# Patient Record
Sex: MALE | Race: WHITE | NOT HISPANIC OR LATINO | Employment: OTHER | URBAN - METROPOLITAN AREA
[De-identification: names, ages, dates, MRNs, and addresses within clinical notes are randomized per-mention and may not be internally consistent; named-entity substitution may affect disease eponyms.]

---

## 2017-01-12 ENCOUNTER — COMMUNICATION - HEALTHEAST (OUTPATIENT)
Dept: NURSING | Facility: CLINIC | Age: 77
End: 2017-01-12

## 2017-01-12 ENCOUNTER — AMBULATORY - HEALTHEAST (OUTPATIENT)
Dept: INTERNAL MEDICINE | Facility: CLINIC | Age: 77
End: 2017-01-12

## 2017-01-12 DIAGNOSIS — I48.20 CHRONIC ATRIAL FIBRILLATION (H): ICD-10-CM

## 2017-01-12 DIAGNOSIS — Z79.01 LONG TERM (CURRENT) USE OF ANTICOAGULANTS: ICD-10-CM

## 2017-01-17 ENCOUNTER — OFFICE VISIT - HEALTHEAST (OUTPATIENT)
Dept: INTERNAL MEDICINE | Facility: CLINIC | Age: 77
End: 2017-01-17

## 2017-01-17 ENCOUNTER — RECORDS - HEALTHEAST (OUTPATIENT)
Dept: ADMINISTRATIVE | Facility: OTHER | Age: 77
End: 2017-01-17

## 2017-01-17 DIAGNOSIS — Z00.00 ROUTINE GENERAL MEDICAL EXAMINATION AT A HEALTH CARE FACILITY: ICD-10-CM

## 2017-01-17 LAB
CHOLEST SERPL-MCNC: 136 MG/DL
FASTING STATUS PATIENT QL REPORTED: YES
HDLC SERPL-MCNC: 46 MG/DL
LDLC SERPL CALC-MCNC: 63 MG/DL
PSA SERPL-MCNC: 0.9 NG/ML (ref 0–6.5)
TRIGL SERPL-MCNC: 135 MG/DL

## 2017-01-17 ASSESSMENT — MIFFLIN-ST. JEOR: SCORE: 1469.32

## 2017-01-18 ENCOUNTER — RECORDS - HEALTHEAST (OUTPATIENT)
Dept: ADMINISTRATIVE | Facility: OTHER | Age: 77
End: 2017-01-18

## 2017-01-19 ENCOUNTER — COMMUNICATION - HEALTHEAST (OUTPATIENT)
Dept: INTERNAL MEDICINE | Facility: CLINIC | Age: 77
End: 2017-01-19

## 2017-01-24 ENCOUNTER — RECORDS - HEALTHEAST (OUTPATIENT)
Dept: ADMINISTRATIVE | Facility: OTHER | Age: 77
End: 2017-01-24

## 2017-01-25 ENCOUNTER — HOSPITAL ENCOUNTER (OUTPATIENT)
Dept: ULTRASOUND IMAGING | Facility: CLINIC | Age: 77
Discharge: HOME | End: 2017-01-25
Attending: SURGERY

## 2017-01-25 DIAGNOSIS — R59.9 ENLARGED LYMPH NODE: ICD-10-CM

## 2017-01-26 ENCOUNTER — COMMUNICATION - HEALTHEAST (OUTPATIENT)
Dept: NURSING | Facility: CLINIC | Age: 77
End: 2017-01-26

## 2017-01-26 ENCOUNTER — AMBULATORY - HEALTHEAST (OUTPATIENT)
Dept: LAB | Facility: CLINIC | Age: 77
End: 2017-01-26

## 2017-01-26 DIAGNOSIS — Z79.01 LONG TERM (CURRENT) USE OF ANTICOAGULANTS: ICD-10-CM

## 2017-01-26 DIAGNOSIS — I48.91 ATRIAL FIBRILLATION (H): ICD-10-CM

## 2017-01-26 DIAGNOSIS — I48.20 CHRONIC ATRIAL FIBRILLATION (H): ICD-10-CM

## 2017-01-31 ENCOUNTER — RECORDS - HEALTHEAST (OUTPATIENT)
Dept: ADMINISTRATIVE | Facility: OTHER | Age: 77
End: 2017-01-31

## 2017-02-07 ENCOUNTER — COMMUNICATION - HEALTHEAST (OUTPATIENT)
Dept: INTERNAL MEDICINE | Facility: CLINIC | Age: 77
End: 2017-02-07

## 2017-02-08 ENCOUNTER — COMMUNICATION - HEALTHEAST (OUTPATIENT)
Dept: INTERNAL MEDICINE | Facility: CLINIC | Age: 77
End: 2017-02-08

## 2017-02-08 DIAGNOSIS — Z79.01 LONG TERM (CURRENT) USE OF ANTICOAGULANTS: ICD-10-CM

## 2017-02-08 DIAGNOSIS — I48.20 CHRONIC ATRIAL FIBRILLATION (H): ICD-10-CM

## 2017-02-13 ENCOUNTER — COMMUNICATION - HEALTHEAST (OUTPATIENT)
Dept: INTERNAL MEDICINE | Facility: CLINIC | Age: 77
End: 2017-02-13

## 2017-02-13 DIAGNOSIS — Z79.01 LONG TERM (CURRENT) USE OF ANTICOAGULANTS: ICD-10-CM

## 2017-02-13 DIAGNOSIS — I48.20 CHRONIC ATRIAL FIBRILLATION (H): ICD-10-CM

## 2017-02-20 ENCOUNTER — COMMUNICATION - HEALTHEAST (OUTPATIENT)
Dept: SCHEDULING | Facility: CLINIC | Age: 77
End: 2017-02-20

## 2017-02-21 ENCOUNTER — COMMUNICATION - HEALTHEAST (OUTPATIENT)
Dept: INTERNAL MEDICINE | Facility: CLINIC | Age: 77
End: 2017-02-21

## 2017-02-21 ENCOUNTER — RECORDS - HEALTHEAST (OUTPATIENT)
Dept: ADMINISTRATIVE | Facility: OTHER | Age: 77
End: 2017-02-21

## 2017-02-21 DIAGNOSIS — Z79.01 LONG TERM (CURRENT) USE OF ANTICOAGULANTS: ICD-10-CM

## 2017-02-21 DIAGNOSIS — I48.20 CHRONIC ATRIAL FIBRILLATION (H): ICD-10-CM

## 2017-02-23 ENCOUNTER — RECORDS - HEALTHEAST (OUTPATIENT)
Dept: ADMINISTRATIVE | Facility: OTHER | Age: 77
End: 2017-02-23

## 2017-02-24 ENCOUNTER — COMMUNICATION - HEALTHEAST (OUTPATIENT)
Dept: NURSING | Facility: CLINIC | Age: 77
End: 2017-02-24

## 2017-02-24 ENCOUNTER — AMBULATORY - HEALTHEAST (OUTPATIENT)
Dept: LAB | Facility: CLINIC | Age: 77
End: 2017-02-24

## 2017-02-24 DIAGNOSIS — Z79.01 LONG TERM (CURRENT) USE OF ANTICOAGULANTS: ICD-10-CM

## 2017-02-24 DIAGNOSIS — I48.91 ATRIAL FIBRILLATION (H): ICD-10-CM

## 2017-02-24 DIAGNOSIS — I48.20 CHRONIC ATRIAL FIBRILLATION (H): ICD-10-CM

## 2017-03-03 ENCOUNTER — COMMUNICATION - HEALTHEAST (OUTPATIENT)
Dept: NURSING | Facility: CLINIC | Age: 77
End: 2017-03-03

## 2017-03-03 DIAGNOSIS — Z79.01 LONG TERM (CURRENT) USE OF ANTICOAGULANTS: ICD-10-CM

## 2017-03-03 DIAGNOSIS — I48.20 CHRONIC ATRIAL FIBRILLATION (H): ICD-10-CM

## 2017-03-10 ENCOUNTER — COMMUNICATION - HEALTHEAST (OUTPATIENT)
Dept: NURSING | Facility: CLINIC | Age: 77
End: 2017-03-10

## 2017-03-10 DIAGNOSIS — Z79.01 LONG TERM (CURRENT) USE OF ANTICOAGULANTS: ICD-10-CM

## 2017-03-10 DIAGNOSIS — I48.20 CHRONIC ATRIAL FIBRILLATION (H): ICD-10-CM

## 2017-03-17 ENCOUNTER — COMMUNICATION - HEALTHEAST (OUTPATIENT)
Dept: NURSING | Facility: CLINIC | Age: 77
End: 2017-03-17

## 2017-03-17 DIAGNOSIS — I48.20 CHRONIC ATRIAL FIBRILLATION (H): ICD-10-CM

## 2017-03-22 ENCOUNTER — AMBULATORY - HEALTHEAST (OUTPATIENT)
Dept: HEALTH INFORMATION MANAGEMENT | Facility: CLINIC | Age: 77
End: 2017-03-22

## 2017-04-05 ENCOUNTER — COMMUNICATION - HEALTHEAST (OUTPATIENT)
Dept: NURSING | Facility: CLINIC | Age: 77
End: 2017-04-05

## 2017-04-05 DIAGNOSIS — Z79.01 LONG TERM (CURRENT) USE OF ANTICOAGULANTS: ICD-10-CM

## 2017-04-05 DIAGNOSIS — I48.20 CHRONIC ATRIAL FIBRILLATION (H): ICD-10-CM

## 2017-04-26 ENCOUNTER — COMMUNICATION - HEALTHEAST (OUTPATIENT)
Dept: NURSING | Facility: CLINIC | Age: 77
End: 2017-04-26

## 2017-04-26 DIAGNOSIS — Z79.01 LONG TERM (CURRENT) USE OF ANTICOAGULANTS: ICD-10-CM

## 2017-04-26 DIAGNOSIS — I48.20 CHRONIC ATRIAL FIBRILLATION (H): ICD-10-CM

## 2017-05-02 ENCOUNTER — AMBULATORY - HEALTHEAST (OUTPATIENT)
Dept: INTERNAL MEDICINE | Facility: CLINIC | Age: 77
End: 2017-05-02

## 2017-05-02 ENCOUNTER — COMMUNICATION - HEALTHEAST (OUTPATIENT)
Dept: INTERNAL MEDICINE | Facility: CLINIC | Age: 77
End: 2017-05-02

## 2017-05-02 DIAGNOSIS — R52 PAIN: ICD-10-CM

## 2017-05-11 ENCOUNTER — COMMUNICATION - HEALTHEAST (OUTPATIENT)
Dept: NURSING | Facility: CLINIC | Age: 77
End: 2017-05-11

## 2017-05-11 ENCOUNTER — AMBULATORY - HEALTHEAST (OUTPATIENT)
Dept: LAB | Facility: CLINIC | Age: 77
End: 2017-05-11

## 2017-05-11 DIAGNOSIS — I48.20 CHRONIC ATRIAL FIBRILLATION (H): ICD-10-CM

## 2017-05-11 DIAGNOSIS — Z79.01 LONG TERM (CURRENT) USE OF ANTICOAGULANTS: ICD-10-CM

## 2017-05-22 ENCOUNTER — COMMUNICATION - HEALTHEAST (OUTPATIENT)
Dept: NURSING | Facility: CLINIC | Age: 77
End: 2017-05-22

## 2017-05-22 ENCOUNTER — AMBULATORY - HEALTHEAST (OUTPATIENT)
Dept: LAB | Facility: CLINIC | Age: 77
End: 2017-05-22

## 2017-05-22 DIAGNOSIS — I48.20 CHRONIC ATRIAL FIBRILLATION (H): ICD-10-CM

## 2017-05-22 DIAGNOSIS — Z79.01 LONG TERM (CURRENT) USE OF ANTICOAGULANTS: ICD-10-CM

## 2017-05-26 ENCOUNTER — COMMUNICATION - HEALTHEAST (OUTPATIENT)
Dept: INTERNAL MEDICINE | Facility: CLINIC | Age: 77
End: 2017-05-26

## 2017-05-31 ENCOUNTER — RECORDS - HEALTHEAST (OUTPATIENT)
Dept: ADMINISTRATIVE | Facility: OTHER | Age: 77
End: 2017-05-31

## 2017-06-13 ENCOUNTER — RECORDS - HEALTHEAST (OUTPATIENT)
Dept: ADMINISTRATIVE | Facility: OTHER | Age: 77
End: 2017-06-13

## 2017-06-20 ENCOUNTER — COMMUNICATION - HEALTHEAST (OUTPATIENT)
Dept: INTERNAL MEDICINE | Facility: CLINIC | Age: 77
End: 2017-06-20

## 2017-06-20 DIAGNOSIS — Z79.01 LONG TERM (CURRENT) USE OF ANTICOAGULANTS: ICD-10-CM

## 2017-06-22 ENCOUNTER — COMMUNICATION - HEALTHEAST (OUTPATIENT)
Dept: INTERNAL MEDICINE | Facility: CLINIC | Age: 77
End: 2017-06-22

## 2017-06-23 ENCOUNTER — COMMUNICATION - HEALTHEAST (OUTPATIENT)
Dept: INTERNAL MEDICINE | Facility: CLINIC | Age: 77
End: 2017-06-23

## 2017-06-23 DIAGNOSIS — Z79.01 LONG TERM (CURRENT) USE OF ANTICOAGULANTS: ICD-10-CM

## 2017-06-23 DIAGNOSIS — I48.91 ATRIAL FIBRILLATION (H): ICD-10-CM

## 2017-06-27 ENCOUNTER — OFFICE VISIT - HEALTHEAST (OUTPATIENT)
Dept: INTERNAL MEDICINE | Facility: CLINIC | Age: 77
End: 2017-06-27

## 2017-06-27 ENCOUNTER — COMMUNICATION - HEALTHEAST (OUTPATIENT)
Dept: NURSING | Facility: CLINIC | Age: 77
End: 2017-06-27

## 2017-06-27 DIAGNOSIS — I48.91 ATRIAL FIBRILLATION (H): ICD-10-CM

## 2017-06-27 DIAGNOSIS — Z79.01 LONG TERM (CURRENT) USE OF ANTICOAGULANTS: ICD-10-CM

## 2017-06-27 DIAGNOSIS — I48.20 CHRONIC ATRIAL FIBRILLATION (H): ICD-10-CM

## 2017-06-27 DIAGNOSIS — Z01.818 PREOP EXAMINATION: ICD-10-CM

## 2017-06-27 LAB
ATRIAL RATE - MUSE: 75 BPM
DIASTOLIC BLOOD PRESSURE - MUSE: NORMAL MMHG
INTERPRETATION ECG - MUSE: NORMAL
P AXIS - MUSE: 79 DEGREES
PR INTERVAL - MUSE: 224 MS
QRS DURATION - MUSE: 74 MS
QT - MUSE: 410 MS
QTC - MUSE: 457 MS
R AXIS - MUSE: -23 DEGREES
SYSTOLIC BLOOD PRESSURE - MUSE: NORMAL MMHG
T AXIS - MUSE: 16 DEGREES
VENTRICULAR RATE- MUSE: 75 BPM

## 2017-06-27 ASSESSMENT — MIFFLIN-ST. JEOR: SCORE: 1469.89

## 2017-06-29 ENCOUNTER — COMMUNICATION - HEALTHEAST (OUTPATIENT)
Dept: INTERNAL MEDICINE | Facility: CLINIC | Age: 77
End: 2017-06-29

## 2017-06-29 ENCOUNTER — RECORDS - HEALTHEAST (OUTPATIENT)
Dept: ADMINISTRATIVE | Facility: OTHER | Age: 77
End: 2017-06-29

## 2017-07-06 ENCOUNTER — AMBULATORY - HEALTHEAST (OUTPATIENT)
Dept: LAB | Facility: CLINIC | Age: 77
End: 2017-07-06

## 2017-07-06 ENCOUNTER — COMMUNICATION - HEALTHEAST (OUTPATIENT)
Dept: NURSING | Facility: CLINIC | Age: 77
End: 2017-07-06

## 2017-07-06 DIAGNOSIS — Z79.01 LONG TERM (CURRENT) USE OF ANTICOAGULANTS: ICD-10-CM

## 2017-07-06 DIAGNOSIS — I48.20 CHRONIC ATRIAL FIBRILLATION (H): ICD-10-CM

## 2017-07-10 ENCOUNTER — RECORDS - HEALTHEAST (OUTPATIENT)
Dept: ADMINISTRATIVE | Facility: OTHER | Age: 77
End: 2017-07-10

## 2017-07-17 ENCOUNTER — AMBULATORY - HEALTHEAST (OUTPATIENT)
Dept: LAB | Facility: CLINIC | Age: 77
End: 2017-07-17

## 2017-07-17 ENCOUNTER — COMMUNICATION - HEALTHEAST (OUTPATIENT)
Dept: NURSING | Facility: CLINIC | Age: 77
End: 2017-07-17

## 2017-07-17 DIAGNOSIS — I48.91 ATRIAL FIBRILLATION (H): ICD-10-CM

## 2017-07-17 DIAGNOSIS — I48.20 CHRONIC ATRIAL FIBRILLATION (H): ICD-10-CM

## 2017-07-17 DIAGNOSIS — Z79.01 LONG TERM (CURRENT) USE OF ANTICOAGULANTS: ICD-10-CM

## 2017-07-31 ENCOUNTER — AMBULATORY - HEALTHEAST (OUTPATIENT)
Dept: LAB | Facility: CLINIC | Age: 77
End: 2017-07-31

## 2017-07-31 ENCOUNTER — COMMUNICATION - HEALTHEAST (OUTPATIENT)
Dept: NURSING | Facility: CLINIC | Age: 77
End: 2017-07-31

## 2017-07-31 DIAGNOSIS — I48.20 CHRONIC ATRIAL FIBRILLATION (H): ICD-10-CM

## 2017-07-31 DIAGNOSIS — Z79.01 LONG TERM (CURRENT) USE OF ANTICOAGULANTS: ICD-10-CM

## 2017-07-31 DIAGNOSIS — I48.91 ATRIAL FIBRILLATION (H): ICD-10-CM

## 2017-08-07 ENCOUNTER — RECORDS - HEALTHEAST (OUTPATIENT)
Dept: GENERAL RADIOLOGY | Facility: CLINIC | Age: 77
End: 2017-08-07

## 2017-08-07 ENCOUNTER — COMMUNICATION - HEALTHEAST (OUTPATIENT)
Dept: NURSING | Facility: CLINIC | Age: 77
End: 2017-08-07

## 2017-08-07 ENCOUNTER — OFFICE VISIT - HEALTHEAST (OUTPATIENT)
Dept: PODIATRY | Facility: CLINIC | Age: 77
End: 2017-08-07

## 2017-08-07 ENCOUNTER — AMBULATORY - HEALTHEAST (OUTPATIENT)
Dept: LAB | Facility: CLINIC | Age: 77
End: 2017-08-07

## 2017-08-07 DIAGNOSIS — I48.20 CHRONIC ATRIAL FIBRILLATION (H): ICD-10-CM

## 2017-08-07 DIAGNOSIS — Z79.01 LONG TERM (CURRENT) USE OF ANTICOAGULANTS: ICD-10-CM

## 2017-08-07 DIAGNOSIS — M72.2 PLANTAR FASCIAL FIBROMATOSIS: ICD-10-CM

## 2017-08-07 DIAGNOSIS — M72.2 PLANTAR FASCIITIS: ICD-10-CM

## 2017-08-07 DIAGNOSIS — I48.91 ATRIAL FIBRILLATION (H): ICD-10-CM

## 2017-08-07 ASSESSMENT — MIFFLIN-ST. JEOR: SCORE: 1465.35

## 2017-08-16 ENCOUNTER — COMMUNICATION - HEALTHEAST (OUTPATIENT)
Dept: ADMINISTRATIVE | Facility: CLINIC | Age: 77
End: 2017-08-16

## 2017-09-01 ENCOUNTER — COMMUNICATION - HEALTHEAST (OUTPATIENT)
Dept: NURSING | Facility: CLINIC | Age: 77
End: 2017-09-01

## 2017-09-01 ENCOUNTER — AMBULATORY - HEALTHEAST (OUTPATIENT)
Dept: LAB | Facility: CLINIC | Age: 77
End: 2017-09-01

## 2017-09-01 DIAGNOSIS — Z79.01 LONG TERM (CURRENT) USE OF ANTICOAGULANTS: ICD-10-CM

## 2017-09-01 DIAGNOSIS — I48.20 CHRONIC ATRIAL FIBRILLATION (H): ICD-10-CM

## 2017-09-21 ENCOUNTER — COMMUNICATION - HEALTHEAST (OUTPATIENT)
Dept: INTERNAL MEDICINE | Facility: CLINIC | Age: 77
End: 2017-09-21

## 2017-09-21 ENCOUNTER — COMMUNICATION - HEALTHEAST (OUTPATIENT)
Dept: ADMINISTRATIVE | Facility: CLINIC | Age: 77
End: 2017-09-21

## 2017-09-21 DIAGNOSIS — I48.91 ATRIAL FIBRILLATION (H): ICD-10-CM

## 2017-09-21 DIAGNOSIS — Z79.01 LONG TERM (CURRENT) USE OF ANTICOAGULANTS: ICD-10-CM

## 2017-09-27 ENCOUNTER — COMMUNICATION - HEALTHEAST (OUTPATIENT)
Dept: ADMINISTRATIVE | Facility: CLINIC | Age: 77
End: 2017-09-27

## 2017-09-27 ENCOUNTER — OFFICE VISIT - HEALTHEAST (OUTPATIENT)
Dept: INTERNAL MEDICINE | Facility: CLINIC | Age: 77
End: 2017-09-27

## 2017-09-27 DIAGNOSIS — C90.00 MULTIPLE MYELOMA, REMISSION STATUS UNSPECIFIED (H): ICD-10-CM

## 2017-09-27 LAB
CHOLEST SERPL-MCNC: 169 MG/DL
FASTING STATUS PATIENT QL REPORTED: YES
HDLC SERPL-MCNC: 52 MG/DL
LDLC SERPL CALC-MCNC: 81 MG/DL
TRIGL SERPL-MCNC: 180 MG/DL

## 2017-09-27 ASSESSMENT — MIFFLIN-ST. JEOR: SCORE: 1474.61

## 2017-09-28 ENCOUNTER — COMMUNICATION - HEALTHEAST (OUTPATIENT)
Dept: INTERNAL MEDICINE | Facility: CLINIC | Age: 77
End: 2017-09-28

## 2017-10-06 ENCOUNTER — COMMUNICATION - HEALTHEAST (OUTPATIENT)
Dept: NURSING | Facility: CLINIC | Age: 77
End: 2017-10-06

## 2017-10-06 ENCOUNTER — AMBULATORY - HEALTHEAST (OUTPATIENT)
Dept: LAB | Facility: CLINIC | Age: 77
End: 2017-10-06

## 2017-10-06 DIAGNOSIS — I48.20 CHRONIC ATRIAL FIBRILLATION (H): ICD-10-CM

## 2017-10-06 DIAGNOSIS — I48.91 ATRIAL FIBRILLATION (H): ICD-10-CM

## 2017-10-10 ENCOUNTER — RECORDS - HEALTHEAST (OUTPATIENT)
Dept: ADMINISTRATIVE | Facility: OTHER | Age: 77
End: 2017-10-10

## 2017-10-19 ENCOUNTER — COMMUNICATION - HEALTHEAST (OUTPATIENT)
Dept: INTERNAL MEDICINE | Facility: CLINIC | Age: 77
End: 2017-10-19

## 2017-10-19 DIAGNOSIS — I48.91 ATRIAL FIBRILLATION (H): ICD-10-CM

## 2017-10-19 DIAGNOSIS — Z79.01 LONG TERM CURRENT USE OF ANTICOAGULANT THERAPY: ICD-10-CM

## 2017-11-02 ENCOUNTER — COMMUNICATION - HEALTHEAST (OUTPATIENT)
Dept: NURSING | Facility: CLINIC | Age: 77
End: 2017-11-02

## 2017-11-02 DIAGNOSIS — I48.91 ATRIAL FIBRILLATION (H): ICD-10-CM

## 2017-12-08 ENCOUNTER — COMMUNICATION - HEALTHEAST (OUTPATIENT)
Dept: NURSING | Facility: CLINIC | Age: 77
End: 2017-12-08

## 2017-12-12 ENCOUNTER — COMMUNICATION - HEALTHEAST (OUTPATIENT)
Dept: INTERNAL MEDICINE | Facility: CLINIC | Age: 77
End: 2017-12-12

## 2017-12-12 ENCOUNTER — COMMUNICATION - HEALTHEAST (OUTPATIENT)
Dept: NURSING | Facility: CLINIC | Age: 77
End: 2017-12-12

## 2017-12-12 ENCOUNTER — AMBULATORY - HEALTHEAST (OUTPATIENT)
Dept: LAB | Facility: CLINIC | Age: 77
End: 2017-12-12

## 2017-12-12 DIAGNOSIS — I48.91 ATRIAL FIBRILLATION (H): ICD-10-CM

## 2017-12-12 DIAGNOSIS — I48.20 CHRONIC ATRIAL FIBRILLATION (H): ICD-10-CM

## 2017-12-14 ENCOUNTER — RECORDS - HEALTHEAST (OUTPATIENT)
Dept: ADMINISTRATIVE | Facility: OTHER | Age: 77
End: 2017-12-14

## 2018-01-15 ENCOUNTER — COMMUNICATION - HEALTHEAST (OUTPATIENT)
Dept: INTERNAL MEDICINE | Facility: CLINIC | Age: 78
End: 2018-01-15

## 2018-01-16 ENCOUNTER — AMBULATORY - HEALTHEAST (OUTPATIENT)
Dept: INTERNAL MEDICINE | Facility: CLINIC | Age: 78
End: 2018-01-16

## 2018-01-16 ENCOUNTER — COMMUNICATION - HEALTHEAST (OUTPATIENT)
Dept: NURSING | Facility: CLINIC | Age: 78
End: 2018-01-16

## 2018-01-16 ENCOUNTER — OFFICE VISIT - HEALTHEAST (OUTPATIENT)
Dept: INTERNAL MEDICINE | Facility: CLINIC | Age: 78
End: 2018-01-16

## 2018-01-16 ENCOUNTER — COMMUNICATION - HEALTHEAST (OUTPATIENT)
Dept: INTERNAL MEDICINE | Facility: CLINIC | Age: 78
End: 2018-01-16

## 2018-01-16 DIAGNOSIS — Z00.00 ROUTINE GENERAL MEDICAL EXAMINATION AT A HEALTH CARE FACILITY: ICD-10-CM

## 2018-01-16 DIAGNOSIS — I48.91 ATRIAL FIBRILLATION (H): ICD-10-CM

## 2018-01-16 DIAGNOSIS — I48.20 CHRONIC ATRIAL FIBRILLATION (H): ICD-10-CM

## 2018-01-16 LAB
ALBUMIN SERPL-MCNC: 3.5 G/DL (ref 3.5–5)
ALBUMIN UR-MCNC: ABNORMAL MG/DL
ALP SERPL-CCNC: 56 U/L (ref 45–120)
ALT SERPL W P-5'-P-CCNC: 15 U/L (ref 0–45)
ANION GAP SERPL CALCULATED.3IONS-SCNC: 12 MMOL/L (ref 5–18)
APPEARANCE UR: CLEAR
AST SERPL W P-5'-P-CCNC: 27 U/L (ref 0–40)
BACTERIA #/AREA URNS HPF: ABNORMAL HPF
BILIRUB SERPL-MCNC: 0.3 MG/DL (ref 0–1)
BILIRUB UR QL STRIP: NEGATIVE
BUN SERPL-MCNC: 32 MG/DL (ref 8–28)
CALCIUM SERPL-MCNC: 8.9 MG/DL (ref 8.5–10.5)
CHLORIDE BLD-SCNC: 107 MMOL/L (ref 98–107)
CHOLEST SERPL-MCNC: 161 MG/DL
CO2 SERPL-SCNC: 21 MMOL/L (ref 22–31)
COLOR UR AUTO: YELLOW
CREAT SERPL-MCNC: 1.64 MG/DL (ref 0.7–1.3)
ERYTHROCYTE [DISTWIDTH] IN BLOOD BY AUTOMATED COUNT: 12.6 % (ref 11–14.5)
FASTING STATUS PATIENT QL REPORTED: YES
GFR SERPL CREATININE-BSD FRML MDRD: 41 ML/MIN/1.73M2
GLUCOSE BLD-MCNC: 102 MG/DL (ref 70–125)
GLUCOSE UR STRIP-MCNC: NEGATIVE MG/DL
HCT VFR BLD AUTO: 38.7 % (ref 40–54)
HDLC SERPL-MCNC: 27 MG/DL
HGB BLD-MCNC: 12.9 G/DL (ref 14–18)
HGB UR QL STRIP: ABNORMAL
INR PPP: 2.5 (ref 0.9–1.1)
KETONES UR STRIP-MCNC: NEGATIVE MG/DL
LDLC SERPL CALC-MCNC: 47 MG/DL
LDLC SERPL CALC-MCNC: ABNORMAL MG/DL
LEUKOCYTE ESTERASE UR QL STRIP: NEGATIVE
MCH RBC QN AUTO: 32 PG (ref 27–34)
MCHC RBC AUTO-ENTMCNC: 33.2 G/DL (ref 32–36)
MCV RBC AUTO: 96 FL (ref 80–100)
NITRATE UR QL: NEGATIVE
PH UR STRIP: 5.5 [PH] (ref 5–8)
PLATELET # BLD AUTO: 142 THOU/UL (ref 140–440)
PMV BLD AUTO: 8.1 FL (ref 7–10)
POTASSIUM BLD-SCNC: 4.8 MMOL/L (ref 3.5–5)
PROT SERPL-MCNC: 7.6 G/DL (ref 6–8)
PSA SERPL-MCNC: 0.9 NG/ML (ref 0–6.5)
RBC # BLD AUTO: 4.02 MILL/UL (ref 4.4–6.2)
RBC #/AREA URNS AUTO: ABNORMAL HPF
SODIUM SERPL-SCNC: 140 MMOL/L (ref 136–145)
SP GR UR STRIP: 1.02 (ref 1–1.03)
SQUAMOUS #/AREA URNS AUTO: ABNORMAL LPF
TRIGL SERPL-MCNC: 628 MG/DL
TSH SERPL DL<=0.005 MIU/L-ACNC: 4 UIU/ML (ref 0.3–5)
UROBILINOGEN UR STRIP-ACNC: ABNORMAL
WBC #/AREA URNS AUTO: ABNORMAL HPF
WBC: 4.8 THOU/UL (ref 4–11)

## 2018-01-16 ASSESSMENT — MIFFLIN-ST. JEOR: SCORE: 1452.31

## 2018-01-18 ENCOUNTER — COMMUNICATION - HEALTHEAST (OUTPATIENT)
Dept: INTERNAL MEDICINE | Facility: CLINIC | Age: 78
End: 2018-01-18

## 2018-01-19 ENCOUNTER — COMMUNICATION - HEALTHEAST (OUTPATIENT)
Dept: NURSING | Facility: CLINIC | Age: 78
End: 2018-01-19

## 2018-01-19 ENCOUNTER — AMBULATORY - HEALTHEAST (OUTPATIENT)
Dept: LAB | Facility: CLINIC | Age: 78
End: 2018-01-19

## 2018-01-19 DIAGNOSIS — I48.20 CHRONIC ATRIAL FIBRILLATION (H): ICD-10-CM

## 2018-01-19 DIAGNOSIS — I48.91 ATRIAL FIBRILLATION (H): ICD-10-CM

## 2018-01-19 LAB — INR PPP: 3.2 (ref 0.9–1.1)

## 2018-01-30 ENCOUNTER — RECORDS - HEALTHEAST (OUTPATIENT)
Dept: ADMINISTRATIVE | Facility: OTHER | Age: 78
End: 2018-01-30

## 2018-02-01 ENCOUNTER — AMBULATORY - HEALTHEAST (OUTPATIENT)
Dept: LAB | Facility: CLINIC | Age: 78
End: 2018-02-01

## 2018-02-01 ENCOUNTER — COMMUNICATION - HEALTHEAST (OUTPATIENT)
Dept: NURSING | Facility: CLINIC | Age: 78
End: 2018-02-01

## 2018-02-01 DIAGNOSIS — I48.91 ATRIAL FIBRILLATION (H): ICD-10-CM

## 2018-02-01 DIAGNOSIS — I48.20 CHRONIC ATRIAL FIBRILLATION (H): ICD-10-CM

## 2018-02-01 DIAGNOSIS — I48.19 PERSISTENT ATRIAL FIBRILLATION (H): ICD-10-CM

## 2018-02-01 LAB — INR PPP: 2.6 (ref 0.9–1.1)

## 2018-02-08 ENCOUNTER — RECORDS - HEALTHEAST (OUTPATIENT)
Dept: ADMINISTRATIVE | Facility: OTHER | Age: 78
End: 2018-02-08

## 2018-02-12 ENCOUNTER — COMMUNICATION - HEALTHEAST (OUTPATIENT)
Dept: INTERNAL MEDICINE | Facility: CLINIC | Age: 78
End: 2018-02-12

## 2018-02-12 ENCOUNTER — RECORDS - HEALTHEAST (OUTPATIENT)
Dept: ADMINISTRATIVE | Facility: OTHER | Age: 78
End: 2018-02-12

## 2018-02-12 DIAGNOSIS — Z79.01 LONG TERM CURRENT USE OF ANTICOAGULANT THERAPY: ICD-10-CM

## 2018-02-12 DIAGNOSIS — I48.91 ATRIAL FIBRILLATION (H): ICD-10-CM

## 2018-02-14 ENCOUNTER — RECORDS - HEALTHEAST (OUTPATIENT)
Dept: ADMINISTRATIVE | Facility: OTHER | Age: 78
End: 2018-02-14

## 2018-02-21 ENCOUNTER — AMBULATORY - HEALTHEAST (OUTPATIENT)
Dept: LAB | Facility: CLINIC | Age: 78
End: 2018-02-21

## 2018-02-21 ENCOUNTER — COMMUNICATION - HEALTHEAST (OUTPATIENT)
Dept: NURSING | Facility: CLINIC | Age: 78
End: 2018-02-21

## 2018-02-21 DIAGNOSIS — I48.19 PERSISTENT ATRIAL FIBRILLATION (H): ICD-10-CM

## 2018-02-21 LAB — INR PPP: 2.2 (ref 0.9–1.1)

## 2018-02-22 ENCOUNTER — RECORDS - HEALTHEAST (OUTPATIENT)
Dept: ADMINISTRATIVE | Facility: OTHER | Age: 78
End: 2018-02-22

## 2018-03-21 ENCOUNTER — COMMUNICATION - HEALTHEAST (OUTPATIENT)
Dept: INTERNAL MEDICINE | Facility: CLINIC | Age: 78
End: 2018-03-21

## 2018-03-21 DIAGNOSIS — I48.19 PERSISTENT ATRIAL FIBRILLATION (H): ICD-10-CM

## 2018-03-21 LAB — INR PPP: 2 (ref 0.9–1.1)

## 2018-04-23 ENCOUNTER — COMMUNICATION - HEALTHEAST (OUTPATIENT)
Dept: INTERNAL MEDICINE | Facility: CLINIC | Age: 78
End: 2018-04-23

## 2018-04-23 DIAGNOSIS — I48.19 PERSISTENT ATRIAL FIBRILLATION (H): ICD-10-CM

## 2018-04-23 LAB — INR PPP: 2.6 (ref 0.9–1.1)

## 2018-04-25 LAB — INR PPP: 2.7 (ref 0.9–1.1)

## 2018-05-02 ENCOUNTER — RECORDS - HEALTHEAST (OUTPATIENT)
Dept: ADMINISTRATIVE | Facility: OTHER | Age: 78
End: 2018-05-02

## 2018-05-15 ENCOUNTER — AMBULATORY - HEALTHEAST (OUTPATIENT)
Dept: LAB | Facility: CLINIC | Age: 78
End: 2018-05-15

## 2018-05-15 ENCOUNTER — COMMUNICATION - HEALTHEAST (OUTPATIENT)
Dept: ANTICOAGULATION | Facility: CLINIC | Age: 78
End: 2018-05-15

## 2018-05-15 DIAGNOSIS — I48.19 PERSISTENT ATRIAL FIBRILLATION (H): ICD-10-CM

## 2018-05-15 LAB — INR PPP: 2.2 (ref 0.9–1.1)

## 2018-06-25 ENCOUNTER — AMBULATORY - HEALTHEAST (OUTPATIENT)
Dept: LAB | Facility: CLINIC | Age: 78
End: 2018-06-25

## 2018-06-25 ENCOUNTER — COMMUNICATION - HEALTHEAST (OUTPATIENT)
Dept: ANTICOAGULATION | Facility: CLINIC | Age: 78
End: 2018-06-25

## 2018-06-25 DIAGNOSIS — I48.19 PERSISTENT ATRIAL FIBRILLATION (H): ICD-10-CM

## 2018-06-25 LAB — INR PPP: 3.1 (ref 0.9–1.1)

## 2018-07-02 ENCOUNTER — RECORDS - HEALTHEAST (OUTPATIENT)
Dept: ADMINISTRATIVE | Facility: OTHER | Age: 78
End: 2018-07-02

## 2018-07-09 ENCOUNTER — AMBULATORY - HEALTHEAST (OUTPATIENT)
Dept: LAB | Facility: CLINIC | Age: 78
End: 2018-07-09

## 2018-07-09 ENCOUNTER — COMMUNICATION - HEALTHEAST (OUTPATIENT)
Dept: ANTICOAGULATION | Facility: CLINIC | Age: 78
End: 2018-07-09

## 2018-07-09 DIAGNOSIS — I48.19 PERSISTENT ATRIAL FIBRILLATION (H): ICD-10-CM

## 2018-07-09 LAB — INR PPP: 2.6 (ref 0.9–1.1)

## 2018-07-30 ENCOUNTER — COMMUNICATION - HEALTHEAST (OUTPATIENT)
Dept: ANTICOAGULATION | Facility: CLINIC | Age: 78
End: 2018-07-30

## 2018-07-30 ENCOUNTER — AMBULATORY - HEALTHEAST (OUTPATIENT)
Dept: LAB | Facility: CLINIC | Age: 78
End: 2018-07-30

## 2018-07-30 DIAGNOSIS — I48.19 PERSISTENT ATRIAL FIBRILLATION (H): ICD-10-CM

## 2018-07-30 LAB — INR PPP: 2 (ref 0.9–1.1)

## 2018-08-06 ENCOUNTER — RECORDS - HEALTHEAST (OUTPATIENT)
Dept: ADMINISTRATIVE | Facility: OTHER | Age: 78
End: 2018-08-06

## 2018-08-10 ENCOUNTER — RECORDS - HEALTHEAST (OUTPATIENT)
Dept: ADMINISTRATIVE | Facility: OTHER | Age: 78
End: 2018-08-10

## 2018-08-27 ENCOUNTER — AMBULATORY - HEALTHEAST (OUTPATIENT)
Dept: LAB | Facility: CLINIC | Age: 78
End: 2018-08-27

## 2018-08-27 ENCOUNTER — COMMUNICATION - HEALTHEAST (OUTPATIENT)
Dept: ANTICOAGULATION | Facility: CLINIC | Age: 78
End: 2018-08-27

## 2018-08-27 DIAGNOSIS — I48.19 PERSISTENT ATRIAL FIBRILLATION (H): ICD-10-CM

## 2018-08-27 LAB — INR PPP: 2 (ref 0.9–1.1)

## 2018-09-21 ENCOUNTER — AMBULATORY - HEALTHEAST (OUTPATIENT)
Dept: LAB | Facility: CLINIC | Age: 78
End: 2018-09-21

## 2018-09-21 ENCOUNTER — COMMUNICATION - HEALTHEAST (OUTPATIENT)
Dept: ANTICOAGULATION | Facility: CLINIC | Age: 78
End: 2018-09-21

## 2018-09-21 DIAGNOSIS — I48.19 PERSISTENT ATRIAL FIBRILLATION (H): ICD-10-CM

## 2018-09-21 LAB — INR PPP: 2.66 (ref 0.9–1.1)

## 2018-10-17 ENCOUNTER — RECORDS - HEALTHEAST (OUTPATIENT)
Dept: ADMINISTRATIVE | Facility: OTHER | Age: 78
End: 2018-10-17

## 2018-10-22 ENCOUNTER — COMMUNICATION - HEALTHEAST (OUTPATIENT)
Dept: LAB | Facility: CLINIC | Age: 78
End: 2018-10-22

## 2018-10-23 ENCOUNTER — AMBULATORY - HEALTHEAST (OUTPATIENT)
Dept: NURSING | Facility: CLINIC | Age: 78
End: 2018-10-23

## 2018-10-23 ENCOUNTER — COMMUNICATION - HEALTHEAST (OUTPATIENT)
Dept: ANTICOAGULATION | Facility: CLINIC | Age: 78
End: 2018-10-23

## 2018-10-23 ENCOUNTER — AMBULATORY - HEALTHEAST (OUTPATIENT)
Dept: LAB | Facility: CLINIC | Age: 78
End: 2018-10-23

## 2018-10-23 DIAGNOSIS — Z23 NEED FOR VACCINATION: ICD-10-CM

## 2018-10-23 DIAGNOSIS — Z79.01 LONG TERM (CURRENT) USE OF ANTICOAGULANTS: ICD-10-CM

## 2018-10-23 DIAGNOSIS — I48.19 PERSISTENT ATRIAL FIBRILLATION (H): ICD-10-CM

## 2018-10-23 LAB — INR PPP: 1.6 (ref 0.9–1.1)

## 2018-10-24 ENCOUNTER — COMMUNICATION - HEALTHEAST (OUTPATIENT)
Dept: ANTICOAGULATION | Facility: CLINIC | Age: 78
End: 2018-10-24

## 2018-10-24 DIAGNOSIS — Z79.01 LONG TERM (CURRENT) USE OF ANTICOAGULANTS: ICD-10-CM

## 2018-10-24 DIAGNOSIS — I48.19 PERSISTENT ATRIAL FIBRILLATION (H): ICD-10-CM

## 2018-10-31 ENCOUNTER — APPOINTMENT (RX ONLY)
Dept: URBAN - METROPOLITAN AREA CLINIC 116 | Facility: CLINIC | Age: 78
Setting detail: DERMATOLOGY
End: 2018-10-31

## 2018-10-31 DIAGNOSIS — Z01.818 ENCOUNTER FOR OTHER PREPROCEDURAL EXAMINATION: ICD-10-CM

## 2018-10-31 PROCEDURE — ? OTHER

## 2018-10-31 PROCEDURE — ? COUNSELING

## 2018-10-31 NOTE — PROCEDURE: OTHER
Note Text (......Xxx Chief Complaint.): This diagnosis correlates with the
Detail Level: Zone
Other (Free Text): Pink Low right antihelix\\nMohs 11/8/18 Dr. Ericka Mann

## 2018-11-06 ENCOUNTER — COMMUNICATION - HEALTHEAST (OUTPATIENT)
Dept: INTERNAL MEDICINE | Facility: CLINIC | Age: 78
End: 2018-11-06

## 2018-11-08 ENCOUNTER — APPOINTMENT (RX ONLY)
Dept: URBAN - METROPOLITAN AREA CLINIC 116 | Facility: CLINIC | Age: 78
Setting detail: DERMATOLOGY
End: 2018-11-08

## 2018-11-08 PROBLEM — Z85.828 PERSONAL HISTORY OF OTHER MALIGNANT NEOPLASM OF SKIN: Status: ACTIVE | Noted: 2018-11-08

## 2018-11-08 PROBLEM — L57.0 ACTINIC KERATOSIS: Status: ACTIVE | Noted: 2018-11-08

## 2018-11-08 PROBLEM — C44.222 SQUAMOUS CELL CARCINOMA OF SKIN OF RIGHT EAR AND EXTERNAL AURICULAR CANAL: Status: ACTIVE | Noted: 2018-11-08

## 2018-11-08 PROBLEM — I48.91 UNSPECIFIED ATRIAL FIBRILLATION: Status: ACTIVE | Noted: 2018-11-08

## 2018-11-08 PROBLEM — H91.90 UNSPECIFIED HEARING LOSS, UNSPECIFIED EAR: Status: ACTIVE | Noted: 2018-11-08

## 2018-11-08 PROCEDURE — 17311 MOHS 1 STAGE H/N/HF/G: CPT

## 2018-11-08 PROCEDURE — 17312 MOHS ADDL STAGE: CPT

## 2018-11-08 PROCEDURE — ? REFERRAL CORRESPONDENCE

## 2018-11-08 PROCEDURE — ? MOHS SURGERY

## 2018-11-08 NOTE — PROCEDURE: MOHS SURGERY
Stage 14: Number Of Blocks?: 0
Graft Cartilage Fenestration Text: The cartilage was fenestrated with a 2mm punch biopsy to help facilitate graft survival and healing.
Hatchet Flap Text: The defect edges were debeveled with a #15 scalpel blade. Given the location of the defect, shape of the defect and the proximity to free margins a hatchet flap was deemed most appropriate. Using a sterile surgical marker, an appropriate hatchet flap was drawn incorporating the defect and placing the expected incisions within the relaxed skin tension lines where possible. The area thus outlined was incised deep to adipose tissue with a #15 scalpel blade. The skin margins were undermined to an appropriate distance in all directions utilizing iris scissors.
Wound Care (No Sutures): Petrolatum
Full Thickness Lip Wedge Repair (Flap) Text: Given the location of the defect and the proximity to free margins a full thickness wedge repair was deemed most appropriate. Using a sterile surgical marker, the appropriate repair was drawn incorporating the defect and placing the expected incisions perpendicular to the vermillion border. The vermillion border was also meticulously outlined to ensure appropriate reapproximation during the repair. The area thus outlined was incised through and through with a #15 scalpel blade. The muscularis and dermis were reaproximated with deep sutures following hemostasis. Care was taken to realign the vermillion border before proceeding with the superficial closure. Once the vermillion was realigned the superfical and mucosal closure was finished.
Provider Procedure Text (B): After obtaining clear surgical margins the defect was repaired by another provider.
Mixed Nodular And Infiltrative Bcc Histology Text: There are narrow strands of spiky, irregular basal cell carcinoma cells infiltrating between collagen bundles with mucin-rich stroma
Complex Repair And Flap Additional Text (Will Appearing After The Standard Complex Repair Text): The complex repair was not sufficient to completely close the primary defect. The remaining additional defect was repaired with the flap mentioned below.
Stage 9: Additional Anesthesia Type: 1% lidocaine with epinephrine
Anesthesia Type: 1% lidocaine with epinephrine and a 1:10 solution of 8.4% sodium bicarbonate
Number Of Stages: 2
Tarsorrhaphy Performed?: No
Scc Ka Subtype Histology Text: There is well-differentiated squamous epithelium with pleomorphism and masses of keratin.
Epidermal Closure: none-secondary intention
Surgical Defect Width In Cm (Optional): 1.5
Show Home Suture Removal Variable: Yes
Melolabial Interpolation Flap Text: A decision was made to reconstruct the defect utilizing an interpolation axial flap and a staged reconstruction. A telfa template was made of the defect. This telfa template was then used to outline the melolabial interpolation flap. The donor area for the pedicle flap was then injected with anesthesia. The flap was excised through the skin and subcutaneous tissue down to the layer of the underlying musculature. The pedicle flap was carefully excised within this deep plane to maintain its blood supply. The edges of the donor site were undermined. The donor site was closed in a primary fashion. The pedicle was then rotated into position and sutured. Once the tube was sutured into place, adequate blood supply was confirmed with blanching and refill. The pedicle was then wrapped with xeroform gauze and dressed appropriately with a telfa and gauze bandage to ensure continued blood supply and protect the attached pedicle.
Unna Boot Text: An Unna boot was placed to help immobilize the limb and facilitate more rapid healing.
Complex Repair Preamble Text (Leave Blank If You Do Not Want): Extensive wide undermining was performed.
Bcc Micronodular Histology Text: Beneath an irregular epidermis, there are extremely small but infiltrative nodular masses of basaloid neoplastic cells with nuclear pleomorphism attached to the basal layer and surrounded by a loose fibrous stroma and mild inflammation in the dermis. The findings are typical for a basal cell carcinoma.
Asc Procedure Text (F): After obtaining clear surgical margins the patient was sent to an [facility] for surgical repair. The patient understands they will receive post-surgical care and follow-up from the [facility] physician.
Consent (Lip)/Introductory Paragraph: The rationale for Mohs was explained to the patient and consent was obtained. The risks, benefits and alternatives to therapy were discussed in detail. Specifically, the risks of lip deformity, changes in the oral aperture, infection, scarring, bleeding, prolonged wound healing, incomplete removal, allergy to anesthesia, nerve injury and recurrence were addressed. Prior to the procedure, the treatment site was clearly identified and confirmed by the patient. All components of Universal Protocol/PAUSE Rule completed.
Asc Procedure Text (C): After obtaining clear surgical margins the patient was sent to an [facility] for surgical repair.  The patient understands they will receive post-surgical care and follow-up from the ASC physician.
Consent 3/Introductory Paragraph: I gave the patient a chance to ask questions they had about the procedure. Following this I explained the Mohs procedure and consent was obtained. The risks, benefits and alternatives to therapy were discussed in detail. Specifically, the risks of infection, scarring, bleeding, prolonged wound healing, incomplete removal, allergy to anesthesia, nerve injury and recurrence were addressed. Prior to the procedure, the treatment site was clearly identified and confirmed by the patient. All components of Universal Protocol/PAUSE Rule completed.
Mohs Histo Method Verbiage: Each section was then chromacoded and processed in the Mohs lab using the Mohs protocol and submitted for frozen section.
Posterior Auricular Interpolation Flap Text: A decision was made to reconstruct the defect utilizing an interpolation axial flap and a staged reconstruction. A telfa template was made of the defect. This telfa template was then used to outline the posterior auricular interpolation flap. The donor area for the pedicle flap was then injected with anesthesia. The flap was excised through the skin and subcutaneous tissue down to the layer of the underlying musculature. The pedicle flap was carefully excised within this deep plane to maintain its blood supply. The edges of the donor site were undermined. The donor site was closed in a primary fashion. The pedicle was then rotated into position and sutured. Once the tube was sutured into place, adequate blood supply was confirmed with blanching and refill. The pedicle was then wrapped with xeroform gauze and dressed appropriately with a telfa and gauze bandage to ensure continued blood supply and protect the attached pedicle.
Body Location Override (Optional - Billing Will Still Be Based On Selected Body Map Location If Applicable): Right antihelix
Surgeon Performing Repair (Optional): Dr Yolanda Greenfield
W Plasty Text: The lesion was extirpated to the level of the fat with a #15 scalpel blade. Given the location of the defect, shape of the defect and the proximity to free margins a W-plasty was deemed most appropriate for repair. Using a sterile surgical marker, the appropriate transposition arms of the W-plasty were drawn incorporating the defect and placing the expected incisions within the relaxed skin tension lines where possible. The area thus outlined was incised deep to adipose tissue with a #15 scalpel blade. The skin margins were undermined to an appropriate distance in all directions utilizing iris scissors. The opposing transposition arms were then transposed into place in opposite direction and anchored with interrupted buried subcutaneous sutures.
Wound Care: Vaseline
O-Z Plasty Text: The defect edges were debeveled with a #15 scalpel blade. Given the location of the defect, shape of the defect and the proximity to free margins an O-Z plasty (double transposition flap) was deemed most appropriate. Using a sterile surgical marker, the appropriate transposition flaps were drawn incorporating the defect and placing the expected incisions within the relaxed skin tension lines where possible. The area thus outlined was incised deep to adipose tissue with a #15 scalpel blade. The skin margins were undermined to an appropriate distance in all directions utilizing iris scissors. Hemostasis was achieved with electrocautery. The flaps were then transposed into place, one clockwise and the other counterclockwise, and anchored with interrupted buried subcutaneous sutures.
Bilobed Transposition Flap Text: The defect edges were debeveled with a #15 scalpel blade. Given the location of the defect and the proximity to free margins a bilobed transposition flap was deemed most appropriate. Using a sterile surgical marker, an appropriate bilobe flap drawn around the defect. The area thus outlined was incised deep to adipose tissue with a #15 scalpel blade. The skin margins were undermined to an appropriate distance in all directions utilizing iris scissors.
Bcc Infiltrative Histology Text: There were numerous aggregates of basaloid cells demonstrating an infiltrative pattern.
Keystone Flap Text: The defect edges were debeveled with a #15 scalpel blade. Given the location of the defect, shape of the defect a keystone flap was deemed most appropriate. Using a sterile surgical marker, an appropriate keystone flap was drawn incorporating the defect, outlining the appropriate donor tissue and placing the expected incisions within the relaxed skin tension lines where possible. The area thus outlined was incised deep to adipose tissue with a #15 scalpel blade. The skin margins were undermined to an appropriate distance in all directions around the primary defect and laterally outward around the flap utilizing iris scissors.
O-T Advancement Flap Text: The defect edges were debeveled with a #15 scalpel blade. Given the location of the defect, shape of the defect and the proximity to free margins an O-T advancement flap was deemed most appropriate. Using a sterile surgical marker, an appropriate advancement flap was drawn incorporating the defect and placing the expected incisions within the relaxed skin tension lines where possible. The area thus outlined was incised deep to adipose tissue with a #15 scalpel blade. The skin margins were undermined to an appropriate distance in all directions utilizing iris scissors.
Oculoplastic Surgeon Procedure Text (F): After obtaining clear surgical margins the patient was sent to oculoplastics for surgical repair. The patient understands they will receive post-surgical care and follow-up from the referring physician's office.
Composite Graft Text: The defect edges were debeveled with a #15 scalpel blade. Given the location of the defect, shape of the defect, the proximity to free margins and the fact the defect was full thickness a composite graft was deemed most appropriate. The defect was outline and then transferred to the donor site. A full thickness graft was then excised from the donor site. The graft was then placed in the primary defect, oriented appropriately and then sutured into place. The secondary defect was then repaired using a primary closure.
Crescentic Intermediate Repair Preamble Text (Leave Blank If You Do Not Want): Undermining was performed with blunt dissection.
Afx Histology Text: Bizarre multinucleated tumor cells in hypercellular, spindly stroma with frequent mitotic figures. There are also smaller fibroblastic cells with pleomorphism and angulated nuclei confined to the dermis.
Surgeon: Rakesh Silva M.D.
Bcc Pigmented Histology Text: Functional melanocytes are scattered through the basal cell carcinoma tumor islands and there are numerous melanophages in the stroma
Ear Star Wedge Flap Text: The defect edges were debeveled with a #15 blade scalpel. Given the location of the defect and the proximity to free margins (helical rim) an ear star wedge flap was deemed most appropriate. Using a sterile surgical marker, the appropriate flap was drawn incorporating the defect and placing the expected incisions between the helical rim and antihelix where possible. The area thus outlined was incised through and through with a #15 scalpel blade.
Date Of Previous Biopsy (Optional): 10/17/18
Closure 4 Information: This tab is for additional flaps and grafts above and beyond our usual structured repairs. Please note if you enter information here it will not currently bill and you will need to add the billing information manually.
Z Plasty Text: The lesion was extirpated to the level of the fat with a #15 scalpel blade. Given the location of the defect, shape of the defect and the proximity to free margins a Z-plasty was deemed most appropriate for repair. Using a sterile surgical marker, the appropriate transposition arms of the Z-plasty were drawn incorporating the defect and placing the expected incisions within the relaxed skin tension lines where possible. The area thus outlined was incised deep to adipose tissue with a #15 scalpel blade. The skin margins were undermined to an appropriate distance in all directions utilizing iris scissors. The opposing transposition arms were then transposed into place in opposite direction and anchored with interrupted buried subcutaneous sutures.
Mid-Level Procedure Text (C): After obtaining clear surgical margins the patient was sent to a mid-level provider for surgical repair. The patient understands they will receive post-surgical care and follow-up from the mid-level provider.
No Residual Tumor Seen Histology Text: There were no malignant cells seen in the sections examined.
Transposition Flap Text: After a lengthy discussion with the patient regarding the wound treatment reconstruction options available including but not limited to simple repair, intermediate repair, complex repair, adjacent tissue transfer, tissue graft as well as allowing the lesion to repair by secondary intention. It was determined that due to the defect location, complexity, and given indications; an adjacent tissue transfer (transposition flap) would be the most appropriate means for repair of the surgical defect as the defect extended through the skin into the subcutaneous tissues, and required rearrangement or transfer of adjacent tissue to repair the surgical wound. \\n\\n\\n\\n\\nThe defect edges were debeveled with a #15 scalpel blade. Given the location of the defect and the proximity to free margins a transposition flap was deemed most appropriate. Using a sterile surgical marker, an appropriate transposition flap was drawn incorporating the defect. The area thus outlined was incised deep to adipose tissue with a #15 scalpel blade. The skin margins were undermined to an appropriate distance in all directions utilizing iris scissors.
Burow's Advancement Flap Text: The defect edges were debeveled with a #15 scalpel blade. Given the location of the defect and the proximity to free margins a Burow's advancement flap was deemed most appropriate. Using a sterile surgical marker, the appropriate advancement flap was drawn incorporating the defect and placing the expected incisions within the relaxed skin tension lines where possible. The area thus outlined was incised deep to adipose tissue with a #15 scalpel blade. The skin margins were undermined to an appropriate distance in all directions utilizing iris scissors.
Post-Care Instructions: WOUND CARE INSTRUCTIONS\\n\\nI reviewed the post-care instructions with the patient in detail. \\n\\nKeep our initial bandage on and dry for 48-72 hours as directed. After 48-72 hours,\\n\\n1. Cleanse the wound with peroxide gently. If there is a lot of crusting/scabbing, soak the site with peroxide to soften. \\n2. Apply a generous amount of Vaseline to the surgical site. DO NOT use Neosporin. \\n3. Cover the wound with a dressing. You can use a non-stick pad with paper tape or regular bandages. \\n4. Repeat twice daily, once in the morning, once in the evening. \\n\\n\\nPAIN [id number]\\T\\I9. It is common to experience swelling, bruising, and drainage after surgery. To decrease pain, use extra strength Tylenol as directed on the bottle. Please do not use ibuprofen, Aleve, Motrin, or Excedrin as they may worsen bleeding. If Tylenol does not help with your pain, please call our office. Certain pain medications may require you to come to the office to  an actual paper prescription. Other intermediate (non-narcotic) strength pain medications may be called in for you if necessary. \\n\\n2. To decrease pain, patients can also try using an ice pack, a bag of frozen green peas, or a bag of frozen marshmellows. Place the ice pack on top of the bandage for 20-30 minutes, then take a break for 20-30 minutes (place the ice pack back in the freezer to get it cold again). Repeat as many times as necessary. \\n\\n\\nBLEEDING CONTROL\\n\\nIf bleeding should occur, apply firm direct pressure to the site for 30 minutes without easing up. If bleeding continues after 30 minutes, please call the office at any time. In rare instances, it may be necessary to go to the nearest emergency room. \\n\\n\\nMISCELLANEOUS INFORMATION\\n\\nThings to avoid while healing:\\n - NO heavy lifting, exercise, or swimming for the next 14 days. \\n - NO exposure to tap water for the next 48-72 hours. \\n - NO exposure to hot tub, swimming pool, or ocean water for the next 14 days. \\n\\n\\nCONTACT INFORMATION\\n\\nShould you have any questions or concerns, please call:\\n\\n1. [address] [facility] Location = [phone]\\n\\n2.  [address] = [phone]
Dermal Autograft Text: The defect edges were debeveled with a #15 scalpel blade. Given the location of the defect, shape of the defect and the proximity to free margins a dermal autograft was deemed most appropriate. Using a sterile surgical marker, the primary defect shape was transferred to the donor site. The area thus outlined was incised deep to adipose tissue with a #15 scalpel blade. The harvested graft was then trimmed of adipose and epidermal tissue until only dermis was left. The skin graft was then placed in the primary defect and oriented appropriately.
Referred To Oculoplastics For Closure Text (Leave Blank If You Do Not Want): After obtaining clear surgical margins the patient was sent to oculoplastics for surgical repair.  The patient understands they will receive post-surgical care and follow-up from the referring physician's office.
Island Pedicle Flap With Canthal Suspension Text: The defect edges were debeveled with a #15 scalpel blade. Given the location of the defect, shape of the defect and the proximity to free margins an island pedicle advancement flap was deemed most appropriate. Using a sterile surgical marker, an appropriate advancement flap was drawn incorporating the defect, outlining the appropriate donor tissue and placing the expected incisions within the relaxed skin tension lines where possible. The area thus outlined was incised deep to adipose tissue with a #15 scalpel blade. The skin margins were undermined to an appropriate distance in all directions around the primary defect and laterally outward around the island pedicle utilizing iris scissors. There was minimal undermining beneath the pedicle flap. A suspension suture was placed in the canthal tendon to prevent tension and prevent ectropion.
Otolaryngologist Procedure Text (C): After obtaining clear surgical margins the patient was sent to otolaryngology for surgical repair. The patient understands they will receive post-surgical care and follow-up from the referring physician's office.
Plastic Surgeon Procedure Text (A): After obtaining clear surgical margins the patient was sent to plastics for surgical repair. The patient understands they will receive post-surgical care and follow-up from the referring physician's office.
Purse String (Intermediate) Text: Given the location of the defect and the characteristics of the surrounding skin a pursestring intermediate closure was deemed most appropriate. Undermining was performed circumfirentially around the surgical defect. A purstring suture was then placed and tightened.
Bcc Infundibulocystic Histology Text: Beneath an irregular epidermis, there are small nodular masses  of basaloid neoplastic cells aggregating in a cystic formation with nuclear pleomorphism attached to the basal layer and surrounded by a loose fibrous stroma and mild inflammation in the dermis. The findings are typical for a basal cell carcinoma.
Information: Selecting Yes will display possible errors in your note based on the variables you have selected. This validation is only offered as a suggestion for you. PLEASE NOTE THAT THE VALIDATION TEXT WILL BE REMOVED WHEN YOU FINALIZE YOUR NOTE. IF YOU WANT TO FAX A PRELIMINARY NOTE YOU WILL NEED TO TOGGLE THIS TO 'NO' IF YOU DO NOT WANT IT IN YOUR FAXED NOTE.
Mauc Instructions: By selecting yes to the question below the [facility] number will be added into the note. This will be calculated automatically based on the diagnosis chosen, the size entered, the body zone selected (H,M,L) and the specific indications you chose. You will also have the option to override the Mohs AUC if you disagree with the automatically calculated number and this option is found in the Case Summary tab.
Xenograft Text: The defect edges were debeveled with a #15 scalpel blade. Given the location of the defect, shape of the defect and the proximity to free margins a xenograft was deemed most appropriate. The graft was then trimmed to fit the size of the defect. The graft was then placed in the primary defect and oriented appropriately.
Scc Moderately Differentiated Histology Text: There are nests of squamous epithelial cells arising from the epidermis and extending into the dermis. The malignant cells are large with abundant eosinophilic cytoplasm and a large nucleus. Keratin pearls are also present.
S Plasty Text: Given the location and shape of the defect, and the orientation of relaxed skin tension lines, an S-plasty was deemed most appropriate for repair. Using a sterile surgical marker, the appropriate outline of the S-plasty was drawn, incorporating the defect and placing the expected incisions within the relaxed skin tension lines where possible. The area thus outlined was incised deep to adipose tissue with a #15 scalpel blade. The skin margins were undermined to an appropriate distance in all directions utilizing iris scissors. The skin flaps were advanced over the defect. The opposing margins were then approximated with interrupted buried subcutaneous sutures.
Consent Type: Consent 1 (Standard)
Partial Purse String (Simple) Text: Given the location of the defect and the characteristics of the surrounding skin a simple purse string closure was deemed most appropriate. Undermining was performed circumfirentially around the surgical defect. A purse string suture was then placed and tightened. Wound tension only allowed a partial closure of the circular defect.
Surgical Defect Length In Cm (Optional): 1.1
Ftsg Text: The defect edges were debeveled with a #15 scalpel blade. Given the location of the defect, shape of the defect and the proximity to free margins a full thickness skin graft was deemed most appropriate. Using a sterile surgical marker, the primary defect shape was transferred to the donor site. The area thus outlined was incised deep to adipose tissue with a #15 scalpel blade. The harvested graft was then trimmed of adipose tissue until only dermis and epidermis was left. The skin margins of the secondary defect were undermined to an appropriate distance in all directions utilizing iris scissors. The secondary defect was closed with interrupted buried subcutaneous sutures. The skin edges were then re-apposed with running  sutures. The skin graft was then placed in the primary defect and oriented appropriately.
Cheiloplasty (Complex) Text: A decision was made to reconstruct the defect with a  cheiloplasty. The defect was undermined extensively. Additional obicularis oris muscle was excised with a 15 blade scalpel. The defect was converted into a full thickness wedge to facilite a better cosmetic result. Small vessels were then tied off with 5-0 monocyrl. The obicularis oris, superficial fascia, adipose and dermis were then reapproximated. After the deeper layers were approximated the epidermis was reapproximated with particular care given to realign the vermillion border.
Advancement-Rotation Flap Text: The defect edges were debeveled with a #15 scalpel blade. Given the location of the defect, shape of the defect and the proximity to free margins an advancement-rotation flap was deemed most appropriate. Using a sterile surgical marker, an appropriate flap was drawn incorporating the defect and placing the expected incisions within the relaxed skin tension lines where possible. The area thus outlined was incised deep to adipose tissue with a #15 scalpel blade. The skin margins were undermined to an appropriate distance in all directions utilizing iris scissors.
Consent 2/Introductory Paragraph: Mohs surgery was explained to the patient and consent was obtained. The risks, benefits and alternatives to therapy were discussed in detail. Specifically, the risks of infection, scarring, bleeding, prolonged wound healing, incomplete removal, allergy to anesthesia, nerve injury and recurrence were addressed. Prior to the procedure, the treatment site was clearly identified and confirmed by the patient. All components of Universal Protocol/PAUSE Rule completed.
A-T Advancement Flap Text: The defect edges were debeveled with a #15 scalpel blade. Given the location of the defect, shape of the defect and the proximity to free margins an A-T advancement flap was deemed most appropriate. Using a sterile surgical marker, an appropriate advancement flap was drawn incorporating the defect and placing the expected incisions within the relaxed skin tension lines where possible. The area thus outlined was incised deep to adipose tissue with a #15 scalpel blade. The skin margins were undermined to an appropriate distance in all directions utilizing iris scissors.
Rhombic Flap Text: The defect edges were debeveled with a #15 scalpel blade. Given the location of the defect and the proximity to free margins a rhombic flap was deemed most appropriate. Using a sterile surgical marker, an appropriate rhombic flap was drawn incorporating the defect. The area thus outlined was incised deep to adipose tissue with a #15 scalpel blade. The skin margins were undermined to an appropriate distance in all directions utilizing iris scissors.
Estimated Blood Loss (Cc): minimal
Rotation Flap Text: After a lengthy discussion with the patient regarding the wound treatment reconstruction options available including but not limited to simple repair, intermediate repair, complex repair, adjacent tissue transfer, tissue graft as well as allowing the lesion to repair by secondary intention. It was determined that due to the defect location, complexity, and given indications; an adjacent tissue transfer (rotation flap) would be the most appropriate means for repair of the surgical defect as the defect extended through the skin into the subcutaneous tissues, and required rearrangement or transfer of adjacent tissue to repair the surgical wound. \\n\\n\\n\\n\\nThe defect edges were debeveled with a #15 scalpel blade. Given the location of the defect, shape of the defect and the proximity to free margins a rotation flap was deemed most appropriate. Using a sterile surgical marker, an appropriate rotation flap was drawn incorporating the defect and placing the expected incisions within the relaxed skin tension lines where possible. The area thus outlined was incised deep to adipose tissue with a #15 scalpel blade. The skin margins were undermined to an appropriate distance in all directions utilizing iris scissors.
Stage 2: Number Of Blocks?: 1
Tarsorrhaphy Text: A tarsorrhaphy was performed using Frost sutures.
Plastic Surgeon Procedure Text (E): After obtaining clear surgical margins the patient was sent to plastics for surgical repair.  The patient understands they will receive post-surgical care and follow-up from the referring physician's office.
Mohs Rapid Report Verbiage: The area of clinically evident tumor was marked with skin marking ink and appropriately hatched. The initial incision was made following the Mohs approach through the skin. The specimen was taken to the lab, divided into the necessary number of pieces, chromacoded and processed according to the Mohs protocol. This was repeated in successive stages until a tumor free defect was achieved.
Consent (Near Eyelid Margin)/Introductory Paragraph: The rationale for Mohs was explained to the patient and consent was obtained. The risks, benefits and alternatives to therapy were discussed in detail. Specifically, the risks of ectropion or eyelid deformity, infection, scarring, bleeding, prolonged wound healing, incomplete removal, allergy to anesthesia, nerve injury and recurrence were addressed. Prior to the procedure, the treatment site was clearly identified and confirmed by the patient. All components of Universal Protocol/PAUSE Rule completed.
Bilobed Flap Text: The defect edges were debeveled with a #15 scalpel blade. Given the location of the defect and the proximity to free margins a bilobe flap was deemed most appropriate. Using a sterile surgical marker, an appropriate bilobe flap drawn around the defect. The area thus outlined was incised deep to adipose tissue with a #15 scalpel blade. The skin margins were undermined to an appropriate distance in all directions utilizing iris scissors.
Graft Donor Site Epidermal Sutures (Optional): 6-0 Prolene
Consent (Scalp)/Introductory Paragraph: The rationale for Mohs was explained to the patient and consent was obtained. The risks, benefits and alternatives to therapy were discussed in detail. Specifically, the risks of changes in hair growth pattern secondary to repair, infection, scarring, bleeding, prolonged wound healing, incomplete removal, allergy to anesthesia, nerve injury and recurrence were addressed. Prior to the procedure, the treatment site was clearly identified and confirmed by the patient. All components of Universal Protocol/PAUSE Rule completed.
Interpolation Flap Text: A decision was made to reconstruct the defect utilizing an interpolation axial flap and a staged reconstruction. A telfa template was made of the defect. This telfa template was then used to outline the interpolation flap. The donor area for the pedicle flap was then injected with anesthesia. The flap was excised through the skin and subcutaneous tissue down to the layer of the underlying musculature. The interpolation flap was carefully excised within this deep plane to maintain its blood supply. The edges of the donor site were undermined. The donor site was closed in a primary fashion. The pedicle was then rotated into position and sutured. Once the tube was sutured into place, adequate blood supply was confirmed with blanching and refill. The pedicle was then wrapped with xeroform gauze and dressed appropriately with a telfa and gauze bandage to ensure continued blood supply and protect the attached pedicle.
Crescentic Advancement Flap Text: The defect edges were debeveled with a #15 scalpel blade. Given the location of the defect and the proximity to free margins a crescentic advancement flap was deemed most appropriate. Using a sterile surgical marker, the appropriate advancement flap was drawn incorporating the defect and placing the expected incisions within the relaxed skin tension lines where possible. The area thus outlined was incised deep to adipose tissue with a #15 scalpel blade. The skin margins were undermined to an appropriate distance in all directions utilizing iris scissors.
Bcc  Nodulocystic Histology Text: Beneath an irregular epidermis, there are small nodular masses of basaloid neoplastic cells aggregating in a cystic formation with nuclear pleomorphism attached to the basal layer and surrounded by a loose fibrous stroma and mild inflammation in the dermis. The findings are typical for a basal cell carcinoma.
Referring Physician (Optional): Dr. Casa Vora
Epidermal Autograft Text: The defect edges were debeveled with a #15 scalpel blade. Given the location of the defect, shape of the defect and the proximity to free margins an epidermal autograft was deemed most appropriate. Using a sterile surgical marker, the primary defect shape was transferred to the donor site. The epidermal graft was then harvested. The skin graft was then placed in the primary defect and oriented appropriately.
Presence Of Scar Tissue (For Histology): absent
Anesthesia Volume In Cc: 3
Star Wedge Flap Text: The defect edges were debeveled with a #15 scalpel blade. Given the location of the defect, shape of the defect and the proximity to free margins a star wedge flap was deemed most appropriate. Using a sterile surgical marker, an appropriate rotation flap was drawn incorporating the defect and placing the expected incisions within the relaxed skin tension lines where possible. The area thus outlined was incised deep to adipose tissue with a #15 scalpel blade. The skin margins were undermined to an appropriate distance in all directions utilizing iris scissors.
No Repair - Repaired With Adjacent Surgical Defect Text (Leave Blank If You Do Not Want): After obtaining clear surgical margins the defect was repaired concurrently with another surgical defect which was in close approximation.
Mid-Level Procedure Text (B): After obtaining clear surgical margins the patient was sent to a mid-level provider for surgical repair.  The patient understands they will receive post-surgical care and follow-up from the mid-level provider.
Consent (Spinal Accessory)/Introductory Paragraph: The rationale for Mohs was explained to the patient and consent was obtained. The risks, benefits and alternatives to therapy were discussed in detail. Specifically, the risks of damage to the spinal accessory nerve, infection, scarring, bleeding, prolonged wound healing, incomplete removal, allergy to anesthesia, and recurrence were addressed. Prior to the procedure, the treatment site was clearly identified and confirmed by the patient. All components of Universal Protocol/PAUSE Rule completed.
Alar Island Pedicle Flap Text: The defect edges were debeveled with a #15 scalpel blade. Given the location of the defect, shape of the defect and the proximity to the alar rim an island pedicle advancement flap was deemed most appropriate. Using a sterile surgical marker, an appropriate advancement flap was drawn incorporating the defect, outlining the appropriate donor tissue and placing the expected incisions within the nasal ala running parallel to the alar rim. The area thus outlined was incised with a #15 scalpel blade. The skin margins were undermined minimally to an appropriate distance in all directions around the primary defect and laterally outward around the island pedicle utilizing iris scissors. There was minimal undermining beneath the pedicle flap.
Non-Graft Cartilage Fenestration Text: The cartilage was fenestrated with a 2mm punch biopsy to help facilitate healing.
Otolaryngologist Procedure Text (D): After obtaining clear surgical margins the patient was sent to otolaryngology for surgical repair.  The patient understands they will receive post-surgical care and follow-up from the referring physician's office.
Scc In Situ Histology Text: Atypia of the keratinocytes across the full thickness of the epidermis with loss of the granular layer and overlying zones of parakeratosis
Bcc Superficial Histology Text: On the basal layer of an irregular epidermis, there are small nodular masses of basaloid neoplastic cells with nuclear pleomorphism attached to the basal layer and surrounded by a loose fibrous stroma and mild inflammation in the dermis. The findings are typical for a basal cell carcinoma.
Scc Well Differentiated Histology Text: There are nests of squamous epithelial cells arising from the epidermis and extending into the dermis. The malignant cells are large with abundant eosinophilic cytoplasm and a large vesicular nucleus.
Mercedes Flap Text: The defect edges were debeveled with a #15 scalpel blade. Given the location of the defect, shape of the defect and the proximity to free margins a Mercedes flap was deemed most appropriate. Using a sterile surgical marker, an appropriate advancement flap was drawn incorporating the defect and placing the expected incisions within the relaxed skin tension lines where possible. The area thus outlined was incised deep to adipose tissue with a #15 scalpel blade. The skin margins were undermined to an appropriate distance in all directions utilizing iris scissors.
Depth Of Tumor Invasion (For Histology): dermis
Bi-Rhombic Flap Text: The defect edges were debeveled with a #15 scalpel blade. Given the location of the defect and the proximity to free margins a bi-rhombic flap was deemed most appropriate. Using a sterile surgical marker, an appropriate rhombic flap was drawn incorporating the defect. The area thus outlined was incised deep to adipose tissue with a #15 scalpel blade. The skin margins were undermined to an appropriate distance in all directions utilizing iris scissors.
Mohs Case Number: [id number]
Banner Transposition Flap Text: The defect edges were debeveled with a #15 scalpel blade. Given the location of the defect and the proximity to free margins a Banner transposition flap was deemed most appropriate. Using a sterile surgical marker, an appropriate flap drawn around the defect. The area thus outlined was incised deep to adipose tissue with a #15 scalpel blade. The skin margins were undermined to an appropriate distance in all directions utilizing iris scissors.
Island Pedicle Flap Text: The defect edges were debeveled with a #15 scalpel blade. Given the location of the defect, shape of the defect and the proximity to free margins an island pedicle advancement flap was deemed most appropriate. Using a sterile surgical marker, an appropriate advancement flap was drawn incorporating the defect, outlining the appropriate donor tissue and placing the expected incisions within the relaxed skin tension lines where possible. The area thus outlined was incised deep to adipose tissue with a #15 scalpel blade. The skin margins were undermined to an appropriate distance in all directions around the primary defect and laterally outward around the island pedicle utilizing iris scissors. There was minimal undermining beneath the pedicle flap.
Mohs Method Verbiage: An incision at a 45 degree angle following the standard Mohs approach was done and the specimen was harvested as a microscopic controlled layer.
V-Y Plasty Text: The defect edges were debeveled with a #15 scalpel blade. Given the location of the defect, shape of the defect and the proximity to free margins an V-Y advancement flap was deemed most appropriate. Using a sterile surgical marker, an appropriate advancement flap was drawn incorporating the defect and placing the expected incisions within the relaxed skin tension lines where possible. The area thus outlined was incised deep to adipose tissue with a #15 scalpel blade. The skin margins were undermined to an appropriate distance in all directions utilizing iris scissors.
Consent 1/Introductory Paragraph: Various treatment options for skin cancer treatment; including but not limited to no treatment, cryosurgery or cryotherapy, excision, radiation therapy, electrodessication and curettage, topical therapeutic agents and light therapy were discussed in depth with the patient. The rationale for Mohs was explained to the patient and consent was obtained. The risks, benefits and alternatives to therapy were discussed in detail. Specifically, the risks of infection, scarring, bleeding, prolonged wound healing, incomplete removal, allergy to anesthesia, nerve injury and recurrence were addressed. Prior to the procedure, the treatment site was clearly identified and confirmed by the patient and the patient agreed that Mohs surgery is the best treatment option for their lesion. No guarantees were made or implied; close follow-up was stressed out to the patient. All components of Universal Protocol/PAUSE Rule completed.
Melolabial Transposition Flap Text: The defect edges were debeveled with a #15 scalpel blade. Given the location of the defect and the proximity to free margins a melolabial flap was deemed most appropriate. Using a sterile surgical marker, an appropriate melolabial transposition flap was drawn incorporating the defect. The area thus outlined was incised deep to adipose tissue with a #15 scalpel blade. The skin margins were undermined to an appropriate distance in all directions utilizing iris scissors.
Same Histology In Subsequent Stages Text: The pattern and morphology of the tumor is as described in the first stage.
Postop Diagnosis: same
Graft Donor Site Dermal Sutures (Optional): 5-0 Monocryl
Area L Indication Text: Tumors in this location are included in Area L (trunk and extremities). Mohs surgery is indicated for larger tumors, or tumors with aggressive histologic features, in these anatomic locations.
Detail Level: Detailed
Dressing (No Sutures): dry sterile dressing
Tissue Cultured Epidermal Autograft Text: The defect edges were debeveled with a #15 scalpel blade. Given the location of the defect, shape of the defect and the proximity to free margins a tissue cultured epidermal autograft was deemed most appropriate. The graft was then trimmed to fit the size of the defect. The graft was then placed in the primary defect and oriented appropriately.
Consent (Ear)/Introductory Paragraph: The rationale for Mohs was explained to the patient and consent was obtained. The risks, benefits and alternatives to therapy were discussed in detail. Specifically, the risks of ear deformity, infection, scarring, bleeding, prolonged wound healing, incomplete removal, allergy to anesthesia, nerve injury and recurrence were addressed. Prior to the procedure, the treatment site was clearly identified and confirmed by the patient. All components of Universal Protocol/PAUSE Rule completed.
Advancement Flap (Single) Text: After a lengthy discussion with the patient regarding the wound treatment reconstruction options available including but not limited to simple repair, intermediate repair, complex repair, adjacent tissue transfer, tissue graft as well as allowing the lesion to repair by secondary intention. It was determined that due to the defect location, complexity, and given indications; an adjacent tissue transfer (advancement flap) would be the most appropriate means for repair of the surgical defect as the defect extended through the skin into the subcutaneous tissues, and required rearrangement or transfer of adjacent tissue to repair the surgical wound. \\n\\n\\n\\n\\n\\nThe defect edges were debeveled with a #15 scalpel blade. Given the location of the defect and the proximity to free margins a single advancement flap was deemed most appropriate. Using a sterile surgical marker, an appropriate advancement flap was drawn incorporating the defect and placing the expected incisions within the relaxed skin tension lines where possible. The area thus outlined was incised deep to adipose tissue with a #15 scalpel blade. The skin margins were undermined to an appropriate distance in all directions utilizing iris scissors.
Cheiloplasty (Less Than 50%) Text: A decision was made to reconstruct the defect with a  cheiloplasty. The defect was undermined extensively. Additional obicularis oris muscle was excised with a 15 blade scalpel. The defect was converted into a full thickness wedge, of less than 50% of the vertical height of the lip, to facilite a better cosmetic result. Small vessels were then tied off with 5-0 monocyrl. The obicularis oris, superficial fascia, adipose and dermis were then reapproximated. After the deeper layers were approximated the epidermis was reapproximated with particular care given to realign the vermillion border.
Scc Acantholytic Histology Text: There are nests of squamous epithelial cells arising from the epidermis and extending into the dermis.  The malignant cells are demonstratic acantholysis
V-Y Flap Text: The defect edges were debeveled with a #15 scalpel blade. Given the location of the defect, shape of the defect and the proximity to free margins a V-Y flap was deemed most appropriate. Using a sterile surgical marker, an appropriate advancement flap was drawn incorporating the defect and placing the expected incisions within the relaxed skin tension lines where possible. The area thus outlined was incised deep to adipose tissue with a #15 scalpel blade. The skin margins were undermined to an appropriate distance in all directions utilizing iris scissors.
Cheek-To-Nose Interpolation Flap Text: A decision was made to reconstruct the defect utilizing an interpolation axial flap and a staged reconstruction. A telfa template was made of the defect. This telfa template was then used to outline the Cheek-To-Nose Interpolation flap. The donor area for the pedicle flap was then injected with anesthesia. The flap was excised through the skin and subcutaneous tissue down to the layer of the underlying musculature. The interpolation flap was carefully excised within this deep plane to maintain its blood supply. The edges of the donor site were undermined. The donor site was closed in a primary fashion. The pedicle was then rotated into position and sutured. Once the tube was sutured into place, adequate blood supply was confirmed with blanching and refill. The pedicle was then wrapped with xeroform gauze and dressed appropriately with a telfa and gauze bandage to ensure continued blood supply and protect the attached pedicle.
Scc Poorly Differentiated Histology Text: There are nests of squamous epithelial cells arising from the epidermis and extending into the dermis. The malignant cells are poorly differentiated.
Tumor Debulked?: scalpel
Helical Rim Advancement Flap Text: The defect edges were debeveled with a #15 blade scalpel. Given the location of the defect and the proximity to free margins (helical rim) a double helical rim advancement flap was deemed most appropriate. Using a sterile surgical marker, the appropriate advancement flaps were drawn incorporating the defect and placing the expected incisions between the helical rim and antihelix where possible. The area thus outlined was incised through and through with a #15 scalpel blade. With a skin hook and iris scissors, the flaps were gently and sharply undermined and freed up.
Spiral Flap Text: The defect edges were debeveled with a #15 scalpel blade. Given the location of the defect, shape of the defect and the proximity to free margins a spiral flap was deemed most appropriate. Using a sterile surgical marker, an appropriate rotation flap was drawn incorporating the defect and placing the expected incisions within the relaxed skin tension lines where possible. The area thus outlined was incised deep to adipose tissue with a #15 scalpel blade. The skin margins were undermined to an appropriate distance in all directions utilizing iris scissors.
Modified Advancement Flap Text: The defect edges were debeveled with a #15 scalpel blade. Given the location of the defect, shape of the defect and the proximity to free margins a modified advancement flap was deemed most appropriate. Using a sterile surgical marker, an appropriate advancement flap was drawn incorporating the defect and placing the expected incisions within the relaxed skin tension lines where possible. The area thus outlined was incised deep to adipose tissue with a #15 scalpel blade. The skin margins were undermined to an appropriate distance in all directions utilizing iris scissors.
Mixed Nodular And Micronodular Bcc Histology Text: Beneath an irregular epidermis, there are varying sizes of nodular masses of basaloid neoplastic cells with nuclear pleomorphism attached to the basal layer and surrounded by a loose fibrous stroma and mild inflammation in the dermis. The findings are typical for a basal cell carcinoma.
Dorsal Nasal Flap Text: The defect edges were debeveled with a #15 scalpel blade. Given the location of the defect and the proximity to free margins a dorsal nasal flap was deemed most appropriate. Using a sterile surgical marker, an appropriate dorsal nasal flap was drawn around the defect. The area thus outlined was incised deep to adipose tissue with a #15 scalpel blade. The skin margins were undermined to an appropriate distance in all directions utilizing iris scissors.
Area M Indication Text: Tumors in this location are included in Area M (cheek, forehead, scalp, neck, jawline and pretibial skin). Mohs surgery is indicated for tumors in these anatomic locations.
Alternatives Discussed Intro (Do Not Add Period): I discussed alternative treatments to Mohs surgery and specifically discussed the risks and benefits of
Hidradenocarcinoma Histology Text: On histologic exam, there are nodular, epithelioid, basaloid tumor cells with irregular infiltration of the surrounding dermis and foci of duct formation.
Double Island Pedicle Flap Text: The defect edges were debeveled with a #15 scalpel blade. Given the location of the defect, shape of the defect and the proximity to free margins a double island pedicle advancement flap was deemed most appropriate. Using a sterile surgical marker, an appropriate advancement flap was drawn incorporating the defect, outlining the appropriate donor tissue and placing the expected incisions within the relaxed skin tension lines where possible. The area thus outlined was incised deep to adipose tissue with a #15 scalpel blade. The skin margins were undermined to an appropriate distance in all directions around the primary defect and laterally outward around the island pedicle utilizing iris scissors. There was minimal undermining beneath the pedicle flap.
Manual Repair Warning Statement: We plan on removing the manually selected variable below in favor of our much easier automatic structured text blocks found in the previous tab. We decided to do this to help make the flow better and give you the full power of structured data. Manual selection is never going to be ideal in our platform and I would encourage you to avoid using manual selection from this point on, especially since I will be sunsetting this feature. It is important that you do one of two things with the customized text below. First, you can save all of the text in a word file so you can have it for future reference. Second, transfer the text to the appropriate area in the Library tab. Lastly, if there is a flap or graft type which we do not have you need to let us know right away so I can add it in before the variable is hidden. No need to panic, we plan to give you roughly 6 months to make the change.
Dressing: pressure dressing with telfa
Melanoma In Situ Histology Text: Histologically, the lesion is asymmetrical, poorly circumscribed with architectural disturbance and marked cytological atypia
Consent (Marginal Mandibular)/Introductory Paragraph: The rationale for Mohs was explained to the patient and consent was obtained. The risks, benefits and alternatives to therapy were discussed in detail. Specifically, the risks of damage to the marginal mandibular branch of the facial nerve, infection, scarring, bleeding, prolonged wound healing, incomplete removal, allergy to anesthesia, and recurrence were addressed. Prior to the procedure, the treatment site was clearly identified and confirmed by the patient. All components of Universal Protocol/PAUSE Rule completed.
Split-Thickness Skin Graft Text: The defect edges were debeveled with a #15 scalpel blade. Given the location of the defect, shape of the defect and the proximity to free margins a split thickness skin graft was deemed most appropriate. Using a sterile surgical marker, the primary defect shape was transferred to the donor site. The split thickness graft was then harvested. The skin graft was then placed in the primary defect and oriented appropriately.
Partial Purse String (Intermediate) Text: Given the location of the defect and the characteristics of the surrounding skin an intermediate purse string closure was deemed most appropriate. Undermining was performed circumfirentially around the surgical defect. A purse string suture was then placed and tightened. Wound tension only allowed a partial closure of the circular defect.
Bcc  Nodular Histology Text: Nodular aggregates of basaloid cells with large, hyperchromatic, oval nuclei and little cytoplasm aligning densely in a palisade pattern at the periphery of the nest
Eye Protection Verbiage: Before proceeding with the stage, a plastic scleral shield was inserted. The globe was anesthetized with a few drops of 1% lidocaine with 1:100,000 epinephrine. Then, an appropriate sized scleral shield was chosen and coated with lacrilube ointment. The shield was gently inserted and left in place for the duration of each stage. After the stage was completed, the shield was gently removed.
Skin Substitute Text: The defect edges were debeveled with a #15 scalpel blade. Given the location of the defect, shape of the defect and the proximity to free margins a skin substitute graft was deemed most appropriate. The graft material was trimmed to fit the size of the defect. The graft was then placed in the primary defect and oriented appropriately.
Closure 3 Information: This tab is for additional flaps and grafts above and beyond our usual structured repairs.  Please note if you enter information here it will not currently bill and you will need to add the billing information manually.
Bcc  Morpheaform/Sclerosing Histology Text: Beneath an irregular epidermis, there are bizarrely shaped masses of basaloid neoplastic cells with nuclear pleomorphism attached to the basal layer and surrounded by a rapidly forming scar and mild inflammation in the dermis. The findings are typical for a basal cell carcinoma.
Island Pedicle Flap-Requiring Vessel Identification Text: The defect edges were debeveled with a #15 scalpel blade. Given the location of the defect, shape of the defect and the proximity to free margins an island pedicle advancement flap was deemed most appropriate. Using a sterile surgical marker, an appropriate advancement flap was drawn, based on the axial vessel mentioned above, incorporating the defect, outlining the appropriate donor tissue and placing the expected incisions within the relaxed skin tension lines where possible. The area thus outlined was incised deep to adipose tissue with a #15 scalpel blade. The skin margins were undermined to an appropriate distance in all directions around the primary defect and laterally outward around the island pedicle utilizing iris scissors. There was minimal undermining beneath the pedicle flap.
Graft Basting Suture (Optional): 5-0 Fast Absorbing Gut
Ear Wedge Repair Text: A wedge excision was completed by carrying down an excision through the full thickness of the ear and cartilage with an inward facing Burow's triangle. The wound was then closed in a layered fashion.
Muscle Hinge Flap Text: The defect edges were debeveled with a #15 scalpel blade. Given the size, depth and location of the defect and the proximity to free margins a muscle hinge flap was deemed most appropriate. Using a sterile surgical marker, an appropriate hinge flap was drawn incorporating the defect. The area thus outlined was incised with a #15 scalpel blade. The skin margins were undermined to an appropriate distance in all directions utilizing iris scissors.
Epidermal Closure Graft Donor Site (Optional): running
Previous Accession (Optional): [id number]
H Plasty Text: Given the location of the defect, shape of the defect and the proximity to free margins a H-plasty was deemed most appropriate for repair. Using a sterile surgical marker, the appropriate advancement arms of the H-plasty were drawn incorporating the defect and placing the expected incisions within the relaxed skin tension lines where possible. The area thus outlined was incised deep to adipose tissue with a #15 scalpel blade. The skin margins were undermined to an appropriate distance in all directions utilizing iris scissors. The opposing advancement arms were then advanced into place in opposite direction and anchored with interrupted buried subcutaneous sutures.
Hemostasis: Electrocautery
Bcc Histology Text: Beneath an irregular epidermis, there are small nodular masses of basaloid neoplastic cells with nuclear pleomorphism attached to the basal layer and surrounded by a loose fibrous stroma and mild inflammation in the dermis. The findings are typical for a basal cell carcinoma.
Complex Repair And Graft Additional Text (Will Appearing After The Standard Complex Repair Text): The complex repair was not sufficient to completely close the primary defect. The remaining additional defect was repaired with the graft mentioned below.
O-T Plasty Text: The defect edges were debeveled with a #15 scalpel blade. Given the location of the defect, shape of the defect and the proximity to free margins an O-T plasty was deemed most appropriate. Using a sterile surgical marker, an appropriate O-T plasty was drawn incorporating the defect and placing the expected incisions within the relaxed skin tension lines where possible. The area thus outlined was incised deep to adipose tissue with a #15 scalpel blade. The skin margins were undermined to an appropriate distance in all directions utilizing iris scissors.
Trilobed Flap Text: The defect edges were debeveled with a #15 scalpel blade. Given the location of the defect and the proximity to free margins a trilobed flap was deemed most appropriate. Using a sterile surgical marker, an appropriate trilobed flap drawn around the defect. The area thus outlined was incised deep to adipose tissue with a #15 scalpel blade. The skin margins were undermined to an appropriate distance in all directions utilizing iris scissors.
Paramedian Forehead Flap Text: A decision was made to reconstruct the defect utilizing an interpolation axial flap and a staged reconstruction. A telfa template was made of the defect. This telfa template was then used to outline the paramedian forehead pedicle flap. The donor area for the pedicle flap was then injected with anesthesia. The flap was excised through the skin and subcutaneous tissue down to the layer of the underlying musculature. The pedicle flap was carefully excised within this deep plane to maintain its blood supply. The edges of the donor site were undermined. The donor site was closed in a primary fashion. The pedicle was then rotated into position and sutured. Once the tube was sutured into place, adequate blood supply was confirmed with blanching and refill. The pedicle was then wrapped with xeroform gauze and dressed appropriately with a telfa and gauze bandage to ensure continued blood supply and protect the attached pedicle.
Mixed Superficial And Nodular Bcc Histology Text: On the basal layer of and beneath an irregular epidermis, there are small nodular masses of basaloid neoplastic cells with nuclear pleomorphism attached to the basal layer and surrounded by a loose fibrous stroma and mild inflammation in the dermis. The findings are typical for a basal cell carcinoma.
Mastoid Interpolation Flap Text: A decision was made to reconstruct the defect utilizing an interpolation axial flap and a staged reconstruction. A telfa template was made of the defect. This telfa template was then used to outline the mastoid interpolation flap. The donor area for the pedicle flap was then injected with anesthesia. The flap was excised through the skin and subcutaneous tissue down to the layer of the underlying musculature. The pedicle flap was carefully excised within this deep plane to maintain its blood supply. The edges of the donor site were undermined. The donor site was closed in a primary fashion. The pedicle was then rotated into position and sutured. Once the tube was sutured into place, adequate blood supply was confirmed with blanching and refill. The pedicle was then wrapped with xeroform gauze and dressed appropriately with a telfa and gauze bandage to ensure continued blood supply and protect the attached pedicle.
Consent (Temporal Branch)/Introductory Paragraph: The rationale for Mohs was explained to the patient and consent was obtained. The risks, benefits and alternatives to therapy were discussed in detail. Specifically, the risks of damage to the temporal branch of the facial nerve, infection, scarring, bleeding, prolonged wound healing, incomplete removal, allergy to anesthesia, and recurrence were addressed. Prior to the procedure, the treatment site was clearly identified and confirmed by the patient. All components of Universal Protocol/PAUSE Rule completed.
Medical Necessity Statement: Based on my medical judgement; after reviewing the pertinent pathology report, physical exam findings and the various treatment options for skin cancer treatment; standard excision and/or destruction technique methods are not clinically sufficient treatments options. To prevent the risk of compromising surgical cure and reconstruction; prompt microscopic examination of the surgical margins is necessary. Based on the given indication(s), maximum conservation of healthy tissue, and high cure rate, Mohs surgery is the most appropriate treatment for this cancer.
Advancement Flap (Double) Text: The defect edges were debeveled with a #15 scalpel blade. Given the location of the defect and the proximity to free margins a double advancement flap was deemed most appropriate. Using a sterile surgical marker, the appropriate advancement flaps were drawn incorporating the defect and placing the expected incisions within the relaxed skin tension lines where possible. The area thus outlined was incised deep to adipose tissue with a #15 scalpel blade. The skin margins were undermined to an appropriate distance in all directions utilizing iris scissors.
Cheek Interpolation Flap Text: A decision was made to reconstruct the defect utilizing an interpolation axial flap and a staged reconstruction. A telfa template was made of the defect. This telfa template was then used to outline the Cheek Interpolation flap. The donor area for the pedicle flap was then injected with anesthesia. The flap was excised through the skin and subcutaneous tissue down to the layer of the underlying musculature. The interpolation flap was carefully excised within this deep plane to maintain its blood supply. The edges of the donor site were undermined. The donor site was closed in a primary fashion. The pedicle was then rotated into position and sutured. Once the tube was sutured into place, adequate blood supply was confirmed with blanching and refill. The pedicle was then wrapped with xeroform gauze and dressed appropriately with a telfa and gauze bandage to ensure continued blood supply and protect the attached pedicle.
Repair Anesthesia Method: local infiltration
Cartilage Graft Text: The defect edges were debeveled with a #15 scalpel blade. Given the location of the defect, shape of the defect, the fact the defect involved a full thickness cartilage defect a cartilage graft was deemed most appropriate. An appropriate donor site was identified, cleansed, and anesthetized. The cartilage graft was then harvested and transferred to the recipient site, oriented appropriately and then sutured into place. The secondary defect was then repaired using a primary closure.
Purse String (Simple) Text: Given the location of the defect and the characteristics of the surrounding skin a pursestring closure was deemed most appropriate. Undermining was performed circumfirentially around the surgical defect. A purstring suture was then placed and tightened.
Localized Dermabrasion Text: The patient was draped in routine manner. Localized dermabrasion using 3 x 17 mm wire brush was performed in routine manner to papillary dermis. This spot dermabrasion is being performed to complete skin cancer reconstruction. It also will eliminate the other sun damaged precancerous cells that are known to be part of the regional effect of a lifetime's worth of sun exposure. This localized dermabrasion is therapeutic and should not be considered cosmetic in any regard.
O-L Flap Text: The defect edges were debeveled with a #15 scalpel blade. Given the location of the defect, shape of the defect and the proximity to free margins an O-L flap was deemed most appropriate. Using a sterile surgical marker, an appropriate advancement flap was drawn incorporating the defect and placing the expected incisions within the relaxed skin tension lines where possible. The area thus outlined was incised deep to adipose tissue with a #15 scalpel blade. The skin margins were undermined to an appropriate distance in all directions utilizing iris scissors.
Bilateral Helical Rim Advancement Flap Text: The defect edges were debeveled with a #15 blade scalpel. Given the location of the defect and the proximity to free margins (helical rim) a bilateral helical rim advancement flap was deemed most appropriate. Using a sterile surgical marker, the appropriate advancement flaps were drawn incorporating the defect and placing the expected incisions between the helical rim and antihelix where possible. The area thus outlined was incised through and through with a #15 scalpel blade. With a skin hook and iris scissors, the flaps were gently and sharply undermined and freed up.
Repair Hemostasis (Optional): Electrodesiccation
Surgeon/Pathologist Verbiage (Will Incorporate Name Of Surgeon From Intro If Not Blank): operated in two distinct and integrated capacities as the surgeon and pathologist. [address] was completed at this time and should be considered part of the medical record. Refer to attached Mohs Map for additional details including but not limited to: address where microscope slide was read, patient name, patient medical record number, date of service, patient insurance, referring provider, biopsy date, tumor type, tumor site, initial pre-operative size, Mohs accession #, Mohs indications, details of Mohs procedure including stages, debulk status, tumor type / pattern / morphology, depth of invasion, inflammation, perineural invasion, scar tissue, margins status, tissue blocks, and final measurement size.
Area H Indication Text: Tumors in this location are included in Area H (eyelids, eyebrows, nose, lips, chin, ear, pre-auricular, post-auricular, temple, genitalia, hands, feet, ankles and areola). Tissue conservation is critical in these anatomic locations.
Inflammation Suggestive Of Cancer Camouflage Histology Text: There was a dense lymphocytic infiltrate which prevented adequate histologic evaluation of adjacent structures.
Mucosal Advancement Flap Text: Given the location of the defect, shape of the defect and the proximity to free margins a mucosal advancement flap was deemed most appropriate. Incisions were made with a 15 blade scalpel in the appropriate fashion along the cutaneous vermillion border and the mucosal lip. The remaining actinically damaged mucosal tissue was excised. The mucosal advancement flap was then elevated to the gingival sulcus with care taken to preserve the neurovascular structures and advanced into the primary defect. Care was taken to ensure that precise realignment of the vermillion border was achieved.
Secondary Intention Text (Leave Blank If You Do Not Want): The defect will heal with secondary intention.
Subsequent Stages Histo Method Verbiage: Using a similar technique to that described above, a thin layer of tissue was removed from all areas where tumor was visible on the previous stage. The tissue was again oriented, mapped, dyed, and processed as above.
Repair Type: No repair - secondary intention
Consent (Nose)/Introductory Paragraph: The rationale for Mohs was explained to the patient and consent was obtained. The risks, benefits and alternatives to therapy were discussed in detail. Specifically, the risks of nasal deformity, changes in the flow of air through the nose, infection, scarring, bleeding, prolonged wound healing, incomplete removal, allergy to anesthesia, nerve injury and recurrence were addressed. Prior to the procedure, the treatment site was clearly identified and confirmed by the patient. All components of Universal Protocol/PAUSE Rule completed.
Closure 2 Information: This tab is for additional flaps and grafts, including complex repair and grafts and complex repair and flaps. You can also specify a different location for the additional defect, if the location is the same you do not need to select a new one. We will insert the automated text for the repair you select below just as we do for solitary flaps and grafts. Please note that at this time if you select a location with a different insurance zone you will need to override the ICD10 and CPT if appropriate.
Location Indication Override (Is Already Calculated Based On Selected Body Location): Area H
Donor Site Anesthesia Type: same as repair anesthesia
Home Suture Removal Text: Patient was provided instructions on removing sutures and will remove their sutures at home. If they have any questions or difficulties they will call the office.

## 2018-11-08 NOTE — HPI: PROCEDURE (MOHS)
Has The Growth Been Previously Biopsied?: has been previously biopsied
Body Location Override (Optional): Right antihelix

## 2018-11-13 ENCOUNTER — RECORDS - HEALTHEAST (OUTPATIENT)
Dept: ADMINISTRATIVE | Facility: OTHER | Age: 78
End: 2018-11-13

## 2018-11-14 ENCOUNTER — COMMUNICATION - HEALTHEAST (OUTPATIENT)
Dept: INTERNAL MEDICINE | Facility: CLINIC | Age: 78
End: 2018-11-14

## 2018-11-14 DIAGNOSIS — Z79.01 LONG TERM (CURRENT) USE OF ANTICOAGULANTS: ICD-10-CM

## 2018-11-14 DIAGNOSIS — I48.19 PERSISTENT ATRIAL FIBRILLATION (H): ICD-10-CM

## 2018-11-14 LAB — INR PPP: 2.9 (ref 0.9–1.1)

## 2018-11-27 ENCOUNTER — RECORDS - HEALTHEAST (OUTPATIENT)
Dept: ADMINISTRATIVE | Facility: OTHER | Age: 78
End: 2018-11-27

## 2018-11-28 ENCOUNTER — RECORDS - HEALTHEAST (OUTPATIENT)
Dept: ADMINISTRATIVE | Facility: OTHER | Age: 78
End: 2018-11-28

## 2018-11-29 ENCOUNTER — COMMUNICATION - HEALTHEAST (OUTPATIENT)
Dept: INTERNAL MEDICINE | Facility: CLINIC | Age: 78
End: 2018-11-29

## 2018-11-29 DIAGNOSIS — I48.19 PERSISTENT ATRIAL FIBRILLATION (H): ICD-10-CM

## 2018-11-29 DIAGNOSIS — Z79.01 LONG TERM (CURRENT) USE OF ANTICOAGULANTS: ICD-10-CM

## 2018-11-29 LAB — INR PPP: 3.1 (ref 0.9–1.1)

## 2018-12-11 ENCOUNTER — COMMUNICATION - HEALTHEAST (OUTPATIENT)
Dept: INTERNAL MEDICINE | Facility: CLINIC | Age: 78
End: 2018-12-11

## 2018-12-11 DIAGNOSIS — Z79.01 LONG TERM CURRENT USE OF ANTICOAGULANT THERAPY: ICD-10-CM

## 2018-12-11 DIAGNOSIS — I48.91 ATRIAL FIBRILLATION (H): ICD-10-CM

## 2018-12-13 ENCOUNTER — AMBULATORY - HEALTHEAST (OUTPATIENT)
Dept: LAB | Facility: CLINIC | Age: 78
End: 2018-12-13

## 2018-12-13 ENCOUNTER — COMMUNICATION - HEALTHEAST (OUTPATIENT)
Dept: ANTICOAGULATION | Facility: CLINIC | Age: 78
End: 2018-12-13

## 2018-12-13 DIAGNOSIS — I48.19 PERSISTENT ATRIAL FIBRILLATION (H): ICD-10-CM

## 2018-12-13 DIAGNOSIS — Z79.01 LONG TERM (CURRENT) USE OF ANTICOAGULANTS: ICD-10-CM

## 2018-12-13 LAB — INR PPP: 1.8 (ref 0.9–1.1)

## 2018-12-14 ENCOUNTER — COMMUNICATION - HEALTHEAST (OUTPATIENT)
Dept: INTERNAL MEDICINE | Facility: CLINIC | Age: 78
End: 2018-12-14

## 2018-12-18 ENCOUNTER — OFFICE VISIT - HEALTHEAST (OUTPATIENT)
Dept: INTERNAL MEDICINE | Facility: CLINIC | Age: 78
End: 2018-12-18

## 2018-12-18 DIAGNOSIS — C90.00 MULTIPLE MYELOMA, REMISSION STATUS UNSPECIFIED (H): ICD-10-CM

## 2018-12-18 ASSESSMENT — MIFFLIN-ST. JEOR: SCORE: 1465.92

## 2018-12-24 ENCOUNTER — AMBULATORY - HEALTHEAST (OUTPATIENT)
Dept: LAB | Facility: CLINIC | Age: 78
End: 2018-12-24

## 2018-12-24 ENCOUNTER — COMMUNICATION - HEALTHEAST (OUTPATIENT)
Dept: INTERNAL MEDICINE | Facility: CLINIC | Age: 78
End: 2018-12-24

## 2018-12-24 DIAGNOSIS — Z79.01 LONG TERM (CURRENT) USE OF ANTICOAGULANTS: ICD-10-CM

## 2018-12-24 DIAGNOSIS — I48.19 PERSISTENT ATRIAL FIBRILLATION (H): ICD-10-CM

## 2018-12-24 LAB — INR PPP: 3 (ref 0.9–1.1)

## 2019-01-09 ENCOUNTER — COMMUNICATION - HEALTHEAST (OUTPATIENT)
Dept: INTERNAL MEDICINE | Facility: CLINIC | Age: 79
End: 2019-01-09

## 2019-01-09 DIAGNOSIS — I48.19 PERSISTENT ATRIAL FIBRILLATION (H): ICD-10-CM

## 2019-01-09 DIAGNOSIS — Z79.01 LONG TERM (CURRENT) USE OF ANTICOAGULANTS: ICD-10-CM

## 2019-01-10 LAB — INR PPP: 2.9 (ref 0.9–1.1)

## 2019-01-15 ENCOUNTER — COMMUNICATION - HEALTHEAST (OUTPATIENT)
Dept: ANTICOAGULATION | Facility: CLINIC | Age: 79
End: 2019-01-15

## 2019-01-15 DIAGNOSIS — I48.19 PERSISTENT ATRIAL FIBRILLATION (H): ICD-10-CM

## 2019-01-18 ENCOUNTER — RECORDS - HEALTHEAST (OUTPATIENT)
Dept: ADMINISTRATIVE | Facility: OTHER | Age: 79
End: 2019-01-18

## 2019-01-23 ENCOUNTER — OFFICE VISIT - HEALTHEAST (OUTPATIENT)
Dept: INTERNAL MEDICINE | Facility: CLINIC | Age: 79
End: 2019-01-23

## 2019-01-23 DIAGNOSIS — Z00.00 ROUTINE GENERAL MEDICAL EXAMINATION AT A HEALTH CARE FACILITY: ICD-10-CM

## 2019-01-23 DIAGNOSIS — Z12.5 SCREENING FOR PROSTATE CANCER: ICD-10-CM

## 2019-01-23 LAB
ALBUMIN SERPL-MCNC: 3.9 G/DL (ref 3.5–5)
ALBUMIN UR-MCNC: NEGATIVE MG/DL
ALP SERPL-CCNC: 67 U/L (ref 45–120)
ALT SERPL W P-5'-P-CCNC: 16 U/L (ref 0–45)
ANION GAP SERPL CALCULATED.3IONS-SCNC: 12 MMOL/L (ref 5–18)
APPEARANCE UR: CLEAR
AST SERPL W P-5'-P-CCNC: 21 U/L (ref 0–40)
BILIRUB SERPL-MCNC: 0.8 MG/DL (ref 0–1)
BILIRUB UR QL STRIP: NEGATIVE
BUN SERPL-MCNC: 29 MG/DL (ref 8–28)
CALCIUM SERPL-MCNC: 9.6 MG/DL (ref 8.5–10.5)
CHLORIDE BLD-SCNC: 106 MMOL/L (ref 98–107)
CHOLEST SERPL-MCNC: 159 MG/DL
CO2 SERPL-SCNC: 21 MMOL/L (ref 22–31)
COLOR UR AUTO: YELLOW
CREAT SERPL-MCNC: 1.71 MG/DL (ref 0.7–1.3)
ERYTHROCYTE [DISTWIDTH] IN BLOOD BY AUTOMATED COUNT: 12.5 % (ref 11–14.5)
FASTING STATUS PATIENT QL REPORTED: YES
GFR SERPL CREATININE-BSD FRML MDRD: 39 ML/MIN/1.73M2
GLUCOSE BLD-MCNC: 97 MG/DL (ref 70–125)
GLUCOSE UR STRIP-MCNC: NEGATIVE MG/DL
HCT VFR BLD AUTO: 37.5 % (ref 40–54)
HDLC SERPL-MCNC: 51 MG/DL
HGB BLD-MCNC: 12.8 G/DL (ref 14–18)
HGB UR QL STRIP: NEGATIVE
KETONES UR STRIP-MCNC: NEGATIVE MG/DL
LDLC SERPL CALC-MCNC: 78 MG/DL
LEUKOCYTE ESTERASE UR QL STRIP: NEGATIVE
MCH RBC QN AUTO: 32.8 PG (ref 27–34)
MCHC RBC AUTO-ENTMCNC: 34.2 G/DL (ref 32–36)
MCV RBC AUTO: 96 FL (ref 80–100)
NITRATE UR QL: NEGATIVE
PH UR STRIP: 5.5 [PH] (ref 5–8)
PLATELET # BLD AUTO: 162 THOU/UL (ref 140–440)
PMV BLD AUTO: 8.6 FL (ref 7–10)
POTASSIUM BLD-SCNC: 4.3 MMOL/L (ref 3.5–5)
PROT SERPL-MCNC: 7.6 G/DL (ref 6–8)
PSA SERPL-MCNC: 1 NG/ML (ref 0–6.5)
RBC # BLD AUTO: 3.91 MILL/UL (ref 4.4–6.2)
SODIUM SERPL-SCNC: 139 MMOL/L (ref 136–145)
SP GR UR STRIP: 1.02 (ref 1–1.03)
TRIGL SERPL-MCNC: 150 MG/DL
TSH SERPL DL<=0.005 MIU/L-ACNC: 3.19 UIU/ML (ref 0.3–5)
UROBILINOGEN UR STRIP-ACNC: NORMAL
WBC: 6.4 THOU/UL (ref 4–11)

## 2019-01-23 ASSESSMENT — MIFFLIN-ST. JEOR: SCORE: 1479.53

## 2019-01-24 ENCOUNTER — COMMUNICATION - HEALTHEAST (OUTPATIENT)
Dept: INTERNAL MEDICINE | Facility: CLINIC | Age: 79
End: 2019-01-24

## 2019-01-28 LAB
ALBUMIN PERCENT: 61.6 % (ref 51–67)
ALBUMIN SERPL ELPH-MCNC: 4.6 G/DL (ref 3.2–4.7)
ALPHA 1 PERCENT: 2.4 % (ref 2–4)
ALPHA 2 PERCENT: 10.5 % (ref 5–13)
ALPHA1 GLOB SERPL ELPH-MCNC: 0.2 G/DL (ref 0.1–0.3)
ALPHA2 GLOB SERPL ELPH-MCNC: 0.8 G/DL (ref 0.4–0.9)
B-GLOBULIN SERPL ELPH-MCNC: 1.2 G/DL (ref 0.7–1.2)
BETA PERCENT: 16.8 % (ref 10–17)
GAMMA GLOB SERPL ELPH-MCNC: 0.6 G/DL (ref 0.6–1.4)
GAMMA GLOBULIN PERCENT: 8.7 % (ref 9–20)
M PROTEIN SERPL ELPH-MCNC: 0.8 G/DL
PATH ICD:: ABNORMAL
PROT PATTERN SERPL ELPH-IMP: ABNORMAL
PROT SERPL-MCNC: 7.4 G/DL (ref 6–8)
REVIEWING PATHOLOGIST: ABNORMAL

## 2019-01-29 ENCOUNTER — AMBULATORY - HEALTHEAST (OUTPATIENT)
Dept: LAB | Facility: CLINIC | Age: 79
End: 2019-01-29

## 2019-01-29 ENCOUNTER — COMMUNICATION - HEALTHEAST (OUTPATIENT)
Dept: ANTICOAGULATION | Facility: CLINIC | Age: 79
End: 2019-01-29

## 2019-01-29 DIAGNOSIS — I48.19 PERSISTENT ATRIAL FIBRILLATION (H): ICD-10-CM

## 2019-01-29 DIAGNOSIS — Z79.01 LONG TERM (CURRENT) USE OF ANTICOAGULANTS: ICD-10-CM

## 2019-01-29 LAB — INR PPP: 2.1 (ref 0.9–1.1)

## 2019-01-30 LAB
PATH ICD:: NORMAL
PROT PATTERN SERPL IFE-IMP: NORMAL
REVIEWING PATHOLOGIST: NORMAL

## 2019-01-31 ENCOUNTER — COMMUNICATION - HEALTHEAST (OUTPATIENT)
Dept: INTERNAL MEDICINE | Facility: CLINIC | Age: 79
End: 2019-01-31

## 2019-02-08 ENCOUNTER — RECORDS - HEALTHEAST (OUTPATIENT)
Dept: ADMINISTRATIVE | Facility: OTHER | Age: 79
End: 2019-02-08

## 2019-02-14 ENCOUNTER — RECORDS - HEALTHEAST (OUTPATIENT)
Dept: ADMINISTRATIVE | Facility: OTHER | Age: 79
End: 2019-02-14

## 2019-02-25 ENCOUNTER — COMMUNICATION - HEALTHEAST (OUTPATIENT)
Dept: ANTICOAGULATION | Facility: CLINIC | Age: 79
End: 2019-02-25

## 2019-02-25 ENCOUNTER — AMBULATORY - HEALTHEAST (OUTPATIENT)
Dept: LAB | Facility: CLINIC | Age: 79
End: 2019-02-25

## 2019-02-25 DIAGNOSIS — Z79.01 LONG TERM (CURRENT) USE OF ANTICOAGULANTS: ICD-10-CM

## 2019-02-25 DIAGNOSIS — I48.19 PERSISTENT ATRIAL FIBRILLATION (H): ICD-10-CM

## 2019-02-25 LAB — INR PPP: 2.3 (ref 0.9–1.1)

## 2019-03-27 ENCOUNTER — COMMUNICATION - HEALTHEAST (OUTPATIENT)
Dept: INTERNAL MEDICINE | Facility: CLINIC | Age: 79
End: 2019-03-27

## 2019-03-27 DIAGNOSIS — I48.19 PERSISTENT ATRIAL FIBRILLATION (H): ICD-10-CM

## 2019-03-27 DIAGNOSIS — Z79.01 LONG TERM (CURRENT) USE OF ANTICOAGULANTS: ICD-10-CM

## 2019-03-27 LAB — INR PPP: 2.2 (ref 0.9–1.1)

## 2019-04-26 ENCOUNTER — COMMUNICATION - HEALTHEAST (OUTPATIENT)
Dept: ANTICOAGULATION | Facility: CLINIC | Age: 79
End: 2019-04-26

## 2019-04-26 DIAGNOSIS — I48.19 PERSISTENT ATRIAL FIBRILLATION (H): ICD-10-CM

## 2019-04-26 DIAGNOSIS — Z79.01 LONG TERM (CURRENT) USE OF ANTICOAGULANTS: ICD-10-CM

## 2019-04-26 LAB — INR PPP: 2 (ref 0.9–1.1)

## 2019-05-10 ENCOUNTER — COMMUNICATION - HEALTHEAST (OUTPATIENT)
Dept: ANTICOAGULATION | Facility: CLINIC | Age: 79
End: 2019-05-10

## 2019-05-10 ENCOUNTER — AMBULATORY - HEALTHEAST (OUTPATIENT)
Dept: LAB | Facility: CLINIC | Age: 79
End: 2019-05-10

## 2019-05-10 DIAGNOSIS — Z79.01 LONG TERM (CURRENT) USE OF ANTICOAGULANTS: ICD-10-CM

## 2019-05-10 DIAGNOSIS — I48.19 PERSISTENT ATRIAL FIBRILLATION (H): ICD-10-CM

## 2019-05-10 LAB — INR PPP: 2.8 (ref 0.9–1.1)

## 2019-06-11 ENCOUNTER — COMMUNICATION - HEALTHEAST (OUTPATIENT)
Dept: INTERNAL MEDICINE | Facility: CLINIC | Age: 79
End: 2019-06-11

## 2019-06-19 ENCOUNTER — COMMUNICATION - HEALTHEAST (OUTPATIENT)
Dept: INTERNAL MEDICINE | Facility: CLINIC | Age: 79
End: 2019-06-19

## 2019-06-19 DIAGNOSIS — Z79.01 LONG TERM CURRENT USE OF ANTICOAGULANT THERAPY: ICD-10-CM

## 2019-06-19 DIAGNOSIS — I48.91 ATRIAL FIBRILLATION (H): ICD-10-CM

## 2019-06-20 ENCOUNTER — COMMUNICATION - HEALTHEAST (OUTPATIENT)
Dept: ANTICOAGULATION | Facility: CLINIC | Age: 79
End: 2019-06-20

## 2019-06-20 ENCOUNTER — OFFICE VISIT - HEALTHEAST (OUTPATIENT)
Dept: INTERNAL MEDICINE | Facility: CLINIC | Age: 79
End: 2019-06-20

## 2019-06-20 DIAGNOSIS — I48.19 PERSISTENT ATRIAL FIBRILLATION (H): ICD-10-CM

## 2019-06-20 DIAGNOSIS — R06.00 DYSPNEA, UNSPECIFIED TYPE: ICD-10-CM

## 2019-06-20 DIAGNOSIS — C90.00 MULTIPLE MYELOMA, REMISSION STATUS UNSPECIFIED (H): ICD-10-CM

## 2019-06-20 DIAGNOSIS — Z79.01 LONG TERM (CURRENT) USE OF ANTICOAGULANTS: ICD-10-CM

## 2019-06-20 LAB
ALBUMIN SERPL-MCNC: 3.7 G/DL (ref 3.5–5)
ALP SERPL-CCNC: 58 U/L (ref 45–120)
ALT SERPL W P-5'-P-CCNC: 9 U/L (ref 0–45)
ANION GAP SERPL CALCULATED.3IONS-SCNC: 9 MMOL/L (ref 5–18)
AST SERPL W P-5'-P-CCNC: 18 U/L (ref 0–40)
BILIRUB SERPL-MCNC: 0.7 MG/DL (ref 0–1)
BNP SERPL-MCNC: 196 PG/ML (ref 0–84)
BUN SERPL-MCNC: 26 MG/DL (ref 8–28)
CALCIUM SERPL-MCNC: 9.1 MG/DL (ref 8.5–10.5)
CHLORIDE BLD-SCNC: 106 MMOL/L (ref 98–107)
CO2 SERPL-SCNC: 24 MMOL/L (ref 22–31)
CREAT SERPL-MCNC: 1.8 MG/DL (ref 0.7–1.3)
ERYTHROCYTE [DISTWIDTH] IN BLOOD BY AUTOMATED COUNT: 12.2 % (ref 11–14.5)
GFR SERPL CREATININE-BSD FRML MDRD: 37 ML/MIN/1.73M2
GLUCOSE BLD-MCNC: 92 MG/DL (ref 70–125)
HCT VFR BLD AUTO: 37 % (ref 40–54)
HGB BLD-MCNC: 12.5 G/DL (ref 14–18)
INR PPP: 2.6 (ref 0.9–1.1)
MCH RBC QN AUTO: 32.8 PG (ref 27–34)
MCHC RBC AUTO-ENTMCNC: 33.7 G/DL (ref 32–36)
MCV RBC AUTO: 97 FL (ref 80–100)
PLATELET # BLD AUTO: 165 THOU/UL (ref 140–440)
PMV BLD AUTO: 8.2 FL (ref 7–10)
POTASSIUM BLD-SCNC: 4.2 MMOL/L (ref 3.5–5)
PROT SERPL-MCNC: 7.3 G/DL (ref 6–8)
RBC # BLD AUTO: 3.8 MILL/UL (ref 4.4–6.2)
SODIUM SERPL-SCNC: 139 MMOL/L (ref 136–145)
WBC: 5.6 THOU/UL (ref 4–11)

## 2019-06-20 RX ORDER — TERAZOSIN 5 MG/1
5 CAPSULE ORAL
Status: SHIPPED | COMMUNITY
Start: 2015-09-22 | End: 2022-02-09

## 2019-06-20 ASSESSMENT — MIFFLIN-ST. JEOR: SCORE: 1461.38

## 2019-06-21 ENCOUNTER — COMMUNICATION - HEALTHEAST (OUTPATIENT)
Dept: INTERNAL MEDICINE | Facility: CLINIC | Age: 79
End: 2019-06-21

## 2019-07-23 ENCOUNTER — RECORDS - HEALTHEAST (OUTPATIENT)
Dept: ADMINISTRATIVE | Facility: OTHER | Age: 79
End: 2019-07-23

## 2019-08-06 ENCOUNTER — RECORDS - HEALTHEAST (OUTPATIENT)
Dept: ADMINISTRATIVE | Facility: OTHER | Age: 79
End: 2019-08-06

## 2019-08-13 ENCOUNTER — COMMUNICATION - HEALTHEAST (OUTPATIENT)
Dept: ANTICOAGULATION | Facility: CLINIC | Age: 79
End: 2019-08-13

## 2019-08-13 ENCOUNTER — AMBULATORY - HEALTHEAST (OUTPATIENT)
Dept: LAB | Facility: CLINIC | Age: 79
End: 2019-08-13

## 2019-08-13 DIAGNOSIS — Z79.01 LONG TERM (CURRENT) USE OF ANTICOAGULANTS: ICD-10-CM

## 2019-08-13 DIAGNOSIS — I48.19 PERSISTENT ATRIAL FIBRILLATION (H): ICD-10-CM

## 2019-08-13 LAB — INR PPP: 3.1 (ref 0.9–1.1)

## 2019-08-16 ENCOUNTER — RECORDS - HEALTHEAST (OUTPATIENT)
Dept: ADMINISTRATIVE | Facility: OTHER | Age: 79
End: 2019-08-16

## 2019-08-27 ENCOUNTER — COMMUNICATION - HEALTHEAST (OUTPATIENT)
Dept: ANTICOAGULATION | Facility: CLINIC | Age: 79
End: 2019-08-27

## 2019-08-27 ENCOUNTER — AMBULATORY - HEALTHEAST (OUTPATIENT)
Dept: LAB | Facility: CLINIC | Age: 79
End: 2019-08-27

## 2019-08-27 DIAGNOSIS — I48.19 PERSISTENT ATRIAL FIBRILLATION (H): ICD-10-CM

## 2019-08-27 DIAGNOSIS — Z79.01 LONG TERM (CURRENT) USE OF ANTICOAGULANTS: ICD-10-CM

## 2019-08-27 LAB — INR PPP: 2 (ref 0.9–1.1)

## 2019-09-17 ENCOUNTER — COMMUNICATION - HEALTHEAST (OUTPATIENT)
Dept: INTERNAL MEDICINE | Facility: CLINIC | Age: 79
End: 2019-09-17

## 2019-09-17 ENCOUNTER — OFFICE VISIT - HEALTHEAST (OUTPATIENT)
Dept: INTERNAL MEDICINE | Facility: CLINIC | Age: 79
End: 2019-09-17

## 2019-09-17 ENCOUNTER — COMMUNICATION - HEALTHEAST (OUTPATIENT)
Dept: ANTICOAGULATION | Facility: CLINIC | Age: 79
End: 2019-09-17

## 2019-09-17 DIAGNOSIS — C90.00 MULTIPLE MYELOMA, REMISSION STATUS UNSPECIFIED (H): ICD-10-CM

## 2019-09-17 DIAGNOSIS — I48.19 PERSISTENT ATRIAL FIBRILLATION (H): ICD-10-CM

## 2019-09-17 DIAGNOSIS — Z79.01 LONG TERM (CURRENT) USE OF ANTICOAGULANTS: ICD-10-CM

## 2019-09-17 LAB
ALBUMIN SERPL-MCNC: 3.5 G/DL (ref 3.5–5)
ALP SERPL-CCNC: 390 U/L (ref 45–120)
ALT SERPL W P-5'-P-CCNC: 244 U/L (ref 0–45)
ANION GAP SERPL CALCULATED.3IONS-SCNC: 10 MMOL/L (ref 5–18)
AST SERPL W P-5'-P-CCNC: 138 U/L (ref 0–40)
BILIRUB SERPL-MCNC: 0.5 MG/DL (ref 0–1)
BUN SERPL-MCNC: 27 MG/DL (ref 8–28)
CALCIUM SERPL-MCNC: 9.6 MG/DL (ref 8.5–10.5)
CHLORIDE BLD-SCNC: 105 MMOL/L (ref 98–107)
CO2 SERPL-SCNC: 24 MMOL/L (ref 22–31)
CREAT SERPL-MCNC: 1.77 MG/DL (ref 0.7–1.3)
ERYTHROCYTE [DISTWIDTH] IN BLOOD BY AUTOMATED COUNT: 11.3 % (ref 11–14.5)
GFR SERPL CREATININE-BSD FRML MDRD: 37 ML/MIN/1.73M2
GLUCOSE BLD-MCNC: 122 MG/DL (ref 70–125)
HCT VFR BLD AUTO: 37.3 % (ref 40–54)
HGB BLD-MCNC: 12.3 G/DL (ref 14–18)
INR PPP: 2.4 (ref 0.9–1.1)
MCH RBC QN AUTO: 33 PG (ref 27–34)
MCHC RBC AUTO-ENTMCNC: 33.1 G/DL (ref 32–36)
MCV RBC AUTO: 100 FL (ref 80–100)
PLATELET # BLD AUTO: 260 THOU/UL (ref 140–440)
PMV BLD AUTO: 8.2 FL (ref 7–10)
POTASSIUM BLD-SCNC: 4.5 MMOL/L (ref 3.5–5)
PROT SERPL-MCNC: 7.3 G/DL (ref 6–8)
RBC # BLD AUTO: 3.74 MILL/UL (ref 4.4–6.2)
SODIUM SERPL-SCNC: 139 MMOL/L (ref 136–145)
TSH SERPL DL<=0.005 MIU/L-ACNC: 3.1 UIU/ML (ref 0.3–5)
WBC: 6.8 THOU/UL (ref 4–11)

## 2019-09-17 ASSESSMENT — MIFFLIN-ST. JEOR: SCORE: 1470.46

## 2019-09-18 ENCOUNTER — AMBULATORY - HEALTHEAST (OUTPATIENT)
Dept: INTERNAL MEDICINE | Facility: CLINIC | Age: 79
End: 2019-09-18

## 2019-09-18 ENCOUNTER — COMMUNICATION - HEALTHEAST (OUTPATIENT)
Dept: INTERNAL MEDICINE | Facility: CLINIC | Age: 79
End: 2019-09-18

## 2019-09-18 DIAGNOSIS — R79.89 ABNORMAL LFTS: ICD-10-CM

## 2019-10-09 ENCOUNTER — COMMUNICATION - HEALTHEAST (OUTPATIENT)
Dept: INTERNAL MEDICINE | Facility: CLINIC | Age: 79
End: 2019-10-09

## 2019-10-09 DIAGNOSIS — I48.91 ATRIAL FIBRILLATION (H): ICD-10-CM

## 2019-10-09 DIAGNOSIS — Z79.01 LONG TERM CURRENT USE OF ANTICOAGULANT THERAPY: ICD-10-CM

## 2019-10-10 ENCOUNTER — RECORDS - HEALTHEAST (OUTPATIENT)
Dept: ADMINISTRATIVE | Facility: OTHER | Age: 79
End: 2019-10-10

## 2019-10-11 ENCOUNTER — HOSPITAL ENCOUNTER (OUTPATIENT)
Dept: ULTRASOUND IMAGING | Facility: CLINIC | Age: 79
Discharge: HOME | End: 2019-10-11
Attending: PHYSICIAN ASSISTANT

## 2019-10-11 DIAGNOSIS — R79.89 ELEVATED LFTS: ICD-10-CM

## 2019-10-11 DIAGNOSIS — R94.5 ABNORMAL RESULTS OF LIVER FUNCTION STUDIES: ICD-10-CM

## 2019-10-15 ENCOUNTER — AMBULATORY - HEALTHEAST (OUTPATIENT)
Dept: LAB | Facility: CLINIC | Age: 79
End: 2019-10-15

## 2019-10-15 ENCOUNTER — COMMUNICATION - HEALTHEAST (OUTPATIENT)
Dept: ANTICOAGULATION | Facility: CLINIC | Age: 79
End: 2019-10-15

## 2019-10-15 DIAGNOSIS — I48.91 ATRIAL FIBRILLATION (H): ICD-10-CM

## 2019-10-15 DIAGNOSIS — Z79.01 LONG TERM (CURRENT) USE OF ANTICOAGULANTS: ICD-10-CM

## 2019-10-15 DIAGNOSIS — I48.19 PERSISTENT ATRIAL FIBRILLATION (H): ICD-10-CM

## 2019-10-15 LAB — INR PPP: 1.7 (ref 0.9–1.1)

## 2019-10-18 ENCOUNTER — COMMUNICATION - HEALTHEAST (OUTPATIENT)
Dept: ANTICOAGULATION | Facility: CLINIC | Age: 79
End: 2019-10-18

## 2019-10-18 DIAGNOSIS — I48.91 ATRIAL FIBRILLATION (H): ICD-10-CM

## 2019-10-18 DIAGNOSIS — Z79.01 LONG TERM (CURRENT) USE OF ANTICOAGULANTS: ICD-10-CM

## 2019-10-21 ENCOUNTER — RECORDS - HEALTHEAST (OUTPATIENT)
Dept: ADMINISTRATIVE | Facility: OTHER | Age: 79
End: 2019-10-21

## 2019-10-22 ENCOUNTER — AMBULATORY - HEALTHEAST (OUTPATIENT)
Dept: CARDIOLOGY | Facility: CLINIC | Age: 79
End: 2019-10-22

## 2019-10-22 ENCOUNTER — COMMUNICATION - HEALTHEAST (OUTPATIENT)
Dept: INTERNAL MEDICINE | Facility: CLINIC | Age: 79
End: 2019-10-22

## 2019-10-22 DIAGNOSIS — I49.5 SICK SINUS SYNDROME (H): ICD-10-CM

## 2019-10-22 DIAGNOSIS — Z00.00 ROUTINE GENERAL MEDICAL EXAMINATION AT A HEALTH CARE FACILITY: ICD-10-CM

## 2019-10-29 ENCOUNTER — COMMUNICATION - HEALTHEAST (OUTPATIENT)
Dept: INTERNAL MEDICINE | Facility: CLINIC | Age: 79
End: 2019-10-29

## 2019-10-29 DIAGNOSIS — Z79.01 LONG TERM (CURRENT) USE OF ANTICOAGULANTS: ICD-10-CM

## 2019-10-29 DIAGNOSIS — I48.91 ATRIAL FIBRILLATION (H): ICD-10-CM

## 2019-10-30 LAB — INR PPP: 1.9 (ref 0.9–1.1)

## 2019-11-11 ENCOUNTER — COMMUNICATION - HEALTHEAST (OUTPATIENT)
Dept: ANTICOAGULATION | Facility: CLINIC | Age: 79
End: 2019-11-11

## 2019-11-11 DIAGNOSIS — Z79.01 LONG TERM (CURRENT) USE OF ANTICOAGULANTS: ICD-10-CM

## 2019-11-11 DIAGNOSIS — I48.91 ATRIAL FIBRILLATION (H): ICD-10-CM

## 2019-11-11 LAB — INR PPP: 2.1 (ref 0.9–1.1)

## 2019-12-04 ENCOUNTER — COMMUNICATION - HEALTHEAST (OUTPATIENT)
Dept: ANTICOAGULATION | Facility: CLINIC | Age: 79
End: 2019-12-04

## 2019-12-04 DIAGNOSIS — Z79.01 LONG TERM (CURRENT) USE OF ANTICOAGULANTS: ICD-10-CM

## 2019-12-04 DIAGNOSIS — I48.91 ATRIAL FIBRILLATION (H): ICD-10-CM

## 2019-12-04 LAB — INR PPP: 1.8 (ref 0.9–1.1)

## 2019-12-10 ENCOUNTER — HOSPITAL ENCOUNTER (OUTPATIENT)
Dept: MRI IMAGING | Facility: CLINIC | Age: 79
Discharge: HOME | End: 2019-12-10

## 2019-12-10 ENCOUNTER — COMMUNICATION - HEALTHEAST (OUTPATIENT)
Dept: INTERNAL MEDICINE | Facility: CLINIC | Age: 79
End: 2019-12-10

## 2019-12-10 ENCOUNTER — HOSPITAL ENCOUNTER (OUTPATIENT)
Dept: RADIOLOGY | Facility: CLINIC | Age: 79
Discharge: HOME | End: 2019-12-10

## 2019-12-10 DIAGNOSIS — K83.8 DILATED CBD, ACQUIRED: ICD-10-CM

## 2019-12-10 DIAGNOSIS — Z95.0 PACEMAKER: ICD-10-CM

## 2019-12-10 DIAGNOSIS — R21 RASH: ICD-10-CM

## 2019-12-10 LAB
CREAT BLD-MCNC: 1.7 MG/DL (ref 0.7–1.3)
GFR SERPL CREATININE-BSD FRML MDRD: 39 ML/MIN/1.73M2

## 2019-12-13 ENCOUNTER — COMMUNICATION - HEALTHEAST (OUTPATIENT)
Dept: ANTICOAGULATION | Facility: CLINIC | Age: 79
End: 2019-12-13

## 2019-12-13 DIAGNOSIS — I48.91 ATRIAL FIBRILLATION (H): ICD-10-CM

## 2019-12-13 DIAGNOSIS — Z79.01 LONG TERM (CURRENT) USE OF ANTICOAGULANTS: ICD-10-CM

## 2019-12-16 ENCOUNTER — COMMUNICATION - HEALTHEAST (OUTPATIENT)
Dept: ANTICOAGULATION | Facility: CLINIC | Age: 79
End: 2019-12-16

## 2019-12-16 ENCOUNTER — AMBULATORY - HEALTHEAST (OUTPATIENT)
Dept: LAB | Facility: CLINIC | Age: 79
End: 2019-12-16

## 2019-12-16 DIAGNOSIS — Z79.01 LONG TERM (CURRENT) USE OF ANTICOAGULANTS: ICD-10-CM

## 2019-12-16 DIAGNOSIS — I48.91 ATRIAL FIBRILLATION (H): ICD-10-CM

## 2019-12-16 DIAGNOSIS — I48.19 PERSISTENT ATRIAL FIBRILLATION (H): ICD-10-CM

## 2019-12-16 LAB
INR PPP: 1.6 (ref 0.9–1.1)
INR PPP: 2 (ref 0.9–1.1)

## 2019-12-19 ENCOUNTER — RECORDS - HEALTHEAST (OUTPATIENT)
Dept: ADMINISTRATIVE | Facility: OTHER | Age: 79
End: 2019-12-19

## 2019-12-20 ENCOUNTER — COMMUNICATION - HEALTHEAST (OUTPATIENT)
Dept: INTERNAL MEDICINE | Facility: CLINIC | Age: 79
End: 2019-12-20

## 2019-12-23 ENCOUNTER — COMMUNICATION - HEALTHEAST (OUTPATIENT)
Dept: INTERNAL MEDICINE | Facility: CLINIC | Age: 79
End: 2019-12-23

## 2019-12-23 ENCOUNTER — COMMUNICATION - HEALTHEAST (OUTPATIENT)
Dept: ANTICOAGULATION | Facility: CLINIC | Age: 79
End: 2019-12-23

## 2019-12-23 ENCOUNTER — OFFICE VISIT - HEALTHEAST (OUTPATIENT)
Dept: INTERNAL MEDICINE | Facility: CLINIC | Age: 79
End: 2019-12-23

## 2019-12-23 DIAGNOSIS — I48.19 PERSISTENT ATRIAL FIBRILLATION (H): ICD-10-CM

## 2019-12-23 DIAGNOSIS — I48.91 ATRIAL FIBRILLATION, UNSPECIFIED TYPE (H): ICD-10-CM

## 2019-12-23 DIAGNOSIS — R21 RASH: ICD-10-CM

## 2019-12-23 DIAGNOSIS — I48.91 ATRIAL FIBRILLATION (H): ICD-10-CM

## 2019-12-23 DIAGNOSIS — Z79.01 LONG TERM CURRENT USE OF ANTICOAGULANT THERAPY: ICD-10-CM

## 2019-12-23 DIAGNOSIS — Z79.01 LONG TERM (CURRENT) USE OF ANTICOAGULANTS: ICD-10-CM

## 2019-12-23 LAB — INR PPP: 1.9 (ref 0.9–1.1)

## 2019-12-26 ENCOUNTER — COMMUNICATION - HEALTHEAST (OUTPATIENT)
Dept: INTERNAL MEDICINE | Facility: CLINIC | Age: 79
End: 2019-12-26

## 2019-12-26 DIAGNOSIS — Z79.01 LONG TERM CURRENT USE OF ANTICOAGULANT THERAPY: ICD-10-CM

## 2019-12-26 DIAGNOSIS — I48.91 ATRIAL FIBRILLATION (H): ICD-10-CM

## 2019-12-27 RX ORDER — WARFARIN SODIUM 2.5 MG/1
TABLET ORAL
Qty: 115 TABLET | Refills: 1 | Status: SHIPPED | OUTPATIENT
Start: 2019-12-27 | End: 2021-07-22

## 2019-12-31 ENCOUNTER — COMMUNICATION - HEALTHEAST (OUTPATIENT)
Dept: INTERNAL MEDICINE | Facility: CLINIC | Age: 79
End: 2019-12-31

## 2019-12-31 DIAGNOSIS — R21 RASH: ICD-10-CM

## 2020-01-02 ENCOUNTER — COMMUNICATION - HEALTHEAST (OUTPATIENT)
Dept: ANTICOAGULATION | Facility: CLINIC | Age: 80
End: 2020-01-02

## 2020-01-02 DIAGNOSIS — Z79.01 LONG TERM (CURRENT) USE OF ANTICOAGULANTS: ICD-10-CM

## 2020-01-02 DIAGNOSIS — I48.91 ATRIAL FIBRILLATION, UNSPECIFIED TYPE (H): ICD-10-CM

## 2020-01-02 LAB — INR PPP: 1.2 (ref 0.9–1.1)

## 2020-01-13 ENCOUNTER — COMMUNICATION - HEALTHEAST (OUTPATIENT)
Dept: ANTICOAGULATION | Facility: CLINIC | Age: 80
End: 2020-01-13

## 2020-01-13 ENCOUNTER — RECORDS - HEALTHEAST (OUTPATIENT)
Dept: ANTICOAGULATION | Facility: CLINIC | Age: 80
End: 2020-01-13

## 2020-01-13 DIAGNOSIS — I48.19 PERSISTENT ATRIAL FIBRILLATION (H): ICD-10-CM

## 2020-01-13 DIAGNOSIS — I48.91 ATRIAL FIBRILLATION, UNSPECIFIED TYPE (H): ICD-10-CM

## 2020-01-13 DIAGNOSIS — Z79.01 LONG TERM (CURRENT) USE OF ANTICOAGULANTS: ICD-10-CM

## 2020-01-13 LAB — INR PPP: 3 (ref 0.9–1.1)

## 2020-01-22 ENCOUNTER — RECORDS - HEALTHEAST (OUTPATIENT)
Dept: ADMINISTRATIVE | Facility: OTHER | Age: 80
End: 2020-01-22

## 2020-01-23 ENCOUNTER — RECORDS - HEALTHEAST (OUTPATIENT)
Dept: ADMINISTRATIVE | Facility: OTHER | Age: 80
End: 2020-01-23

## 2020-01-24 ENCOUNTER — COMMUNICATION - HEALTHEAST (OUTPATIENT)
Dept: INTERNAL MEDICINE | Facility: CLINIC | Age: 80
End: 2020-01-24

## 2020-01-28 ENCOUNTER — COMMUNICATION - HEALTHEAST (OUTPATIENT)
Dept: ANTICOAGULATION | Facility: CLINIC | Age: 80
End: 2020-01-28

## 2020-01-28 ENCOUNTER — OFFICE VISIT - HEALTHEAST (OUTPATIENT)
Dept: INTERNAL MEDICINE | Facility: CLINIC | Age: 80
End: 2020-01-28

## 2020-01-28 DIAGNOSIS — Z79.01 LONG TERM (CURRENT) USE OF ANTICOAGULANTS: ICD-10-CM

## 2020-01-28 DIAGNOSIS — Z00.00 ROUTINE GENERAL MEDICAL EXAMINATION AT A HEALTH CARE FACILITY: ICD-10-CM

## 2020-01-28 DIAGNOSIS — I48.19 PERSISTENT ATRIAL FIBRILLATION (H): ICD-10-CM

## 2020-01-28 DIAGNOSIS — I48.91 ATRIAL FIBRILLATION, UNSPECIFIED TYPE (H): ICD-10-CM

## 2020-01-28 LAB
ALBUMIN SERPL-MCNC: 2.8 G/DL (ref 3.5–5)
ALBUMIN UR-MCNC: NEGATIVE MG/DL
ALP SERPL-CCNC: 577 U/L (ref 45–120)
ALT SERPL W P-5'-P-CCNC: 109 U/L (ref 0–45)
ANION GAP SERPL CALCULATED.3IONS-SCNC: 12 MMOL/L (ref 5–18)
APPEARANCE UR: CLEAR
AST SERPL W P-5'-P-CCNC: 120 U/L (ref 0–40)
BILIRUB SERPL-MCNC: 2.9 MG/DL (ref 0–1)
BILIRUB UR QL STRIP: ABNORMAL
BUN SERPL-MCNC: 22 MG/DL (ref 8–28)
CALCIUM SERPL-MCNC: 8.9 MG/DL (ref 8.5–10.5)
CHLORIDE BLD-SCNC: 105 MMOL/L (ref 98–107)
CHOLEST SERPL-MCNC: 187 MG/DL
CO2 SERPL-SCNC: 23 MMOL/L (ref 22–31)
COLOR UR AUTO: YELLOW
CREAT SERPL-MCNC: 1.52 MG/DL (ref 0.7–1.3)
ERYTHROCYTE [DISTWIDTH] IN BLOOD BY AUTOMATED COUNT: 12.5 % (ref 11–14.5)
FASTING STATUS PATIENT QL REPORTED: YES
GFR SERPL CREATININE-BSD FRML MDRD: 44 ML/MIN/1.73M2
GLUCOSE BLD-MCNC: 126 MG/DL (ref 70–125)
GLUCOSE UR STRIP-MCNC: NEGATIVE MG/DL
HCT VFR BLD AUTO: 33.9 % (ref 40–54)
HDLC SERPL-MCNC: 61 MG/DL
HGB BLD-MCNC: 11.4 G/DL (ref 14–18)
HGB UR QL STRIP: NEGATIVE
INR PPP: 2.6 (ref 0.9–1.1)
KETONES UR STRIP-MCNC: NEGATIVE MG/DL
LDLC SERPL CALC-MCNC: 96 MG/DL
LEUKOCYTE ESTERASE UR QL STRIP: NEGATIVE
MCH RBC QN AUTO: 34.3 PG (ref 27–34)
MCHC RBC AUTO-ENTMCNC: 33.5 G/DL (ref 32–36)
MCV RBC AUTO: 102 FL (ref 80–100)
NITRATE UR QL: NEGATIVE
PH UR STRIP: 5.5 [PH] (ref 5–8)
PLATELET # BLD AUTO: 178 THOU/UL (ref 140–440)
PMV BLD AUTO: 8.3 FL (ref 7–10)
POTASSIUM BLD-SCNC: 4.2 MMOL/L (ref 3.5–5)
PROT SERPL-MCNC: 7.2 G/DL (ref 6–8)
RBC # BLD AUTO: 3.31 MILL/UL (ref 4.4–6.2)
SODIUM SERPL-SCNC: 140 MMOL/L (ref 136–145)
SP GR UR STRIP: 1.02 (ref 1–1.03)
TRIGL SERPL-MCNC: 149 MG/DL
TSH SERPL DL<=0.005 MIU/L-ACNC: 3.2 UIU/ML (ref 0.3–5)
UROBILINOGEN UR STRIP-ACNC: ABNORMAL
WBC: 6.7 THOU/UL (ref 4–11)

## 2020-01-28 ASSESSMENT — MIFFLIN-ST. JEOR: SCORE: 1433.6

## 2020-01-28 ASSESSMENT — ANXIETY QUESTIONNAIRES
2. NOT BEING ABLE TO STOP OR CONTROL WORRYING: NOT AT ALL
1. FEELING NERVOUS, ANXIOUS, OR ON EDGE: NOT AT ALL

## 2020-01-29 ENCOUNTER — COMMUNICATION - HEALTHEAST (OUTPATIENT)
Dept: INTERNAL MEDICINE | Facility: CLINIC | Age: 80
End: 2020-01-29

## 2020-01-30 ENCOUNTER — COMMUNICATION - HEALTHEAST (OUTPATIENT)
Dept: INTERNAL MEDICINE | Facility: CLINIC | Age: 80
End: 2020-01-30

## 2020-02-04 ENCOUNTER — RECORDS - HEALTHEAST (OUTPATIENT)
Dept: ADMINISTRATIVE | Facility: OTHER | Age: 80
End: 2020-02-04

## 2020-02-05 ENCOUNTER — COMMUNICATION - HEALTHEAST (OUTPATIENT)
Dept: INTERNAL MEDICINE | Facility: CLINIC | Age: 80
End: 2020-02-05

## 2020-02-05 DIAGNOSIS — I48.91 ATRIAL FIBRILLATION, UNSPECIFIED TYPE (H): ICD-10-CM

## 2020-02-05 DIAGNOSIS — Z79.01 LONG TERM (CURRENT) USE OF ANTICOAGULANTS: ICD-10-CM

## 2020-02-06 ENCOUNTER — RECORDS - HEALTHEAST (OUTPATIENT)
Dept: ADMINISTRATIVE | Facility: OTHER | Age: 80
End: 2020-02-06

## 2020-02-07 ENCOUNTER — COMMUNICATION - HEALTHEAST (OUTPATIENT)
Dept: INTERNAL MEDICINE | Facility: CLINIC | Age: 80
End: 2020-02-07

## 2020-02-07 DIAGNOSIS — I48.91 ATRIAL FIBRILLATION, UNSPECIFIED TYPE (H): ICD-10-CM

## 2020-02-07 DIAGNOSIS — Z79.01 LONG TERM (CURRENT) USE OF ANTICOAGULANTS: ICD-10-CM

## 2020-02-17 ENCOUNTER — COMMUNICATION - HEALTHEAST (OUTPATIENT)
Dept: INTERNAL MEDICINE | Facility: CLINIC | Age: 80
End: 2020-02-17

## 2020-02-17 ENCOUNTER — COMMUNICATION - HEALTHEAST (OUTPATIENT)
Dept: SCHEDULING | Facility: CLINIC | Age: 80
End: 2020-02-17

## 2020-02-17 DIAGNOSIS — Z79.01 LONG TERM (CURRENT) USE OF ANTICOAGULANTS: ICD-10-CM

## 2020-02-17 DIAGNOSIS — I48.91 ATRIAL FIBRILLATION, UNSPECIFIED TYPE (H): ICD-10-CM

## 2020-02-18 ENCOUNTER — COMMUNICATION - HEALTHEAST (OUTPATIENT)
Dept: INTERNAL MEDICINE | Facility: CLINIC | Age: 80
End: 2020-02-18

## 2020-02-21 ENCOUNTER — COMMUNICATION - HEALTHEAST (OUTPATIENT)
Dept: INTERNAL MEDICINE | Facility: CLINIC | Age: 80
End: 2020-02-21

## 2020-02-21 DIAGNOSIS — I48.91 ATRIAL FIBRILLATION, UNSPECIFIED TYPE (H): ICD-10-CM

## 2020-02-21 DIAGNOSIS — Z79.01 LONG TERM (CURRENT) USE OF ANTICOAGULANTS: ICD-10-CM

## 2020-02-26 ENCOUNTER — RECORDS - HEALTHEAST (OUTPATIENT)
Dept: ADMINISTRATIVE | Facility: OTHER | Age: 80
End: 2020-02-26

## 2020-02-27 ENCOUNTER — COMMUNICATION - HEALTHEAST (OUTPATIENT)
Dept: INTERNAL MEDICINE | Facility: CLINIC | Age: 80
End: 2020-02-27

## 2020-02-27 DIAGNOSIS — Z79.01 LONG TERM (CURRENT) USE OF ANTICOAGULANTS: ICD-10-CM

## 2020-02-27 DIAGNOSIS — I48.91 ATRIAL FIBRILLATION, UNSPECIFIED TYPE (H): ICD-10-CM

## 2020-02-28 ENCOUNTER — COMMUNICATION - HEALTHEAST (OUTPATIENT)
Dept: ANTICOAGULATION | Facility: CLINIC | Age: 80
End: 2020-02-28

## 2020-02-28 ENCOUNTER — AMBULATORY - HEALTHEAST (OUTPATIENT)
Dept: LAB | Facility: CLINIC | Age: 80
End: 2020-02-28

## 2020-02-28 DIAGNOSIS — I48.91 ATRIAL FIBRILLATION, UNSPECIFIED TYPE (H): ICD-10-CM

## 2020-02-28 DIAGNOSIS — Z79.01 LONG TERM (CURRENT) USE OF ANTICOAGULANTS: ICD-10-CM

## 2020-02-28 DIAGNOSIS — I48.19 PERSISTENT ATRIAL FIBRILLATION (H): ICD-10-CM

## 2020-02-28 LAB — INR PPP: 1.8 (ref 0.9–1.1)

## 2020-03-13 ENCOUNTER — COMMUNICATION - HEALTHEAST (OUTPATIENT)
Dept: INTERNAL MEDICINE | Facility: CLINIC | Age: 80
End: 2020-03-13

## 2020-03-13 DIAGNOSIS — I48.91 ATRIAL FIBRILLATION, UNSPECIFIED TYPE (H): ICD-10-CM

## 2020-03-13 DIAGNOSIS — Z79.01 LONG TERM (CURRENT) USE OF ANTICOAGULANTS: ICD-10-CM

## 2020-03-19 LAB — INR PPP: 1.4 (ref 0.9–1.1)

## 2020-03-20 LAB — INR PPP: 1.5 (ref 0.9–1.1)

## 2020-03-24 ENCOUNTER — COMMUNICATION - HEALTHEAST (OUTPATIENT)
Dept: INTERNAL MEDICINE | Facility: CLINIC | Age: 80
End: 2020-03-24

## 2020-03-24 DIAGNOSIS — Z79.01 LONG TERM (CURRENT) USE OF ANTICOAGULANTS: ICD-10-CM

## 2020-03-24 DIAGNOSIS — I48.91 ATRIAL FIBRILLATION, UNSPECIFIED TYPE (H): ICD-10-CM

## 2020-03-27 ENCOUNTER — HOSPITAL ENCOUNTER (OUTPATIENT)
Dept: CT IMAGING | Facility: CLINIC | Age: 80
Discharge: HOME | End: 2020-03-27
Attending: INTERNAL MEDICINE

## 2020-03-27 ENCOUNTER — AMBULATORY - HEALTHEAST (OUTPATIENT)
Dept: LAB | Facility: CLINIC | Age: 80
End: 2020-03-27

## 2020-03-27 ENCOUNTER — AMBULATORY - HEALTHEAST (OUTPATIENT)
Dept: INTERNAL MEDICINE | Facility: CLINIC | Age: 80
End: 2020-03-27

## 2020-03-27 ENCOUNTER — COMMUNICATION - HEALTHEAST (OUTPATIENT)
Dept: ANTICOAGULATION | Facility: CLINIC | Age: 80
End: 2020-03-27

## 2020-03-27 DIAGNOSIS — C25.1 MALIGNANT NEOPLASM OF BODY OF PANCREAS (H): ICD-10-CM

## 2020-03-27 DIAGNOSIS — I48.91 ATRIAL FIBRILLATION, UNSPECIFIED TYPE (H): ICD-10-CM

## 2020-03-27 DIAGNOSIS — I48.19 PERSISTENT ATRIAL FIBRILLATION (H): ICD-10-CM

## 2020-03-27 DIAGNOSIS — Z79.01 LONG TERM (CURRENT) USE OF ANTICOAGULANTS: ICD-10-CM

## 2020-03-27 LAB
ANION GAP SERPL CALCULATED.3IONS-SCNC: 9 MMOL/L (ref 5–18)
BUN SERPL-MCNC: 14 MG/DL (ref 8–28)
CALCIUM SERPL-MCNC: 7.9 MG/DL (ref 8.5–10.5)
CHLORIDE BLD-SCNC: 103 MMOL/L (ref 98–107)
CO2 SERPL-SCNC: 24 MMOL/L (ref 22–31)
CREAT SERPL-MCNC: 1.39 MG/DL (ref 0.7–1.3)
ERYTHROCYTE [DISTWIDTH] IN BLOOD BY AUTOMATED COUNT: 12.9 % (ref 11–14.5)
GFR SERPL CREATININE-BSD FRML MDRD: 49 ML/MIN/1.73M2
GLUCOSE BLD-MCNC: 101 MG/DL (ref 70–125)
HCT VFR BLD AUTO: 32.6 % (ref 40–54)
HGB BLD-MCNC: 10.8 G/DL (ref 14–18)
INR PPP: 2 (ref 0.9–1.1)
MCH RBC QN AUTO: 31.8 PG (ref 27–34)
MCHC RBC AUTO-ENTMCNC: 33.1 G/DL (ref 32–36)
MCV RBC AUTO: 96 FL (ref 80–100)
PLATELET # BLD AUTO: 297 THOU/UL (ref 140–440)
PMV BLD AUTO: 7.9 FL (ref 7–10)
POTASSIUM BLD-SCNC: 3.7 MMOL/L (ref 3.5–5)
RBC # BLD AUTO: 3.39 MILL/UL (ref 4.4–6.2)
SODIUM SERPL-SCNC: 136 MMOL/L (ref 136–145)
WBC: 10.2 THOU/UL (ref 4–11)

## 2020-03-29 LAB — CANCER AG19-9 SERPL IA-ACNC: 25 U/ML (ref 0–37)

## 2020-03-30 ENCOUNTER — COMMUNICATION - HEALTHEAST (OUTPATIENT)
Dept: INTERNAL MEDICINE | Facility: CLINIC | Age: 80
End: 2020-03-30

## 2020-04-02 ENCOUNTER — COMMUNICATION - HEALTHEAST (OUTPATIENT)
Dept: INTERNAL MEDICINE | Facility: CLINIC | Age: 80
End: 2020-04-02

## 2020-04-03 ENCOUNTER — COMMUNICATION - HEALTHEAST (OUTPATIENT)
Dept: LAB | Facility: CLINIC | Age: 80
End: 2020-04-03

## 2020-04-03 ENCOUNTER — AMBULATORY - HEALTHEAST (OUTPATIENT)
Dept: LAB | Facility: CLINIC | Age: 80
End: 2020-04-03

## 2020-04-03 DIAGNOSIS — I48.19 PERSISTENT ATRIAL FIBRILLATION (H): ICD-10-CM

## 2020-04-03 DIAGNOSIS — Z79.01 LONG TERM (CURRENT) USE OF ANTICOAGULANTS: ICD-10-CM

## 2020-04-03 DIAGNOSIS — I48.91 ATRIAL FIBRILLATION, UNSPECIFIED TYPE (H): ICD-10-CM

## 2020-04-03 LAB — INR PPP: 5.19 (ref 0.9–1.1)

## 2020-04-07 ENCOUNTER — COMMUNICATION - HEALTHEAST (OUTPATIENT)
Dept: INTERNAL MEDICINE | Facility: CLINIC | Age: 80
End: 2020-04-07

## 2020-04-09 ENCOUNTER — COMMUNICATION - HEALTHEAST (OUTPATIENT)
Dept: ANTICOAGULATION | Facility: CLINIC | Age: 80
End: 2020-04-09

## 2020-04-09 ENCOUNTER — AMBULATORY - HEALTHEAST (OUTPATIENT)
Dept: LAB | Facility: CLINIC | Age: 80
End: 2020-04-09

## 2020-04-09 DIAGNOSIS — Z79.01 LONG TERM (CURRENT) USE OF ANTICOAGULANTS: ICD-10-CM

## 2020-04-09 DIAGNOSIS — I48.19 PERSISTENT ATRIAL FIBRILLATION (H): ICD-10-CM

## 2020-04-09 DIAGNOSIS — I48.91 ATRIAL FIBRILLATION, UNSPECIFIED TYPE (H): ICD-10-CM

## 2020-04-09 LAB — INR PPP: 3.8 (ref 0.9–1.1)

## 2020-04-10 ENCOUNTER — COMMUNICATION - HEALTHEAST (OUTPATIENT)
Dept: INTERNAL MEDICINE | Facility: CLINIC | Age: 80
End: 2020-04-10

## 2020-04-10 DIAGNOSIS — I48.91 ATRIAL FIBRILLATION (H): ICD-10-CM

## 2020-04-10 DIAGNOSIS — Z79.01 LONG TERM (CURRENT) USE OF ANTICOAGULANTS: ICD-10-CM

## 2020-04-14 ENCOUNTER — RECORDS - HEALTHEAST (OUTPATIENT)
Dept: ADMINISTRATIVE | Facility: OTHER | Age: 80
End: 2020-04-14

## 2020-04-14 ENCOUNTER — COMMUNICATION - HEALTHEAST (OUTPATIENT)
Dept: INTERNAL MEDICINE | Facility: CLINIC | Age: 80
End: 2020-04-14

## 2020-04-14 DIAGNOSIS — Z79.01 LONG TERM (CURRENT) USE OF ANTICOAGULANTS: ICD-10-CM

## 2020-04-14 DIAGNOSIS — I48.91 ATRIAL FIBRILLATION (H): ICD-10-CM

## 2020-04-14 LAB — INR PPP: 3.1 (ref 0.9–1.1)

## 2020-04-15 ENCOUNTER — COMMUNICATION - HEALTHEAST (OUTPATIENT)
Dept: INTERNAL MEDICINE | Facility: CLINIC | Age: 80
End: 2020-04-15

## 2020-04-16 ENCOUNTER — COMMUNICATION - HEALTHEAST (OUTPATIENT)
Dept: INTERNAL MEDICINE | Facility: CLINIC | Age: 80
End: 2020-04-16

## 2020-04-16 ENCOUNTER — AMBULATORY - HEALTHEAST (OUTPATIENT)
Dept: INTERNAL MEDICINE | Facility: CLINIC | Age: 80
End: 2020-04-16

## 2020-04-16 DIAGNOSIS — I48.91 ATRIAL FIBRILLATION (H): ICD-10-CM

## 2020-04-17 ENCOUNTER — AMBULATORY - HEALTHEAST (OUTPATIENT)
Dept: NURSING | Facility: CLINIC | Age: 80
End: 2020-04-17

## 2020-04-17 ENCOUNTER — OFFICE VISIT - HEALTHEAST (OUTPATIENT)
Dept: INTERNAL MEDICINE | Facility: CLINIC | Age: 80
End: 2020-04-17

## 2020-04-17 DIAGNOSIS — I48.91 ATRIAL FIBRILLATION (H): ICD-10-CM

## 2020-04-17 DIAGNOSIS — C24.0 BILE DUCT CANCER (H): ICD-10-CM

## 2020-04-17 LAB
ATRIAL RATE - MUSE: 82 BPM
DIASTOLIC BLOOD PRESSURE - MUSE: NORMAL
INTERPRETATION ECG - MUSE: NORMAL
P AXIS - MUSE: 95 DEGREES
PR INTERVAL - MUSE: 200 MS
QRS DURATION - MUSE: 74 MS
QT - MUSE: 376 MS
QTC - MUSE: 439 MS
R AXIS - MUSE: -22 DEGREES
SYSTOLIC BLOOD PRESSURE - MUSE: NORMAL
T AXIS - MUSE: 35 DEGREES
VENTRICULAR RATE- MUSE: 82 BPM

## 2020-04-17 RX ORDER — ROSUVASTATIN CALCIUM 10 MG/1
TABLET, COATED ORAL
Status: SHIPPED | COMMUNITY
Start: 2020-03-11 | End: 2023-02-10

## 2020-04-21 ENCOUNTER — COMMUNICATION - HEALTHEAST (OUTPATIENT)
Dept: LAB | Facility: CLINIC | Age: 80
End: 2020-04-21

## 2020-04-21 ENCOUNTER — COMMUNICATION - HEALTHEAST (OUTPATIENT)
Dept: INTERNAL MEDICINE | Facility: CLINIC | Age: 80
End: 2020-04-21

## 2020-04-21 ENCOUNTER — AMBULATORY - HEALTHEAST (OUTPATIENT)
Dept: LAB | Facility: CLINIC | Age: 80
End: 2020-04-21

## 2020-04-21 DIAGNOSIS — I48.19 PERSISTENT ATRIAL FIBRILLATION (H): ICD-10-CM

## 2020-04-21 DIAGNOSIS — Z79.01 LONG TERM (CURRENT) USE OF ANTICOAGULANTS: ICD-10-CM

## 2020-04-21 DIAGNOSIS — I48.91 ATRIAL FIBRILLATION (H): ICD-10-CM

## 2020-04-21 LAB — INR PPP: 5.53 (ref 0.9–1.1)

## 2020-04-22 ENCOUNTER — OFFICE VISIT - HEALTHEAST (OUTPATIENT)
Dept: INTERNAL MEDICINE | Facility: CLINIC | Age: 80
End: 2020-04-22

## 2020-04-22 DIAGNOSIS — G47.00 INSOMNIA, UNSPECIFIED TYPE: ICD-10-CM

## 2020-04-22 DIAGNOSIS — C24.0 BILE DUCT CANCER (H): ICD-10-CM

## 2020-04-27 ENCOUNTER — AMBULATORY - HEALTHEAST (OUTPATIENT)
Dept: LAB | Facility: CLINIC | Age: 80
End: 2020-04-27

## 2020-04-27 ENCOUNTER — COMMUNICATION - HEALTHEAST (OUTPATIENT)
Dept: ANTICOAGULATION | Facility: CLINIC | Age: 80
End: 2020-04-27

## 2020-04-27 DIAGNOSIS — I48.19 PERSISTENT ATRIAL FIBRILLATION (H): ICD-10-CM

## 2020-04-27 DIAGNOSIS — I48.91 ATRIAL FIBRILLATION (H): ICD-10-CM

## 2020-04-27 DIAGNOSIS — Z79.01 LONG TERM (CURRENT) USE OF ANTICOAGULANTS: ICD-10-CM

## 2020-04-27 LAB — INR PPP: 3.3 (ref 0.9–1.1)

## 2020-05-04 ENCOUNTER — AMBULATORY - HEALTHEAST (OUTPATIENT)
Dept: LAB | Facility: CLINIC | Age: 80
End: 2020-05-04

## 2020-05-04 ENCOUNTER — COMMUNICATION - HEALTHEAST (OUTPATIENT)
Dept: ANTICOAGULATION | Facility: CLINIC | Age: 80
End: 2020-05-04

## 2020-05-04 DIAGNOSIS — Z79.01 LONG TERM (CURRENT) USE OF ANTICOAGULANTS: ICD-10-CM

## 2020-05-04 DIAGNOSIS — I48.19 PERSISTENT ATRIAL FIBRILLATION (H): ICD-10-CM

## 2020-05-04 DIAGNOSIS — I48.91 ATRIAL FIBRILLATION (H): ICD-10-CM

## 2020-05-04 LAB — INR PPP: 4.1 (ref 0.9–1.1)

## 2020-05-06 ENCOUNTER — COMMUNICATION - HEALTHEAST (OUTPATIENT)
Dept: INTERNAL MEDICINE | Facility: CLINIC | Age: 80
End: 2020-05-06

## 2020-05-06 DIAGNOSIS — I48.91 ATRIAL FIBRILLATION (H): ICD-10-CM

## 2020-05-06 DIAGNOSIS — Z79.01 LONG TERM (CURRENT) USE OF ANTICOAGULANTS: ICD-10-CM

## 2020-05-08 ENCOUNTER — RECORDS - HEALTHEAST (OUTPATIENT)
Dept: ADMINISTRATIVE | Facility: OTHER | Age: 80
End: 2020-05-08

## 2020-05-11 ENCOUNTER — COMMUNICATION - HEALTHEAST (OUTPATIENT)
Dept: ANTICOAGULATION | Facility: CLINIC | Age: 80
End: 2020-05-11

## 2020-05-11 ENCOUNTER — AMBULATORY - HEALTHEAST (OUTPATIENT)
Dept: LAB | Facility: CLINIC | Age: 80
End: 2020-05-11

## 2020-05-11 ENCOUNTER — COMMUNICATION - HEALTHEAST (OUTPATIENT)
Dept: INTERNAL MEDICINE | Facility: CLINIC | Age: 80
End: 2020-05-11

## 2020-05-11 DIAGNOSIS — G47.00 INSOMNIA, UNSPECIFIED TYPE: ICD-10-CM

## 2020-05-11 DIAGNOSIS — Z79.01 LONG TERM (CURRENT) USE OF ANTICOAGULANTS: ICD-10-CM

## 2020-05-11 DIAGNOSIS — I48.19 PERSISTENT ATRIAL FIBRILLATION (H): ICD-10-CM

## 2020-05-11 DIAGNOSIS — I48.91 ATRIAL FIBRILLATION (H): ICD-10-CM

## 2020-05-11 LAB — INR PPP: 4.5 (ref 0.9–1.1)

## 2020-05-14 ENCOUNTER — COMMUNICATION - HEALTHEAST (OUTPATIENT)
Dept: INTERNAL MEDICINE | Facility: CLINIC | Age: 80
End: 2020-05-14

## 2020-05-14 ENCOUNTER — OFFICE VISIT - HEALTHEAST (OUTPATIENT)
Dept: INTERNAL MEDICINE | Facility: CLINIC | Age: 80
End: 2020-05-14

## 2020-05-14 DIAGNOSIS — C24.0 BILE DUCT CANCER (H): ICD-10-CM

## 2020-05-14 DIAGNOSIS — Z20.822 SUSPECTED 2019 NOVEL CORONAVIRUS INFECTION: ICD-10-CM

## 2020-05-15 ENCOUNTER — AMBULATORY - HEALTHEAST (OUTPATIENT)
Dept: LAB | Facility: CLINIC | Age: 80
End: 2020-05-15

## 2020-05-15 ENCOUNTER — COMMUNICATION - HEALTHEAST (OUTPATIENT)
Dept: ANTICOAGULATION | Facility: CLINIC | Age: 80
End: 2020-05-15

## 2020-05-15 DIAGNOSIS — I48.91 ATRIAL FIBRILLATION (H): ICD-10-CM

## 2020-05-15 DIAGNOSIS — Z79.01 LONG TERM (CURRENT) USE OF ANTICOAGULANTS: ICD-10-CM

## 2020-05-15 DIAGNOSIS — I48.19 PERSISTENT ATRIAL FIBRILLATION (H): ICD-10-CM

## 2020-05-15 LAB — INR PPP: 3.1 (ref 0.9–1.1)

## 2020-05-21 ENCOUNTER — AMBULATORY - HEALTHEAST (OUTPATIENT)
Dept: LAB | Facility: CLINIC | Age: 80
End: 2020-05-21

## 2020-05-21 ENCOUNTER — COMMUNICATION - HEALTHEAST (OUTPATIENT)
Dept: ANTICOAGULATION | Facility: CLINIC | Age: 80
End: 2020-05-21

## 2020-05-21 ENCOUNTER — COMMUNICATION - HEALTHEAST (OUTPATIENT)
Dept: INTERNAL MEDICINE | Facility: CLINIC | Age: 80
End: 2020-05-21

## 2020-05-21 ENCOUNTER — OFFICE VISIT - HEALTHEAST (OUTPATIENT)
Dept: INTERNAL MEDICINE | Facility: CLINIC | Age: 80
End: 2020-05-21

## 2020-05-21 DIAGNOSIS — Z79.01 LONG TERM (CURRENT) USE OF ANTICOAGULANTS: ICD-10-CM

## 2020-05-21 DIAGNOSIS — I48.91 ATRIAL FIBRILLATION (H): ICD-10-CM

## 2020-05-21 DIAGNOSIS — I48.19 PERSISTENT ATRIAL FIBRILLATION (H): ICD-10-CM

## 2020-05-21 DIAGNOSIS — C24.0 BILE DUCT CANCER (H): ICD-10-CM

## 2020-05-21 DIAGNOSIS — R53.82 CHRONIC FATIGUE: ICD-10-CM

## 2020-05-21 LAB — INR PPP: 3.5 (ref 0.9–1.1)

## 2020-05-26 ENCOUNTER — COMMUNICATION - HEALTHEAST (OUTPATIENT)
Dept: ANTICOAGULATION | Facility: CLINIC | Age: 80
End: 2020-05-26

## 2020-05-26 ENCOUNTER — AMBULATORY - HEALTHEAST (OUTPATIENT)
Dept: LAB | Facility: CLINIC | Age: 80
End: 2020-05-26

## 2020-05-26 DIAGNOSIS — C24.0 BILE DUCT CANCER (H): ICD-10-CM

## 2020-05-26 DIAGNOSIS — R53.82 CHRONIC FATIGUE: ICD-10-CM

## 2020-05-26 DIAGNOSIS — I48.19 PERSISTENT ATRIAL FIBRILLATION (H): ICD-10-CM

## 2020-05-26 DIAGNOSIS — I48.91 ATRIAL FIBRILLATION (H): ICD-10-CM

## 2020-05-26 DIAGNOSIS — Z79.01 LONG TERM (CURRENT) USE OF ANTICOAGULANTS: ICD-10-CM

## 2020-05-26 LAB
ALBUMIN SERPL-MCNC: 3.5 G/DL (ref 3.5–5)
ALBUMIN UR-MCNC: NEGATIVE MG/DL
ALP SERPL-CCNC: 113 U/L (ref 45–120)
ALT SERPL W P-5'-P-CCNC: 97 U/L (ref 0–45)
ANION GAP SERPL CALCULATED.3IONS-SCNC: 9 MMOL/L (ref 5–18)
APPEARANCE UR: CLEAR
AST SERPL W P-5'-P-CCNC: 99 U/L (ref 0–40)
BILIRUB SERPL-MCNC: 0.4 MG/DL (ref 0–1)
BILIRUB UR QL STRIP: NEGATIVE
BUN SERPL-MCNC: 24 MG/DL (ref 8–28)
CALCIUM SERPL-MCNC: 8.7 MG/DL (ref 8.5–10.5)
CHLORIDE BLD-SCNC: 107 MMOL/L (ref 98–107)
CO2 SERPL-SCNC: 24 MMOL/L (ref 22–31)
COLOR UR AUTO: YELLOW
CREAT SERPL-MCNC: 1.61 MG/DL (ref 0.7–1.3)
ERYTHROCYTE [DISTWIDTH] IN BLOOD BY AUTOMATED COUNT: 14.8 % (ref 11–14.5)
GFR SERPL CREATININE-BSD FRML MDRD: 41 ML/MIN/1.73M2
GLUCOSE BLD-MCNC: 112 MG/DL (ref 70–125)
GLUCOSE UR STRIP-MCNC: NEGATIVE MG/DL
HCT VFR BLD AUTO: 33.8 % (ref 40–54)
HGB BLD-MCNC: 11 G/DL (ref 14–18)
HGB UR QL STRIP: NEGATIVE
INR PPP: 2.2 (ref 0.9–1.1)
KETONES UR STRIP-MCNC: NEGATIVE MG/DL
LEUKOCYTE ESTERASE UR QL STRIP: NEGATIVE
MCH RBC QN AUTO: 29.5 PG (ref 27–34)
MCHC RBC AUTO-ENTMCNC: 32.4 G/DL (ref 32–36)
MCV RBC AUTO: 91 FL (ref 80–100)
NITRATE UR QL: NEGATIVE
PH UR STRIP: 5.5 [PH] (ref 5–8)
PLATELET # BLD AUTO: 160 THOU/UL (ref 140–440)
PMV BLD AUTO: 8.3 FL (ref 7–10)
POTASSIUM BLD-SCNC: 4 MMOL/L (ref 3.5–5)
PROT SERPL-MCNC: 6.8 G/DL (ref 6–8)
RBC # BLD AUTO: 3.72 MILL/UL (ref 4.4–6.2)
SODIUM SERPL-SCNC: 140 MMOL/L (ref 136–145)
SP GR UR STRIP: 1.02 (ref 1–1.03)
TSH SERPL DL<=0.005 MIU/L-ACNC: 4 UIU/ML (ref 0.3–5)
UROBILINOGEN UR STRIP-ACNC: NORMAL
WBC: 6.1 THOU/UL (ref 4–11)

## 2020-05-28 LAB — CANCER AG19-9 SERPL IA-ACNC: 62 U/ML (ref 0–37)

## 2020-05-29 ENCOUNTER — COMMUNICATION - HEALTHEAST (OUTPATIENT)
Dept: INTERNAL MEDICINE | Facility: CLINIC | Age: 80
End: 2020-05-29

## 2020-05-29 DIAGNOSIS — E88.810 METABOLIC SYNDROME: ICD-10-CM

## 2020-06-02 ENCOUNTER — COMMUNICATION - HEALTHEAST (OUTPATIENT)
Dept: ANTICOAGULATION | Facility: CLINIC | Age: 80
End: 2020-06-02

## 2020-06-02 ENCOUNTER — AMBULATORY - HEALTHEAST (OUTPATIENT)
Dept: LAB | Facility: CLINIC | Age: 80
End: 2020-06-02

## 2020-06-02 DIAGNOSIS — I48.19 PERSISTENT ATRIAL FIBRILLATION (H): ICD-10-CM

## 2020-06-02 DIAGNOSIS — Z79.01 LONG TERM (CURRENT) USE OF ANTICOAGULANTS: ICD-10-CM

## 2020-06-02 DIAGNOSIS — I48.91 ATRIAL FIBRILLATION (H): ICD-10-CM

## 2020-06-02 LAB — INR PPP: 2.1 (ref 0.9–1.1)

## 2020-06-09 ENCOUNTER — AMBULATORY - HEALTHEAST (OUTPATIENT)
Dept: LAB | Facility: CLINIC | Age: 80
End: 2020-06-09

## 2020-06-09 ENCOUNTER — COMMUNICATION - HEALTHEAST (OUTPATIENT)
Dept: ANTICOAGULATION | Facility: CLINIC | Age: 80
End: 2020-06-09

## 2020-06-09 DIAGNOSIS — I48.19 PERSISTENT ATRIAL FIBRILLATION (H): ICD-10-CM

## 2020-06-09 DIAGNOSIS — I48.91 ATRIAL FIBRILLATION (H): ICD-10-CM

## 2020-06-09 DIAGNOSIS — Z79.01 LONG TERM (CURRENT) USE OF ANTICOAGULANTS: ICD-10-CM

## 2020-06-09 LAB — INR PPP: 2.3 (ref 0.9–1.1)

## 2020-06-11 ENCOUNTER — COMMUNICATION - HEALTHEAST (OUTPATIENT)
Dept: INTERNAL MEDICINE | Facility: CLINIC | Age: 80
End: 2020-06-11

## 2020-06-11 DIAGNOSIS — Z79.01 LONG TERM (CURRENT) USE OF ANTICOAGULANTS: ICD-10-CM

## 2020-06-11 DIAGNOSIS — I48.91 ATRIAL FIBRILLATION (H): ICD-10-CM

## 2020-06-15 ENCOUNTER — RECORDS - HEALTHEAST (OUTPATIENT)
Dept: ADMINISTRATIVE | Facility: OTHER | Age: 80
End: 2020-06-15

## 2020-06-16 ENCOUNTER — AMBULATORY - HEALTHEAST (OUTPATIENT)
Dept: LAB | Facility: CLINIC | Age: 80
End: 2020-06-16

## 2020-06-16 ENCOUNTER — COMMUNICATION - HEALTHEAST (OUTPATIENT)
Dept: ANTICOAGULATION | Facility: CLINIC | Age: 80
End: 2020-06-16

## 2020-06-16 DIAGNOSIS — I48.91 ATRIAL FIBRILLATION (H): ICD-10-CM

## 2020-06-16 DIAGNOSIS — Z79.01 LONG TERM (CURRENT) USE OF ANTICOAGULANTS: ICD-10-CM

## 2020-06-16 DIAGNOSIS — I48.19 PERSISTENT ATRIAL FIBRILLATION (H): ICD-10-CM

## 2020-06-16 LAB — INR PPP: 2.1 (ref 0.9–1.1)

## 2020-06-19 ENCOUNTER — RECORDS - HEALTHEAST (OUTPATIENT)
Dept: ADMINISTRATIVE | Facility: OTHER | Age: 80
End: 2020-06-19

## 2020-06-24 ENCOUNTER — COMMUNICATION - HEALTHEAST (OUTPATIENT)
Dept: ANTICOAGULATION | Facility: CLINIC | Age: 80
End: 2020-06-24

## 2020-06-24 ENCOUNTER — AMBULATORY - HEALTHEAST (OUTPATIENT)
Dept: LAB | Facility: CLINIC | Age: 80
End: 2020-06-24

## 2020-06-24 DIAGNOSIS — Z79.01 LONG TERM (CURRENT) USE OF ANTICOAGULANTS: ICD-10-CM

## 2020-06-24 DIAGNOSIS — I48.91 ATRIAL FIBRILLATION (H): ICD-10-CM

## 2020-06-24 DIAGNOSIS — I48.19 PERSISTENT ATRIAL FIBRILLATION (H): ICD-10-CM

## 2020-06-24 LAB — INR PPP: 2.1 (ref 0.9–1.1)

## 2020-07-02 ENCOUNTER — COMMUNICATION - HEALTHEAST (OUTPATIENT)
Dept: ANTICOAGULATION | Facility: CLINIC | Age: 80
End: 2020-07-02

## 2020-07-02 ENCOUNTER — AMBULATORY - HEALTHEAST (OUTPATIENT)
Dept: LAB | Facility: CLINIC | Age: 80
End: 2020-07-02

## 2020-07-02 DIAGNOSIS — Z79.01 LONG TERM (CURRENT) USE OF ANTICOAGULANTS: ICD-10-CM

## 2020-07-02 DIAGNOSIS — I48.19 PERSISTENT ATRIAL FIBRILLATION (H): ICD-10-CM

## 2020-07-02 DIAGNOSIS — I48.91 ATRIAL FIBRILLATION (H): ICD-10-CM

## 2020-07-02 LAB — INR PPP: 2.6 (ref 0.9–1.1)

## 2020-07-08 ENCOUNTER — RECORDS - HEALTHEAST (OUTPATIENT)
Dept: ADMINISTRATIVE | Facility: OTHER | Age: 80
End: 2020-07-08

## 2020-07-09 ENCOUNTER — RECORDS - HEALTHEAST (OUTPATIENT)
Dept: ADMINISTRATIVE | Facility: OTHER | Age: 80
End: 2020-07-09

## 2020-07-10 ENCOUNTER — COMMUNICATION - HEALTHEAST (OUTPATIENT)
Dept: INTERNAL MEDICINE | Facility: CLINIC | Age: 80
End: 2020-07-10

## 2020-07-10 DIAGNOSIS — I48.91 ATRIAL FIBRILLATION (H): ICD-10-CM

## 2020-07-10 DIAGNOSIS — Z79.01 LONG TERM (CURRENT) USE OF ANTICOAGULANTS: ICD-10-CM

## 2020-07-12 ENCOUNTER — COMMUNICATION - HEALTHEAST (OUTPATIENT)
Dept: SCHEDULING | Facility: CLINIC | Age: 80
End: 2020-07-12

## 2020-07-13 ENCOUNTER — COMMUNICATION - HEALTHEAST (OUTPATIENT)
Dept: INTERNAL MEDICINE | Facility: CLINIC | Age: 80
End: 2020-07-13

## 2020-07-13 DIAGNOSIS — E88.810 METABOLIC SYNDROME: ICD-10-CM

## 2020-07-13 RX ORDER — FUROSEMIDE 40 MG
40 TABLET ORAL DAILY
Qty: 30 TABLET | Refills: 3 | Status: SHIPPED | OUTPATIENT
Start: 2020-07-13 | End: 2022-12-26

## 2020-07-16 ENCOUNTER — COMMUNICATION - HEALTHEAST (OUTPATIENT)
Dept: ANTICOAGULATION | Facility: CLINIC | Age: 80
End: 2020-07-16

## 2020-07-16 ENCOUNTER — OFFICE VISIT - HEALTHEAST (OUTPATIENT)
Dept: INTERNAL MEDICINE | Facility: CLINIC | Age: 80
End: 2020-07-16

## 2020-07-16 ENCOUNTER — AMBULATORY - HEALTHEAST (OUTPATIENT)
Dept: LAB | Facility: CLINIC | Age: 80
End: 2020-07-16

## 2020-07-16 DIAGNOSIS — I48.19 PERSISTENT ATRIAL FIBRILLATION (H): ICD-10-CM

## 2020-07-16 DIAGNOSIS — I48.91 ATRIAL FIBRILLATION (H): ICD-10-CM

## 2020-07-16 DIAGNOSIS — Z79.01 LONG TERM (CURRENT) USE OF ANTICOAGULANTS: ICD-10-CM

## 2020-07-16 DIAGNOSIS — I10 ESSENTIAL HYPERTENSION: ICD-10-CM

## 2020-07-16 LAB — INR PPP: 2.1 (ref 0.9–1.1)

## 2020-07-16 ASSESSMENT — MIFFLIN-ST. JEOR: SCORE: 1361.03

## 2020-07-17 ENCOUNTER — COMMUNICATION - HEALTHEAST (OUTPATIENT)
Dept: INTERNAL MEDICINE | Facility: CLINIC | Age: 80
End: 2020-07-17

## 2020-07-17 DIAGNOSIS — Z79.01 LONG TERM (CURRENT) USE OF ANTICOAGULANTS: ICD-10-CM

## 2020-07-17 DIAGNOSIS — I48.91 ATRIAL FIBRILLATION (H): ICD-10-CM

## 2020-07-23 ENCOUNTER — AMBULATORY - HEALTHEAST (OUTPATIENT)
Dept: LAB | Facility: CLINIC | Age: 80
End: 2020-07-23

## 2020-07-23 ENCOUNTER — COMMUNICATION - HEALTHEAST (OUTPATIENT)
Dept: ANTICOAGULATION | Facility: CLINIC | Age: 80
End: 2020-07-23

## 2020-07-23 DIAGNOSIS — Z79.01 LONG TERM (CURRENT) USE OF ANTICOAGULANTS: ICD-10-CM

## 2020-07-23 DIAGNOSIS — I48.19 PERSISTENT ATRIAL FIBRILLATION (H): ICD-10-CM

## 2020-07-23 LAB — INR PPP: 1.9 (ref 0.9–1.1)

## 2020-07-24 ENCOUNTER — RECORDS - HEALTHEAST (OUTPATIENT)
Dept: ADMINISTRATIVE | Facility: OTHER | Age: 80
End: 2020-07-24

## 2020-07-25 ENCOUNTER — RECORDS - HEALTHEAST (OUTPATIENT)
Dept: ADMINISTRATIVE | Facility: OTHER | Age: 80
End: 2020-07-25

## 2020-07-28 ENCOUNTER — OFFICE VISIT - HEALTHEAST (OUTPATIENT)
Dept: INTERNAL MEDICINE | Facility: CLINIC | Age: 80
End: 2020-07-28

## 2020-07-28 ENCOUNTER — COMMUNICATION - HEALTHEAST (OUTPATIENT)
Dept: ANTICOAGULATION | Facility: CLINIC | Age: 80
End: 2020-07-28

## 2020-07-28 DIAGNOSIS — Z79.01 LONG TERM (CURRENT) USE OF ANTICOAGULANTS: ICD-10-CM

## 2020-07-28 DIAGNOSIS — I48.19 PERSISTENT ATRIAL FIBRILLATION (H): ICD-10-CM

## 2020-07-28 DIAGNOSIS — I48.19 OTHER PERSISTENT ATRIAL FIBRILLATION (H): ICD-10-CM

## 2020-07-28 DIAGNOSIS — I10 ESSENTIAL HYPERTENSION: ICD-10-CM

## 2020-07-28 LAB — INR PPP: 2.1 (ref 0.9–1.1)

## 2020-07-28 RX ORDER — BACILLUS COAGULANS 1B CELL
CAPSULE ORAL
Status: SHIPPED | COMMUNITY
Start: 2020-07-21

## 2020-07-28 RX ORDER — TAMSULOSIN HYDROCHLORIDE 0.4 MG/1
0.4 CAPSULE ORAL
Status: SHIPPED | COMMUNITY
Start: 2020-07-28 | End: 2022-05-17

## 2020-07-28 ASSESSMENT — MIFFLIN-ST. JEOR: SCORE: 1379.17

## 2020-07-30 ENCOUNTER — RECORDS - HEALTHEAST (OUTPATIENT)
Dept: ADMINISTRATIVE | Facility: OTHER | Age: 80
End: 2020-07-30

## 2020-08-04 ENCOUNTER — COMMUNICATION - HEALTHEAST (OUTPATIENT)
Dept: ANTICOAGULATION | Facility: CLINIC | Age: 80
End: 2020-08-04

## 2020-08-04 ENCOUNTER — AMBULATORY - HEALTHEAST (OUTPATIENT)
Dept: LAB | Facility: CLINIC | Age: 80
End: 2020-08-04

## 2020-08-04 DIAGNOSIS — Z79.01 LONG TERM (CURRENT) USE OF ANTICOAGULANTS: ICD-10-CM

## 2020-08-04 DIAGNOSIS — I48.19 PERSISTENT ATRIAL FIBRILLATION (H): ICD-10-CM

## 2020-08-04 DIAGNOSIS — I48.19 OTHER PERSISTENT ATRIAL FIBRILLATION (H): ICD-10-CM

## 2020-08-04 LAB — INR PPP: 2.4 (ref 0.9–1.1)

## 2020-08-06 ENCOUNTER — COMMUNICATION - HEALTHEAST (OUTPATIENT)
Dept: INTERNAL MEDICINE | Facility: CLINIC | Age: 80
End: 2020-08-06

## 2020-08-06 ENCOUNTER — RECORDS - HEALTHEAST (OUTPATIENT)
Dept: ADMINISTRATIVE | Facility: OTHER | Age: 80
End: 2020-08-06

## 2020-08-06 DIAGNOSIS — Z79.01 LONG TERM (CURRENT) USE OF ANTICOAGULANTS: ICD-10-CM

## 2020-08-06 DIAGNOSIS — I48.19 OTHER PERSISTENT ATRIAL FIBRILLATION (H): ICD-10-CM

## 2020-08-11 ENCOUNTER — OFFICE VISIT - HEALTHEAST (OUTPATIENT)
Dept: INTERNAL MEDICINE | Facility: CLINIC | Age: 80
End: 2020-08-11

## 2020-08-11 ENCOUNTER — COMMUNICATION - HEALTHEAST (OUTPATIENT)
Dept: ANTICOAGULATION | Facility: CLINIC | Age: 80
End: 2020-08-11

## 2020-08-11 DIAGNOSIS — I48.19 PERSISTENT ATRIAL FIBRILLATION (H): ICD-10-CM

## 2020-08-11 DIAGNOSIS — C25.9 MALIGNANT NEOPLASM OF PANCREAS, UNSPECIFIED LOCATION OF MALIGNANCY (H): ICD-10-CM

## 2020-08-11 LAB
ALBUMIN SERPL-MCNC: 3.5 G/DL (ref 3.5–5)
ALP SERPL-CCNC: 157 U/L (ref 45–120)
ALT SERPL W P-5'-P-CCNC: 52 U/L (ref 0–45)
ANION GAP SERPL CALCULATED.3IONS-SCNC: 12 MMOL/L (ref 5–18)
AST SERPL W P-5'-P-CCNC: 44 U/L (ref 0–40)
BILIRUB SERPL-MCNC: 0.5 MG/DL (ref 0–1)
BUN SERPL-MCNC: 24 MG/DL (ref 8–28)
CALCIUM SERPL-MCNC: 8.5 MG/DL (ref 8.5–10.5)
CHLORIDE BLD-SCNC: 103 MMOL/L (ref 98–107)
CO2 SERPL-SCNC: 23 MMOL/L (ref 22–31)
CREAT SERPL-MCNC: 1.6 MG/DL (ref 0.7–1.3)
ERYTHROCYTE [DISTWIDTH] IN BLOOD BY AUTOMATED COUNT: 14 % (ref 11–14.5)
GFR SERPL CREATININE-BSD FRML MDRD: 42 ML/MIN/1.73M2
GLUCOSE BLD-MCNC: 89 MG/DL (ref 70–125)
HCT VFR BLD AUTO: 35.3 % (ref 40–54)
HGB BLD-MCNC: 11.4 G/DL (ref 14–18)
INR PPP: 2.4 (ref 0.9–1.1)
MCH RBC QN AUTO: 31 PG (ref 27–34)
MCHC RBC AUTO-ENTMCNC: 32.4 G/DL (ref 32–36)
MCV RBC AUTO: 96 FL (ref 80–100)
PLATELET # BLD AUTO: 151 THOU/UL (ref 140–440)
PMV BLD AUTO: 8.3 FL (ref 7–10)
POTASSIUM BLD-SCNC: 4.7 MMOL/L (ref 3.5–5)
PROT SERPL-MCNC: 6.9 G/DL (ref 6–8)
RBC # BLD AUTO: 3.69 MILL/UL (ref 4.4–6.2)
SODIUM SERPL-SCNC: 138 MMOL/L (ref 136–145)
WBC: 5.6 THOU/UL (ref 4–11)

## 2020-08-11 ASSESSMENT — MIFFLIN-ST. JEOR: SCORE: 1374.64

## 2020-08-13 ENCOUNTER — COMMUNICATION - HEALTHEAST (OUTPATIENT)
Dept: INTERNAL MEDICINE | Facility: CLINIC | Age: 80
End: 2020-08-13

## 2020-08-13 ENCOUNTER — RECORDS - HEALTHEAST (OUTPATIENT)
Dept: ADMINISTRATIVE | Facility: OTHER | Age: 80
End: 2020-08-13

## 2020-08-13 LAB — CANCER AG19-9 SERPL IA-ACNC: 106 U/ML (ref 0–37)

## 2020-08-14 ENCOUNTER — COMMUNICATION - HEALTHEAST (OUTPATIENT)
Dept: INTERNAL MEDICINE | Facility: CLINIC | Age: 80
End: 2020-08-14

## 2020-08-17 ENCOUNTER — RECORDS - HEALTHEAST (OUTPATIENT)
Dept: ADMINISTRATIVE | Facility: OTHER | Age: 80
End: 2020-08-17

## 2020-08-18 ENCOUNTER — COMMUNICATION - HEALTHEAST (OUTPATIENT)
Dept: ANTICOAGULATION | Facility: CLINIC | Age: 80
End: 2020-08-18

## 2020-08-18 ENCOUNTER — AMBULATORY - HEALTHEAST (OUTPATIENT)
Dept: LAB | Facility: CLINIC | Age: 80
End: 2020-08-18

## 2020-08-18 DIAGNOSIS — I48.19 PERSISTENT ATRIAL FIBRILLATION (H): ICD-10-CM

## 2020-08-18 DIAGNOSIS — I48.19 OTHER PERSISTENT ATRIAL FIBRILLATION (H): ICD-10-CM

## 2020-08-18 DIAGNOSIS — Z79.01 LONG TERM (CURRENT) USE OF ANTICOAGULANTS: ICD-10-CM

## 2020-08-18 LAB — INR PPP: 4.3 (ref 0.9–1.1)

## 2020-08-20 ENCOUNTER — AMBULATORY - HEALTHEAST (OUTPATIENT)
Dept: LAB | Facility: CLINIC | Age: 80
End: 2020-08-20

## 2020-08-20 ENCOUNTER — RECORDS - HEALTHEAST (OUTPATIENT)
Dept: ADMINISTRATIVE | Facility: OTHER | Age: 80
End: 2020-08-20

## 2020-08-20 ENCOUNTER — COMMUNICATION - HEALTHEAST (OUTPATIENT)
Dept: ANTICOAGULATION | Facility: CLINIC | Age: 80
End: 2020-08-20

## 2020-08-20 DIAGNOSIS — Z79.01 LONG TERM (CURRENT) USE OF ANTICOAGULANTS: ICD-10-CM

## 2020-08-20 DIAGNOSIS — I48.19 PERSISTENT ATRIAL FIBRILLATION (H): ICD-10-CM

## 2020-08-20 DIAGNOSIS — I48.19 OTHER PERSISTENT ATRIAL FIBRILLATION (H): ICD-10-CM

## 2020-08-20 LAB — INR PPP: 2.6 (ref 0.9–1.1)

## 2020-08-25 ENCOUNTER — RECORDS - HEALTHEAST (OUTPATIENT)
Dept: ADMINISTRATIVE | Facility: OTHER | Age: 80
End: 2020-08-25
Payer: MEDICARE

## 2020-08-25 LAB — INR PPP: 2.5 (ref 0.9–1.1)

## 2020-08-26 ENCOUNTER — COMMUNICATION - HEALTHEAST (OUTPATIENT)
Dept: INTERNAL MEDICINE | Facility: CLINIC | Age: 80
End: 2020-08-26

## 2020-08-26 ENCOUNTER — RECORDS - HEALTHEAST (OUTPATIENT)
Dept: ADMINISTRATIVE | Facility: OTHER | Age: 80
End: 2020-08-26

## 2020-08-26 DIAGNOSIS — Z79.01 LONG TERM (CURRENT) USE OF ANTICOAGULANTS: ICD-10-CM

## 2020-08-26 DIAGNOSIS — I48.19 OTHER PERSISTENT ATRIAL FIBRILLATION (H): ICD-10-CM

## 2020-08-26 LAB — INR PPP: 3 (ref 0.9–1.1)

## 2020-08-28 ENCOUNTER — COMMUNICATION - HEALTHEAST (OUTPATIENT)
Dept: ANTICOAGULATION | Facility: CLINIC | Age: 80
End: 2020-08-28

## 2020-08-28 ENCOUNTER — AMBULATORY - HEALTHEAST (OUTPATIENT)
Dept: LAB | Facility: CLINIC | Age: 80
End: 2020-08-28

## 2020-08-28 DIAGNOSIS — I48.19 PERSISTENT ATRIAL FIBRILLATION (H): ICD-10-CM

## 2020-08-28 DIAGNOSIS — I48.19 OTHER PERSISTENT ATRIAL FIBRILLATION (H): ICD-10-CM

## 2020-08-28 DIAGNOSIS — Z79.01 LONG TERM (CURRENT) USE OF ANTICOAGULANTS: ICD-10-CM

## 2020-08-28 LAB — INR PPP: 2.7 (ref 0.9–1.1)

## 2020-09-03 ENCOUNTER — COMMUNICATION - HEALTHEAST (OUTPATIENT)
Dept: ANTICOAGULATION | Facility: CLINIC | Age: 80
End: 2020-09-03

## 2020-09-03 ENCOUNTER — AMBULATORY - HEALTHEAST (OUTPATIENT)
Dept: LAB | Facility: CLINIC | Age: 80
End: 2020-09-03

## 2020-09-03 DIAGNOSIS — I48.19 PERSISTENT ATRIAL FIBRILLATION (H): ICD-10-CM

## 2020-09-03 DIAGNOSIS — Z79.01 LONG TERM (CURRENT) USE OF ANTICOAGULANTS: ICD-10-CM

## 2020-09-03 DIAGNOSIS — I48.19 OTHER PERSISTENT ATRIAL FIBRILLATION (H): ICD-10-CM

## 2020-09-03 LAB — INR PPP: 1.9 (ref 0.9–1.1)

## 2020-09-09 ENCOUNTER — COMMUNICATION - HEALTHEAST (OUTPATIENT)
Dept: INTERNAL MEDICINE | Facility: CLINIC | Age: 80
End: 2020-09-09

## 2020-09-15 ENCOUNTER — COMMUNICATION - HEALTHEAST (OUTPATIENT)
Dept: ANTICOAGULATION | Facility: CLINIC | Age: 80
End: 2020-09-15

## 2020-09-15 ENCOUNTER — OFFICE VISIT - HEALTHEAST (OUTPATIENT)
Dept: INTERNAL MEDICINE | Facility: CLINIC | Age: 80
End: 2020-09-15

## 2020-09-15 DIAGNOSIS — I10 ESSENTIAL HYPERTENSION: ICD-10-CM

## 2020-09-15 DIAGNOSIS — I48.19 PERSISTENT ATRIAL FIBRILLATION (H): ICD-10-CM

## 2020-09-15 DIAGNOSIS — Z79.01 LONG TERM (CURRENT) USE OF ANTICOAGULANTS: ICD-10-CM

## 2020-09-15 DIAGNOSIS — I48.19 OTHER PERSISTENT ATRIAL FIBRILLATION (H): ICD-10-CM

## 2020-09-15 DIAGNOSIS — C24.0 BILE DUCT CANCER (H): ICD-10-CM

## 2020-09-15 LAB
CHOLEST SERPL-MCNC: 92 MG/DL
FASTING STATUS PATIENT QL REPORTED: NO
HDLC SERPL-MCNC: 52 MG/DL
HGB BLD-MCNC: 11.7 G/DL (ref 14–18)
INR PPP: 3.7 (ref 0.9–1.1)
LDLC SERPL CALC-MCNC: 25 MG/DL
TRIGL SERPL-MCNC: 75 MG/DL

## 2020-09-15 ASSESSMENT — MIFFLIN-ST. JEOR: SCORE: 1356.49

## 2020-09-17 ENCOUNTER — COMMUNICATION - HEALTHEAST (OUTPATIENT)
Dept: INTERNAL MEDICINE | Facility: CLINIC | Age: 80
End: 2020-09-17

## 2020-09-17 LAB — CANCER AG19-9 SERPL IA-ACNC: 39 U/ML (ref 0–37)

## 2020-09-22 ENCOUNTER — AMBULATORY - HEALTHEAST (OUTPATIENT)
Dept: LAB | Facility: CLINIC | Age: 80
End: 2020-09-22

## 2020-09-22 ENCOUNTER — COMMUNICATION - HEALTHEAST (OUTPATIENT)
Dept: ANTICOAGULATION | Facility: CLINIC | Age: 80
End: 2020-09-22

## 2020-09-22 DIAGNOSIS — I48.19 PERSISTENT ATRIAL FIBRILLATION (H): ICD-10-CM

## 2020-09-22 DIAGNOSIS — Z79.01 LONG TERM (CURRENT) USE OF ANTICOAGULANTS: ICD-10-CM

## 2020-09-22 DIAGNOSIS — I48.19 OTHER PERSISTENT ATRIAL FIBRILLATION (H): ICD-10-CM

## 2020-09-29 ENCOUNTER — COMMUNICATION - HEALTHEAST (OUTPATIENT)
Dept: ANTICOAGULATION | Facility: CLINIC | Age: 80
End: 2020-09-29

## 2020-09-29 ENCOUNTER — AMBULATORY - HEALTHEAST (OUTPATIENT)
Dept: LAB | Facility: CLINIC | Age: 80
End: 2020-09-29

## 2020-09-29 DIAGNOSIS — Z79.01 LONG TERM (CURRENT) USE OF ANTICOAGULANTS: ICD-10-CM

## 2020-09-29 DIAGNOSIS — I48.19 PERSISTENT ATRIAL FIBRILLATION (H): ICD-10-CM

## 2020-09-29 DIAGNOSIS — I48.19 OTHER PERSISTENT ATRIAL FIBRILLATION (H): ICD-10-CM

## 2020-09-29 LAB — INR PPP: 2.2 (ref 0.9–1.1)

## 2020-10-01 ENCOUNTER — OFFICE VISIT - HEALTHEAST (OUTPATIENT)
Dept: INTERNAL MEDICINE | Facility: CLINIC | Age: 80
End: 2020-10-01

## 2020-10-01 ENCOUNTER — COMMUNICATION - HEALTHEAST (OUTPATIENT)
Dept: SCHEDULING | Facility: CLINIC | Age: 80
End: 2020-10-01

## 2020-10-01 DIAGNOSIS — R05.9 COUGH: ICD-10-CM

## 2020-10-05 ENCOUNTER — RECORDS - HEALTHEAST (OUTPATIENT)
Dept: ADMINISTRATIVE | Facility: OTHER | Age: 80
End: 2020-10-05

## 2020-10-05 LAB — INR PPP: 1.9 (ref 0.9–1.1)

## 2020-10-06 ENCOUNTER — COMMUNICATION - HEALTHEAST (OUTPATIENT)
Dept: ANTICOAGULATION | Facility: CLINIC | Age: 80
End: 2020-10-06

## 2020-10-06 ENCOUNTER — AMBULATORY - HEALTHEAST (OUTPATIENT)
Dept: LAB | Facility: CLINIC | Age: 80
End: 2020-10-06

## 2020-10-06 ENCOUNTER — OFFICE VISIT - HEALTHEAST (OUTPATIENT)
Dept: INTERNAL MEDICINE | Facility: CLINIC | Age: 80
End: 2020-10-06

## 2020-10-06 ENCOUNTER — COMMUNICATION - HEALTHEAST (OUTPATIENT)
Dept: SCHEDULING | Facility: CLINIC | Age: 80
End: 2020-10-06

## 2020-10-06 DIAGNOSIS — C24.1 PRIMARY ADENOCARCINOMA OF AMPULLA OF VATER (H): ICD-10-CM

## 2020-10-06 DIAGNOSIS — R07.9 ACUTE CHEST PAIN: ICD-10-CM

## 2020-10-06 DIAGNOSIS — K21.9 GASTROESOPHAGEAL REFLUX DISEASE, UNSPECIFIED WHETHER ESOPHAGITIS PRESENT: ICD-10-CM

## 2020-10-06 DIAGNOSIS — I48.91 ATRIAL FIBRILLATION (H): ICD-10-CM

## 2020-10-06 DIAGNOSIS — I48.19 PERSISTENT ATRIAL FIBRILLATION (H): ICD-10-CM

## 2020-10-06 DIAGNOSIS — Z79.01 LONG TERM (CURRENT) USE OF ANTICOAGULANTS: ICD-10-CM

## 2020-10-06 LAB — INR PPP: 2.7 (ref 0.9–1.1)

## 2020-10-06 RX ORDER — FAMOTIDINE 40 MG/1
40 TABLET, FILM COATED ORAL EVERY EVENING
Qty: 30 TABLET | Refills: 11 | Status: SHIPPED | OUTPATIENT
Start: 2020-10-06 | End: 2022-12-26

## 2020-10-07 ENCOUNTER — COMMUNICATION - HEALTHEAST (OUTPATIENT)
Dept: SCHEDULING | Facility: CLINIC | Age: 80
End: 2020-10-07

## 2020-10-12 ENCOUNTER — COMMUNICATION - HEALTHEAST (OUTPATIENT)
Dept: ANTICOAGULATION | Facility: CLINIC | Age: 80
End: 2020-10-12

## 2020-10-12 ENCOUNTER — COMMUNICATION - HEALTHEAST (OUTPATIENT)
Dept: SCHEDULING | Facility: CLINIC | Age: 80
End: 2020-10-12

## 2020-10-12 ENCOUNTER — AMBULATORY - HEALTHEAST (OUTPATIENT)
Dept: LAB | Facility: CLINIC | Age: 80
End: 2020-10-12

## 2020-10-12 ENCOUNTER — OFFICE VISIT - HEALTHEAST (OUTPATIENT)
Dept: INTERNAL MEDICINE | Facility: CLINIC | Age: 80
End: 2020-10-12

## 2020-10-12 DIAGNOSIS — R05.9 COUGH: ICD-10-CM

## 2020-10-12 DIAGNOSIS — I48.19 PERSISTENT ATRIAL FIBRILLATION (H): ICD-10-CM

## 2020-10-12 DIAGNOSIS — Z79.01 LONG TERM (CURRENT) USE OF ANTICOAGULANTS: ICD-10-CM

## 2020-10-12 DIAGNOSIS — I48.91 ATRIAL FIBRILLATION (H): ICD-10-CM

## 2020-10-12 LAB — INR PPP: 1.8 (ref 0.9–1.1)

## 2020-10-13 ENCOUNTER — COMMUNICATION - HEALTHEAST (OUTPATIENT)
Dept: INTERNAL MEDICINE | Facility: CLINIC | Age: 80
End: 2020-10-13

## 2020-10-19 ENCOUNTER — OFFICE VISIT - HEALTHEAST (OUTPATIENT)
Dept: INTERNAL MEDICINE | Facility: CLINIC | Age: 80
End: 2020-10-19

## 2020-10-19 ENCOUNTER — RECORDS - HEALTHEAST (OUTPATIENT)
Dept: ADMINISTRATIVE | Facility: OTHER | Age: 80
End: 2020-10-19

## 2020-10-19 ENCOUNTER — COMMUNICATION - HEALTHEAST (OUTPATIENT)
Dept: ANTICOAGULATION | Facility: CLINIC | Age: 80
End: 2020-10-19

## 2020-10-19 DIAGNOSIS — R05.9 COUGH: ICD-10-CM

## 2020-10-19 DIAGNOSIS — E03.9 HYPOTHYROIDISM, UNSPECIFIED TYPE: ICD-10-CM

## 2020-10-19 DIAGNOSIS — Z79.01 LONG TERM (CURRENT) USE OF ANTICOAGULANTS: ICD-10-CM

## 2020-10-19 DIAGNOSIS — I48.91 ATRIAL FIBRILLATION (H): ICD-10-CM

## 2020-10-19 DIAGNOSIS — I48.19 PERSISTENT ATRIAL FIBRILLATION (H): ICD-10-CM

## 2020-10-19 LAB
INR PPP: 2.3 (ref 0.9–1.1)
TSH SERPL DL<=0.005 MIU/L-ACNC: 13.04 UIU/ML (ref 0.3–5)

## 2020-10-19 ASSESSMENT — MIFFLIN-ST. JEOR: SCORE: 1370.1

## 2020-10-20 ENCOUNTER — AMBULATORY - HEALTHEAST (OUTPATIENT)
Dept: INTERNAL MEDICINE | Facility: CLINIC | Age: 80
End: 2020-10-20

## 2020-10-20 ENCOUNTER — COMMUNICATION - HEALTHEAST (OUTPATIENT)
Dept: INTERNAL MEDICINE | Facility: CLINIC | Age: 80
End: 2020-10-20

## 2020-10-20 DIAGNOSIS — E88.810 METABOLIC SYNDROME: ICD-10-CM

## 2020-10-20 RX ORDER — LEVOTHYROXINE SODIUM 100 UG/1
100 TABLET ORAL DAILY
Qty: 90 TABLET | Refills: 3 | Status: SHIPPED | OUTPATIENT
Start: 2020-10-20 | End: 2021-07-15

## 2020-10-21 ENCOUNTER — RECORDS - HEALTHEAST (OUTPATIENT)
Dept: ADMINISTRATIVE | Facility: OTHER | Age: 80
End: 2020-10-21

## 2020-11-10 ENCOUNTER — COMMUNICATION - HEALTHEAST (OUTPATIENT)
Dept: ANTICOAGULATION | Facility: CLINIC | Age: 80
End: 2020-11-10

## 2020-11-10 DIAGNOSIS — I48.91 ATRIAL FIBRILLATION (H): ICD-10-CM

## 2020-11-10 DIAGNOSIS — Z79.01 LONG TERM (CURRENT) USE OF ANTICOAGULANTS: ICD-10-CM

## 2020-11-10 LAB — INR PPP: 3.2 (ref 0.9–1.1)

## 2020-11-27 ENCOUNTER — COMMUNICATION - HEALTHEAST (OUTPATIENT)
Dept: INTERNAL MEDICINE | Facility: CLINIC | Age: 80
End: 2020-11-27

## 2020-11-27 DIAGNOSIS — I48.91 ATRIAL FIBRILLATION (H): ICD-10-CM

## 2020-11-27 DIAGNOSIS — Z79.01 LONG TERM (CURRENT) USE OF ANTICOAGULANTS: ICD-10-CM

## 2020-11-27 LAB — INR PPP: 1.9 (ref 0.9–1.1)

## 2020-12-08 ENCOUNTER — COMMUNICATION - HEALTHEAST (OUTPATIENT)
Dept: ANTICOAGULATION | Facility: CLINIC | Age: 80
End: 2020-12-08

## 2020-12-08 ENCOUNTER — RECORDS - HEALTHEAST (OUTPATIENT)
Dept: ADMINISTRATIVE | Facility: OTHER | Age: 80
End: 2020-12-08

## 2020-12-08 DIAGNOSIS — Z79.01 LONG TERM (CURRENT) USE OF ANTICOAGULANTS: ICD-10-CM

## 2020-12-08 DIAGNOSIS — I48.91 ATRIAL FIBRILLATION (H): ICD-10-CM

## 2020-12-08 LAB — INR PPP: 1.9 (ref 0.9–1.1)

## 2020-12-10 ENCOUNTER — OFFICE VISIT - HEALTHEAST (OUTPATIENT)
Dept: INTERNAL MEDICINE | Facility: CLINIC | Age: 80
End: 2020-12-10

## 2020-12-10 DIAGNOSIS — C24.1 PRIMARY ADENOCARCINOMA OF AMPULLA OF VATER (H): ICD-10-CM

## 2020-12-10 DIAGNOSIS — I10 ESSENTIAL HYPERTENSION: ICD-10-CM

## 2020-12-10 ASSESSMENT — PATIENT HEALTH QUESTIONNAIRE - PHQ9: SUM OF ALL RESPONSES TO PHQ QUESTIONS 1-9: 0

## 2020-12-17 ENCOUNTER — OFFICE VISIT - HEALTHEAST (OUTPATIENT)
Dept: INTERNAL MEDICINE | Facility: CLINIC | Age: 80
End: 2020-12-17

## 2020-12-17 ENCOUNTER — COMMUNICATION - HEALTHEAST (OUTPATIENT)
Dept: ANTICOAGULATION | Facility: CLINIC | Age: 80
End: 2020-12-17

## 2020-12-17 DIAGNOSIS — I48.19 PERSISTENT ATRIAL FIBRILLATION (H): ICD-10-CM

## 2020-12-17 DIAGNOSIS — C24.0 BILE DUCT CANCER (H): ICD-10-CM

## 2020-12-17 DIAGNOSIS — Z79.01 LONG TERM (CURRENT) USE OF ANTICOAGULANTS: ICD-10-CM

## 2020-12-17 LAB
ALBUMIN SERPL-MCNC: 3.7 G/DL (ref 3.5–5)
ALP SERPL-CCNC: 180 U/L (ref 45–120)
ALT SERPL W P-5'-P-CCNC: 33 U/L (ref 0–45)
ANION GAP SERPL CALCULATED.3IONS-SCNC: 10 MMOL/L (ref 5–18)
AST SERPL W P-5'-P-CCNC: 39 U/L (ref 0–40)
BILIRUB SERPL-MCNC: 0.5 MG/DL (ref 0–1)
BUN SERPL-MCNC: 23 MG/DL (ref 8–28)
CALCIUM SERPL-MCNC: 8.6 MG/DL (ref 8.5–10.5)
CHLORIDE BLD-SCNC: 106 MMOL/L (ref 98–107)
CO2 SERPL-SCNC: 24 MMOL/L (ref 22–31)
CREAT SERPL-MCNC: 1.43 MG/DL (ref 0.7–1.3)
ERYTHROCYTE [DISTWIDTH] IN BLOOD BY AUTOMATED COUNT: 12.4 % (ref 11–14.5)
GFR SERPL CREATININE-BSD FRML MDRD: 48 ML/MIN/1.73M2
GLUCOSE BLD-MCNC: 85 MG/DL (ref 70–125)
HCT VFR BLD AUTO: 36 % (ref 40–54)
HGB BLD-MCNC: 12.1 G/DL (ref 14–18)
MCH RBC QN AUTO: 33.3 PG (ref 27–34)
MCHC RBC AUTO-ENTMCNC: 33.7 G/DL (ref 32–36)
MCV RBC AUTO: 99 FL (ref 80–100)
PLATELET # BLD AUTO: 149 THOU/UL (ref 140–440)
PMV BLD AUTO: 8.2 FL (ref 7–10)
POTASSIUM BLD-SCNC: 4.8 MMOL/L (ref 3.5–5)
PROT SERPL-MCNC: 7.1 G/DL (ref 6–8)
RBC # BLD AUTO: 3.64 MILL/UL (ref 4.4–6.2)
SODIUM SERPL-SCNC: 140 MMOL/L (ref 136–145)
WBC: 5.4 THOU/UL (ref 4–11)

## 2020-12-17 RX ORDER — METOPROLOL SUCCINATE 25 MG/1
25 TABLET, EXTENDED RELEASE ORAL DAILY
Status: SHIPPED | COMMUNITY
Start: 2020-12-10

## 2020-12-17 ASSESSMENT — MIFFLIN-ST. JEOR: SCORE: 1356.49

## 2020-12-19 LAB — CANCER AG19-9 SERPL IA-ACNC: 26 U/ML (ref 0–37)

## 2020-12-21 ENCOUNTER — COMMUNICATION - HEALTHEAST (OUTPATIENT)
Dept: ANTICOAGULATION | Facility: CLINIC | Age: 80
End: 2020-12-21

## 2020-12-21 ENCOUNTER — COMMUNICATION - HEALTHEAST (OUTPATIENT)
Dept: INTERNAL MEDICINE | Facility: CLINIC | Age: 80
End: 2020-12-21

## 2020-12-21 ENCOUNTER — AMBULATORY - HEALTHEAST (OUTPATIENT)
Dept: LAB | Facility: CLINIC | Age: 80
End: 2020-12-21

## 2020-12-21 DIAGNOSIS — I48.91 ATRIAL FIBRILLATION (H): ICD-10-CM

## 2020-12-21 DIAGNOSIS — I48.19 PERSISTENT ATRIAL FIBRILLATION (H): ICD-10-CM

## 2020-12-21 DIAGNOSIS — Z79.01 LONG TERM (CURRENT) USE OF ANTICOAGULANTS: ICD-10-CM

## 2020-12-21 LAB — INR PPP: 3.4 (ref 0.9–1.1)

## 2021-01-04 ENCOUNTER — COMMUNICATION - HEALTHEAST (OUTPATIENT)
Dept: ANTICOAGULATION | Facility: CLINIC | Age: 81
End: 2021-01-04

## 2021-01-04 DIAGNOSIS — I48.91 ATRIAL FIBRILLATION (H): ICD-10-CM

## 2021-01-04 DIAGNOSIS — Z79.01 LONG TERM (CURRENT) USE OF ANTICOAGULANTS: ICD-10-CM

## 2021-01-04 LAB — INR PPP: 2.1 (ref 0.9–1.1)

## 2021-01-15 ENCOUNTER — RECORDS - HEALTHEAST (OUTPATIENT)
Dept: ADMINISTRATIVE | Facility: OTHER | Age: 81
End: 2021-01-15

## 2021-01-17 ENCOUNTER — RECORDS - HEALTHEAST (OUTPATIENT)
Dept: ADMINISTRATIVE | Facility: OTHER | Age: 81
End: 2021-01-17

## 2021-01-22 ENCOUNTER — COMMUNICATION - HEALTHEAST (OUTPATIENT)
Dept: ANTICOAGULATION | Facility: CLINIC | Age: 81
End: 2021-01-22

## 2021-01-22 ENCOUNTER — OFFICE VISIT - HEALTHEAST (OUTPATIENT)
Dept: INTERNAL MEDICINE | Facility: CLINIC | Age: 81
End: 2021-01-22

## 2021-01-22 ENCOUNTER — AMBULATORY - HEALTHEAST (OUTPATIENT)
Dept: LAB | Facility: CLINIC | Age: 81
End: 2021-01-22

## 2021-01-22 DIAGNOSIS — Z79.01 LONG TERM (CURRENT) USE OF ANTICOAGULANTS: ICD-10-CM

## 2021-01-22 DIAGNOSIS — J18.9 PNEUMONIA DUE TO INFECTIOUS ORGANISM, UNSPECIFIED LATERALITY, UNSPECIFIED PART OF LUNG: ICD-10-CM

## 2021-01-22 DIAGNOSIS — I48.91 ATRIAL FIBRILLATION (H): ICD-10-CM

## 2021-01-22 DIAGNOSIS — I48.19 PERSISTENT ATRIAL FIBRILLATION (H): ICD-10-CM

## 2021-01-22 LAB
ALBUMIN SERPL-MCNC: 2.8 G/DL (ref 3.5–5)
ALP SERPL-CCNC: 326 U/L (ref 45–120)
ALT SERPL W P-5'-P-CCNC: 118 U/L (ref 0–45)
ANION GAP SERPL CALCULATED.3IONS-SCNC: 11 MMOL/L (ref 5–18)
AST SERPL W P-5'-P-CCNC: 88 U/L (ref 0–40)
BILIRUB SERPL-MCNC: 0.6 MG/DL (ref 0–1)
BUN SERPL-MCNC: 27 MG/DL (ref 8–28)
CALCIUM SERPL-MCNC: 8.4 MG/DL (ref 8.5–10.5)
CHLORIDE BLD-SCNC: 99 MMOL/L (ref 98–107)
CO2 SERPL-SCNC: 25 MMOL/L (ref 22–31)
CREAT SERPL-MCNC: 1.35 MG/DL (ref 0.7–1.3)
ERYTHROCYTE [DISTWIDTH] IN BLOOD BY AUTOMATED COUNT: 11.1 % (ref 11–14.5)
GFR SERPL CREATININE-BSD FRML MDRD: 51 ML/MIN/1.73M2
GLUCOSE BLD-MCNC: 96 MG/DL (ref 70–125)
HCT VFR BLD AUTO: 38.4 % (ref 40–54)
HGB BLD-MCNC: 12.8 G/DL (ref 14–18)
INR PPP: 3.3 (ref 0.9–1.1)
INR PPP: 3.6 (ref 0.9–1.1)
INR PPP: 3.9 (ref 0.9–1.1)
INR PPP: 4 (ref 0.9–1.1)
MCH RBC QN AUTO: 33.1 PG (ref 27–34)
MCHC RBC AUTO-ENTMCNC: 33.3 G/DL (ref 32–36)
MCV RBC AUTO: 99 FL (ref 80–100)
PLATELET # BLD AUTO: 466 THOU/UL (ref 140–440)
PMV BLD AUTO: 7.2 FL (ref 7–10)
POTASSIUM BLD-SCNC: 5.3 MMOL/L (ref 3.5–5)
PROT SERPL-MCNC: 6.7 G/DL (ref 6–8)
RBC # BLD AUTO: 3.86 MILL/UL (ref 4.4–6.2)
SODIUM SERPL-SCNC: 135 MMOL/L (ref 136–145)
WBC: 9.3 THOU/UL (ref 4–11)

## 2021-01-22 ASSESSMENT — MIFFLIN-ST. JEOR: SCORE: 1342.88

## 2021-01-25 ENCOUNTER — COMMUNICATION - HEALTHEAST (OUTPATIENT)
Dept: ADMINISTRATIVE | Facility: CLINIC | Age: 81
End: 2021-01-25

## 2021-01-25 ENCOUNTER — COMMUNICATION - HEALTHEAST (OUTPATIENT)
Dept: INTERNAL MEDICINE | Facility: CLINIC | Age: 81
End: 2021-01-25

## 2021-01-27 ENCOUNTER — COMMUNICATION - HEALTHEAST (OUTPATIENT)
Dept: ANTICOAGULATION | Facility: CLINIC | Age: 81
End: 2021-01-27

## 2021-01-27 ENCOUNTER — AMBULATORY - HEALTHEAST (OUTPATIENT)
Dept: LAB | Facility: CLINIC | Age: 81
End: 2021-01-27

## 2021-01-27 DIAGNOSIS — Z79.01 LONG TERM (CURRENT) USE OF ANTICOAGULANTS: ICD-10-CM

## 2021-01-27 DIAGNOSIS — I48.91 ATRIAL FIBRILLATION (H): ICD-10-CM

## 2021-01-27 DIAGNOSIS — I48.19 PERSISTENT ATRIAL FIBRILLATION (H): ICD-10-CM

## 2021-01-27 LAB — INR PPP: 1.8 (ref 0.9–1.1)

## 2021-01-28 ENCOUNTER — COMMUNICATION - HEALTHEAST (OUTPATIENT)
Dept: INTERNAL MEDICINE | Facility: CLINIC | Age: 81
End: 2021-01-28

## 2021-01-28 ENCOUNTER — COMMUNICATION - HEALTHEAST (OUTPATIENT)
Dept: SCHEDULING | Facility: CLINIC | Age: 81
End: 2021-01-28

## 2021-01-29 ENCOUNTER — COMMUNICATION - HEALTHEAST (OUTPATIENT)
Dept: ADMINISTRATIVE | Facility: CLINIC | Age: 81
End: 2021-01-29

## 2021-01-29 ENCOUNTER — RECORDS - HEALTHEAST (OUTPATIENT)
Dept: ADMINISTRATIVE | Facility: OTHER | Age: 81
End: 2021-01-29

## 2021-02-03 ENCOUNTER — AMBULATORY - HEALTHEAST (OUTPATIENT)
Dept: LAB | Facility: CLINIC | Age: 81
End: 2021-02-03

## 2021-02-03 ENCOUNTER — COMMUNICATION - HEALTHEAST (OUTPATIENT)
Dept: ANTICOAGULATION | Facility: CLINIC | Age: 81
End: 2021-02-03

## 2021-02-03 DIAGNOSIS — Z79.01 LONG TERM (CURRENT) USE OF ANTICOAGULANTS: ICD-10-CM

## 2021-02-03 DIAGNOSIS — I48.19 PERSISTENT ATRIAL FIBRILLATION (H): ICD-10-CM

## 2021-02-03 DIAGNOSIS — I48.91 ATRIAL FIBRILLATION (H): ICD-10-CM

## 2021-02-03 LAB — INR PPP: 5.1 (ref 0.9–1.1)

## 2021-02-08 ENCOUNTER — AMBULATORY - HEALTHEAST (OUTPATIENT)
Dept: LAB | Facility: CLINIC | Age: 81
End: 2021-02-08

## 2021-02-08 ENCOUNTER — RECORDS - HEALTHEAST (OUTPATIENT)
Dept: ANTICOAGULATION | Facility: CLINIC | Age: 81
End: 2021-02-08

## 2021-02-08 ENCOUNTER — COMMUNICATION - HEALTHEAST (OUTPATIENT)
Dept: INTERNAL MEDICINE | Facility: CLINIC | Age: 81
End: 2021-02-08

## 2021-02-08 ENCOUNTER — COMMUNICATION - HEALTHEAST (OUTPATIENT)
Dept: LAB | Facility: CLINIC | Age: 81
End: 2021-02-08

## 2021-02-08 ENCOUNTER — OFFICE VISIT - HEALTHEAST (OUTPATIENT)
Dept: INTERNAL MEDICINE | Facility: CLINIC | Age: 81
End: 2021-02-08

## 2021-02-08 DIAGNOSIS — I48.19 PERSISTENT ATRIAL FIBRILLATION (H): ICD-10-CM

## 2021-02-08 DIAGNOSIS — Z00.00 ROUTINE GENERAL MEDICAL EXAMINATION AT A HEALTH CARE FACILITY: ICD-10-CM

## 2021-02-08 DIAGNOSIS — Z79.01 LONG TERM (CURRENT) USE OF ANTICOAGULANTS: ICD-10-CM

## 2021-02-08 DIAGNOSIS — I48.91 ATRIAL FIBRILLATION (H): ICD-10-CM

## 2021-02-08 LAB
ALBUMIN SERPL-MCNC: 2.9 G/DL (ref 3.5–5)
ALBUMIN UR-MCNC: ABNORMAL MG/DL
ALP SERPL-CCNC: 168 U/L (ref 45–120)
ALT SERPL W P-5'-P-CCNC: 39 U/L (ref 0–45)
ANION GAP SERPL CALCULATED.3IONS-SCNC: 10 MMOL/L (ref 5–18)
APPEARANCE UR: CLEAR
AST SERPL W P-5'-P-CCNC: 37 U/L (ref 0–40)
BACTERIA #/AREA URNS HPF: ABNORMAL HPF
BILIRUB SERPL-MCNC: 0.4 MG/DL (ref 0–1)
BILIRUB UR QL STRIP: NEGATIVE
BUN SERPL-MCNC: 19 MG/DL (ref 8–28)
CALCIUM SERPL-MCNC: 8.5 MG/DL (ref 8.5–10.5)
CHLORIDE BLD-SCNC: 103 MMOL/L (ref 98–107)
CHOLEST SERPL-MCNC: 87 MG/DL
CO2 SERPL-SCNC: 24 MMOL/L (ref 22–31)
COLOR UR AUTO: YELLOW
CREAT SERPL-MCNC: 1.39 MG/DL (ref 0.7–1.3)
ERYTHROCYTE [DISTWIDTH] IN BLOOD BY AUTOMATED COUNT: 13.9 % (ref 11–14.5)
FASTING STATUS PATIENT QL REPORTED: YES
GFR SERPL CREATININE-BSD FRML MDRD: 49 ML/MIN/1.73M2
GLUCOSE BLD-MCNC: 107 MG/DL (ref 70–125)
GLUCOSE UR STRIP-MCNC: NEGATIVE MG/DL
HCT VFR BLD AUTO: 39.4 % (ref 40–54)
HDLC SERPL-MCNC: 40 MG/DL
HGB BLD-MCNC: 12.6 G/DL (ref 14–18)
HGB UR QL STRIP: NEGATIVE
INR PPP: 4.35 (ref 0.9–1.1)
KETONES UR STRIP-MCNC: NEGATIVE MG/DL
LDLC SERPL CALC-MCNC: 23 MG/DL
LEUKOCYTE ESTERASE UR QL STRIP: NEGATIVE
MCH RBC QN AUTO: 32.4 PG (ref 27–34)
MCHC RBC AUTO-ENTMCNC: 32 G/DL (ref 32–36)
MCV RBC AUTO: 101 FL (ref 80–100)
NITRATE UR QL: NEGATIVE
PH UR STRIP: 5 [PH] (ref 5–8)
PLATELET # BLD AUTO: 322 THOU/UL (ref 140–440)
PMV BLD AUTO: 9 FL (ref 7–10)
POTASSIUM BLD-SCNC: 4.8 MMOL/L (ref 3.5–5)
PROT SERPL-MCNC: 6.9 G/DL (ref 6–8)
RBC # BLD AUTO: 3.89 MILL/UL (ref 4.4–6.2)
RBC #/AREA URNS AUTO: ABNORMAL HPF
SODIUM SERPL-SCNC: 137 MMOL/L (ref 136–145)
SP GR UR STRIP: 1.02 (ref 1–1.03)
SQUAMOUS #/AREA URNS AUTO: ABNORMAL LPF
TRIGL SERPL-MCNC: 122 MG/DL
TSH SERPL DL<=0.005 MIU/L-ACNC: 4.46 UIU/ML (ref 0.3–5)
UROBILINOGEN UR STRIP-ACNC: ABNORMAL
WBC #/AREA URNS AUTO: ABNORMAL HPF
WBC: 7.4 THOU/UL (ref 4–11)

## 2021-02-08 RX ORDER — BRIMONIDINE TARTRATE, TIMOLOL MALEATE 2; 5 MG/ML; MG/ML
1 SOLUTION/ DROPS OPHTHALMIC
Status: SHIPPED | COMMUNITY
Start: 2020-02-17

## 2021-02-08 ASSESSMENT — PATIENT HEALTH QUESTIONNAIRE - PHQ9: SUM OF ALL RESPONSES TO PHQ QUESTIONS 1-9: 7

## 2021-02-08 ASSESSMENT — MIFFLIN-ST. JEOR: SCORE: 1290.16

## 2021-02-08 ASSESSMENT — ANXIETY QUESTIONNAIRES
1. FEELING NERVOUS, ANXIOUS, OR ON EDGE: NOT AT ALL
2. NOT BEING ABLE TO STOP OR CONTROL WORRYING: NOT AT ALL

## 2021-02-09 ENCOUNTER — COMMUNICATION - HEALTHEAST (OUTPATIENT)
Dept: INTERNAL MEDICINE | Facility: CLINIC | Age: 81
End: 2021-02-09

## 2021-02-10 LAB — CANCER AG19-9 SERPL IA-ACNC: 19 U/ML (ref 0–37)

## 2021-02-12 ENCOUNTER — AMBULATORY - HEALTHEAST (OUTPATIENT)
Dept: LAB | Facility: CLINIC | Age: 81
End: 2021-02-12

## 2021-02-12 ENCOUNTER — COMMUNICATION - HEALTHEAST (OUTPATIENT)
Dept: ANTICOAGULATION | Facility: CLINIC | Age: 81
End: 2021-02-12

## 2021-02-12 DIAGNOSIS — Z79.01 LONG TERM (CURRENT) USE OF ANTICOAGULANTS: ICD-10-CM

## 2021-02-12 DIAGNOSIS — I48.91 ATRIAL FIBRILLATION (H): ICD-10-CM

## 2021-02-12 DIAGNOSIS — I48.19 PERSISTENT ATRIAL FIBRILLATION (H): ICD-10-CM

## 2021-02-12 LAB — INR PPP: 3.6 (ref 0.9–1.1)

## 2021-02-15 ENCOUNTER — RECORDS - HEALTHEAST (OUTPATIENT)
Dept: ADMINISTRATIVE | Facility: OTHER | Age: 81
End: 2021-02-15

## 2021-02-16 ENCOUNTER — COMMUNICATION - HEALTHEAST (OUTPATIENT)
Dept: INTERNAL MEDICINE | Facility: CLINIC | Age: 81
End: 2021-02-16

## 2021-02-16 DIAGNOSIS — I48.91 ATRIAL FIBRILLATION (H): ICD-10-CM

## 2021-02-16 DIAGNOSIS — Z79.01 LONG TERM (CURRENT) USE OF ANTICOAGULANTS: ICD-10-CM

## 2021-02-16 LAB — INR PPP: 2.1 (ref 0.9–1.1)

## 2021-02-19 ENCOUNTER — COMMUNICATION - HEALTHEAST (OUTPATIENT)
Dept: ANTICOAGULATION | Facility: CLINIC | Age: 81
End: 2021-02-19

## 2021-02-19 ENCOUNTER — AMBULATORY - HEALTHEAST (OUTPATIENT)
Dept: ANTICOAGULATION | Facility: CLINIC | Age: 81
End: 2021-02-19

## 2021-02-19 ENCOUNTER — AMBULATORY - HEALTHEAST (OUTPATIENT)
Dept: LAB | Facility: CLINIC | Age: 81
End: 2021-02-19

## 2021-02-19 DIAGNOSIS — I48.91 ATRIAL FIBRILLATION (H): ICD-10-CM

## 2021-02-19 DIAGNOSIS — I48.19 PERSISTENT ATRIAL FIBRILLATION (H): ICD-10-CM

## 2021-02-19 DIAGNOSIS — Z79.01 LONG TERM (CURRENT) USE OF ANTICOAGULANTS: ICD-10-CM

## 2021-02-19 LAB — INR PPP: 1.4 (ref 0.9–1.1)

## 2021-02-22 ENCOUNTER — OFFICE VISIT - HEALTHEAST (OUTPATIENT)
Dept: INTERNAL MEDICINE | Facility: CLINIC | Age: 81
End: 2021-02-22

## 2021-02-22 DIAGNOSIS — C24.0 BILE DUCT CANCER (H): ICD-10-CM

## 2021-02-22 ASSESSMENT — MIFFLIN-ST. JEOR: SCORE: 1308.3

## 2021-02-23 ENCOUNTER — COMMUNICATION - HEALTHEAST (OUTPATIENT)
Dept: ANTICOAGULATION | Facility: CLINIC | Age: 81
End: 2021-02-23

## 2021-02-23 ENCOUNTER — AMBULATORY - HEALTHEAST (OUTPATIENT)
Dept: INTERNAL MEDICINE | Facility: CLINIC | Age: 81
End: 2021-02-23

## 2021-02-23 ENCOUNTER — AMBULATORY - HEALTHEAST (OUTPATIENT)
Dept: FAMILY MEDICINE | Facility: CLINIC | Age: 81
End: 2021-02-23

## 2021-02-23 ENCOUNTER — AMBULATORY - HEALTHEAST (OUTPATIENT)
Dept: LAB | Facility: CLINIC | Age: 81
End: 2021-02-23

## 2021-02-23 DIAGNOSIS — J18.9 PNEUMONIA OF BOTH LUNGS DUE TO INFECTIOUS ORGANISM, UNSPECIFIED PART OF LUNG: ICD-10-CM

## 2021-02-23 DIAGNOSIS — Z79.01 LONG TERM (CURRENT) USE OF ANTICOAGULANTS: ICD-10-CM

## 2021-02-23 DIAGNOSIS — I48.91 ATRIAL FIBRILLATION (H): ICD-10-CM

## 2021-02-23 DIAGNOSIS — I48.19 PERSISTENT ATRIAL FIBRILLATION (H): ICD-10-CM

## 2021-02-23 DIAGNOSIS — C24.0 BILE DUCT CANCER (H): ICD-10-CM

## 2021-02-23 LAB — INR PPP: 1.7 (ref 0.9–1.1)

## 2021-02-24 ENCOUNTER — AMBULATORY - HEALTHEAST (OUTPATIENT)
Dept: ANTICOAGULATION | Facility: CLINIC | Age: 81
End: 2021-02-24

## 2021-02-24 DIAGNOSIS — Z79.01 LONG TERM (CURRENT) USE OF ANTICOAGULANTS: ICD-10-CM

## 2021-02-24 LAB
SARS-COV-2 PCR COMMENT: NORMAL
SARS-COV-2 RNA SPEC QL NAA+PROBE: NEGATIVE
SARS-COV-2 VIRUS SPECIMEN SOURCE: NORMAL

## 2021-02-25 ENCOUNTER — COMMUNICATION - HEALTHEAST (OUTPATIENT)
Dept: SCHEDULING | Facility: CLINIC | Age: 81
End: 2021-02-25

## 2021-02-25 ENCOUNTER — COMMUNICATION - HEALTHEAST (OUTPATIENT)
Dept: INTERNAL MEDICINE | Facility: CLINIC | Age: 81
End: 2021-02-25

## 2021-02-26 ENCOUNTER — AMBULATORY - HEALTHEAST (OUTPATIENT)
Dept: LAB | Facility: CLINIC | Age: 81
End: 2021-02-26

## 2021-02-26 ENCOUNTER — COMMUNICATION - HEALTHEAST (OUTPATIENT)
Dept: ANTICOAGULATION | Facility: CLINIC | Age: 81
End: 2021-02-26

## 2021-02-26 DIAGNOSIS — Z79.01 LONG TERM (CURRENT) USE OF ANTICOAGULANTS: ICD-10-CM

## 2021-02-26 DIAGNOSIS — I48.91 ATRIAL FIBRILLATION (H): ICD-10-CM

## 2021-02-26 DIAGNOSIS — I48.19 PERSISTENT ATRIAL FIBRILLATION (H): ICD-10-CM

## 2021-02-26 LAB — INR PPP: 2.2 (ref 0.9–1.1)

## 2021-03-02 ENCOUNTER — AMBULATORY - HEALTHEAST (OUTPATIENT)
Dept: LAB | Facility: CLINIC | Age: 81
End: 2021-03-02

## 2021-03-02 ENCOUNTER — COMMUNICATION - HEALTHEAST (OUTPATIENT)
Dept: ANTICOAGULATION | Facility: CLINIC | Age: 81
End: 2021-03-02

## 2021-03-02 DIAGNOSIS — Z79.01 LONG TERM (CURRENT) USE OF ANTICOAGULANTS: ICD-10-CM

## 2021-03-02 DIAGNOSIS — I48.91 ATRIAL FIBRILLATION (H): ICD-10-CM

## 2021-03-02 DIAGNOSIS — I48.19 PERSISTENT ATRIAL FIBRILLATION (H): ICD-10-CM

## 2021-03-02 LAB — INR PPP: 2.4 (ref 0.9–1.1)

## 2021-03-03 ENCOUNTER — COMMUNICATION - HEALTHEAST (OUTPATIENT)
Dept: INTERNAL MEDICINE | Facility: CLINIC | Age: 81
End: 2021-03-03

## 2021-03-03 DIAGNOSIS — Z79.01 LONG TERM (CURRENT) USE OF ANTICOAGULANTS: ICD-10-CM

## 2021-03-03 DIAGNOSIS — I48.91 ATRIAL FIBRILLATION (H): ICD-10-CM

## 2021-03-16 ENCOUNTER — RECORDS - HEALTHEAST (OUTPATIENT)
Dept: ADMINISTRATIVE | Facility: OTHER | Age: 81
End: 2021-03-16

## 2021-03-16 LAB — INR PPP: 2.2 (ref 0.9–1.1)

## 2021-03-17 ENCOUNTER — COMMUNICATION - HEALTHEAST (OUTPATIENT)
Dept: INTERNAL MEDICINE | Facility: CLINIC | Age: 81
End: 2021-03-17

## 2021-03-17 DIAGNOSIS — Z79.01 LONG TERM (CURRENT) USE OF ANTICOAGULANTS: ICD-10-CM

## 2021-03-17 DIAGNOSIS — I48.91 ATRIAL FIBRILLATION (H): ICD-10-CM

## 2021-03-19 ENCOUNTER — COMMUNICATION - HEALTHEAST (OUTPATIENT)
Dept: INTERNAL MEDICINE | Facility: CLINIC | Age: 81
End: 2021-03-19

## 2021-03-22 ENCOUNTER — COMMUNICATION - HEALTHEAST (OUTPATIENT)
Dept: ADMINISTRATIVE | Facility: CLINIC | Age: 81
End: 2021-03-22

## 2021-03-23 ENCOUNTER — OFFICE VISIT - HEALTHEAST (OUTPATIENT)
Dept: INTERNAL MEDICINE | Facility: CLINIC | Age: 81
End: 2021-03-23

## 2021-03-23 DIAGNOSIS — R39.14 BENIGN PROSTATIC HYPERPLASIA WITH INCOMPLETE BLADDER EMPTYING: ICD-10-CM

## 2021-03-23 DIAGNOSIS — I48.19 PERSISTENT ATRIAL FIBRILLATION (H): ICD-10-CM

## 2021-03-23 DIAGNOSIS — I50.22 CHRONIC SYSTOLIC HEART FAILURE (H): ICD-10-CM

## 2021-03-23 DIAGNOSIS — N40.1 BENIGN PROSTATIC HYPERPLASIA WITH INCOMPLETE BLADDER EMPTYING: ICD-10-CM

## 2021-03-23 DIAGNOSIS — Z01.818 PREOPERATIVE EXAMINATION: ICD-10-CM

## 2021-03-23 DIAGNOSIS — E03.9 HYPOTHYROIDISM, UNSPECIFIED TYPE: ICD-10-CM

## 2021-03-23 DIAGNOSIS — N18.31 STAGE 3A CHRONIC KIDNEY DISEASE (H): ICD-10-CM

## 2021-03-23 LAB — INR PPP: 2.9 (ref 0.9–1.1)

## 2021-03-23 ASSESSMENT — MIFFLIN-ST. JEOR: SCORE: 1330.08

## 2021-03-26 ENCOUNTER — COMMUNICATION - HEALTHEAST (OUTPATIENT)
Dept: ANTICOAGULATION | Facility: CLINIC | Age: 81
End: 2021-03-26

## 2021-03-26 ENCOUNTER — RECORDS - HEALTHEAST (OUTPATIENT)
Dept: ADMINISTRATIVE | Facility: OTHER | Age: 81
End: 2021-03-26

## 2021-03-26 DIAGNOSIS — Z79.01 LONG TERM (CURRENT) USE OF ANTICOAGULANTS: ICD-10-CM

## 2021-03-26 DIAGNOSIS — I48.91 ATRIAL FIBRILLATION (H): ICD-10-CM

## 2021-03-26 LAB — INR PPP: 2.9 (ref 0.9–1.1)

## 2021-03-31 ENCOUNTER — RECORDS - HEALTHEAST (OUTPATIENT)
Dept: ADMINISTRATIVE | Facility: OTHER | Age: 81
End: 2021-03-31

## 2021-03-31 ENCOUNTER — COMMUNICATION - HEALTHEAST (OUTPATIENT)
Dept: ANTICOAGULATION | Facility: CLINIC | Age: 81
End: 2021-03-31

## 2021-03-31 DIAGNOSIS — I48.91 ATRIAL FIBRILLATION (H): ICD-10-CM

## 2021-03-31 DIAGNOSIS — Z79.01 LONG TERM (CURRENT) USE OF ANTICOAGULANTS: ICD-10-CM

## 2021-03-31 LAB — INR PPP: 2.6 (ref 0.9–1.1)

## 2021-04-08 ENCOUNTER — RECORDS - HEALTHEAST (OUTPATIENT)
Dept: ADMINISTRATIVE | Facility: OTHER | Age: 81
End: 2021-04-08

## 2021-04-08 ENCOUNTER — COMMUNICATION - HEALTHEAST (OUTPATIENT)
Dept: INTERNAL MEDICINE | Facility: CLINIC | Age: 81
End: 2021-04-08

## 2021-04-08 DIAGNOSIS — Z79.01 LONG TERM (CURRENT) USE OF ANTICOAGULANTS: ICD-10-CM

## 2021-04-08 DIAGNOSIS — I48.91 ATRIAL FIBRILLATION (H): ICD-10-CM

## 2021-04-08 LAB — INR PPP: 5 (ref 0.9–1.1)

## 2021-04-09 ENCOUNTER — COMMUNICATION - HEALTHEAST (OUTPATIENT)
Dept: ANTICOAGULATION | Facility: CLINIC | Age: 81
End: 2021-04-09

## 2021-04-09 ENCOUNTER — AMBULATORY - HEALTHEAST (OUTPATIENT)
Dept: LAB | Facility: CLINIC | Age: 81
End: 2021-04-09

## 2021-04-09 DIAGNOSIS — I48.91 ATRIAL FIBRILLATION (H): ICD-10-CM

## 2021-04-09 DIAGNOSIS — Z79.01 LONG TERM (CURRENT) USE OF ANTICOAGULANTS: ICD-10-CM

## 2021-04-09 DIAGNOSIS — I48.19 PERSISTENT ATRIAL FIBRILLATION (H): ICD-10-CM

## 2021-04-09 LAB — INR PPP: 5 (ref 0.9–1.1)

## 2021-04-12 ENCOUNTER — COMMUNICATION - HEALTHEAST (OUTPATIENT)
Dept: ANTICOAGULATION | Facility: CLINIC | Age: 81
End: 2021-04-12

## 2021-04-12 ENCOUNTER — RECORDS - HEALTHEAST (OUTPATIENT)
Dept: ADMINISTRATIVE | Facility: OTHER | Age: 81
End: 2021-04-12

## 2021-04-12 DIAGNOSIS — Z79.01 LONG TERM (CURRENT) USE OF ANTICOAGULANTS: ICD-10-CM

## 2021-04-12 DIAGNOSIS — I48.91 ATRIAL FIBRILLATION (H): ICD-10-CM

## 2021-04-12 LAB — INR PPP: 3 (ref 0.9–1.1)

## 2021-04-15 ENCOUNTER — COMMUNICATION - HEALTHEAST (OUTPATIENT)
Dept: ANTICOAGULATION | Facility: CLINIC | Age: 81
End: 2021-04-15

## 2021-04-15 DIAGNOSIS — I48.91 ATRIAL FIBRILLATION (H): ICD-10-CM

## 2021-04-15 DIAGNOSIS — Z79.01 LONG TERM (CURRENT) USE OF ANTICOAGULANTS: ICD-10-CM

## 2021-04-15 DIAGNOSIS — I48.19 PERSISTENT ATRIAL FIBRILLATION (H): ICD-10-CM

## 2021-04-15 DIAGNOSIS — I48.91 ATRIAL FIBRILLATION WITH RVR (H): ICD-10-CM

## 2021-04-15 LAB — INR PPP: 3.4 (ref 0.9–1.1)

## 2021-04-15 RX ORDER — WARFARIN SODIUM 1 MG/1
TABLET ORAL
Qty: 90 TABLET | Refills: 1 | Status: SHIPPED | OUTPATIENT
Start: 2021-04-15 | End: 2021-12-13

## 2021-04-19 ENCOUNTER — COMMUNICATION - HEALTHEAST (OUTPATIENT)
Dept: ANTICOAGULATION | Facility: CLINIC | Age: 81
End: 2021-04-19

## 2021-04-19 DIAGNOSIS — I48.19 PERSISTENT ATRIAL FIBRILLATION (H): ICD-10-CM

## 2021-04-19 DIAGNOSIS — Z79.01 LONG TERM (CURRENT) USE OF ANTICOAGULANTS: ICD-10-CM

## 2021-04-19 LAB — INR PPP: 3.6 (ref 0.9–1.1)

## 2021-04-23 ENCOUNTER — COMMUNICATION - HEALTHEAST (OUTPATIENT)
Dept: ANTICOAGULATION | Facility: CLINIC | Age: 81
End: 2021-04-23

## 2021-04-23 ENCOUNTER — RECORDS - HEALTHEAST (OUTPATIENT)
Dept: INTERNAL MEDICINE | Facility: CLINIC | Age: 81
End: 2021-04-23

## 2021-04-23 DIAGNOSIS — Z79.01 LONG TERM (CURRENT) USE OF ANTICOAGULANTS: ICD-10-CM

## 2021-04-23 DIAGNOSIS — I48.19 PERSISTENT ATRIAL FIBRILLATION (H): ICD-10-CM

## 2021-04-23 LAB — INR PPP: 2.1 (ref 0.9–1.1)

## 2021-04-30 ENCOUNTER — OFFICE VISIT - HEALTHEAST (OUTPATIENT)
Dept: INTERNAL MEDICINE | Facility: CLINIC | Age: 81
End: 2021-04-30

## 2021-04-30 DIAGNOSIS — C25.1 MALIGNANT NEOPLASM OF BODY OF PANCREAS (H): ICD-10-CM

## 2021-04-30 LAB
ALBUMIN SERPL-MCNC: 3.7 G/DL (ref 3.5–5)
ALP SERPL-CCNC: 277 U/L (ref 45–120)
ALT SERPL W P-5'-P-CCNC: 45 U/L (ref 0–45)
ANION GAP SERPL CALCULATED.3IONS-SCNC: 12 MMOL/L (ref 5–18)
AST SERPL W P-5'-P-CCNC: 44 U/L (ref 0–40)
BILIRUB SERPL-MCNC: 0.6 MG/DL (ref 0–1)
BUN SERPL-MCNC: 37 MG/DL (ref 8–28)
CALCIUM SERPL-MCNC: 8.8 MG/DL (ref 8.5–10.5)
CHLORIDE BLD-SCNC: 100 MMOL/L (ref 98–107)
CO2 SERPL-SCNC: 23 MMOL/L (ref 22–31)
CREAT SERPL-MCNC: 2.21 MG/DL (ref 0.7–1.3)
ERYTHROCYTE [DISTWIDTH] IN BLOOD BY AUTOMATED COUNT: 14.1 % (ref 11–14.5)
GFR SERPL CREATININE-BSD FRML MDRD: 29 ML/MIN/1.73M2
GLUCOSE BLD-MCNC: 87 MG/DL (ref 70–125)
HCT VFR BLD AUTO: 37.8 % (ref 40–54)
HGB BLD-MCNC: 12.1 G/DL (ref 14–18)
MCH RBC QN AUTO: 32.3 PG (ref 27–34)
MCHC RBC AUTO-ENTMCNC: 32 G/DL (ref 32–36)
MCV RBC AUTO: 101 FL (ref 80–100)
PLATELET # BLD AUTO: 207 THOU/UL (ref 140–440)
PMV BLD AUTO: 10.3 FL (ref 7–10)
POTASSIUM BLD-SCNC: 5.1 MMOL/L (ref 3.5–5)
PROT SERPL-MCNC: 7.7 G/DL (ref 6–8)
RBC # BLD AUTO: 3.75 MILL/UL (ref 4.4–6.2)
SODIUM SERPL-SCNC: 135 MMOL/L (ref 136–145)
WBC: 8.4 THOU/UL (ref 4–11)

## 2021-04-30 RX ORDER — AMIODARONE HYDROCHLORIDE 200 MG/1
200 TABLET ORAL DAILY
Status: SHIPPED | COMMUNITY
Start: 2021-04-23 | End: 2022-05-17

## 2021-04-30 ASSESSMENT — MIFFLIN-ST. JEOR: SCORE: 1326.45

## 2021-05-01 ENCOUNTER — AMBULATORY - HEALTHEAST (OUTPATIENT)
Dept: INTERNAL MEDICINE | Facility: CLINIC | Age: 81
End: 2021-05-01

## 2021-05-01 DIAGNOSIS — N18.30 STAGE 3 CHRONIC KIDNEY DISEASE, UNSPECIFIED WHETHER STAGE 3A OR 3B CKD (H): ICD-10-CM

## 2021-05-02 LAB — CANCER AG19-9 SERPL IA-ACNC: 33 U/ML (ref 0–37)

## 2021-05-03 ENCOUNTER — COMMUNICATION - HEALTHEAST (OUTPATIENT)
Dept: INTERNAL MEDICINE | Facility: CLINIC | Age: 81
End: 2021-05-03

## 2021-05-03 ENCOUNTER — COMMUNICATION - HEALTHEAST (OUTPATIENT)
Dept: ANTICOAGULATION | Facility: CLINIC | Age: 81
End: 2021-05-03

## 2021-05-03 ENCOUNTER — AMBULATORY - HEALTHEAST (OUTPATIENT)
Dept: LAB | Facility: CLINIC | Age: 81
End: 2021-05-03

## 2021-05-03 DIAGNOSIS — I48.19 PERSISTENT ATRIAL FIBRILLATION (H): ICD-10-CM

## 2021-05-03 DIAGNOSIS — Z79.01 LONG TERM (CURRENT) USE OF ANTICOAGULANTS: ICD-10-CM

## 2021-05-03 LAB — INR PPP: 2.6 (ref 0.9–1.1)

## 2021-05-10 ENCOUNTER — AMBULATORY - HEALTHEAST (OUTPATIENT)
Dept: LAB | Facility: CLINIC | Age: 81
End: 2021-05-10

## 2021-05-10 ENCOUNTER — COMMUNICATION - HEALTHEAST (OUTPATIENT)
Dept: ANTICOAGULATION | Facility: CLINIC | Age: 81
End: 2021-05-10

## 2021-05-10 DIAGNOSIS — I48.19 PERSISTENT ATRIAL FIBRILLATION (H): ICD-10-CM

## 2021-05-10 DIAGNOSIS — Z79.01 LONG TERM (CURRENT) USE OF ANTICOAGULANTS: ICD-10-CM

## 2021-05-10 LAB — INR PPP: 2.9 (ref 0.9–1.1)

## 2021-05-11 ENCOUNTER — TRANSCRIBE ORDERS (OUTPATIENT)
Dept: OTHER | Age: 81
End: 2021-05-11

## 2021-05-11 DIAGNOSIS — N18.30 CKD (CHRONIC KIDNEY DISEASE) STAGE 3, GFR 30-59 ML/MIN (H): Primary | ICD-10-CM

## 2021-05-13 ENCOUNTER — RECORDS - HEALTHEAST (OUTPATIENT)
Dept: ADMINISTRATIVE | Facility: OTHER | Age: 81
End: 2021-05-13

## 2021-05-17 ENCOUNTER — COMMUNICATION - HEALTHEAST (OUTPATIENT)
Dept: ANTICOAGULATION | Facility: CLINIC | Age: 81
End: 2021-05-17

## 2021-05-17 ENCOUNTER — AMBULATORY - HEALTHEAST (OUTPATIENT)
Dept: LAB | Facility: CLINIC | Age: 81
End: 2021-05-17

## 2021-05-17 DIAGNOSIS — I48.19 PERSISTENT ATRIAL FIBRILLATION (H): ICD-10-CM

## 2021-05-17 DIAGNOSIS — Z79.01 LONG TERM (CURRENT) USE OF ANTICOAGULANTS: ICD-10-CM

## 2021-05-17 LAB — INR PPP: 3.4 (ref 0.9–1.1)

## 2021-05-21 ENCOUNTER — OFFICE VISIT - HEALTHEAST (OUTPATIENT)
Dept: INTERNAL MEDICINE | Facility: CLINIC | Age: 81
End: 2021-05-21

## 2021-05-21 ENCOUNTER — COMMUNICATION - HEALTHEAST (OUTPATIENT)
Dept: ANTICOAGULATION | Facility: CLINIC | Age: 81
End: 2021-05-21

## 2021-05-21 DIAGNOSIS — I48.91 ATRIAL FIBRILLATION, UNSPECIFIED TYPE (H): ICD-10-CM

## 2021-05-21 DIAGNOSIS — I48.19 PERSISTENT ATRIAL FIBRILLATION (H): ICD-10-CM

## 2021-05-21 DIAGNOSIS — Z79.01 LONG TERM (CURRENT) USE OF ANTICOAGULANTS: ICD-10-CM

## 2021-05-21 DIAGNOSIS — R21 RASH: ICD-10-CM

## 2021-05-21 DIAGNOSIS — I10 ESSENTIAL HYPERTENSION: ICD-10-CM

## 2021-05-21 LAB
CHOLEST SERPL-MCNC: 96 MG/DL
FASTING STATUS PATIENT QL REPORTED: YES
HDLC SERPL-MCNC: 55 MG/DL
INR PPP: 2.6 (ref 0.9–1.1)
LDLC SERPL CALC-MCNC: 28 MG/DL
TRIGL SERPL-MCNC: 67 MG/DL

## 2021-05-21 RX ORDER — TRIAMCINOLONE ACETONIDE 1 MG/G
CREAM TOPICAL
Qty: 60 G | Refills: 3 | Status: SHIPPED | OUTPATIENT
Start: 2021-05-21

## 2021-05-21 ASSESSMENT — MIFFLIN-ST. JEOR: SCORE: 1335.52

## 2021-05-25 ENCOUNTER — COMMUNICATION - HEALTHEAST (OUTPATIENT)
Dept: INTERNAL MEDICINE | Facility: CLINIC | Age: 81
End: 2021-05-25

## 2021-05-26 ENCOUNTER — RECORDS - HEALTHEAST (OUTPATIENT)
Dept: ADMINISTRATIVE | Facility: CLINIC | Age: 81
End: 2021-05-26

## 2021-05-26 ASSESSMENT — PATIENT HEALTH QUESTIONNAIRE - PHQ9: SUM OF ALL RESPONSES TO PHQ QUESTIONS 1-9: 0

## 2021-05-27 ASSESSMENT — PATIENT HEALTH QUESTIONNAIRE - PHQ9: SUM OF ALL RESPONSES TO PHQ QUESTIONS 1-9: 7

## 2021-05-27 NOTE — TELEPHONE ENCOUNTER
Who is calling:  Patient  Reason for Call:  Following up on INR that was drawn 03/22/19 at out of state lab in [place]. Patient states his INR was 2.2 and is looking for dosing and follow up instructions. Thank you!  Date of last appointment with primary care: none  Okay to leave a detailed message: Yes

## 2021-05-27 NOTE — TELEPHONE ENCOUNTER
ANTICOAGULATION  MANAGEMENT    Assessment     Today's INR result of 2.20 is Therapeutic (goal INR of 2.0-3.0)        Warfarin taken as previously instructed    No new diet changes affecting INR    No new medication/supplements affecting INR    Continues to tolerate warfarin with no reported s/s of bleeding or thromboembolism     Previous INR was Therapeutic at 2.30 on 2/25/19.    Plan:     Spoke with Trenton regarding INR result and instructed:     Warfarin Dosing Instructions:    - Continue current warfarin dose 1.25 mg daily on Mon/Thurs; and 2.5 mg daily rest of week.    Instructed patient to follow up no later than:  4-6 wks.    Education provided: importance of consistent vitamin K intake, target INR goal and significance of current INR result and importance of notifying clinic for changes in medications    Trenton verbalizes understanding and agrees to warfarin dosing plan.    Instructed to call the [facility] for any changes, questions or concerns. (#[phone])   ?   Yamileth Flores RN    Subjective/Objective:      Trenton Nova, a 79 y.o. male is on warfarin.     Trenton reports:     Home warfarin dose: verbally confirmed home dose with Trenton and updated on anticoagulation calendar     Missed doses: No     Medication changes:  No     S/S of bleeding or thromboembolism:  No     New Injury or illness:  No     Changes in diet or alcohol consumption:  No     Upcoming surgery, procedure or cardioversion:  No    Anticoagulation Episode Summary     Current INR goal:   2.0-3.0   TTR:   81.1 % (4.1 y)   Next INR check:   5/8/2019   INR from last check:   2.20 (3/22/2019)   Weekly max warfarin dose:      Target end date:      INR check location:      Preferred lab:      Send INR reminders to:   ANTICOAGULATION POOL C (DTN,VAD,CGR,GAV)    Indications    Atrial Fibrillation [I48.91]  Long term (current) use of anticoagulants [Z79.01]           Comments:            Anticoagulation Care Providers     Provider Role  Specialty Phone number    Sarwat Mayorga MD Referring Internal Medicine [phone]

## 2021-05-27 NOTE — TELEPHONE ENCOUNTER
Called [facility] -  Tel#[phone]     - INR from 3/22/19 has not been received.  Advised to FAX INR results for patients medical records.     - WAITING FOR INR RESULTS.

## 2021-05-28 ENCOUNTER — AMBULATORY - HEALTHEAST (OUTPATIENT)
Dept: LAB | Facility: CLINIC | Age: 81
End: 2021-05-28

## 2021-05-28 ENCOUNTER — COMMUNICATION - HEALTHEAST (OUTPATIENT)
Dept: ANTICOAGULATION | Facility: CLINIC | Age: 81
End: 2021-05-28

## 2021-05-28 DIAGNOSIS — I48.19 PERSISTENT ATRIAL FIBRILLATION (H): ICD-10-CM

## 2021-05-28 DIAGNOSIS — Z79.01 LONG TERM (CURRENT) USE OF ANTICOAGULANTS: ICD-10-CM

## 2021-05-28 LAB — INR PPP: 3.2 (ref 0.9–1.1)

## 2021-05-28 NOTE — TELEPHONE ENCOUNTER
ANTICOAGULATION  MANAGEMENT    Assessment     Today's INR result of 2.00 is Therapeutic (goal INR of 2.0-3.0)        Warfarin taken as previously instructed    No new diet changes affecting INR    No new medication/supplements affecting INR    Continues to tolerate warfarin with no reported s/s of bleeding or thromboembolism     Previous INR was Therapeutic at 2.20 on 3/27/19.  (last 6 consecutive INR's therapeutic.)    Flying home to [place] on 4/30/19.    Plan:     Spoke with Mr. Nova regarding INR result and instructed:     Warfarin Dosing Instructions:  (has 2.5mg tabs)   - Continue current warfarin dose 1.25 mg daily on Mon/Thurs; and 2.5 mg daily rest of week.    Instructed patient to follow up no later than:  6-8 wks.     - However, would advise to recheck INR after 1-2 wks being in [place].    Education provided: importance of consistent vitamin K intake and target INR goal and significance of current INR result    Mr. Nova verbalizes understanding and agrees to warfarin dosing plan.    Instructed to call the [facility] for any changes, questions or concerns. (#[phone])   ?   Yamileth Flores RN    Subjective/Objective:      Trenton Nova, a 79 y.o. male is on warfarin.     Trenton reports:     Home warfarin dose: verbally confirmed home dose with Mr. Nova and updated on anticoagulation calendar     Missed doses: No     Medication changes:  No     S/S of bleeding or thromboembolism:  No     New Injury or illness:  No     Changes in diet or alcohol consumption:  No     Upcoming surgery, procedure or cardioversion:  No    Anticoagulation Episode Summary     Current INR goal:   2.0-3.0   TTR:   81.5 % (4.2 y)   Next INR check:   6/7/2019   INR from last check:   2.00 (4/26/2019)   Weekly max warfarin dose:      Target end date:      INR check location:      Preferred lab:      Send INR reminders to:   ANTICOAGULATION POOL C (DTN,VAD,CGR,GAV)    Indications    Atrial Fibrillation [I48.91]  Long term (current)  use of anticoagulants [Z79.01]           Comments:            Anticoagulation Care Providers     Provider Role Specialty Phone number    Sarwat Mayorga MD Referring Internal Medicine [phone]

## 2021-05-28 NOTE — TELEPHONE ENCOUNTER
Received a faxed INR result for Trenton Nova     From [facility], [facility], [place], [place]    INR result dated 4/26/2019 is 2.00

## 2021-05-28 NOTE — TELEPHONE ENCOUNTER
ANTICOAGULATION  MANAGEMENT    Assessment     Today's INR result of 2.80 is Therapeutic (goal INR of 2.0-3.0)        Warfarin taken as previously instructed    No new diet changes affecting INR    No new medication/supplements affecting INR    Continues to tolerate warfarin with no reported s/s of bleeding or thromboembolism     Previous INR was Therapeutic at 2.00 on 4/26/19.    Plan:     Spoke with Trenton regarding INR result and instructed:     Warfarin Dosing Instructions:   (has 2.5mg tabs)   - Continue current warfarin dose 1.25 mg daily on Mon/Thurs; and 2.5 mg daily rest of week.    Instructed patient to follow up no later than:  6 wks.   - scheduled on 6/20/19 during f/u visit with Dr. Mayorga.    Education provided: importance of consistent vitamin K intake, target INR goal and significance of current INR result and importance of notifying clinic for changes in medications    Trenton verbalizes understanding and agrees to warfarin dosing plan.    Instructed to call the [facility] for any changes, questions or concerns. (#[phone])   ?   Yamileth Flores RN    Subjective/Objective:      Trenton Nova, a 79 y.o. male is on warfarin.     Trenton reports:     Home warfarin dose: as updated on anticoagulation calendar per template     Missed doses: No     Medication changes:  No     S/S of bleeding or thromboembolism:  No     New Injury or illness:  No     Changes in diet or alcohol consumption:  No     Upcoming surgery, procedure or cardioversion:  No    Anticoagulation Episode Summary     Current INR goal:   2.0-3.0   TTR:   81.7 % (4.2 y)   Next INR check:   6/21/2019   INR from last check:   2.80 (5/10/2019)   Weekly max warfarin dose:      Target end date:      INR check location:      Preferred lab:      Send INR reminders to:   ANTICOAGULATION POOL C (DTN,VAD,CGR,GAV)    Indications    Atrial Fibrillation [I48.91]  Long term (current) use of anticoagulants [Z79.01]           Comments:             Anticoagulation Care Providers     Provider Role Specialty Phone number    Sarwat Mayorga MD Referring Internal Medicine [phone]

## 2021-05-29 NOTE — TELEPHONE ENCOUNTER
ANTICOAGULATION  MANAGEMENT    Assessment     Today's INR result of 2.60 is Therapeutic (goal INR of 2.0-3.0)        Warfarin taken as previously instructed    No new diet changes affecting INR    No new medication/supplements affecting INR    Continues to tolerate warfarin with no reported s/s of bleeding or thromboembolism     Previous INR was Therapeutic at 2.80 on 5/10/19.  (last 8 consecutive INR's therapeutic.)    Plan:     Spoke with Trenton regarding INR result and instructed:     Warfarin Dosing Instructions:  (has 2.5mg tabs)   - Continue current warfarin dose 1.25 mg daily on Mon/Thurs; and 2.5 mg daily rest of week.    Instructed patient to follow up no later than:  6-8 wks.   - scheduled on 8/13/19 @ [facility].    Education provided: importance of consistent vitamin K intake, target INR goal and significance of current INR result and importance of notifying clinic for changes in medications    Trenton verbalizes understanding and agrees to warfarin dosing plan.    Instructed to call the [facility] for any changes, questions or concerns. (#[phone])   ?   Yamileth Flores RN    Subjective/Objective:      Trenton Noav, a 79 y.o. male is on warfarin.     Trenton reports:     Home warfarin dose: as updated on anticoagulation calendar per template     Missed doses: No     Medication changes:  No     S/S of bleeding or thromboembolism:  No     New Injury or illness:  Yes:  F/u today with Dr. Mayorga - complained of shortness of breath with exertion (golfing and going up stairs or incline). - referred to f/u with cardiologist.     Changes in diet or alcohol consumption:  No     Upcoming surgery, procedure or cardioversion:  No    Anticoagulation Episode Summary     Current INR goal:   2.0-3.0   TTR:   82.1 % (4.4 y)   Next INR check:   8/15/2019   INR from last check:   2.60 (6/20/2019)   Weekly max warfarin dose:      Target end date:      INR check location:      Preferred lab:      Send INR reminders to:    [facility]    Indications    Atrial Fibrillation [I48.91]  Long term (current) use of anticoagulants [Z79.01]           Comments:            Anticoagulation Care Providers     Provider Role Specialty Phone number    Sarwat Mayorga MD Referring Internal Medicine [phone]

## 2021-05-29 NOTE — TELEPHONE ENCOUNTER
Upcoming Appointment Question  When is the appointment: 6/20/19  What is your appointment for?: Follow up and referral for CT  Who is your appointment scheduled with?: PCP only  What is your question/concern?: Patient is calling to find out what he needs the CT scan for and to find out what he is following up on. Please advise.  Okay to leave a detailed message?: Yes

## 2021-05-29 NOTE — TELEPHONE ENCOUNTER
Not certain I know reason for CT scan and maybe pt should make an office visit appt. W/ me to discuss and thanks

## 2021-05-29 NOTE — PROGRESS NOTES
Office Visit - Follow up    Trenton Nova   79 y.o. male    Date of Visit: 6/20/2019    Chief Complaint   Patient presents with     Shortness of Breath     notices derick. when doing stairs       Subjective: Multiple myeloma smoldering.  Followed by Dr. LAYTON Johnston arrived as well from [facility] Department of cardiac electrophysiology.  The patient is short of breath with climbing stairs in his home he has 32 stairs to climb he can climb 1 flight and another flight if he takes this time there is no pain he does have left-sided abdominal pain or had it feels better it bothered him in March but better now.    No blood in stool or urine no chest pain no syncopal spells medication list reviewed well-tolerated normal effects.    Last colonoscopy dated February 21, 2017 showed 2 tubular adenomatous colon polyps but no sign of cancer.    Medication list reviewed reconciled well-tolerated.    Sulfa allergy.    ROS: A comprehensive review of systems was performed and was otherwise negative    Medications:  Prior to Admission medications    Medication Sig Start Date End Date Taking? Authorizing Provider   calcium carbonate-vitamin D2 500 mg(1,250mg) -200 unit tablet Take 1 tablet by mouth 2 (two) times a day.   Yes PROVIDER, HISTORICAL   cyanocobalamin (VITAMIN B-12) 500 MCG tablet Take 500 mcg by mouth daily.   Yes PROVIDER, HISTORICAL   levothyroxine 50 mcg cap Take 1 tablet by mouth Daily at 6:00 am.   Yes Sarwat Mayorga MD   multivitamin therapeutic (THERAGRAN) tablet Take 1 tablet by mouth daily.   Yes PROVIDER, HISTORICAL   omeprazole (PRILOSEC) 20 MG capsule Take 20 mg by mouth daily.   Yes Sarwat Mayorga MD   simvastatin (ZOCOR) 20 MG tablet Take 20 mg by mouth at bedtime 5 mg every other day.         Yes Sarwat Mayorga MD   sotalol (BETAPACE) 80 MG tablet 40 mg daily .       6/25/15  Yes PROVIDER, HISTORICAL   terazosin (HYTRIN) 5 MG capsule Take 5 mg by mouth. 9/22/15  Yes PROVIDER, HISTORICAL    vit C/E/Zn/coppr/lutein/zeaxan (PRESERVISION AREDS-2 ORAL) Take by mouth 2 (two) times a day.   Yes PROVIDER, HISTORICAL   warfarin (COUMADIN/JANTOVEN) 2.5 MG tablet Take 1/2 tab (1.25mg) to 1 tab (2.5mg) by mouth daily, as directed.  Adjust dose based on INR results.. 12/12/18  Yes Sarwat Mayorga MD   tadalafil (CIALIS) 5 MG tablet Take 5 mg by mouth daily as needed for erectile dysfunction.    PROVIDER, HISTORICAL   terazosin (HYTRIN) 1 MG capsule Take 1 mg by mouth bedtime.  6/20/19  PROVIDER, HISTORICAL       Allergies:   Allergies   Allergen Reactions     Sulfa (Sulfonamide Antibiotics)        Immunizations:   Immunization History   Administered Date(s) Administered     DT (pediatric) 11/15/2004     IG-IM 04/18/2000     Influenza [id number], 02/09/2010     Influenza high dose, seasonal 10/21/2015, 09/14/2016, 11/02/2017, 10/23/2018     Influenza, inj, historic,unspecified 09/30/2009, 10/01/2014     Influenza, seasonal,quad inj 6-35 mos 10/04/2013, 10/16/2014     Pneumo Conj 13-V (2010&after) 12/20/2016     Pneumo Polysac 23-V 11/08/2005     Td,adult,historic,unspecified 11/15/2004     Tdap 02/18/2014       Exam Chest clear to auscultation and percussion.  Heart tones regular rhythm without murmur rub or gallop.  Abdomen soft nontender no organomegaly.  No peritoneal signs.  Extremities free of edema cyanosis or clubbing.  Neck veins nondistended no thyromegaly or scleral icterus noted, carotids full.  Skin warm and dry easily conversant good spirited.  Normal intelligence.  Neurologically intact no gross localizing findings.    108/60 pulse 67 respirations 18 O2 sats 95% on room air.  Weight down 4 pounds slightly pale not in acute distress no neck vein distention no leg edema.  No carotid bruits no thyromegaly.    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    Today check chest x-ray plus comprehensive metabolic  profile hemogram brain natruretic peptide level.  I did advise the patient to reconsult Dr. LAYTON Johnston arrived at [facility] Department of cardiac electrophysiology regarding dyspnea on exertion.    Time: total time spent with the patient was 25 minutes of which >50% was spent in counseling and coordination of care    The following high BMI interventions were performed this visit: encouragement to exercise    Sarwat Mayorga MD    Patient Active Problem List   Diagnosis     TMJ Pain     Neck Pain     Hyperlipidemia     Benign Monoclonal Hypergammaglobulinemia     Atrial Fibrillation     Benign Prostatic Hypertrophy     Primary Osteoarthritis Of The Cervical Vertebrae     Reactions To Sulfa Drugs     Removal of staples     Multiple myeloma (H)     Colon polyp     Long term (current) use of anticoagulants     Shoulder pain

## 2021-05-30 VITALS — HEIGHT: 70 IN | BODY MASS INDEX: 24.05 KG/M2 | WEIGHT: 168 LBS

## 2021-05-31 VITALS — WEIGHT: 173 LBS | BODY MASS INDEX: 27.15 KG/M2 | HEIGHT: 67 IN

## 2021-05-31 VITALS — WEIGHT: 176 LBS | HEIGHT: 68 IN | BODY MASS INDEX: 26.67 KG/M2

## 2021-05-31 VITALS — WEIGHT: 175 LBS | BODY MASS INDEX: 26.52 KG/M2 | HEIGHT: 68 IN

## 2021-05-31 VITALS — WEIGHT: 177.04 LBS | BODY MASS INDEX: 26.83 KG/M2 | HEIGHT: 68 IN

## 2021-05-31 NOTE — TELEPHONE ENCOUNTER
ANTICOAGULATION  MANAGEMENT    Assessment     Today's INR result of 2.00 is Therapeutic (goal INR of 2.0-3.0)        Warfarin taken as previously instructed    No new diet changes affecting INR    No new medication/supplements affecting INR    Continues to tolerate warfarin with no reported s/s of bleeding or thromboembolism     Previous INR was Supratherapeutic at 3.10 on 8/13/19.    Plan:     Spoke with Jonatan regarding INR result and instructed:     Warfarin Dosing Instructions:   (has 2.5mg tabs)   - Continue current warfarin dose 1.25 mg daily on Monday/Thursday; and 2.5 mg daily rest of week.    Instructed patient to follow up no later than:  2 wks.   - scheduled on 9/17/19 during f/u visit with Dr. Mayorga.    Education provided: importance of consistent vitamin K intake and target INR goal and significance of current INR result    Jonatan verbalizes understanding and agrees to warfarin dosing plan.    Instructed to call the [facility] for any changes, questions or concerns. (#[phone])   ?   Yamileth Flores RN    Subjective/Objective:      Trenton LINDA Meraradha, a 79 y.o. male is on warfarin.     Trenton reports:     Home warfarin dose: as updated on anticoagulation calendar per template     Missed doses: No     Medication changes:  No     S/S of bleeding or thromboembolism:  No     New Injury or illness:  No     Changes in diet or alcohol consumption:  No.  Reported no new changes with regiments.     Upcoming surgery, procedure or cardioversion:  No    Anticoagulation Episode Summary     Current INR goal:   2.0-3.0   TTR:   82.1 % (4.5 y)   Next INR check:   9/10/2019   INR from last check:   2.00 (8/27/2019)   Weekly max warfarin dose:      Target end date:      INR check location:      Preferred lab:      Send INR reminders to:   [facility]    Indications    Atrial Fibrillation [I48.91]  Long term (current) use of anticoagulants [Z79.01]           Comments:            Anticoagulation Care  Providers     Provider Role Specialty Phone number    Sarwat Mayorga MD Referring Internal Medicine [phone]

## 2021-05-31 NOTE — TELEPHONE ENCOUNTER
ANTICOAGULATION  MANAGEMENT    Assessment     Today's INR result of 3.10 is Supratherapeutic (goal INR of 2.0-3.0)        Warfarin taken as previously instructed    No new diet changes affecting INR    No interaction expected between discontinued Sotalol and warfarin    Continues to tolerate warfarin with no reported s/s of bleeding or thromboembolism     Previous INR was Therapeutic at 2.60 on 6/20/19.    Plan:     Spoke with Mr. Nova regarding INR result and instructed:     Warfarin Dosing Instructions:  (has 2.5mg tabs)   - Continue current warfarin dose 1.25 mg daily on Monday / Thursday; and 2.5 mg daily rest of week.    Instructed patient to follow up no later than:  2 wks.   - scheduled on 8/27/19 @ [facility].    Education provided: importance of consistent vitamin K intake, target INR goal and significance of current INR result, no interaction anticipated between warfarin and Sotalol discontinued and importance of notifying clinic for changes in medications    Mr. Nova verbalizes understanding and agrees to warfarin dosing plan.    Instructed to call the [facility] for any changes, questions or concerns. (#[phone])   ?   Yamileth Flores RN    Subjective/Objective:      Trenton Nova, a 79 y.o. male is on warfarin.     Trenton reports:     Home warfarin dose: as updated on anticoagulation calendar per template     Missed doses: No     Medication changes:  Yes:  On 8/7/19 Sotalol discontinued for a while.     S/S of bleeding or thromboembolism:  No     New Injury or illness:  No     Changes in diet or alcohol consumption:  No     Upcoming surgery, procedure or cardioversion:  No    Anticoagulation Episode Summary     Current INR goal:   2.0-3.0   TTR:   82.1 % (4.5 y)   Next INR check:   8/27/2019   INR from last check:   3.10! (8/13/2019)   Weekly max warfarin dose:      Target end date:      INR check location:      Preferred lab:      Send INR reminders to:   [facility]     Atrial Fibrillation [I48.91]  Long term (current) use of anticoagulants [Z79.01]           Comments:            Anticoagulation Care Providers     Provider Role Specialty Phone number    Sarwat Mayorga MD Referring Internal Medicine [phone]

## 2021-06-01 NOTE — PROGRESS NOTES
Office Visit - Follow up    Trenton Nova   79 y.o. male    Date of Visit: 9/17/2019    Chief Complaint   Patient presents with     Follow-up     3 month follow up       Subjective: Atrial fibrillation with multiple myeloma.    Persistent fatigue especially with climbing a slight incline.  Seen by hematologist at [place] [facility] Dr. Johan Pepper recently 6 weeks ago Tejinder.  He has no chest pain leg cramps.    No blood in stool or urine medication list reviewed well-tolerated reconciled no ill effects.    Colonoscopy showed 2 tubular adenomatous colon polyps date of colonoscopy February 21, 2017 [facility] presiding.    Trouble with his wife she is suspicious that he is had an affair.    ROS: A comprehensive review of systems was performed and was otherwise negative    Medications:  Prior to Admission medications    Medication Sig Start Date End Date Taking? Authorizing Provider   calcium carbonate-vitamin D2 500 mg(1,250mg) -200 unit tablet Take 1 tablet by mouth 2 (two) times a day.   Yes PROVIDER, HISTORICAL   cyanocobalamin (VITAMIN B-12) 500 MCG tablet Take 500 mcg by mouth daily.   Yes PROVIDER, HISTORICAL   levothyroxine 50 mcg cap Take 1 tablet by mouth Daily at 6:00 am.   Yes Sarwat Mayorga MD   multivitamin therapeutic (THERAGRAN) tablet Take 1 tablet by mouth daily.   Yes PROVIDER, HISTORICAL   omeprazole (PRILOSEC) 20 MG capsule Take 20 mg by mouth daily.   Yes Sarwat Mayorga MD   simvastatin (ZOCOR) 20 MG tablet Take 10 mg by mouth at bedtime. 5 mg every other day         Yes Sarwat Mayorga MD   sotalol (BETAPACE) 80 MG tablet 40 mg daily .       6/25/15  Yes PROVIDER, HISTORICAL   terazosin (HYTRIN) 5 MG capsule Take 5 mg by mouth. 9/22/15  Yes PROVIDER, HISTORICAL   vit C/E/Zn/coppr/lutein/zeaxan (PRESERVISION AREDS-2 ORAL) Take by mouth 2 (two) times a day.   Yes PROVIDER, HISTORICAL   warfarin (COUMADIN/JANTOVEN) 2.5 MG tablet TAKE 1/2 TABLET( 1.25MG) TO 1 TABLET( 2.5 MG)  BY MOUTH ONCE DAILY AS DIRECTED, ADJUST BASED ON INR 6/20/19  Yes Sarwat Mayorga MD   tadalafil (CIALIS) 5 MG tablet Take 5 mg by mouth daily as needed for erectile dysfunction.  9/17/19 Yes PROVIDER, HISTORICAL       Allergies:   Allergies   Allergen Reactions     Sulfa (Sulfonamide Antibiotics)        Immunizations:   Immunization History   Administered Date(s) Administered     DT (pediatric) 11/15/2004     IG-IM 04/18/2000     Influenza L0h3-60, 02/09/2010     Influenza high dose,seasonal,PF, 65+ yrs 10/21/2015, 09/14/2016, 11/02/2017, 10/23/2018     Influenza, inj, historic,unspecified 09/30/2009, 10/01/2014     Influenza, seasonal,quad inj 6-35 mos 10/04/2013, 10/16/2014     Pneumo Conj 13-V (2010&after) 12/20/2016     Pneumo Polysac 23-V 11/08/2005     Td,adult,historic,unspecified 11/15/2004     Tdap 02/18/2014       Exam Chest clear to auscultation and percussion.  Heart tones regular rhythm without murmur rub or gallop.  Abdomen soft nontender no organomegaly.  No peritoneal signs.  Extremities free of edema cyanosis or clubbing.  Neck veins nondistended no thyromegaly or scleral icterus noted, carotids full.  Skin warm and dry easily conversant good spirited.  Normal intelligence.  Neurologically intact no gross localizing findings.  Pale and sallow in complexion.  102/56 pulse 82 respirations 18 O2 sats 97%.  Weight stable 177 pounds he says.    Assessment and Plan  Persistent atrial fibrillation status post permanent pacemaker multiple myeloma check hemogram plus comprehensive metabolic profile and TSH.    Sulfa allergy.    Tubular adenomatous colon polyps seen on colonoscopy February 21, 2017.    Non-smoker no excess alcohol.  History of myeloma kidney.  Return to clinic 3 months time consider hematology nephrology reconsultation.  Pending results of today's labs.    Time: total time spent with the patient was 25 minutes of which >50% was spent in counseling and coordination of care    The  following high BMI interventions were performed this visit: encouragement to exercise    Sarwat Mayorga MD    Patient Active Problem List   Diagnosis     TMJ Pain     Neck Pain     Hyperlipidemia     Benign Monoclonal Hypergammaglobulinemia     Atrial Fibrillation     Benign Prostatic Hypertrophy     Primary Osteoarthritis Of The Cervical Vertebrae     Reactions To Sulfa Drugs     Removal of staples     Multiple myeloma (H)     Colon polyp     Long term (current) use of anticoagulants     Shoulder pain

## 2021-06-01 NOTE — TELEPHONE ENCOUNTER
Name of form/paperwork: Other:  Disability form  Date form received by clinic:  Today  When is the form needed by? Non-urgent  Patient Notified form requests are processed in 3-5 business days: No  (If patient needs for sooner, please note that in this message)  Okay to leave a detailed message: Yes   [phone]    Patient stated there are some missing parts from his form that Sarwat Mayorga MD filled out today. Patient stated he's going to drop it off to the clinic today and he would like to pick it back up.

## 2021-06-01 NOTE — TELEPHONE ENCOUNTER
ANTICOAGULATION  MANAGEMENT    Assessment     Today's INR result of 2.40 is Therapeutic (goal INR of 2.0-3.0)        Warfarin taken as previously instructed    No new diet changes affecting INR    No new medication/supplements affecting INR    Continues to tolerate warfarin with no reported s/s of bleeding or thromboembolism     Previous INR was Therapeutic at 2.00 on 8/27/19.    Already leaving for [place] for the winter on 10/24/19.    Plan:     Spoke with Mr. Nova regarding INR result and instructed:     Warfarin Dosing Instructions:   (has 2.5mg tabs)   - Continue current warfarin dose 1.25 mg daily on Mon/Thurs; and 2.5 mg daily rest of week.    Instructed patient to follow up no later than:  4 wks.   - Scheduled on 10/15/19 @ [facility]    Education provided: target INR goal and significance of current INR result    Mr. Nova verbalizes understanding and agrees to warfarin dosing plan.    Instructed to call the [facility] for any changes, questions or concerns. (#[phone])   ?   Yamileth Flores RN    Subjective/Objective:      Trenton Nova, a 79 y.o. male is on warfarin.     Trenton reports:     Home warfarin dose: as updated on anticoagulation calendar per template     Missed doses: No     Medication changes:  No     S/S of bleeding or thromboembolism:  No     New Injury or illness:  No     Changes in diet or alcohol consumption:  No     Upcoming surgery, procedure or cardioversion:  No    Anticoagulation Episode Summary     Current INR goal:   2.0-3.0   TTR:   88.1 %   Next INR check:   10/15/2019   INR from last check:   2.40 (9/17/2019)   Weekly max warfarin dose:      Target end date:      INR check location:      Preferred lab:      Send INR reminders to:   [facility]    Indications    Atrial Fibrillation [I48.91]  Long term (current) use of anticoagulants [Z79.01]           Comments:            Anticoagulation Care Providers     Provider Role Specialty Phone number    Tierra  Sarwat MCKEON MD [facility] Internal Medicine [phone]

## 2021-06-02 ENCOUNTER — RECORDS - HEALTHEAST (OUTPATIENT)
Dept: ADMINISTRATIVE | Facility: CLINIC | Age: 81
End: 2021-06-02

## 2021-06-02 VITALS — HEIGHT: 67 IN | BODY MASS INDEX: 27.62 KG/M2 | WEIGHT: 176 LBS

## 2021-06-02 VITALS — BODY MASS INDEX: 28.09 KG/M2 | HEIGHT: 67 IN | WEIGHT: 179 LBS

## 2021-06-02 NOTE — TELEPHONE ENCOUNTER
Medication Request  Medication name: Sedation medication for MRI  Pharmacy Name and Location:  [facility] #[id number] - [facility] [address] AT [facility] [address] [facility] [address]  Reason for request: needed for MRI as Patient stated I am claustrophobic.   When did you use medication last?:  N/a not tried prior    Patient offered appointment:  patient declined  Okay to leave a detailed message: yes

## 2021-06-02 NOTE — TELEPHONE ENCOUNTER
Talked to Trenton who will check with his lab as we did not receive the fax yet for an inr done this am

## 2021-06-02 NOTE — TELEPHONE ENCOUNTER
ANTICOAGULATION  MANAGEMENT    Assessment     Today's INR result of 1.90 is Subtherapeutic (goal INR of 2.0-3.0)        Warfarin recently held as instructed which may be affecting INR    No new diet changes affecting INR    No new medication/supplements affecting INR    Continues to tolerate warfarin with no reported s/s of bleeding or thromboembolism     Previous INR was Subtherapeutic at 1.70 on 10/15/19.    Already in [place] for the winter since 10/24/19.    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    Plan:     Spoke with Mr. Nova regarding INR result and instructed:     Warfarin Dosing Instructions:   (evenings. has 2.5mg tabs)   - Change warfarin dose to 1.25 mg daily on Mondays; and 2.5 mg daily rest of week.   - (8.3 % change)    Instructed patient to follow up no later than:  2 wks.    Education provided: importance of consistent vitamin K intake and target INR goal and significance of current INR result    Mr. Noav verbalizes understanding and agrees to warfarin dosing plan.    Instructed to call the [facility] for any changes, questions or concerns. (#[phone])   ?   Yamileth Flores RN    Subjective/Objective:      Trenton Nova, a 79 y.o. male is on warfarin.     Trenton reports:     Home warfarin dose: as updated on anticoagulation calendar per template     Missed doses: No     Medication changes:  No     S/S of bleeding or thromboembolism:  No     New Injury or illness:  Yes.  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     Changes in diet or alcohol consumption:  No     Upcoming surgery, procedure or cardioversion:  No    Anticoagulation Episode Summary     Current INR goal:   2.0-3.0   TTR:   86.0 %   Next INR check:   11/19/2019   INR from last check:   1.90! (10/29/2019)   Weekly max warfarin dose:      Target end date:      INR check location:      Preferred lab:      Send INR reminders to:    [facility]    Indications    Atrial Fibrillation [I48.91]  Long term (current) use of anticoagulants [Z79.01]           Comments:            Anticoagulation Care Providers     Provider Role Specialty Phone number    Sarwat Mayorga MD Referring Internal Medicine [phone]

## 2021-06-02 NOTE — TELEPHONE ENCOUNTER
Okgiovanni for lorazepam 0.5 mg tablets number 3 tablets to be dispensed   : Take 1 or 2 tablets prior to MRI scan.  No refills.  Please call patient and his pharmacy.

## 2021-06-02 NOTE — TELEPHONE ENCOUNTER
Spoke with the patient and relayed message below from Dr. Mayorga.  He verbalized understanding and had no further questions at this time.    Prescription has been set up for Dr. Mayorga to review.    Eileen LONG, CLAUDIA/CMT....................10:34 AM

## 2021-06-02 NOTE — TELEPHONE ENCOUNTER
Who is calling:  patient  Reason for Call:  Patient called requesting to speak with Faviola regarding his INR result of 1.9 today. He is in [place] & wanted to make sure that his INR was received  Date of last appointment with primary care: 9/17/19  Okay to leave a detailed message: Yes

## 2021-06-02 NOTE — TELEPHONE ENCOUNTER
Received a faxed INR result for Trenton Nova  From [facility]  PHYSICIAN  [facility]  INR result dated 10/29/2019 is 1.90

## 2021-06-02 NOTE — TELEPHONE ENCOUNTER
Called [facility] ([phone])     - advised to FAX INR results tested on 10/29/19.

## 2021-06-02 NOTE — TELEPHONE ENCOUNTER
INR STANDING ORDER - [facility], [address], [place] [address]  and Fax# [phone]

## 2021-06-02 NOTE — TELEPHONE ENCOUNTER
ANTICOAGULATION  MANAGEMENT    Assessment     Today's INR result of 1.7 is Subtherapeutic (goal INR of 2.0-3.0)        Warfarin taken as previously instructed    No new diet changes affecting INR    No new medication/supplements affecting INR    Continues to tolerate warfarin with no reported s/s of bleeding or thromboembolism     Previous INR was Therapeutic x 2    Plan:     Spoke with Trenton regarding INR result and instructed:     Warfarin Dosing Instructions:  one time booster of 5mg then continue current warfarin dose 1.25 mg daily on Mon/Thurs; and 5 mg daily rest of week  (0 % change)    Instructed patient to follow up no later than: two weeks    Education provided: importance of therapeutic range and target INR goal and significance of current INR result  See note below regarding travel plans.  Trenton verbalizes understanding and agrees to warfarin dosing plan.    Instructed to call the [facility] for any changes, questions or concerns. (#[phone])   ?   Nieves Danielle RN    Subjective/Objective:      Trenton Nova, a 79 y.o. male is on warfarin.     Trenton reports:     Home warfarin dose: as updated on anticoagulation calendar per template     Missed doses: No     Medication changes:  No     S/S of bleeding or thromboembolism:  No     New Injury or illness:  No     Changes in diet or alcohol consumption:  No     Upcoming surgery, procedure or cardioversion:  No    Anticoagulation Episode Summary     Current INR goal:   2.0-3.0   TTR:   85.8 %   Next INR check:   10/29/2019   INR from last check:   1.70! (10/15/2019)   Weekly max warfarin dose:      Target end date:      INR check location:      Preferred lab:      Send INR reminders to:   [facility]    Indications    Atrial Fibrillation [I48.91]  Long term (current) use of anticoagulants [Z79.01]           Comments:            Anticoagulation Care Providers     Provider Role Specialty Phone number    Sarwat Mayorga MD  [facility] Internal Medicine [phone]

## 2021-06-03 VITALS — BODY MASS INDEX: 27.47 KG/M2 | WEIGHT: 175 LBS | HEIGHT: 67 IN

## 2021-06-03 VITALS
OXYGEN SATURATION: 97 % | DIASTOLIC BLOOD PRESSURE: 56 MMHG | HEART RATE: 82 BPM | HEIGHT: 67 IN | SYSTOLIC BLOOD PRESSURE: 102 MMHG | WEIGHT: 177 LBS | BODY MASS INDEX: 27.78 KG/M2

## 2021-06-03 NOTE — TELEPHONE ENCOUNTER
Received a faxed INR result for Trenton Nova  From [facility]  INR result dated 11/11/2019 is 2.10

## 2021-06-03 NOTE — TELEPHONE ENCOUNTER
ANTICOAGULATION  MANAGEMENT    Assessment     Today's INR result of 2.10 is Therapeutic (goal INR of 2.0-3.0)        Warfarin taken as previously instructed    No new diet changes affecting INR    No new medication/supplements affecting INR    Continues to tolerate warfarin with no reported s/s of bleeding or thromboembolism     Previous INR was Subtherapeutic at 1.90 on 11/11/19.    Will establishe now with a primary physician in [place].  They will be managing his warfarin.  (he is planning to move to [place] and spend summers in [place]).    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      Plan:     Spoke with Trenton regarding INR result and instructed:     Warfarin Dosing Instructions:   (evenings. has 2.5mg tabs)   - Continue current warfarin dose 1.25 mg daily on Mondays; and 2.5 mg daily rest of week.    Instructed patient to follow up no later than:  2 wks.    Education provided: importance of consistent vitamin K intake, target INR goal and significance of current INR result and importance of notifying clinic for changes in medications    Trenton verbalizes understanding and agrees to warfarin dosing plan.    Instructed to call the [facility] for any changes, questions or concerns. (#[phone])   ?   Yamileth Flores RN    Subjective/Objective:      Trenton Nova, a 79 y.o. male is on warfarin.     Trenton reports:     Home warfarin dose: verbally confirmed home dose with Trenton and updated on anticoagulation calendar     Missed doses: No     Medication changes:  No     S/S of bleeding or thromboembolism:  No     New Injury or illness:  No     Changes in diet or alcohol consumption:  No     Upcoming surgery, procedure or cardioversion:  No    Anticoagulation Episode Summary     Current INR goal:   2.0-3.0   TTR:   84.3 %   Next INR check:   4/30/2020   INR from last check:   2.10 (11/11/2019)   Weekly max warfarin dose:      Target end date:      INR check  location:      Preferred lab:      Send INR reminders to:   [facility]    Indications    Atrial Fibrillation [I48.91]  Long term (current) use of anticoagulants [Z79.01]           Comments:            Anticoagulation Care Providers     Provider Role Specialty Phone number    Sarwat Mayorga MD Referring Internal Medicine [phone]

## 2021-06-04 ENCOUNTER — COMMUNICATION - HEALTHEAST (OUTPATIENT)
Dept: ANTICOAGULATION | Facility: CLINIC | Age: 81
End: 2021-06-04

## 2021-06-04 ENCOUNTER — AMBULATORY - HEALTHEAST (OUTPATIENT)
Dept: LAB | Facility: CLINIC | Age: 81
End: 2021-06-04

## 2021-06-04 VITALS
WEIGHT: 168 LBS | HEIGHT: 68 IN | BODY MASS INDEX: 25.46 KG/M2 | DIASTOLIC BLOOD PRESSURE: 60 MMHG | OXYGEN SATURATION: 98 % | HEART RATE: 78 BPM | SYSTOLIC BLOOD PRESSURE: 118 MMHG

## 2021-06-04 VITALS
WEIGHT: 152 LBS | SYSTOLIC BLOOD PRESSURE: 116 MMHG | OXYGEN SATURATION: 98 % | DIASTOLIC BLOOD PRESSURE: 56 MMHG | HEART RATE: 70 BPM | BODY MASS INDEX: 23.04 KG/M2 | HEIGHT: 68 IN

## 2021-06-04 VITALS
OXYGEN SATURATION: 98 % | WEIGHT: 173 LBS | SYSTOLIC BLOOD PRESSURE: 132 MMHG | DIASTOLIC BLOOD PRESSURE: 70 MMHG | HEART RATE: 70 BPM | BODY MASS INDEX: 26.89 KG/M2

## 2021-06-04 VITALS
TEMPERATURE: 97.6 F | WEIGHT: 156 LBS | HEIGHT: 68 IN | DIASTOLIC BLOOD PRESSURE: 60 MMHG | OXYGEN SATURATION: 99 % | SYSTOLIC BLOOD PRESSURE: 110 MMHG | HEART RATE: 64 BPM | BODY MASS INDEX: 23.64 KG/M2

## 2021-06-04 VITALS
HEIGHT: 68 IN | OXYGEN SATURATION: 97 % | DIASTOLIC BLOOD PRESSURE: 60 MMHG | SYSTOLIC BLOOD PRESSURE: 110 MMHG | BODY MASS INDEX: 23.49 KG/M2 | WEIGHT: 155 LBS | HEART RATE: 60 BPM

## 2021-06-04 DIAGNOSIS — Z79.01 LONG TERM (CURRENT) USE OF ANTICOAGULANTS: ICD-10-CM

## 2021-06-04 DIAGNOSIS — I48.19 PERSISTENT ATRIAL FIBRILLATION (H): ICD-10-CM

## 2021-06-04 LAB — INR PPP: 2.4 (ref 0.9–1.1)

## 2021-06-04 NOTE — TELEPHONE ENCOUNTER
ANTICOAGULATION  MANAGEMENT    Assessment     Today's INR result of 2.00 is Therapeutic (goal INR of 2.0-3.0)        Warfarin taken as previously instructed    - reported taking one time booster with 5mg warfarin dose on 12/13, as instructed.    No new diet changes affecting INR    No new medication/supplements affecting INR    Continues to tolerate warfarin with no reported s/s of bleeding or thromboembolism     Previous INR was Subtherapeutic at 1.60 on 12/13/19.Scheduled for CHANTELLE and Cardioversion on Thurs. 12/19/19 @ [facility].   (recheck INR one wk post Cardioverson).    Plan:     Spoke with Mr. Nova regarding INR result and instructed:     Warfarin Dosing Instructions:   (has 2.5mg tabs)   - just for today, advised one time booster with 3.75mg warfarin dose,   - then continue current warfarin dose 2.5 mg daily.    Instructed patient to follow up no later than:  One wk post Cardioversion.    Education provided: importance of consistent vitamin K intake, target INR goal and significance of current INR result and importance of notifying clinic for changes in medications    Mr. Nova verbalizes understanding and agrees to warfarin dosing plan.    Instructed to call the [facility] for any changes, questions or concerns. (#[phone])   ?   Yamileth Flores RN    Subjective/Objective:      Trenton Nova, a 79 y.o. male is on warfarin.     Trenton reports:     Home warfarin dose: verbally confirmed home dose with Mr. Nova and updated on anticoagulation calendar     Missed doses: No     Medication changes:  No.  Reported taking one time booster with 5mg warfarin dose on 12/13/19, as instructed.     S/S of bleeding or thromboembolism:  No     New Injury or illness:  Yes: noted recurrent Atrial Fibrillation on device check.  Also symptomatic with shortness of breath and feeling more tired.     Changes in diet or alcohol consumption:  No    Upcoming surgery, procedure or cardioversion:  Yes: Scheduled for CHANTELLE and  Cardioversion on Thurs. 12/19/19 @ [facility].      Anticoagulation Episode Summary     Current INR goal:   2.0-3.0   TTR:   80.2 % (1 y)   Next INR check:   12/26/2019   INR from last check:   1.60! (12/13/2019)   Weekly max warfarin dose:      Target end date:      INR check location:      Preferred lab:      Send INR reminders to:   [facility]    Indications    Atrial Fibrillation [I48.91]  Long term (current) use of anticoagulants [Z79.01]           Comments:            Anticoagulation Care Providers     Provider Role Specialty Phone number    Sarwat Mayorga MD Referring Internal Medicine [phone]

## 2021-06-04 NOTE — PROGRESS NOTES
Patient's pacemaker placed in MRI Sure Scan mode by [employer] rep for MRI scan DOO 90. Patient hooked up to ECG and pulse oximetry and was monitored continuously by RN throughout duration of scan.  See VS tab.  Patient tolerated MRI with no concerns expressed or observed.  Patient placed back into previous pacer mode by [employer] Rep at completion of MRI.

## 2021-06-04 NOTE — TELEPHONE ENCOUNTER
Incoming fax from [facility]    INR date: 1/2    See attached document

## 2021-06-04 NOTE — TELEPHONE ENCOUNTER
Medication Request  Medication name: Triamcinolone acetamidine 0.1% 1 pound jar  Pharmacy Name and Location[id number]  Reason for request: Refill  When did you use medication last?:  Skin rash on arms and hips  Patient offered appointment:  n/a  Okay to leave a detailed message: yes  [phone]      Caller stated that Sarwat Mayorga MD had prescribed this medication in the past. Caller stated that patient gets this rash occasionally.      This medication is not listed on the patient's current medication list.

## 2021-06-04 NOTE — TELEPHONE ENCOUNTER
Try it thin layer twice daily for 1 month.  With 3 refills.  Please call patient and pharmacy.  Asked him to try it for 1 month and if no better than to return to clinic and see me for office visit.

## 2021-06-04 NOTE — TELEPHONE ENCOUNTER
Ok for rx as requested?    Did not see on past medication list to jimmy up a sig    If okay what are her directions?

## 2021-06-04 NOTE — TELEPHONE ENCOUNTER
ANTICOAGULATION  MANAGEMENT    Assessment     Today's INR result of 1.2 is Subtherapeutic (goal INR of 2.0-3.0)         More warfarin taken than instructed which may be affecting INR denies missed doses and says he took 5 mg on thurs    No new diet changes affecting INR    No new medication/supplements affecting INR    Continues to tolerate warfarin with no reported s/s of bleeding or thromboembolism     Previous INR was Subtherapeutic    Plan:     Spoke with Trenton regarding INR result and instructed:     Warfarin Dosing Instructions:  Change warfarin dose to 5 mg daily on *mon/thu**; and 2.5 mg daily rest of week  (12 % change)    Instructed patient to follow up no later than: one week        Trenton verbalizes understanding and agrees to warfarin dosing plan.    Instructed to call the [facility] for any changes, questions or concerns. (#[phone])   ?   Miryam Valenzuela RN    Subjective/Objective:      Trenton Nova, a 79 y.o. male is on warfarin.     Trenton reports:     Home warfarin dose: as updated on anticoagulation calendar per template     Missed doses: No     Medication changes:  No     S/S of bleeding or thromboembolism:  No     New Injury or illness:  No     Changes in diet or alcohol consumption:  No     Upcoming surgery, procedure or cardioversion:  No    Anticoagulation Episode Summary     Current INR goal:   2.0-3.0   TTR:   75.6 % (1 y)   Next INR check:   1/9/2020   INR from last check:   1.20! (1/2/2020)   Weekly max warfarin dose:      Target end date:      INR check location:      Preferred lab:      Send INR reminders to:   [facility]    Indications    Atrial Fibrillation [I48.91]  Long term (current) use of anticoagulants [Z79.01]           Comments:            Anticoagulation Care Providers     Provider Role Specialty Phone number    Sarwat Mayorga MD Referring Internal Medicine [phone]

## 2021-06-04 NOTE — TELEPHONE ENCOUNTER
RN cannot approve Refill Request    RN can NOT refill this medication Protocol failed and NO refill given. Last office visit: 9/17/2019 Sarwat Mayorga MD Last Physical: 1/23/2019 Last MTM visit: Visit date not found Last visit same specialty: 12/23/2019 Daniel Martinez MD.  Next visit within 3 mo: Visit date not found  Next physical within 3 mo: Visit date not found      Corrina Tucker, [facility] Triage/Med Refill 12/25/2019    Requested Prescriptions   Pending Prescriptions Disp Refills     warfarin ANTICOAGULANT (COUMADIN/JANTOVEN) 2.5 MG tablet [Pharmacy Med Name: WARFARIN SOD 2.5MG TABLETS (GREEN)] 90 tablet 0     Sig: TAKE 1/2 TABLET( 1.25MG) TO 1 TABLET( 2.5 MG) BY MOUTH ONCE DAILY AS DIRECTED, ADJUST BASED ON INR       Warfarin Refill Protocol  Failed - 12/23/2019  4:38 PM        Failed -  Route to appropriate pool/provider     Last Anticoagulation Summary:   Anticoagulation Episode Summary     Current INR goal:   2.0-3.0   TTR:   78.2 % (1 y)   Next INR check:   4/30/2020   INR from last check:   1.90! (12/23/2019)   Weekly max warfarin dose:      Target end date:      INR check location:      Preferred lab:      Send INR reminders to:   [facility]    Indications    Atrial Fibrillation [I48.91]  Long term (current) use of anticoagulants [Z79.01]           Comments:            Anticoagulation Care Providers     Provider Role Specialty Phone number    Sarwat Mayorga MD Referring Internal Medicine [phone]                Passed - Provider visit in last year     Last office visit with prescriber/PCP: 9/17/2019 Sarwat Mayorga MD OR same dept: 12/23/2019 Daniel Martinez MD OR same specialty: 12/23/2019 Daniel Martinez MD  Last physical: 1/23/2019 Last MTM visit: Visit date not found    Next appt within 3 mo: Visit date not found Next physical within 3 mo: Visit date not found  Prescriber OR PCP: Sarwat Mayorga MD  Last diagnosis associated with med order: 1.  Atrial fibrillation (H)  - warfarin ANTICOAGULANT (COUMADIN/JANTOVEN) 2.5 MG tablet [Pharmacy Med Name: WARFARIN SOD 2.5MG TABLETS (GREEN)]; TAKE 1/2 TABLET( 1.25MG) TO 1 TABLET( 2.5 MG) BY MOUTH ONCE DAILY AS DIRECTED, ADJUST BASED ON INR  Dispense: 90 tablet; Refill: 0    2. Long term current use of anticoagulant therapy  - warfarin ANTICOAGULANT (COUMADIN/JANTOVEN) 2.5 MG tablet [Pharmacy Med Name: WARFARIN SOD 2.5MG TABLETS (GREEN)]; TAKE 1/2 TABLET( 1.25MG) TO 1 TABLET( 2.5 MG) BY MOUTH ONCE DAILY AS DIRECTED, ADJUST BASED ON INR  Dispense: 90 tablet; Refill: 0    If protocol passes may refill for 6 months if within 3 months of last provider visit (or a total of 9 months).

## 2021-06-04 NOTE — TELEPHONE ENCOUNTER
ANTICOAGULATION  MANAGEMENT    Assessment     Today's INR result of 1.90 is Subtherapeutic (goal INR of 2.0-3.0)        Warfarin taken as previously instructed    No new diet changes affecting INR    No new medication/supplements affecting INR    Continues to tolerate warfarin with no reported s/s of bleeding or thromboembolism     Previous INR was Therapeutic at 2.00 on 12/16/19.    S/p CHANTELLE / Cardioversion on 12/19/19.  Reported feeling so much better.    Will be leaving for [place] on 12/30/19 and he has a cardiologist that will manage his INR's / warfarin doses.    Plan:     Spoke with Trenton regarding INR result and instructed:    1.  INR trending lower.  Will increase wkly warfarin dose, to ensure INR stability.    Warfarin Dosing Instructions:   - today, advised one time booster with 3.75mg warfarin dose   - Change warfarin dose to 3.75 mg daily on Thursdays; and 2.5 mg daily rest of week.   - (7.1 % change)    Instructed patient to follow up no later than:  7-10 days.    Education provided: target INR goal and significance of current INR result and importance of notifying clinic for changes in medications    Trenton verbalizes understanding and agrees to warfarin dosing plan.    Instructed to call the [facility] for any changes, questions or concerns. (#[phone])   ?   Yamileth Flores RN    Subjective/Objective:      Trenton Nova, a 79 y.o. male is on warfarin.     Trenton reports:     Home warfarin dose: as updated on anticoagulation calendar per template     Missed doses: No     Medication changes:  No.  Reported he will be starting Probiotic.     S/S of bleeding or thromboembolism:  No     New Injury or illness:  No.S/p CHANTELLE / Cardioversion on 12/19/19.        Changes in diet or alcohol consumption:  No     Upcoming surgery, procedure or cardioversion:  No.    Anticoagulation Episode Summary     Current INR goal:   2.0-3.0   TTR:   78.3 % (1 y)   Next INR check:   4/30/2020   INR from last check:   1.90!  (12/23/2019)   Weekly max warfarin dose:      Target end date:      INR check location:      Preferred lab:      Send INR reminders to:   [facility]    Indications    Atrial Fibrillation [I48.91]  Long term (current) use of anticoagulants [Z79.01]           Comments:            Anticoagulation Care Providers     Provider Role Specialty Phone number    Sarwat Mayorga MD Referring Internal Medicine [phone]

## 2021-06-04 NOTE — PROGRESS NOTES
[facility] Follow Up Note    Trenton Nova   79 y.o. male    Date of Visit: 12/23/2019    Chief Complaint   Patient presents with     Follow-up     ER     Subjective  This is a 79-year-old man who is a patient of Dr. Sarwat Carrera.  He has a longstanding history of atrial fibrillation.  He has been on Coumadin.  Last Thursday he apparently underwent cardioversion by the cardiologist at [facility].  He was advised to be seen here today for follow-up.  Since the cardioversion he is feeling better.  He thinks he has a little more energy.  No chest pains and no shortness of breath.  No changes in medication.  No other new complaints.    ROS A comprehensive review of systems was performed and was otherwise negative    Medications, allergies, and problem list were reviewed and updated    Exam  General Appearance:   On examination his blood pressure is 132/70.  Weight is 173 pounds.    Heart rhythm is stable today with a rate of 70 and no ectopy.    Lungs are clear.    No peripheral edema.    The patient is alert and oriented x3.      Assessment/Plan  1. Atrial fibrillation, unspecified type (H)     2. Rash  triamcinolone (KENALOG) 0.1 % ointment     Atrial fibrillation with cardioversion last Thursday.  He now appears to be in a sinus rhythm and is feeling better.  No additional recommendations today.  He is due for 9.  He has a follow-up appointment with Dr. Sarwat Mayorga in February.  He will keep that appointment.  He will let us know if there are any problems in between.  Body Mass Index was not assessed due to Patient was in for hospital follow-up..    Daniel Martinez MD      Current Outpatient Medications on File Prior to Visit   Medication Sig     calcium carbonate-vitamin D2 500 mg(1,250mg) -200 unit tablet Take 1 tablet by mouth daily.      cyanocobalamin (VITAMIN B-12) 500 MCG tablet Take 500 mcg by mouth daily.     levothyroxine 50 mcg cap Take 1 tablet by mouth Daily at  6:00 am.     multivitamin therapeutic (THERAGRAN) tablet Take 1 tablet by mouth daily.     omeprazole (PRILOSEC) 20 MG capsule Take 20 mg by mouth daily.     simvastatin (ZOCOR) 20 MG tablet Take 10 mg by mouth at bedtime. 5 mg every other day           terazosin (HYTRIN) 5 MG capsule Take 5 mg by mouth.     vit C/E/Zn/coppr/lutein/zeaxan (PRESERVISION AREDS-2 ORAL) Take by mouth 2 (two) times a day.     warfarin (COUMADIN/JANTOVEN) 2.5 MG tablet TAKE 1/2 TABLET( 1.25MG) TO 1 TABLET( 2.5 MG) BY MOUTH ONCE DAILY AS DIRECTED, ADJUST BASED ON INR     [DISCONTINUED] triamcinolone (KENALOG) 0.1 % ointment Apply thin layer to affected areas (arms & hips) twice daily X 1 month.     [DISCONTINUED] LORazepam (ATIVAN) 0.5 MG tablet Take 1 or 2 tablets prior to MRI scan     [DISCONTINUED] sotalol (BETAPACE) 80 MG tablet 40 mg daily .           No current facility-administered medications on file prior to visit.      Allergies   Allergen Reactions     Sulfa (Sulfonamide Antibiotics)      Social History     Tobacco Use     Smoking status: Former Smoker     Smokeless tobacco: Never Used     Tobacco comment: quit 8/8/1988   Substance Use Topics     Alcohol use: No     Drug use: No

## 2021-06-04 NOTE — TELEPHONE ENCOUNTER
"Who is calling:  Patient  Reason for Call:  Patient stated that he wants to speak to Sarwat Mayorga MD's assistant. Patient declined to give any information and stated \"it's personal\".  Date of last appointment with primary care: 9/17/19  Okay to leave a detailed message: Yes  [phone]    " ----- Message from Le Salmeron MD sent at 7/10/2018 11:10 AM CDT -----  yes  ----- Message -----  From: DOREEN Ulloa, RN  Sent: 7/10/2018   8:00 AM  To: Le Salmeron MD    Came to ER for epistaxis--are we still ok with keeping her on Adempas and Opsumit?

## 2021-06-04 NOTE — TELEPHONE ENCOUNTER
Routed to the anticoagulation pool    Wendy Rivas, RN     [facility] Medication Refill and Triage Nurse  3:10 PM  12/27/2019

## 2021-06-04 NOTE — TELEPHONE ENCOUNTER
Trenton had a cardioversion yesterday and has follow up with Dr. Martinez on MOnday Karen Dunn, CMA

## 2021-06-04 NOTE — TELEPHONE ENCOUNTER
Medication Question or Clarification  Who is calling: Pharmacy: incoming fax from [facility] [place]  What medication are you calling about? (include dose and sig)     triamcinolone (KENALOG) 0.1 % ointment 80 g 3 12/23/2019     Sig: Apply thin layer to affected areas (arms & hips) twice daily X 1 month.      Who prescribed the medication?: Sarwat Mayorga MD  What is your question/concern?: Per pharmacy patient requesting cream not ointment. Please advise.  Pharmacy: [facility]  Okay to leave a detailed message?: Yes  Site CMT - Please call the pharmacy to obtain any additional needed information.

## 2021-06-04 NOTE — TELEPHONE ENCOUNTER
----- Message from Shaista Booth sent at 12/13/2019  3:59 PM CST -----  Regarding: INR    Ileana from [facility] was transferred via vocera to the clinic to leave a message for Dr. Mayorga. I took the call as no staff were available to answer. This patient was seen by them today and they need to do a cardioversion on 12/19/19. Ileana stated that they will do a CHANTELLE prior, but are concerned about the patient's recent INRs being sub-therapeutic. They did a re-test today and will fax the results to [phone]. Anything we can do to his medications to get him above a 2 would be great.     Shaista Booth  [facility] Clinic Manager

## 2021-06-04 NOTE — TELEPHONE ENCOUNTER
Patient Returning Call  Reason for call:  General  Information relayed to patient:  Patient wants Charito know he is available.  Patient has additional questions:  Yes  If YES, what are your questions/concerns:    Okay to leave a detailed message?: Yes

## 2021-06-04 NOTE — TELEPHONE ENCOUNTER
Received a faxed INR result for Trenton Nova     From [facility] - from [place], [place]    INR result dated 12/3/2019 is 1.80

## 2021-06-04 NOTE — TELEPHONE ENCOUNTER
Called and left message with Mr. Nova     - will be scheduled for cardioversion on Thurs. 12/19/19     - would like INR higher between 2.0 - 3.0     - tonight advised to take 5mg warfarin dose, then continue with 2.5mg daily     - advised to check INR on Mon. 12/16/19.     - advised to call [facility] on Mon. 12/16/19 also.

## 2021-06-04 NOTE — TELEPHONE ENCOUNTER
ANTICOAGULATION  MANAGEMENT    Assessment     Today's INR result of 1.80 is Subtherapeutic (goal INR of 2.0-3.0)    INR from 12/3/19,  [facility], [place].       Warfarin taken as previously instructed    No new diet changes affecting INR    No new medication/supplements affecting INR    Continues to tolerate warfarin with no reported s/s of bleeding or thromboembolism     Previous INR was Therapeutic at 2.10 on 11/11/19.    NOTE:  Will establish now with a primary physician in [place].  They will be managing his warfarin.  (he is planning to move to [place] and spend summers in [place]).    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    Plan:     Spoke with Mr. Nova regarding INR result and instructed:    1.  Rationale:  Increased wkly warfarin dose, due to last 3 out of 4 INR's have been sub-therapeutic.    Warfarin Dosing Instructions:    - Change warfarin dose to 2.5 mg daily.   - (7.7 % change)    Instructed patient to follow up no later than:  2 wks.  He already scheduled on 12/17/19.    Education provided: importance of consistent vitamin K intake, target INR goal and significance of current INR result and importance of notifying clinic for changes in medications    Mr. Nova verbalizes understanding and agrees to warfarin dosing plan.    Instructed to call the [facility] for any changes, questions or concerns. (#[phone])   ?   Yamileth Flores RN    Subjective/Objective:      Trenton Nova, a 79 y.o. male is on warfarin.     Trenton reports:     Home warfarin dose: verbally confirmed home dose with Mr. Nova and updated on anticoagulation calendar     Missed doses: No     Medication changes:  No     S/S of bleeding or thromboembolism:  No     New Injury or illness:  No     Changes in diet or alcohol consumption:  No     Upcoming surgery, procedure or cardioversion:  No    Anticoagulation Episode Summary     Current INR goal:   2.0-3.0   TTR:    82.7 % (1 y)   Next INR check:   12/18/2019   INR from last check:   1.80! (12/3/2019)   Weekly max warfarin dose:      Target end date:      INR check location:      Preferred lab:      Send INR reminders to:   [facility] [place]    Indications    Atrial Fibrillation [I48.91]  Long term (current) use of anticoagulants [Z79.01]           Comments:            Anticoagulation Care Providers     Provider Role Specialty Phone number    Sarwat Mayorga MD Referring Internal Medicine [phone]

## 2021-06-04 NOTE — TELEPHONE ENCOUNTER
Left message to call back for: Sarahi  Information to relay to patient:  Advised rx is being sent in-left detailed instructions on use and follow-up. Call if any Q's

## 2021-06-04 NOTE — TELEPHONE ENCOUNTER
ANTICOAGULATION  MANAGEMENT- Travel planning    Trenton Nova reports upcoming travel plans:    Departure date: 12/30/19  Anticipated return date: spring April 2020  Phone number patient can be reached at: [phone]      INR monitoring plan:     [facility] to monitor and dose while traveling: No   - is establisched with cardiologist in [place] who will manage his INR / warfarin.     INR standing order faxed/mailed: No     If no, provider name and phone number to manage warfarin: N/A.          Instructed to call the [facility] for any changes, questions or concerns. (#[phone])   ?   Yamileth lFores RN

## 2021-06-05 VITALS
OXYGEN SATURATION: 99 % | HEIGHT: 67 IN | HEART RATE: 64 BPM | BODY MASS INDEX: 23.2 KG/M2 | RESPIRATION RATE: 18 BRPM | DIASTOLIC BLOOD PRESSURE: 54 MMHG | WEIGHT: 147.8 LBS | SYSTOLIC BLOOD PRESSURE: 100 MMHG | TEMPERATURE: 97.3 F

## 2021-06-05 VITALS
BODY MASS INDEX: 22.88 KG/M2 | OXYGEN SATURATION: 98 % | DIASTOLIC BLOOD PRESSURE: 74 MMHG | WEIGHT: 151 LBS | HEART RATE: 64 BPM | TEMPERATURE: 97.4 F | HEIGHT: 68 IN | SYSTOLIC BLOOD PRESSURE: 130 MMHG

## 2021-06-05 VITALS
BODY MASS INDEX: 22.43 KG/M2 | OXYGEN SATURATION: 96 % | WEIGHT: 148 LBS | DIASTOLIC BLOOD PRESSURE: 68 MMHG | HEART RATE: 78 BPM | HEIGHT: 68 IN | SYSTOLIC BLOOD PRESSURE: 122 MMHG

## 2021-06-05 VITALS
DIASTOLIC BLOOD PRESSURE: 68 MMHG | BODY MASS INDEX: 22.44 KG/M2 | HEIGHT: 67 IN | OXYGEN SATURATION: 97 % | TEMPERATURE: 97.2 F | SYSTOLIC BLOOD PRESSURE: 118 MMHG | WEIGHT: 143 LBS | HEART RATE: 73 BPM

## 2021-06-05 VITALS
DIASTOLIC BLOOD PRESSURE: 68 MMHG | WEIGHT: 154 LBS | BODY MASS INDEX: 23.34 KG/M2 | HEIGHT: 68 IN | SYSTOLIC BLOOD PRESSURE: 128 MMHG | OXYGEN SATURATION: 98 % | TEMPERATURE: 97 F | HEART RATE: 66 BPM

## 2021-06-05 VITALS
TEMPERATURE: 97.2 F | OXYGEN SATURATION: 97 % | SYSTOLIC BLOOD PRESSURE: 140 MMHG | HEART RATE: 65 BPM | BODY MASS INDEX: 23.07 KG/M2 | WEIGHT: 147 LBS | HEIGHT: 67 IN | DIASTOLIC BLOOD PRESSURE: 72 MMHG

## 2021-06-05 VITALS
SYSTOLIC BLOOD PRESSURE: 110 MMHG | DIASTOLIC BLOOD PRESSURE: 58 MMHG | HEIGHT: 67 IN | BODY MASS INDEX: 21.82 KG/M2 | HEART RATE: 90 BPM | OXYGEN SATURATION: 95 % | TEMPERATURE: 96.8 F | WEIGHT: 139 LBS

## 2021-06-05 VITALS
WEIGHT: 151 LBS | SYSTOLIC BLOOD PRESSURE: 118 MMHG | HEART RATE: 59 BPM | HEIGHT: 68 IN | DIASTOLIC BLOOD PRESSURE: 60 MMHG | BODY MASS INDEX: 22.88 KG/M2 | TEMPERATURE: 97.3 F | OXYGEN SATURATION: 98 %

## 2021-06-05 NOTE — TELEPHONE ENCOUNTER
Who is calling:  Patient   Reason for Call:  Caller stated that he will be needing a PRE-OP done before his procedure that he is having in February. Caller stated that since he has an appointment for a physical already can it also be a pre-op as well?    Date of last appointment with primary care: n/a  Okay to leave a detailed message: Yes

## 2021-06-05 NOTE — TELEPHONE ENCOUNTER
Who is calling:  Patient   Reason for Call:  Had procedure on Tuesday February 4th and surgeon has advised to hold the Coumadin/Warfarin until the 6th and start on the 7th.  patient currently has an INR set up for 02/10/2020 and would like to know if he should keep that, or when he should have the INR rechecked  Date of last appointment with primary care: n/a  Okay to leave a detailed message: Yes

## 2021-06-05 NOTE — TELEPHONE ENCOUNTER
- had ERCP on 2/4/2020   - s/p biliary sphincterotomy was performed on 2/4/20.    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     - reported biopsy showed cancer.  He is awaiting for surgeon to call him back with the next step.     - Trenton was advised to avoid NSAIDS, Aspirin, and warfarin for 72 hours.  Will restart warfarin 2/7.

## 2021-06-05 NOTE — TELEPHONE ENCOUNTER
Anticoagulation    Chart reviewed. Warfarin held 1/31-2/4 for ERCP.  During ERCP found to have: A large infiltrative mass  measuring twenty-five mm in diameter was found at the major papilla. Biopsies taken. Sphincterotomy performed. Metal stent common bile duct. Discharge instructions were to hold anticoagulation for 72 hours.    2nd procedure scheduled for 2/13/20  For polyp removal in common bile duct. Requires additional time off warfarin; 4 day hold requested    Warfarin indication: Afib  S/p cardioversion 12/19/19    [id number] = 2-3    Additional hx: multiple Myeloma    Plan:    Spoke to Dr. Mayorga-feels that patient is likely fine to continue to hold, but would like cardiology input due to prolonged duration/multiple Myeloma hx.  _____    1:10 PM -Call placed to [facility]-Dr. Johnson. Left message for his nurse Erasmo with request for input.     Mirta Peacock, PharmD

## 2021-06-05 NOTE — TELEPHONE ENCOUNTER
ANTICOAGULATION  MANAGEMENT    Assessment     Today's INR result of 2.60 is Therapeutic (goal INR of 2.0-3.0)        Warfarin taken as previously instructed    No new diet changes affecting INR    No new medication/supplements affecting INR    Continues to tolerate warfarin with no reported s/s of bleeding or thromboembolism     Previous INR was Therapeutic at 3.00 on 1/13/2020.    Scheduled surgery on 2/4/2020 @ [facility] for (ERCP) Endoscopic Retrograde Cholangiopancreatography r/t Bile duct obstruction.    Will send to Mirta Peacock, PharmD, and evaluate warfarin HOLD / possible bridging.    Plan:     Spoke with Mr. Nova regarding INR result and instructed:     Warfarin Dosing Instructions:  (has 2.5mg tabs)   - Continue current warfarin dose 5 mg daily on Mon/Thurs; and 2.5 mg daily rest of week.    Instructed patient to follow up no later than:  Check INR after resuming warfarin.    Education provided: importance of consistent vitamin K intake, target INR goal and significance of current INR result and importance of notifying clinic of upcoming surgeries and procedures 2 weeks in advance    Mr. Nova verbalizes understanding and agrees to warfarin dosing plan.    Instructed to call the [facility] for any changes, questions or concerns. (#[phone])   ?   Yamileth Flores RN    Subjective/Objective:      Trenton Nova, a 79 y.o. male is on warfarin.     Trenton reports:     Home warfarin dose: as updated on anticoagulation calendar per template     Missed doses: No     Medication changes:  No     S/S of bleeding or thromboembolism:  No     New Injury or illness:  No     Changes in diet or alcohol consumption:  No     Upcoming surgery, procedure or cardioversion:  Yes:  Scheduled for ERCP on 2/4/2020 @ [facility]    Anticoagulation Episode Summary     Current INR goal:   2.0-3.0   TTR:   74.3 % (1 y)   Next INR check:   2/11/2020   INR from last check:   2.60 (1/28/2020)   Weekly max warfarin dose:       Target end date:      INR check location:      Preferred lab:      Send INR reminders to:   [facility]    Indications    Atrial Fibrillation [I48.91]  Long term (current) use of anticoagulants [Z79.01]           Comments:            Anticoagulation Care Providers     Provider Role Specialty Phone number    Sarwat Mayorga MD Referring Internal Medicine [phone]

## 2021-06-05 NOTE — TELEPHONE ENCOUNTER
Anticoagulation    Received return call from THEODORE Zimmerman with [facility] clinic. She reviewed Trenton's situation with need for 2nd procedure, prolonged hold with covering provider Nurse Practitioner Dayanna whom advised:      Okay to continue holding warfarin for 2nd procedure (no clinical benefit to restarting for 2 days)    No bridge    Patient to call clinic right away if any recurrence of Afib (including night or weekend)    Restart warfarin as soon as possible after procedure    ___________    Spoke to Trenton and relayed recommendations above. Discussed importance of contacting clinic for any s/sx of afib recurrence as they requested above.  Also discussed risk of stroke while of warfarin and s/sx to monitor and importance of immediately seeking treatment in ED if needed.    Trenton verbalized understanding of plan.    Mirta Peacock, PharmD

## 2021-06-05 NOTE — TELEPHONE ENCOUNTER
Left message to call back for: Trenton  Information to relay to patient:  Okay for preop and physical together. Pt is to call back with what surgery he is having, when and where? Please add to appt notes. Thanks.

## 2021-06-05 NOTE — TELEPHONE ENCOUNTER
Who is calling: Patient   Reason for Call:  Returning call  - has question - See below  Date of last appointment with primary care:   Okay to leave a detailed message: Yes    Patient has questions regarding what he should taking for Warfarin.    Please call and advise.

## 2021-06-05 NOTE — TELEPHONE ENCOUNTER
Who is calling:  Ileana [facility]  Reason for Call:  Dr. Johnson says it is ok to do a 4 day hold no bridging although Dr. Johnson wants the patient to start Warfarin again the evening before the procedure.    Ileana has called [facility] as well with this information  Date of last appointment with primary care: n/a  Okay to leave a detailed message: Yes- only call back if further questions or concerns

## 2021-06-05 NOTE — TELEPHONE ENCOUNTER
Who is calling:  Patient   Reason for Call:  Patient will have another procedure on the 13th and is supposed to hold Coumadin/Warfarin for 4 days prior to that.   Patient would like to let [facility] know, and then also determine when he should have next INR  Date of last appointment with primary care: n/a  Okay to leave a detailed message: Yes

## 2021-06-05 NOTE — TELEPHONE ENCOUNTER
Called and spoke with Trenton.     - s/p ERCP on 2/4/2020.    - diagnosed to have neoplasm of common bile duct.     - (reported did not know name of procedure) - scheduled procedure - Trenton stated, they will put tube down his throat for removal of polpy in common bile duct, on 2/13/2020.    - he was advised to hold warfarin 4 days prior.     - Trenton previously HELD warfarin 7 days for ERCP on 2/4/20 without bridging.     - please advise if same instructioins with no briding when he holds warfarin 4 days prior?

## 2021-06-05 NOTE — TELEPHONE ENCOUNTER
ANTICOAGULATION  MANAGEMENT     Assessment      Today's INR result of 3.00 is Therapeutic (goal INR of 2.0-3.0)                 - INR result from [facility] in [place], [place].       Warfarin taken as previously instructed    No new diet changes affecting INR              - reported eating good  Now.    No new medication/supplements affecting INR    Continues to tolerate warfarin with no reported s/s of bleeding or thromboembolism     Previous INR was sub-therapeutic at 1.20 on 1/2/2020.    Reported being sick with the Flu for 2 days when he arrived in [place], he was not able to eat and had lost 10 lbs in the 2 days he was sick.     Plan:      Spoke with Jonatan regarding INR result and instructed:      Warfarin Dosing Instructions:  (has 2.5mg tabs)              - Continue current warfarin dose 5 mg daily on Mon/Thurs; and 2.5 mg daily rest of week.     Instructed patient to follow up no later than:  1-2 wks.              - scheduled on 1/28/20 during f/u visit with Dr. Mayorga/     Education provided: importance of consistent vitamin K intake and target INR goal and significance of current INR result     Trenton verbalizes understanding and agrees to warfarin dosing plan.     Instructed to call the [facility] for any changes, questions or concerns. (#[phone])   ?   Yamileth Flores RN     Subjective/Objective:      Trenton LINDA Nova, a 79 y.o. male is on warfarin.      Trenton reports:      Home warfarin dose: verbally confirmed home dose with Trenton and updated on anticoagulation calendar     Missed doses: No     Medication changes:  No     S/S of bleeding or thromboembolism:  No     New Injury or illness:  No              - s/p CHANTELLE cardioversion on 12/19/19 r/t atrial fibrillation noted during device check.     Changes in diet or alcohol consumption:  No     Upcoming surgery, procedure or cardioversion:  No               Anticoagulation Episode Summary      Current INR goal:   2.0-3.0   TTR:    74.9 % (11.8 mo)   Next INR check:   1/9/2020   INR from last check:   1.20! (1/2/2020)   Weekly max warfarin dose:       Target end date:       INR check location:       Preferred lab:       Send INR reminders to:   [facility] [place]    Indications    Atrial Fibrillation [I48.91]  Long term (current) use of anticoagulants [Z79.01]                       Comments:                              Anticoagulation Care Providers      Provider Role Specialty Phone number     Sarwat Mayorga MD Referring Internal Medicine [phone]              Telephone for INR Check   1/13/2020   Yamileth Flores, RN - [facility] Anticoagulation Clinic Visit Summary   Diagnoses     None   Orders Signed This Visit     None   Orders Pended This Visit     None

## 2021-06-05 NOTE — TELEPHONE ENCOUNTER
Anticoagulation clinic    Warfarin hold plan for ERCP on 2/4/20    Called placed to [facility] cardiology team to confirm okay to hold due to recent Cardioversion in December.  Spoke to THEODROE Tejeda and confirmed recommendation from Dr. Johnson: To hold warfarin 5 days before procedure and resume 1 day prior to procedure  (= 4 day hold) Rationale for restart day prior to procedure is based on warfarin's delayed onset of action and to speed return to therapeutic post procedure. No bridging advised.    ______    Then received a return call from [facility]. THEODORE Liu from [facility] reports that Dr. Jorgito Agrawal overrode the hold orders and they have instructed patient to hold 4 days prior to procedure and not to restart prior to procedure.  She is unsure if Dr. Agrawal will call [facility].  _______    Writer called [facility] and Updated THEODORE Tejeda that orders were overriden at [facility] and she will update Dr. Johnson.  _______    Spoke to Trenton and confirmed he received Dr. Agrawal, [facility], hold orders (hold 1/31-2/3).     Instructed when cleared by GI to resume warfarin (likely 2/4 PM), recommended booster dose 5 mg x 1 then resume 5 mg Mon, Thur and 2.5 mg daily rest of week. INR check ~ 1 week post procedure to ensure INR returning to goal. Scheduled for 2/10.    Mirta Peacock, PharmD  Anticoagulation clinic

## 2021-06-05 NOTE — TELEPHONE ENCOUNTER
Mirta Peacock, PharmD    Please review and evaluate re:  Warfarin hold and possible bridging prior to upcoming scheduled surgery / procedure for ERCP on 2/4/2020 @ [facility]:   Jorgito Agrawal MD     [address] [facility] [place] [id number] [phone] (Fax)        Per Dr. Mayorga, pre-op notes 1/28/20:   History of atrial fibrillation and history of radiofrequency ablation for same currently in a regular rhythm followed by Dr. RENAE at [facility] Department of cardiac electrophysiology.     On chronic warfarin therapy.  Discussed warfarin dosing prior to ERCP the patient will contact anticoagulation nurse for possible bridging and also [facility] if warfarin should be discontinued. Her to ERCP February 4, 2020.  - INR - 2.60

## 2021-06-05 NOTE — TELEPHONE ENCOUNTER
Spoke to [facility] and confirmed 4-day hold requested for ERCP.   ---------------------    Due to recent cardioversion 12/19/2019, called patient's cardiology team at [facility] to confirm if patient was allowed hold warfarin with no bridge. RN states typically no interruption of warfarin for 6 weeks post procedure, since patient is close to this date, she will reach out to his care team for advice. Awaiting call back.       Karen Darden, PharmD

## 2021-06-05 NOTE — PROGRESS NOTES
Annual wellness visit.  Preoperative examination.  Medically acceptable risk for anticipated ERCP examination at [facility] February 4, 2020.      Assessment and Plan:   Annual wellness visit    1. Routine general medical examination at a health care facility  Annual wellness visit and preoperative examination anticipation of ERCP February 4, 2020.  - HM2(CBC w/o Differential)  - Comprehensive Metabolic Panel  - Lipid Cascade  - Thyroid Stimulating Hormone (TSH)  - Urinalysis-UC if Indicated    2. Persistent atrial fibrillation  History of atrial fibrillation and history of radiofrequency ablation for same currently in a regular rhythm followed by Dr. RENAE at [facility] Department of cardiac electrophysiology.  On chronic warfarin therapy.  Discussed warfarin dosing prior to ERCP the patient will contact anticoagulation nurse for possible bridging and also [facility] if warfarin should be discontinued.  Her to ERCP February 4, 2020.  - INR     The patient's current medical problems were reviewed.    I have had an Advance Directives discussion with the patient.  The following health maintenance schedule was reviewed with the patient and provided in printed form in the after visit summary:   Health Maintenance   Topic Date Due     MEDICARE ANNUAL WELLNESS VISIT  01/23/2020     FALL RISK ASSESSMENT  12/23/2020     LIPID  01/23/2024     ADVANCE CARE PLANNING  01/23/2024     TD 18+ HE  02/18/2024     PNEUMOCOCCAL IMMUNIZATION 65+ LOW/MEDIUM RISK  Completed     INFLUENZA VACCINE RULE BASED  Completed     ZOSTER VACCINES  Completed        Subjective:   Chief Complaint: Trenton Nova is an 79 y.o. male here for an Annual Wellness visit.   HPI: Annual wellness visit and preoperative evaluation in anticipation of bile duct evaluation by ERCP for cholestasis for this 79-year-old male whose energy level is low.  If he climbs a flight of stairs he has to sit down before he can proceed on to take a shower.  He is  weaker each day he has lost 10 pounds in weight there is evidence on liver function testing for cholestasis with elevated alkaline phosphatase and AST and ALT.  The patient has leg cramps at night he is a non-smoker he does not use alcohol he is allergic to sulfa.  More fatigue of late    Prior history colonoscopy 2017 showed 2 tubular adenomatous colon polyps.    Genitourinary examination and rectal exams with PSA to be done by Dr. LEVINE at [facility] in 1 to 2 weeks.    History of atrial fibrillation and prior radiofrequency ablation for same followed by Dr. RENAE at [facility] on chronic warfarin therapy.  INR check is pending.    Prior successful cervical disc surgery and history of appendectomy and hernia repair also prior history of arthroscopic shoulder and knee surgeries.    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 Adalberto wren.  Also followed by [facility].  Multiple myeloma.  No history of chemotherapy for same.    Erectile dysfunction and history of hyperlipidemia.    Mother  88 old age.    Father was estranged from family death unknown.    2 children well patient has been  twice  once his first wife ultimately  of a brain tumor age 66-second wife is living and well and a survivor of malignant melanoma in anal with recurrence resected now no sign of recurrence.  Children well.    Review of Systems:    Please see above.  The rest of the review of systems are negative for all systems.    Patient Care Team:  Sarwat Mayorga MD as PCP - General  Nicky Shaver MD (Dermatology)  Johan Dasilva MD as Physician (Hematology and Oncology)  Sarwat Mayorga MD as Assigned PCP     Patient Active Problem List   Diagnosis     TMJ Pain     Neck Pain     Hyperlipidemia     Benign Monoclonal Hypergammaglobulinemia     Atrial Fibrillation     Benign Prostatic Hypertrophy     Primary  Osteoarthritis Of The Cervical Vertebrae     Reactions To Sulfa Drugs     Removal of staples     Multiple myeloma (H)     Colon polyp     Long term (current) use of anticoagulants     Shoulder pain     History reviewed. No pertinent past medical history.   Past Surgical History:   Procedure Laterality Date     KNEE SURGERY       SHOULDER SURGERY        History reviewed. No pertinent family history.   Social History     Socioeconomic History     Marital status:      Spouse name: Not on file     Number of children: Not on file     Years of education: Not on file     Highest education level: Not on file   Occupational History     Not on file   Social Needs     Financial resource strain: Not on file     Food insecurity:     Worry: Not on file     Inability: Not on file     Transportation needs:     Medical: Not on file     Non-medical: Not on file   Tobacco Use     Smoking status: Former Smoker     Smokeless tobacco: Never Used     Tobacco comment: quit 8/8/1988   Substance and Sexual Activity     Alcohol use: No     Drug use: No     Sexual activity: Not on file   Lifestyle     Physical activity:     Days per week: Not on file     Minutes per session: Not on file     Stress: Not on file   Relationships     Social connections:     Talks on phone: Not on file     Gets together: Not on file     Attends Yarsani service: Not on file     Active member of club or organization: Not on file     Attends meetings of clubs or organizations: Not on file     Relationship status: Not on file     Intimate partner violence:     Fear of current or ex partner: Not on file     Emotionally abused: Not on file     Physically abused: Not on file     Forced sexual activity: Not on file   Other Topics Concern     Not on file   Social History Narrative     Not on file      Current Outpatient Medications   Medication Sig Dispense Refill     calcium carbonate-vitamin D2 500 mg(1,250mg) -200 unit tablet Take 1 tablet by mouth daily.         "cyanocobalamin (VITAMIN B-12) 500 MCG tablet Take 500 mcg by mouth daily.       levothyroxine 50 mcg cap Take 1 tablet by mouth Daily at 6:00 am.       multivitamin therapeutic (THERAGRAN) tablet Take 1 tablet by mouth daily.       omeprazole (PRILOSEC) 20 MG capsule Take 20 mg by mouth daily.       simvastatin (ZOCOR) 20 MG tablet Take 10 mg by mouth at bedtime. 1/4 tablet daily       terazosin (HYTRIN) 5 MG capsule Take 5 mg by mouth.       triamcinolone (KENALOG) 0.1 % cream Apply thin layer to affected areas (arms & hips) twice daily X 1 month 60 g 3     vit C/E/Zn/coppr/lutein/zeaxan (PRESERVISION AREDS-2 ORAL) Take by mouth 2 (two) times a day.       warfarin ANTICOAGULANT (COUMADIN/JANTOVEN) 2.5 MG tablet Take 1&1/2 tabs (3.75mg) on Thursdays and take 1 tab (2.5mg) all other days by mouth daily, as directed.  Adjust dose based on INR results. 115 tablet 1     No current facility-administered medications for this visit.       Objective:   Vital Signs:   Visit Vitals  /60 (Patient Site: Right Arm, Patient Position: Sitting)   Pulse 78   Ht 5' 7.5\" (1.715 m)   Wt 168 lb (76.2 kg)   SpO2 98%   BMI 25.92 kg/m         VisionScreening:  No exam data present     PHYSICAL EXAM  Chest clear to auscultation and percussion.  Heart tones regular rhythm without murmur rub or gallop.  Abdomen soft nontender no organomegaly.  No peritoneal signs.  Extremities free of edema cyanosis or clubbing.  Neck veins nondistended no thyromegaly or scleral icterus noted, carotids full.  Skin warm and dry easily conversant good spirited.  Normal intelligence.  Neurologically intact no gross localizing findings.  Dental no urinary and rectal exams to be done by Dr. LEVINE at [facility] not repeated by this examiner.    Headache acceptable risk for anticipated ERCP examination and surgery February 4, 2020.  [facility].  Assessment Results 1/28/2020   Activities of Daily Living No help needed   Instrumental Activities of Daily " Living No help needed   Get Up and Go Score Less than 12 seconds   Mini Cog Total Score 4   Some recent data might be hidden     A Mini-Cog score of 0-2 suggests the possibility of dementia, score of 3-5 suggests no dementia    Identified Health Risks:     The patient was provided with suggestions to help him develop a healthy physical lifestyle.   He is at risk for lack of exercise and has been provided with information to increase physical activity for the benefit of his well-being.  The patient was counseled and encouraged to consider modifying their diet and eating habits. He was provided with information on recommended healthy diet options.  Patient's advanced directive was discussed and I am comfortable with the patient's wishes.

## 2021-06-05 NOTE — TELEPHONE ENCOUNTER
Anticoagulation Annual Referral Renewal Review    Trenton Nova's chart reviewed for annual renewal of referral to anticoagulation monitoring.        Criteria for anticoagulation nurse and/or pharmacist renewal met   Warfarin indication: Atrial Fibrillation, persistent Yes, per indication   Current with INR monitoring/compliant Yes Yes   Date of last office visit 12/23/2019 Yes, had office visit within last year   Time in Therapeutic Range (TTR) 91.99 % Yes, TTR > 60%       Trenton Nova met all criteria for anticoagulation management program initiated renewal.  New INR standing orders and anticoagulation referral renewal placed.      Yamileth Flores RN  11:16 AM

## 2021-06-05 NOTE — TELEPHONE ENCOUNTER
Who is calling:  Sienna from [facility]  Reason for Call:  Returning call   Date of last appointment with primary care:   Okay to leave a detailed message: Yes    Calley from [facility] returning call. Calley's ext is [phone].     Please return call

## 2021-06-05 NOTE — TELEPHONE ENCOUNTER
FYI - Status Update  Who is Calling: Patient  Update: Caller stated that the Fax number to [place] is [phone], the doctor is  Jorgito Agrawal.   Okay to leave a detailed message?:  No return call needed

## 2021-06-06 NOTE — TELEPHONE ENCOUNTER
Called and spoke with Trenton     - reported being scheduled for Major surgery @ [facility] on Mon. 2/24/20 - will be hospitalized for 5 days after procedure.  (hx of ampulla vater adenoma, high grade dysplasia).     - on 2/13/2020 had Encoscopic ultrasound procedure FNA / Biopsy - (NOTE:  patient is awaiting call back re: pathology biopsy / FNA findings)     - he was instructed to resume warfarin on 2/15.  He took 2.5mg warfarin dose on 2/15-16.     - Please advise in warfarin dosing and next prep for upcoming surgery.     - Mirta Peacock, PharmD - ASAP please review and evaluate again for warfarin hold and possible bridging.

## 2021-06-06 NOTE — TELEPHONE ENCOUNTER
FYI - Status Update  Who is Calling: Patient  Update: Requests call back from Faviola CAZARES.  Patient has been off his Coumadin for about 3 weeks. He is scheduled for a major 5-6 hour surgery next week, Monday, 2/24/20. Would like to discuss his Coumadin dosing for prior to surgery.  Okay to leave a detailed message?:  Yes

## 2021-06-06 NOTE — TELEPHONE ENCOUNTER
Called and spoke with Trenton.     - [facility] did not get all the information required for upcoming surgery.  So surgery will be rescheduled.     - Trenton wants to know, should he resume his warfarin?     - please advise.

## 2021-06-06 NOTE — TELEPHONE ENCOUNTER
Call placed to [facility] and [facility].     Left message on voicemail for THEODORE Zimmerman to updated Dr. Johnson on Trenton's need for another procedure and ongoing warfarin hold/request if bridging required from cardiology perspective    Warfarin hold started 1/31 for 2/4 ERCP.  Continued on 2/7 for 2/3 Endoscopic ultrasound & biopsy of ampulla Major uqvmup-dhavgwldwpwlnx-zgcpuil type    Warfarin resumed 2/15.    Surgery planned for 2/24.      Mirta Peacock, PharmD

## 2021-06-06 NOTE — TELEPHONE ENCOUNTER
Who is calling:  Patient   Reason for Call:  Returning phone call from [facility]  Date of last appointment with primary care: NA  Okay to leave a detailed message: Yes

## 2021-06-06 NOTE — TELEPHONE ENCOUNTER
Anticoagulation     Spoke to Trenton.  He reports he doesn't know exactly when surgery will be but expects it to be at least 5-7 days from next Monday 2/24.  Will have him restart warfarin over weekend since surgery sounds to be at least 8 days away and possibly further. Instructed Trenton to take 5 mg x 1 today then resume 5 mg Mon, Thursday and 2.5 mg daily rest of week.  Call with surgery date as soon as known.  INR check 2/27 or 2/28 if not holding before then.    Mirta Peacock, PharmD

## 2021-06-06 NOTE — TELEPHONE ENCOUNTER
ANTICOAGULATION  MANAGEMENT    Assessment     Today's INR result of 1.80 is Subtherapeutic (goal INR of 2.0-3.0)        Warfarin recently held as instructed which may be affecting INR    - HELD warfarin for 15 days from 1/31 - 2/20.    No new diet changes affecting INR    No new medication/supplements affecting INR    Continues to tolerate warfarin with no reported s/s of bleeding or thromboembolism     Previous INR was Therapeutic at 2.60 on 1/28/20.    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 Recent ERCP and EUS with biopsy.    Scheduled surgery @ [facility] on 3/19/20.   (upcoming scheduled consultations with GI / Hepatologist, and surgeon @ [facility])    Plan:     Spoke with Mr. Nova regarding INR result and instructed:     Warfarin Dosing Instructions:     - today, advised one time booster with 5mg warfarin dose,    - then continue current warfarin dose 5 mg daily on Mon/Thurs; and 2.5 mg daily rest of week.    Instructed patient to follow up no later than:  1-2 wks.    Education provided: target INR goal and significance of current INR result and importance of notifying clinic of upcoming surgeries and procedures 2 weeks in advance    Mr. Nova verbalizes understanding and agrees to warfarin dosing plan.    Instructed to call the [facility] for any changes, questions or concerns. (#[phone])   ?   Yamileth Flores RN    Subjective/Objective:      Trenton Nova, a 79 y.o. male is on warfarin.     Trenton reports:     Home warfarin dose: verbally confirmed home dose with Mr. Nova and updated on anticoagulation calendar     Missed doses: No     Medication changes:  No     S/S of bleeding or thromboembolism:  No     New Injury or illness:  Yes: newly diagnosed Malignant neoplasm of ampullary adenoma.     Changes in diet or alcohol consumption:  No     Upcoming surgery, procedure or cardioversion:  Yes:  Scheduled major surgery on 3/19/20 @ [place], [place]   - Trenton will let us know  the prep after his visits with doctors from [facility].    Anticoagulation Episode Summary     Current INR goal:   2.0-3.0   TTR:   72.2 % (1 y)   Next INR check:   3/6/2020   INR from last check:   1.80! (2/28/2020)   Weekly max warfarin dose:      Target end date:      INR check location:      Preferred lab:      Send INR reminders to:   [facility]    Indications    Atrial Fibrillation [I48.91]  Long term (current) use of anticoagulants [Z79.01]           Comments:            Anticoagulation Care Providers     Provider Role Specialty Phone number    Sarwat Mayorga MD Referring Internal Medicine [phone]

## 2021-06-06 NOTE — TELEPHONE ENCOUNTER
Who is calling:  Patient   Reason for Call:  Warfarin and prep for surgery -- see below  Date of last appointment with primary care:   Okay to leave a detailed message: Yes    Patient is having abdominal surgery (cancer) at the [facility] on 3/19 and wants to know what he should do regarding Warfarin in prep of the surgery.     Please call and advise.

## 2021-06-06 NOTE — TELEPHONE ENCOUNTER
FYI - Status Update  Who is Calling: Patient  Update: Requests a call back from [facility] - to discuss patient back on Coumadin, and when should he have his next INR  Okay to leave a detailed message?:  Yes

## 2021-06-06 NOTE — TELEPHONE ENCOUNTER
Called and spoke to Trenton,     - has been resumed on warfarin therapy x1 wk.     - scheduled INR on Fri. 2/28/20 @ [facility].     - reported surgery will be scheduled on 3/19/2020 @ [facility]

## 2021-06-06 NOTE — TELEPHONE ENCOUNTER
Anticoagulation    Plan for 2/24 Surgery at [facility]    Received return call from THEODORE Zimmerman at [facility].      Trenton seen in cardiology clinic yesterday by Giselle Patel NP:      Patient okay to proceed with surgery scheduled on 2/24    No need to bridge patient for AC and will defer to surgery as to when to restart after procedure  ___________________    Returned call to Trenton. Instructed to continue warfarin today and begin 5 day hold for surgery Wed 2/19. Discussed INR will continue to be low and monitoring for s/sx of stroke. Recommended.  INR check 2-4 days after hospital discharge.  Trenton verbalized understanding.     Mirta Peacock, PharmD

## 2021-06-06 NOTE — TELEPHONE ENCOUNTER
Dr. Mayorga,  Patient did not have an EKG, he had an ECHO done on 12/19/19.  ECHO has been printed for the patient.  Asked that he contact [place] to make sure that they will accept an ECHO instead of an EKG.  Please advise.  Thank you.  Eileen LONG CMA/IRINEO....................10:14 AM

## 2021-06-06 NOTE — TELEPHONE ENCOUNTER
Who is calling:  Patient  Reason for Call:  Patient will be having surgery 2/24/20 at [facility].  The patient states that he had a physical on 1/28/20 and is asking if this would fufill the need for the Pre-op.  The patient would like to  a copy of the Pre-op if this visit will satisfy for a pre-op.  Please advise the patient ASAP. So that there is time for any other labs/visits that are needed prior to next weeks surgery.  Records indicated an EKG was done 12/19/19  Date of last appointment with primary care: 1/28/20  Okay to leave a detailed message: Yes

## 2021-06-06 NOTE — TELEPHONE ENCOUNTER
Who is calling:  patient  Reason for Call:  Patient returning call.  Date of last appointment with primary care: 1/28/2020  Okay to leave a detailed message: Yes

## 2021-06-07 NOTE — TELEPHONE ENCOUNTER
Who is calling:  Patient   Reason for Call:  Patient had his INR checked at [facility] on Tuesday the 14th and has not heard back from anyone regarding this.  Patient is wondering if [facility] got it to [facility]- INR result was 3.1  Date of last appointment with primary care: n/a  Okay to leave a detailed message: Yes

## 2021-06-07 NOTE — TELEPHONE ENCOUNTER
Who is calling:  Ileana  Reason for Call:  Needs recent INR results  Date of last appointment with primary care:   Okay to leave a detailed message: Yes

## 2021-06-07 NOTE — TELEPHONE ENCOUNTER
FYI - Status Update  Who is Calling: Patient  Update: Patient has been discharged from the [facility] today 3/24/2020. The patient was only given instructions to have INR checked either Friday or Monday, and told to restart coumadin tonight, with no dosing guidance, please call the patient back at the home number in the chart, thank you.  Okay to leave a detailed message?:  Yes

## 2021-06-07 NOTE — PROGRESS NOTES
"Trenton Nova is a 80 y.o. male who is being evaluated via a billable telephone visit.      The patient has been notified of following:     \"This telephone visit will be conducted via a call between you and your physician/provider. We have found that certain health care needs can be provided without the need for a physical exam.  This service lets us provide the care you need with a short phone conversation.  If a prescription is necessary we can send it directly to your pharmacy.  If lab work is needed we can place an order for that and you can then stop by our lab to have the test done at a later time.    Telephone visits are billed at different rates depending on your insurance coverage. During this emergency period, for some insurers they may be billed the same as an in-person visit.  Please reach out to your insurance provider with any questions.    If during the course of the call the physician/provider feels a telephone visit is not appropriate, you will not be charged for this service.\"    Patient has given verbal consent to a Telephone visit? Yes    Patient would like to receive their AVS by snail mail.    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  30 some lymph nodes were examined allCLEAR.  Patient is doing well incision better abdominal pain lessened.  Cannot sleep try trazodone.    No blood in stool or urine medication list reviewed denies fever cough chest pain shortness of breath body aches or headache.  No exposure to coronavirus 19.    Anticipates meeting with his oncologist here in the [facility] to see whether or not he should have additional chemotherapy history of smoldering multiple myeloma with myeloma kidney disease as well CKD.    Medication list reviewed reconciled.  Sulfa allergy noted.    Non-smoker no alcohol for many years decades.    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  Doing well.  Is see a candidate for adjuvant chemotherapy or radiation therapy.  Will defer to oncology.    Insomnia try trazodone 50 mg at bedtime.    Sulfa allergy.    Smoldering multiple myeloma followed by oncologist and history of mild: Kidney stable.    Call 10 days time regarding status in terms of insomnia with trazodone may be a candidate for something like Ambien lorazepam.  Followed by nephrology as well.      Phone call duration:  11 minutes    Karen Dunn, CMA

## 2021-06-07 NOTE — TELEPHONE ENCOUNTER
Spoke with Ileana and relayed last four INRs for patient. Requested INRs be faxed to [phone]. Fax sent and Ileana has no further questions.

## 2021-06-07 NOTE — TELEPHONE ENCOUNTER
Standing orders already in for INR, EKG order placed    Do you want to f/u with him tomorrow or Ok to wait until after furlough 4/27

## 2021-06-07 NOTE — PROGRESS NOTES
"Trenton Nova is a 80 y.o. male who is being evaluated via a billable video visit.      The patient has been notified of following:     \"This video visit will be conducted via a call between you and your physician/provider. We have found that certain health care needs can be provided without the need for an in-person physical exam.  This service lets us provide the care you need with a video conversation.  If a prescription is necessary we can send it directly to your pharmacy.  If lab work is needed we can place an order for that and you can then stop by our lab to have the test done at a later time.    Video visits are billed at different rates depending on your insurance coverage. Please reach out to your insurance provider with any questions.    If during the course of the call the physician/provider feels a video visit is not appropriate, you will not be charged for this service.\"    Patient has given verbal consent to a Video visit? Yes    Patient would like to receive their AVS by AVS Preference: Mail a copy.    Patient would like the video invitation sent by: Text to cell phone: [phone]    Will anyone else be joining your video visit? No    Video Start Time: 10:56 A    Additional provider notes: GENERAL: healthy, alert and no distress, EYES: Eyes grossly normal to inspection, conjunctivae and sclerae normal, RESP: no audible wheeze, cough, or visible cyanosis.  No visible retractions or increased work of breathing.  Able to speak fully in complete sentences., NEURO: Cranial nerves grossly intact, mentation intact and speech normal and PSYCH: mentation appears normal, affect normal/bright, judgement and insight intact, normal speech and appearance well-groomed      Video-Visit Details    Type of service:  Video Visit    Video End Time (time video stopped): 11:12AM  Originating Location (pt. Location): Home    Distant Location (provider location):  [facility] INTERNAL MEDICINE     Mode of Communication:  Video " Conference via Axial      Daniel Del Rosario MD       Office Visit - Follow Up   Trenton Nova   80 y.o. male    Date of Visit: 4/22/2020    Chief Complaint   Patient presents with     Insomnia        Assessment and Plan   1. Bile duct cancer (H)  As noted seems to be doing well off pain medication    2. Insomnia, unspecified type  I think he has had some habituation into current habits.  We discussed the importance of good sleep hygiene.  I have asked that he go to bed at 10 PM instead of 9 PM.  If he wakes up at 1:30 AM and I have asked him not to lie in bed for more than 10 minutes.  If he does not fall back asleep he should go to another bed and try again.  He should not lie in any bed for more than 10 to 15 minutes.  Okay to do some light reading with dim light.  He should not take naps during the day.  Additionally I recommended Ambien which can be used as a short-term bridge until he develops regular sleep habits again.  I have asked him to follow-up with Dr. Sarwat BOYCE next week  - zolpidem (AMBIEN) 5 MG tablet; Take 1 tablet (5 mg total) by mouth at bedtime as needed for sleep.  Dispense: 20 tablet; Refill: 0    Return in about 1 week (around 4/29/2020) for follow up with PCP.     History of Present Illness   This 80 y.o. old man with recent Whipple procedure is complaining of insomnia.  This is been going on since his Whipple procedure.  He had been waking up at about 1:30 AM with pain.  He was using morphine.  He is not morphine but continues to wake up at 1:30 AM and is unable to fall back asleep.  He goes to bed at 9 PM.  He usually wakes up at 5:30 AM prior to surgery.  He sleeps through the night generally occasionally waking up to urinate.  He has never had problems falling back asleep before.  He is tired during the day and will take a nap generally unsuccessfully.  He has tried over-the-counter sleep remedies as well as trazodone they have not been effective.  He does not necessarily need  to urinate when he wakes up but will generally go to the bathroom.    Review of Systems: A comprehensive review of systems was negative except as noted.     Medications, Allergies and Problem List   Reviewed, reconciled and updated  Post Discharge Medication Reconciliation Status:      Physical Exam   General Appearance:   See above    There were no vitals taken for this visit.       Additional Information   Current Outpatient Medications   Medication Sig Dispense Refill     calcium carbonate-vitamin D2 500 mg(1,250mg) -200 unit tablet Take 1 tablet by mouth daily.        cyanocobalamin (VITAMIN B-12) 500 MCG tablet Take 500 mcg by mouth daily.       levothyroxine 50 mcg cap Take 1 tablet by mouth Daily at 6:00 am.       multivitamin therapeutic (THERAGRAN) tablet Take 1 tablet by mouth daily.       omeprazole (PRILOSEC) 20 MG capsule Take 20 mg by mouth daily.       rosuvastatin (CRESTOR) 10 MG tablet        terazosin (HYTRIN) 5 MG capsule Take 5 mg by mouth.       traZODone (DESYREL) 50 MG tablet Take 1 tablet (50 mg total) by mouth at bedtime. 30 tablet 12     triamcinolone (KENALOG) 0.1 % cream Apply thin layer to affected areas (arms & hips) twice daily X 1 month 60 g 3     vit C/E/Zn/coppr/lutein/zeaxan (PRESERVISION AREDS-2 ORAL) Take by mouth 2 (two) times a day.       warfarin ANTICOAGULANT (COUMADIN/JANTOVEN) 2.5 MG tablet Take 1&1/2 tabs (3.75mg) on Thursdays and take 1 tab (2.5mg) all other days by mouth daily, as directed.  Adjust dose based on INR results. 115 tablet 1     zolpidem (AMBIEN) 5 MG tablet Take 1 tablet (5 mg total) by mouth at bedtime as needed for sleep. 20 tablet 0     No current facility-administered medications for this visit.      Allergies   Allergen Reactions     Sulfa (Sulfonamide Antibiotics)      Social History     Tobacco Use     Smoking status: Former Smoker     Smokeless tobacco: Never Used     Tobacco comment: quit 8/8/1988   Substance Use Topics     Alcohol use: No     Drug  use: No       Review and/or order of clinical lab tests:  Review and/or order of radiology tests:  Review and/or order of medicine tests:  Discussion of test results with performing physician:  Decision to obtain old records and/or obtain history from someone other than the patient:  Review and summarization of old records and/or obtaining history from someone other than the patient and.or discussion of case with another health care provider:  Independent visualization of image, tracing or specimen itself:    Time:      Daniel Del Rosario MD

## 2021-06-07 NOTE — TELEPHONE ENCOUNTER
Medication Question or Clarification  Who is calling: Patient  What medication are you calling about (include dose and sig)?:   traZODone (DESYREL) 50 MG tablet  30 tablet  12  4/17/2020      Sig - Route: Take 1 tablet (50 mg total) by mouth at bedtime. - Oral         Who prescribed the medication?: Dr Mayorga  What is your question/concern?: Patient states this medication is not helping sleep. States he has just had recent surgery and is not been able to sleep.  Please have covering provider address if Dr Mayorga is not in today.  Requested Pharmacy: [facility] to leave a detailed message?: Yes

## 2021-06-07 NOTE — TELEPHONE ENCOUNTER
ANTICOAGULATION  MANAGEMENT    Assessment     Today's INR result of 3.30 is Supratherapeutic (goal INR of 2.0-3.0)        Warfarin recently held as instructed which may be affecting INR    decreased appetite may be affecting diet and INR    No new medication/supplements affecting INR    Continues to tolerate warfarin with no reported s/s of bleeding or thromboembolism     Previous INR was Supratherapeutic at 5.53 on 4/21/20.    Plan:     Spoke with Trenton regarding INR result and instructed:     Warfarin Dosing Instructions:  (evening, has 2.5mg tabs)    Change warfarin dose to 1.25 mg daily on Mon/Wed/Sat; and 2.5 mg daily rest of week.   - (8.3 % change)    Instructed patient to follow up no later than:  One wk.   - INR scheduled on 5/420 @ [facility]    Education provided: impact of vitamin K foods on INR, target INR goal and significance of current INR result and monitoring for bleeding signs and symptoms    Trenton verbalizes understanding and agrees to warfarin dosing plan.    Instructed to call the [facility] for any changes, questions or concerns. (#[phone])   ?   Yamileth Flores RN    Subjective/Objective:      Trenton Nova, a 80 y.o. male is on warfarin.     Trenton reports:     Home warfarin dose: as updated on anticoagulation calendar per template     Missed doses: Yes:  Held warfarin doses for 2 days on 4/21-22.     Medication changes:  No     S/S of bleeding or thromboembolism:  No     New Injury or illness:  No     Changes in diet or alcohol consumption:  No.  Previously reported decreased appetite.     Upcoming surgery, procedure or cardioversion:  No    Anticoagulation Episode Summary     Current INR goal:   2.0-3.0   TTR:   56.6 % (1 y)   Next INR check:   5/4/2020   INR from last check:   3.30! (4/27/2020)   Weekly max warfarin dose:      Target end date:      INR check location:      Preferred lab:      Send INR reminders to:   [facility]    Indications    Atrial Fibrillation  [I48.91]  Long term (current) use of anticoagulants [Z79.01]           Comments:            Anticoagulation Care Providers     Provider Role Specialty Phone number    Sarwat Mayorga MD Referring Internal Medicine [phone]

## 2021-06-07 NOTE — TELEPHONE ENCOUNTER
"ANTICOAGULATION  MANAGEMENT    Assessment     Today's INR result of 3.8 is Supratherapeutic (goal INR of 2.0-3.0)        Warfarin taken as previously instructed - confirmed patient held warfarin 2 days then took 2.5 mg daily     No new diet changes affecting INR    Concurrent use of melatonin and warfarin may increase risk of bleeding, but not expected to affect INR    No interaction expected between hydrocodone  and warfarin - reports started 3 days ago     Continues to tolerate warfarin with no reported s/s of bleeding or thromboembolism     Previous INR was Supratherapeutic    Plan:     Spoke with Trenton regarding INR result and instructed:     Warfarin Dosing Instructions:  Hold tonight,  then change warfarin dose to 2.5 mg daily   (0 % change)    Instructed patient to follow up no later than: 1 week     Education provided: importance of therapeutic range, importance of following up for INR monitoring at instructed interval, importance of taking warfarin as instructed and monitoring for bleeding signs and symptoms    Trenton verbalizes understanding and agrees to warfarin dosing plan.    Instructed to call the [facility] for any changes, questions or concerns. (#[phone])   ?   Luz Bojorquez RN    Subjective/Objective:      Trenton Nova, a 80 y.o. male is on warfarin.     Trenton reports:     Home warfarin dose: verbally confirmed home dose with Trenton  and updated on anticoagulation calendar - confirmed \"surgery\" was the Whipple 3/19/2020 [facility] aware     Missed doses: No     Medication changes:  Yes: started melatonin, also taking hydrocodone      S/S of bleeding or thromboembolism:  No     New Injury or illness:  Yes: pain, trouble sleeping since Whipple surgery      Changes in diet or alcohol consumption:  No     Upcoming surgery, procedure or cardioversion:  No    Anticoagulation Episode Summary     Current INR goal:   2.0-3.0   TTR:   61.6 % (1 y)   Next INR check:   4/16/2020   INR from last check:   " 3.80! (4/9/2020)   Weekly max warfarin dose:      Target end date:      INR check location:      Preferred lab:      Send INR reminders to:   [facility]    Indications    Atrial Fibrillation [I48.91]  Long term (current) use of anticoagulants [Z79.01]           Comments:            Anticoagulation Care Providers     Provider Role Specialty Phone number    Sarwat Mayorga MD Referring Internal Medicine [phone]

## 2021-06-07 NOTE — TELEPHONE ENCOUNTER
INITIAL INR > 5.5 NOTIFICATION- Anticoagulation Management Program    Trenton Nova had an initial point of care INR of 5.8.     Venous confirmation of INR required per protocol. STAT venous sample drawn and being sent out for confirmation.    Anticoagulation template scanned into EHR. Patient phone call with Anticoagulation Management Program (ACM) being arranged for patient triage prior to discharge.     Sadia Alicea

## 2021-06-07 NOTE — TELEPHONE ENCOUNTER
Who is calling:  Patient   Reason for Call:  Patient stated that he would like his lab results on 3/27 and 4/3 to faxed to fax number [phone] attn Dr. Allen (Patient stated that he doesn't know how to write the doctor's name). Patient stated that Charito had sent the lab results above to the wrong place last time.  Date of last appointment with primary care: n/a  Okay to leave a detailed message: Yes  [phone]

## 2021-06-07 NOTE — TELEPHONE ENCOUNTER
INITIAL INR > 5.5 NOTIFICATION- Anticoagulation Management Program    Trenton Nova had an initial point of care INR of 6.7     Venous confirmation of INR required per protocol. STAT venous sample drawn and being sent out for confirmation.    Anticoagulation template scanned into EHR. Patient phone call with Anticoagulation Management Program (ACM) being arranged for patient triage prior to discharge. Miryam has been notified    Shreyas Gurrola RT (R)

## 2021-06-07 NOTE — TELEPHONE ENCOUNTER
Noted     - will await for INR result being tested today prior to Cardioversion @ [facility].     - INR tested prior to EP Cardioversion today - was 3.10      (currently on 2.5mg daily)     - per [facility], instructed to check INR one wk post Cardioversion.

## 2021-06-07 NOTE — TELEPHONE ENCOUNTER
Forms Request  Name of form/paperwork: Other:  CT result report  Have you been seen for this request: N/A  Do we have the form: Yes- in imaging tab  When is form needed by: ASAP  How would you like the form returned: Fax:  [place] told patient that the clinic should have the fax number in the system.   Patient Notified form requests are processed in 3-5 business days: No    Okay to leave a detailed message? Yes Please update went report is faxed.

## 2021-06-07 NOTE — TELEPHONE ENCOUNTER
Spoke with the patient and relayed message below from Dr. Del Rosario.  Patient states that he has already tried melatonin and it does not help.  Helped the patient to schedule a video visit with Dr. Del Rosario tomorrow to discuss further.  He had no further questions at this time.  Eileen LONG CMA/IRINEO....................10:53 AM

## 2021-06-07 NOTE — TELEPHONE ENCOUNTER
Called and schedule patient for EKg @ [facility] tomorrow morning- telephone visit to follow @ 1:30pm    Faviola MCKEON told patient INR will be done next week

## 2021-06-07 NOTE — TELEPHONE ENCOUNTER
Lab work requested has been faxed to Dr. Jolly's office.  #[phone]  Eileen LONG CMA/IRINEO....................12:42 PM

## 2021-06-07 NOTE — TELEPHONE ENCOUNTER
FYI - Status Update  Who is Calling: Patient  Update: Called in to cancel his INR for later this week. He is currently at [facility], for a cardioversion and pre-op. They will do an INR today prior to procedure.  Okay to leave a detailed message?:  No return call needed

## 2021-06-07 NOTE — TELEPHONE ENCOUNTER
Yes needs a telephone visit plus an EKG and an INR.  Prior to my telephone visit.  Please set this up and send orders.  And I will sign them.

## 2021-06-07 NOTE — TELEPHONE ENCOUNTER
New Appointment Needed  What is the reason for the visit:    Colostomy bag leaking, was told to contact primary doctor to possibly get stitched up, please call patient back  Provider Preference: PCP only  How soon do you need to be seen?: as soon as possible  Waitlist offered?: No  Okay to leave a detailed message:  Yes

## 2021-06-07 NOTE — TELEPHONE ENCOUNTER
Called and spoke with Mr. Nova.     - post Cardioversion on 4/14/20 @ [facility].     - INR taken on 4/14 was therapeutic at 3.10     - continue same warfarin dose.      - next INR scheduled in one wk on 4/21/20 @ [facility]

## 2021-06-07 NOTE — TELEPHONE ENCOUNTER
** WIll set up telephone visit- do you want him to have the EKG and INR prior?       From his summary:   * Continue current medications  * Follow up with Dr. Sarwat Mayorga MD in 1 week for ECG and INR

## 2021-06-07 NOTE — TELEPHONE ENCOUNTER
New Appointment Needed  What is the reason for the visit:    Same Date/Next Day Appt Request  What is the reason for your visit?: F/U CARDIOVERSION -- SEE BELOW    Provider Preference: Any available  How soon do you need to be seen?: WITHIN 1 WEEK  Waitlist offered?: No  Okay to leave a detailed message:  Yes    Patient had cardioversion procedure done yesterday 4/14. Patient was told to schedule a follow up and have an ECG and INR.     Please call and advise and help schedule.

## 2021-06-07 NOTE — TELEPHONE ENCOUNTER
"ANTICOAGULATION  MANAGEMENT    Trenton Nova is on warfarin and had a point of care INR result > 5.5 or an \"error message\" on point of care meter today.    Morning INR; STAT venous INR processing requested from lab    Spoke with Trenton via phone while at the lab and reviewed triage questions for potential signs and symptoms of bleeding.      Patient Response     Have you had any bleeding in the last week?   No   Have you passed any red, black, or tarry stools in last week?   No   Have you vomited/spit up any red or coffee ground material in last week?   No   Have you had any new, severe abdominal pain or bloating develop in last week?   No   Have you fallen or had any injuries in last week?   No   Do you currently have a severe, sudden onset headache?   No   Do you currently have any severe sudden changes in your vision?   No   Do you currently have any new onset numbness, weakness/paralysis?   No     Assessment/Plan:     Trenton's responses were negative for signs and symptoms of bleeding; may discharge from clinic    Trenton instructed to:       Hold warfarin today until venous INR result returns and receives follow up call from M    Seek medical attention for new signs and symptoms of bleeding or a fall with injury.       "

## 2021-06-07 NOTE — TELEPHONE ENCOUNTER
Spoke with the patient and relayed message below from Dr. Mayorga.  Patient verbalized understanding and has been scheduled for a phone visit today with Dr. Cabrera as Dr. Mayorga had no available visits this afternoon.  Eileen LONG CMA/IRINEO....................12:01 PM

## 2021-06-08 NOTE — TELEPHONE ENCOUNTER
Please asked patient to make a telephone visit and/or video virtual visit with me to discuss and review.

## 2021-06-08 NOTE — TELEPHONE ENCOUNTER
ANTICOAGULATION  MANAGEMENT    Assessment     Today's INR result of 2.20 is Therapeutic (goal INR of 2.0-3.0)        Warfarin recently held as instructed which may be affecting INR    No new diet changes affecting INR    No interaction expected between stopped Benadryl and warfarin    Continues to tolerate warfarin with no reported s/s of bleeding or thromboembolism     Previous INR was Supratherapeutic at 3.50 on 5/21/20.    Stopped Benadryl since it was making him really groggy / foggy in the morning.  Would like to take 2 tabs of Melatonin 3mg - will get clearance from Dr. Mayorga.    Plan:     Spoke with Trenton regarding INR result and instructed:     Warfarin Dosing Instructions:  (has 2.5mg tabs)   - Continue current warfarin dose 1.25 mg daily.    Instructed patient to follow up no later than:  1-2 wks.   - INR scheduled on 6/2/20 @ [facility].    Education provided: target INR goal and significance of current INR result and importance of notifying clinic for changes in medications    Trenton verbalizes understanding and agrees to warfarin dosing plan.    Instructed to call the [facility] for any changes, questions or concerns. (#[phone])   ?   Yamileth Flores RN    Subjective/Objective:      Trenton Nova, a 80 y.o. male is on warfarin.     Trenton reports:     Home warfarin dose: as updated on anticoagulation calendar per template     Missed doses: Yes:  Held warfarin dose on 5/21, as instructed.     Medication changes:  Yes: reported stopping Benadryl at bed-time, it was making him really groggy in the morning.     S/S of bleeding or thromboembolism:  No     New Injury or illness:  No     Changes in diet or alcohol consumption:  No     Upcoming surgery, procedure or cardioversion:  No    Anticoagulation Episode Summary     Current INR goal:   2.0-3.0   TTR:   49.6 % (1 y)   Next INR check:   6/2/2020   INR from last check:   2.20 (5/26/2020)   Weekly max warfarin dose:      Target end date:      INR  check location:      Preferred lab:      Send INR reminders to:   [facility]    Indications    Atrial Fibrillation [I48.91]  Long term (current) use of anticoagulants [Z79.01]           Comments:            Anticoagulation Care Providers     Provider Role Specialty Phone number    Sarwat Mayorga MD Referring Internal Medicine [phone]

## 2021-06-08 NOTE — TELEPHONE ENCOUNTER
Okay to stop Benadryl.    Okay for 2 tablets of melatonin with the Ambien.    Many thanks for your help.  Please call patient for me.

## 2021-06-08 NOTE — TELEPHONE ENCOUNTER
Prescription Monitoring Program activity reviewed with no discrepancies noted.      Last fill per : 4/22/20  Quantity/days supply: 20 tablets for 20 days    Controlled Substance Agreement on file: No  Date: NA    Last office visit with provider:  Virtual visit 4/17/20  Please advise.

## 2021-06-08 NOTE — TELEPHONE ENCOUNTER
ANTICOAGULATION  MANAGEMENT    Assessment     Today's INR result of 3.10 is slightly Supratherapeutic (goal INR of 2.0-3.0)        Warfarin recently held as instructed which may be affecting INR    No new diet changes affecting INR    No interaction expected between Benadryl and warfarin    Potential interaction between Melatonin and warfarin which may affect subsequent INRs    Continues to tolerate warfarin with no reported s/s of bleeding or thromboembolism     Previous INR was Supratherapeutic at 4.50 on 5/11/20.    Trenton reported he was able to sleep for a total of 6 hrs.    Plan:     Spoke with Trenton regarding INR result and instructed:     Warfarin Dosing Instructions:  (has 2.5mg tabs)   - Change warfarin dose to 2.5 mg daily on Wednesdays; and 1.25 mg daily rest of week.   - (11.1 % change)    Instructed patient to follow up no later than:  One wk.   - INR scheduled on 5/21/20 @ [facility].    Education provided: target INR goal and significance of current INR result, potential interaction between warfarin and Melatonin and importance of notifying clinic for changes in medications    Trenton verbalizes understanding and agrees to warfarin dosing plan.    Instructed to call the [facility] for any changes, questions or concerns. (#[phone])   ?   Yamileth Flores RN    Subjective/Objective:      Trenton Nova, a 80 y.o. male is on warfarin.     Trenton reports:     Home warfarin dose: as updated on anticoagulation calendar per template     Missed doses: Yes:  HELD warfarin dose on 4/11/20.     Medication changes:  Yes:  On 5/13, Insomnia persists and already using Ambien.  Added Melatonin and Benadryl 25mg to 50mg at bedtime to trial for one week.   - Per Micromedex, Concurrent use of MELATONIN and WARFARIN may result in an increased risk of bleeding.        S/S of bleeding or thromboembolism:  No     New Injury or illness:  No     Changes in diet or alcohol consumption:  No     Upcoming surgery, procedure or  cardioversion:  No    Anticoagulation Episode Summary     Current INR goal:   2.0-3.0   TTR:   51.7 % (1 y)   Next INR check:   5/22/2020   INR from last check:   3.10! (5/15/2020)   Weekly max warfarin dose:      Target end date:      INR check location:      Preferred lab:      Send INR reminders to:   [facility]    Indications    Atrial Fibrillation [I48.91]  Long term (current) use of anticoagulants [Z79.01]           Comments:            Anticoagulation Care Providers     Provider Role Specialty Phone number    Sarwat Mayorga MD Referring Internal Medicine [phone]

## 2021-06-08 NOTE — TELEPHONE ENCOUNTER
Left message for the patient relaying message below from Dr. Mayorga.  Asked the patient to call and schedule an appointment as requested below by Dr. Mayorga.  Eileen LONG CMA/IRINEO....................10:41 AM

## 2021-06-08 NOTE — TELEPHONE ENCOUNTER
"Dr. Mayorga,  Okay for the refill with the increased dose requested?  \"Patient stated that the 5 mg is not working well for the patient. Patient is questioning if Sarwat Mayorga MD would be ok with patient taking 10 mg.\"  Please advise.  Thank you.  Eileen LONG CMA/IRINEO....................1:19 PM    "

## 2021-06-08 NOTE — TELEPHONE ENCOUNTER
ANTICOAGULATION  MANAGEMENT    Assessment     Today's INR result of 2.30 is Therapeutic (goal INR of 2.0-3.0)        Warfarin taken as previously instructed    No new diet changes affecting INR    Potential interaction between temporary stopped Multaq for 2 doses on 6/3/20  and warfarin which may affect subsequent INRs    - due to dizziness when standing up too fast.  Resume Multaq on 6/4/20.    Continues to tolerate warfarin with no reported s/s of bleeding or thromboembolism     Previous INR was Therapeutic at 2.10 on 6/2/20.    Post Bone Marrow Biopsy on 6/3/20.    Scheduled for repeat cardioversion due to back in atrial fibrillation detected on pacemaker device check on 6/3/20. Patient was asymptomatic.    Plan:     Spoke with Trenton regarding INR result and instructed:    1.  Will prep Trenton for upcoming Cardioversion and increase warfarin dose.    Warfarin Dosing Instructions:    - today advised 2.5 mg warfarin dose.   - then continue current warfarin dose 1.25 mg daily.    Instructed patient to follow up no later than:  One wk after Cardioversion.    Education provided: target INR goal and significance of current INR result and importance of notifying clinic of upcoming surgeries and procedures 2 weeks in advance    Trenton verbalizes understanding and agrees to warfarin dosing plan.    Instructed to call the [facility] for any changes, questions or concerns. (#[phone])   ?   Yamileth Flores RN    Subjective/Objective:      Trenton Nova, a 80 y.o. male is on warfarin.     Trenton reports:     Home warfarin dose: as updated on anticoagulation calendar per template     Missed doses: No     Medication changes:  Yes.  Reported he did not take his Multaq on 6/3, due to experiencing dizziness when he stood up quickly.     S/S of bleeding or thromboembolism:  No     New Injury or illness:  Yes:  Reported being back in atrial fibrillation.     Changes in diet or alcohol consumption:  No     Upcoming  surgery, procedure or cardioversion:  Yes:  Possibly repeat cardioversion    Anticoagulation Episode Summary     Current INR goal:   2.0-3.0   TTR:   49.5 % (1 y)   Next INR check:   6/23/2020   INR from last check:   2.30 (6/9/2020)   Weekly max warfarin dose:      Target end date:      INR check location:      Preferred lab:      Send INR reminders to:   [facility]    Indications    Atrial Fibrillation [I48.91]  Long term (current) use of anticoagulants [Z79.01]           Comments:            Anticoagulation Care Providers     Provider Role Specialty Phone number    Sarwat Mayorga MD Referring Internal Medicine [phone]

## 2021-06-08 NOTE — TELEPHONE ENCOUNTER
Who is calling:  Erasmo Hardin)  Reason for Call:  Review plan for patients INR  Date of last appointment with primary care:   Okay to leave a detailed message: Yes

## 2021-06-08 NOTE — PROGRESS NOTES
"Trenton Nova is a 80 y.o. male who is being evaluated via a billable telephone visit.      The patient has been notified of following:     \"This telephone visit will be conducted via a call between you and your physician/provider. We have found that certain health care needs can be provided without the need for a physical exam.  This service lets us provide the care you need with a short phone conversation.  If a prescription is necessary we can send it directly to your pharmacy.  If lab work is needed we can place an order for that and you can then stop by our lab to have the test done at a later time.    Telephone visits are billed at different rates depending on your insurance coverage. During this emergency period, for some insurers they may be billed the same as an in-person visit.  Please reach out to your insurance provider with any questions.    If during the course of the call the physician/provider feels a telephone visit is not appropriate, you will not be charged for this service.\"    Patient has given verbal consent to a Telephone visit? Yes    What phone number would you like to be contacted at? [phone]    Patient would like to receive their AVS by AVS Preference: Kadeem.    Additional provider notes: Bile duct cancer.    Recently status post Whipple procedure [facility] for bile duct cancer no clinical evidence of recurrence.    The patient has insomnia.  Already using Ambien.  Suggested melatonin plus Benadryl the latter 25 to 50 mg that Triolo at bedtime for 1 week call back.    No blood in stool or urine medication list reviewed well-tolerated normal effects reconciled in the chart.    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  Plus recent diagnosis of bile duct cancer.    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  Already on Ambien add melatonin plus Benadryl 25 to 50 mg at at bedtime that Trio for 3 to 1-week " then call back regarding status.    Myeloma kidney with smoldering myeloma.    Sulfa allergy.      Phone call duration:  11 minutes    Karen Dunn, CMA

## 2021-06-08 NOTE — TELEPHONE ENCOUNTER
Medication Question or Clarification  Who is calling: Patient   What medication are you calling about (include dose and sig)?:    Disp  Refills  Start  End     zolpidem (AMBIEN) 5 MG tablet  30 tablet  0  5/11/2020      Sig - Route: Take 1 tablet (5 mg total) by mouth at bedtime as needed for sleep. - Oral     Sent to pharmacy as: zolpidem 5 mg tablet (Ambien)     E-Prescribing Status: Receipt confirmed by pharmacy (5/11/2020 12:17 PM CDT)       Who prescribed the medication?: Sarwat Mayorga MD   What is your question/concern?: Patient stated that the 5 mg is not working well for the patient. Patient is questioning if Sarwat Mayorga MD would be ok with patient taking 10 mg.  Requested Pharmacy: [facility] #[id number]  Okay to leave a detailed message?: Yes

## 2021-06-08 NOTE — TELEPHONE ENCOUNTER
Spoke with the patient and relayed message below from Dr. Mayorga.  Patient verbalized understanding and had no further questions at this time.    Prescription has been set up for Dr. Mayorga to review per message below.  Eileen LONG, CLAUDIA/CMT....................1:46 PM

## 2021-06-08 NOTE — TELEPHONE ENCOUNTER
ANTICOAGULATION  MANAGEMENT    Assessment     Today's INR result of 2.10 is Therapeutic (goal INR of 2.0-3.0)        Warfarin taken as previously instructed    No new diet changes affecting INR    No interaction expected between Ambien and warfarin    Potential interaction between Melatonin and warfarin which may affect subsequent INRs    Continues to tolerate warfarin with no reported s/s of bleeding or thromboembolism     Previous INR was Therapeutic at 2.20 on 5/26/20.    Scheduled for Bone marrow test schedule 6/3/20 -  (Advised to call t hem, to ensure there is no stoppage on his warfarin therapy prior to procedure.)    Plan:     Spoke with Jonatan regarding INR result and instructed:    1.  Will check INR in one wk, since he stopped Melatonin 4 days ago.  This is to ensure INR stability.   2.  He also reported dizziness.  He thought it might be r/t to his Melatonin or Drondarone.  Advised, if this occurs again, to ensure he informs his Cardiologist;  (has appt in one wk).    Warfarin Dosing Instructions:  (has 2.5mg tabs)   - Continue current warfarin dose 1.25 mg daily.    Instructed patient to follow up no later than:  1-2 wks.   - INR scheduled on 6/9/20 @ [facility].    Education provided: target INR goal and significance of current INR result, potential interaction between warfarin and Melatonin, no interaction anticipated between warfarin and Ambien and importance of notifying clinic for changes in medications    Trenton verbalizes understanding and agrees to warfarin dosing plan.    Instructed to call the [facility] for any changes, questions or concerns. (#[phone])   ?   Yamileth Flores RN    Subjective/Objective:      Trenton Nova, a 80 y.o. male is on warfarin.     Trenton reports:     Home warfarin dose: as updated on anticoagulation calendar per template     Missed doses: No     Medication changes:  Yes: Ambien dose increased from 5mg to 10mg HS and reported he stopped Melatonin 4 days ago.   - has  been on Drondarone since 5/6/20.     S/S of bleeding or thromboembolism:  No     New Injury or illness:  No     Changes in diet or alcohol consumption:  No     Upcoming surgery, procedure or cardioversion:  No    Anticoagulation Episode Summary     Current INR goal:   2.0-3.0   TTR:   49.6 % (1 y)   Next INR check:   6/9/2020   INR from last check:   2.10 (6/2/2020)   Weekly max warfarin dose:      Target end date:      INR check location:      Preferred lab:      Send INR reminders to:   [facility]    Indications    Atrial Fibrillation [I48.91]  Long term (current) use of anticoagulants [Z79.01]           Comments:            Anticoagulation Care Providers     Provider Role Specialty Phone number    Sarwat Mayorga MD Referring Internal Medicine [phone]

## 2021-06-08 NOTE — PROGRESS NOTES
"Trenton Nova is a 80 y.o. male who is being evaluated via a billable telephone visit.      The patient has been notified of following:     \"This telephone visit will be conducted via a call between you and your physician/provider. We have found that certain health care needs can be provided without the need for a physical exam.  This service lets us provide the care you need with a short phone conversation.  If a prescription is necessary we can send it directly to your pharmacy.  If lab work is needed we can place an order for that and you can then stop by our lab to have the test done at a later time.    Telephone visits are billed at different rates depending on your insurance coverage. During this emergency period, for some insurers they may be billed the same as an in-person visit.  Please reach out to your insurance provider with any questions.    If during the course of the call the physician/provider feels a telephone visit is not appropriate, you will not be charged for this service.\"    Patient has given verbal consent to a Telephone visit? Yes    What phone number would you like to be contacted at? [phone]    Patient would like to receive their AVS by AVS Preference: Mail a copy.    Additional provider notes: Fatigue.    Patient has generalized fatigue he recently underwent a Whipple procedure at the [facility] for bile duct cancer.  CA-19-9 is pending along with comprehensive lab tests and urinalysis see below.    The patient is sleeping better on a combination of melatonin with Benadryl and Ambien.  The patient sleeps about 7 hours per night but after he has breakfast in the morning he feels so fatigued he has to go lie down.  The patient's Whipple procedure for bile duct cancer was not that long ago and I told him it may take 1 to 2 years to really get his legs and strength back.    He denies blood in the stool or urine medication list reviewed reconciled in the " chart.    Assessment/Plan:  Generalized fatigue today check hemogram plus comprehensive metabolic profile urinalysis TSH plus CA-19-9 level.    Bile duct cancer status post Whipple procedure recovery.  Will take some time and may be etiology for fatigue.    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  Contributing to generalized fatigue and insomnia.  Continue same regimen of Benadryl melatonin and Ambien.  Sleep pattern is improved with patient reporting 7 hours of restful sleep.      Phone call duration:  21 minutes    Joy C Steinert, CMA

## 2021-06-08 NOTE — PROGRESS NOTES
Office Visit - Physical    Trenton Nova   76 y.o. male    Date of Visit: 1/17/2017    Chief Complaint   Patient presents with     Annual Exam     Physical Exam  fasting     Knee Pain     right       Subjective: Physical.    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  For physical exam.    Right knee bothers.  Suggest orthopedic consultation at [facility].    Colonoscopy dated January 24, 2012 showed a tubular adenomatous polyp.  Nonsmoker no excess alcohol no alcohol and no known drug allergies but for sulfa.    Anoscopy is pending.    ROS: A comprehensive review of systems was performed and was otherwise negative    Medications:   Prior to Admission medications    Medication Sig Start Date End Date Taking? Authorizing Provider   calcium carbonate-vitamin D2 500 mg(1,250mg) -200 unit tablet Take 1 tablet by mouth 2 (two) times a day.   Yes PROVIDER, HISTORICAL   levothyroxine 50 mcg cap Take 1 tablet by mouth Daily at 6:00 am.   Yes Sarwat Mayorga MD   multivitamin therapeutic (THERAGRAN) tablet Take 1 tablet by mouth daily.   Yes PROVIDER, HISTORICAL   omeprazole (PRILOSEC) 20 MG capsule Take 20 mg by mouth daily.   Yes Sarwat Mayorga MD   simvastatin (ZOCOR) 20 MG tablet Take 20 mg by mouth bedtime.   Yes Sarwat Mayorga MD   sotalol (BETAPACE) 80 MG tablet 40 mg 2 times a day at 6:00 am and 4:00 pm. 6/25/15  Yes PROVIDER, HISTORICAL   terazosin (HYTRIN) 1 MG capsule Take 1 mg by mouth bedtime.   Yes PROVIDER, HISTORICAL   warfarin (COUMADIN) 2.5 MG tablet Take 0.5 - 1 tablet by mouth daily as directed. 3/12/15  Yes Sarwat Mayorga MD   tadalafil (CIALIS) 5 MG tablet Take 5 mg by mouth daily as needed for erectile dysfunction.    PROVIDER, HISTORICAL       Allergies:  Allergies   Allergen Reactions     Sulfa (Sulfonamide Antibiotics)        Immunizations:   Immunization History   Administered Date(s) Administered     DT (pediatric) 11/15/2004     Influenza [id number], 02/09/2010     Influenza high dose,  seasonal 10/21/2015, 2016     Influenza, inj, historic 2009     Influenza, seasonal,quad inj 6-35 mos 10/04/2013, 10/16/2014     Pneumo Conj 13-V (2010&after) 2016     Pneumo Polysac 23-V 2005     Td, historic 11/15/2004     Tdap 2014       Health Maintenance: Immunizations reviewed up to date.    Past Medical History: Smoldering multiple myeloma.    Myeloma kidney.    Hyperlipidemia.    Hypothyroidism on replacement.    Atrial fibrillation status post permanent pacemaker.    Prior shoulder surgery.    Hernia repair.    Cervical neck fusion posterior approach.        Past Surgical History: Cervical spine fusion posterior approach.    Permanent pacemaker.    Hernia repair.    Shoulder surgery.        Family History: Mother  age 88.    Father  uncertain age and cause.    2 children well.     twice first wife  of brain cancer.    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  No recurrence.    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  Some of the year lives in [place].    Exam Chest clear to auscultation and percussion.  Heart tones regular rhythm without murmur rub or gallop.  Abdomen soft nontender no organomegaly.  No peritoneal signs.  Extremities free of edema cyanosis or clubbing.  Neck veins nondistended no thyromegaly or scleral icterus noted, carotids full.  Skin warm and dry easily conversant good spirited.  Normal intelligence.  Neurologically intact no gross localizing findings.  Breast exam in its entirety negative.  Including genital rectal prostate 2 pulse noted all 4 extremities skin negative for lymph negative neuro negative psych normal HEENT negative back straightor spine tenderness or deformity noted posterior spine from prior surgical fusion cervical spine.    Assessment and Plan  Gen. medical exam check hemogram plus competence of metabolic profile urinalysis lipid panel PSA TSH.    Patient is followed also by [facility]  urologist Dr. LEVINE.    Smoldering multiple myeloma followed by medicine oncology.    Myeloma kidney previously seen by Dr. Tera Kothari nephrologist.    Sulfa allergy.    Atrial fibrillation status post permanent pacemaker followed by Dr. AMANDA [facility] Department of cardiac electrophysiology.    The following high BMI interventions were performed this visit: encouragement to exercise    Sarwat Mayorga MD    Patient Active Problem List   Diagnosis     TMJ Pain     Neck Pain     Hyperlipidemia     Benign Monoclonal Hypergammaglobulinemia     Atrial Fibrillation     Benign Prostatic Hypertrophy     Primary Osteoarthritis Of The Cervical Vertebrae     Reactions To Sulfa Drugs     Removal of staples     Multiple myeloma     Colon polyp     Long term (current) use of anticoagulants     Shoulder pain

## 2021-06-08 NOTE — TELEPHONE ENCOUNTER
Who is calling:  Ileana with [facility]  Reason for Call:  Nurse was returning call from [facility] on patient.  At time of Ileana's call [facility] was unavailable.   Okay to leave a detailed message: Yes

## 2021-06-08 NOTE — TELEPHONE ENCOUNTER
Who is calling:  Patient  Reason for Call:  Patient states he is no longer having cardioversion procedure and asked for call back from [facility] to advise on follow up for next INR.  Date of last appointment with primary care: na  Okay to leave a detailed message: Yes

## 2021-06-08 NOTE — TELEPHONE ENCOUNTER
Who is calling:  Patient   Reason for Call:  Patient was calling to speak with [facility] regarding new medication.  Patient states he is suppose to start taking Multaq the evening of 05/06/20.   Patient wishes to speak with [facility] prior about this medication  Date of last appointment with primary care:   Okay to leave a detailed message: Yes

## 2021-06-08 NOTE — TELEPHONE ENCOUNTER
Patient Returning Call  Reason for call:  Return call   Information relayed to patient:  No message relay   Patient has additional questions:  Yes  If YES, what are your questions/concerns:  Caller would like a call back  Okay to leave a detailed message?: Yes

## 2021-06-08 NOTE — TELEPHONE ENCOUNTER
Called and talked to Jonatan,     - he reported no cardioversion scheduled.     - discontinued Multaq starting 6/12.  (he reported already taking dose this morning.)     - INR scheduled on 6/16/20 @ [facility].

## 2021-06-08 NOTE — TELEPHONE ENCOUNTER
Called and spoke with Trenton,     - Trenton is being started on Multaq.     per Micromedex, Concurrent use of DRONEDARONE and WARFARIN may result in MAJOR increased INR.      - necessary to closely monitor INR, especially within the first week after initiating dronedarone      - scheduled for Cardioversion this Fri. 5/8/20 @ [facility]

## 2021-06-08 NOTE — TELEPHONE ENCOUNTER
ANTICOAGULATION  MANAGEMENT    Assessment     Today's INR result of 2.10 is Therapeutic (goal INR of 2.0-3.0)        Warfarin taken as previously instructed    No new diet changes affecting INR    Potential interaction between Multaq (Dronedarone) discontinued and warfarin which may affect subsequent INRs    Continues to tolerate warfarin with no reported s/s of bleeding or thromboembolism     Previous INR was Therapeutic at 2.30 on 6/9/20.    Possibly will be scheduled for Cardioversion in the next 2 wks after discontinuing Multaq.    Plan:     Spoke with Trenton regarding INR result and instructed:     Warfarin Dosing Instructions:  (eveningshas 2.5mg tabs)   - Change warfarin dose to 2.5 mg daily on Tuesdays; and  1.25 mg daily rest of week  (14.3 % change)    Instructed patient to follow up no later than:  One wk.   - INR scheduled on 6/24/20 @ [facility].    Education provided: target INR goal and significance of current INR result, potential interaction between warfarin and Multaq and importance of notifying clinic for changes in medications    Trenton verbalizes understanding and agrees to warfarin dosing plan.    Instructed to call the [facility] for any changes, questions or concerns. (#[phone])   ?   Yamileth Flores RN    Subjective/Objective:      Trenton Nova, a 80 y.o. male is on warfarin.     Trenton reports:     Home warfarin dose: as updated on anticoagulation calendar per template     Missed doses: No     Medication changes:  Yes:  6/15/20 started on Furosemide 20mg daily for  Elevated BNP / CHF.  Stopped Multaq (Dronedarone) on 6/12/20.     S/S of bleeding or thromboembolism:  No     New Injury or illness:  Yes:  Continues with persistent atrial fibrillation / shortness of breath.   Changes in diet or alcohol consumption:  No     Upcoming surgery, procedure or cardioversion:  Yes.  Possible Cardioversion in 2 wks.    Anticoagulation Episode Summary     Current INR goal:   2.0-3.0   TTR:   49.6 %  (1 y)   Next INR check:   6/23/2020   INR from last check:   2.10 (6/16/2020)   Weekly max warfarin dose:      Target end date:      INR check location:      Preferred lab:      Send INR reminders to:   [facility]    Indications    Atrial Fibrillation [I48.91]  Long term (current) use of anticoagulants [Z79.01]           Comments:            Anticoagulation Care Providers     Provider Role Specialty Phone number    Sarwat Mayorga MD Referring Internal Medicine [phone]

## 2021-06-08 NOTE — TELEPHONE ENCOUNTER
Dr. Mayorga,     - Trenton stopped his Bendaryl, since it was making him really groggy in the morning.  He reported, feeling so much better in the morning, since stopping Benadryl over the weekend.     - he would like to know, if he can take 2 tablets of Melatonin at bed-time?   Along with his Ambien?

## 2021-06-08 NOTE — TELEPHONE ENCOUNTER
Controlled Substance Refill Request  Medication Name:   Requested Prescriptions     Pending Prescriptions Disp Refills     zolpidem (AMBIEN) 5 MG tablet 30 tablet 1     Sig: Take 1 tablet (5 mg total) by mouth at bedtime as needed for sleep.     Date Last Fill: 04/22/2020  Requested Pharmacy: [id number] [place] [address] AT [facility] & [address]   Submit electronically to pharmacy  Controlled Substance Agreement on file:   Encounter-Level CSA Scan Date:    There are no encounter-level csa scan date.        Last office visit:  04/22/2020  Med has greatly improved his sleep.

## 2021-06-08 NOTE — TELEPHONE ENCOUNTER
"Question following Office Visit  When did you see your provider: May 14, 2020  What is your question:  Dr. Mayorga advised patient to add Melatonin (patient did not know the strength) and Benadryl 25 mg to his current Ambien nightly medication routine in an effort to improve his insomnia.  This has helped increase the amount of sleep but not the fatigued.     He continues to have \"no energy.\"  What is next for patient?  Okay to leave a detailed message: Yes    "

## 2021-06-08 NOTE — TELEPHONE ENCOUNTER
ANTICOAGULATION  MANAGEMENT    Assessment     Today's INR result of 4.50 is Supratherapeutic (goal INR of 2.0-3.0)        Warfarin taken as previously instructed    No new diet changes affecting INR    Potential interaction between Dronedarone and warfarin which may affect subsequent INRs    Continues to tolerate warfarin with no reported s/s of bleeding or thromboembolism     Previous INR was Supratherapeutic at 3.80 on 5/8/20.    S/p Cardioversion on 5/8/20 and successful conversion to atrial paced.    Tested for Covid-19 was negative on 5/7/20.    Plan:     Spoke with Trenton regarding INR result and instructed:     Warfarin Dosing Instructions:    - advised to HOLD warfarin dose today,    - then change warfarin dose to 2.5 mg daily on Mon/Thurs; and 1.25 mg daily rest of week.   - (10 % change)    Instructed patient to follow up no later than:  INR schedule don 5/15/20 @ [facility].    Education provided: target INR goal and significance of current INR result, potential interaction between warfarin and Dronedarone (Multaq) and importance of notifying clinic for changes in medications    Trenton verbalizes understanding and agrees to warfarin dosing plan.    Instructed to call the [facility] for any changes, questions or concerns. (#[phone])   ?   Yamileth Flores RN    Subjective/Objective:      Trenton Meraradha, a 80 y.o. male is on warfarin.     Trenton reports:     Home warfarin dose: as updated on anticoagulation calendar per template     Missed doses: No     Medication changes:  Yes: started on Dronedarone (Multaq) 400mg BID on 5/6/20.   - per [employer], Concurrent use of DRONEDARONE and WARFARIN may result in MAJOR increased INR.      S/S of bleeding or thromboembolism:  No     New Injury or illness:  Yes:  S/p Cardioversion 5/8/20.     Changes in diet or alcohol consumption:  No     Upcoming surgery, procedure or cardioversion:  No    Anticoagulation Episode Summary     Current INR goal:   2.0-3.0    TTR:   52.8 % (1 y)   Next INR check:   5/18/2020   INR from last check:   4.50! (5/11/2020)   Weekly max warfarin dose:      Target end date:      INR check location:      Preferred lab:      Send INR reminders to:   [facility] [place]    Indications    Atrial Fibrillation [I48.91]  Long term (current) use of anticoagulants [Z79.01]           Comments:            Anticoagulation Care Providers     Provider Role Specialty Phone number    Sarwat Mayorga MD Referring Internal Medicine [phone]

## 2021-06-09 ENCOUNTER — RECORDS - HEALTHEAST (OUTPATIENT)
Dept: ADMINISTRATIVE | Facility: CLINIC | Age: 81
End: 2021-06-09

## 2021-06-09 NOTE — TELEPHONE ENCOUNTER
Trenton calls in tonight with concerns about swollen ankles and feet.  Per discharge note of 7/9/2020 he should then follow up with his provider.  It also states if he gains more than 3 pounds in 1 day or 5 pounds in a week he should let them know as well. He states he has gained about 5 pounds in a week.  He will call Dr. Mayorga's office Monday morning.  Sending note to have follow up with him as well. He agrees to this plan.  Did tell him if he has difficulty breathing to go to ER otherwise tomorrow morning is soon enough.     Reason for Disposition    [1] MILD swelling of both ankles (i.e., pedal edema) AND [2] new onset or worsening    Additional Information    Negative: Severe difficulty breathing (e.g., struggling for each breath, speaks in single words)    Negative: Looks like a broken bone or dislocated joint (e.g., crooked or deformed)    Negative: Sounds like a life-threatening emergency to the triager    Negative: Chest pain    Negative: Followed a leg injury    Negative: [1] Small area of swelling AND [2] followed an insect bite to the area    Negative: Swelling of one ankle joint    Negative: Swelling of knee is main symptom    Negative: Pregnant    Negative: Postpartum (from 0 to 6 weeks after delivery)    Negative: Difficulty breathing at rest    Negative: Entire foot is cool or blue in comparison to other side    Negative: [1] Can't walk or can barely walk AND [2] new onset    Negative: [1] Difficulty breathing with exertion (e.g., walking) AND [2] new onset or worsening    Negative: [1] Red area or streak AND [2] fever    Negative: [1] Swelling is painful to touch AND [2] fever    Negative: [1] Cast on leg or ankle AND [2] now increased pain    Negative: Patient sounds very sick or weak to the triager    Negative: SEVERE leg swelling (e.g., swelling extends above knee, entire leg is swollen, weeping fluid)    Negative: [1] Red area or streak [2] large (> 2 in. or 5 cm)    Negative: [1] Thigh or  calf pain AND [2] only 1 side AND [3] present > 1 hour    Negative: [1] Thigh, calf, or ankle swelling AND [2] only 1 side    Negative: [1] Thigh, calf, or ankle swelling AND [2] bilateral AND [3] 1 side is more swollen    Negative: [1] MODERATE leg swelling (e.g., swelling extends up to knees) AND [2] new onset or worsening    Negative: Swelling of face, arm or hands  (Exception: slight puffiness of fingers occurring during hot weather)    Negative: Looks like a boil, infected sore, deep ulcer or other infected rash (spreading redness, pus)    Protocols used: LEG SWELLING AND EDEMA-A-AH

## 2021-06-09 NOTE — TELEPHONE ENCOUNTER
FYI - Status Update  Who is Calling: Patient  Update: Returning call from Faviola. Tried to warm transfer and was going to straight to voicemail. Please call patient back.  Okay to leave a detailed message?:  Yes

## 2021-06-09 NOTE — TELEPHONE ENCOUNTER
Suggest Lasix 40 mg number 30 tablets take 1 tablet daily with 3 refills.  Please call patient and pharmacy.  Please asked patient avoid salt in diet and to make an appointment to be seen by me sometime this week.

## 2021-06-09 NOTE — TELEPHONE ENCOUNTER
ANTICOAGULATION  MANAGEMENT    Assessment     Today's INR result of 1.9 is Subtherapeutic (goal INR of 2.0-3.0)        Warfarin taken as previously instructed    No new diet changes affecting INR    No new medication/supplements affecting INR    Continues to tolerate warfarin with no reported s/s of bleeding or thromboembolism     Previous INR was Therapeutic    Plan:     Spoke with Trenton regarding INR result and instructed:     Warfarin Dosing Instructions:  He did take the 2.5 mg last night as instructed then continue current warfarin dose 2.5 mg daily on Tuesdays; and 1.25 mg daily rest of week  (0 % change)    Instructed patient to follow up no later than: one week    Education provided: importance of therapeutic range, importance of following up for INR monitoring at instructed interval, importance of taking warfarin as instructed and potential interaction between warfarin and Amiodarone    Jonatan verbalizes understanding and agrees to warfarin dosing plan.    Instructed to call the [facility] for any changes, questions or concerns. (#[phone])   ?   Lakisha Price RN    Subjective/Objective:      Trenton LINDA Meraradha, a 80 y.o. male is on warfarin.     Trenton reports:     Home warfarin dose: verbally confirmed home dose with Jonatan and updated on anticoagulation calendar     Missed doses: No     Medication changes:  Yes: started the Amiodarone today. Discussed weekly INR.      S/S of bleeding or thromboembolism:  No     New Injury or illness:  No     Changes in diet or alcohol consumption:  No     Upcoming surgery, procedure or cardioversion:  No    Anticoagulation Episode Summary     Current INR goal:   2.0-3.0   TTR:   48.4 % (1 y)   Next INR check:   7/30/2020   INR from last check:   1.90! (7/23/2020)   Weekly max warfarin dose:      Target end date:      INR check location:      Preferred lab:      Send INR reminders to:   [facility]    Indications    Atrial Fibrillation [I48.91]  Long  term (current) use of anticoagulants [Z79.01]           Comments:            Anticoagulation Care Providers     Provider Role Specialty Phone number    Sarwat Mayorga MD Referring Internal Medicine [phone]

## 2021-06-09 NOTE — TELEPHONE ENCOUNTER
ACN called and left a message for Ileana with confirmation that message received. Made aware that ACN will monitor patient's INR weekly.    Sol Hoff RN

## 2021-06-09 NOTE — TELEPHONE ENCOUNTER
Who is calling:  Ileana   Reason for Call:  Medication notification   Date of last appointment with primary care:   Okay to leave a detailed message: Yes    Patient will be starting the following medication on 7/24 for chronic afib     Amiodarone 200mg 2x daily for 2 weeks and then 200mg 1x daily.

## 2021-06-09 NOTE — TELEPHONE ENCOUNTER
FYI - Status Update  Who is Calling: Patient  Update: Had his procedure at [facility]. Now calling to f/u with SAGE Salmeron regarding INR at hospital,  Dosing, and follow up INR.  Requests Faviola call him back.  Okay to leave a detailed message?:  No

## 2021-06-09 NOTE — TELEPHONE ENCOUNTER
Called and talked with Jonatan.     - INR on 7/9/20 result from [facility] was 2.5     - on 7/8/20 repeat Cardiovrsion - atrial fibrillation, and initiated Tikosyn (Dofetilide) 125mcg one tab every 12 hours.    (no known interaction of warfarin and Tikosyn).     - reported feeling good.     - will do wkly INR checks after Cardioversion.     - INR scheduled on 7/16/20 @ [facility].

## 2021-06-09 NOTE — TELEPHONE ENCOUNTER
New Appointment Needed  What is the reason for the visit:    Inpatient/ED Follow Up Appt Request  At what hospital or facility were you seen?: [facility]  What is the reason you were seen?: Cardioversion procedure  What date were you admitted?: date: 7/07/20  What date were you discharged?: date: 7/10/20  What was the recommended timeframe for your follow up appointment?: 7 - 10 days  Provider Preference: Prefers Dr Mayorga  How soon do you need to be seen?: This week Thursday or Friday  Waitlist offered?: No  Okay to leave a detailed message:  Yes    Also there is a chart note 7/13/20 to schedule a visit with Dr Mayorga regarding swollen ankles and feet.    Patient is requesting an in-office visit. Can be available any time Thursday or Friday. You can leave a message for him with appointment time.

## 2021-06-09 NOTE — TELEPHONE ENCOUNTER
Dr. Mayorga,  Would you be able to see this patient in clinic this week?  Please advise.  Thank you.  Eileen LONG, CLAUDIA/CMT....................9:45 AM

## 2021-06-09 NOTE — TELEPHONE ENCOUNTER
Yes I would be happy to see the patient later in the week not today or tomorrow but Thursday or Friday.  Please check with Charito my assistant as to best and time.

## 2021-06-09 NOTE — TELEPHONE ENCOUNTER
ANTICOAGULATION  MANAGEMENT    Assessment     Today's INR result of 2.60 is Therapeutic (goal INR of 2.0-3.0)        Warfarin taken as previously instructed    No new diet changes affecting INR    No new medication/supplements affecting INR    Continues to tolerate warfarin with no reported s/s of bleeding or thromboembolism     Previous INR was Therapeutic at 2.10 on 6/24/20.    Scheduled for admission on 7/7/20 @ [facility] for initiation of Tikosyn with possible repeat Cardioversion.    Plan:     Spoke with Jonatan regarding INR result and instructed:     Warfarin Dosing Instructions:  (evenings. Has 2.5mg tabs)   - Continue current warfarin dose 2.5 mg daily on Tuesdays; and 1.25 mg daily rest of week.    Instructed patient to follow up no later than: advised to check INR one wk after hospital discharge.    Education provided: importance of consistent vitamin K intake, target INR goal and significance of current INR result and importance of notifying clinic for changes in medications    Jonatan verbalizes understanding and agrees to warfarin dosing plan.    Instructed to call the [facility] for any changes, questions or concerns. (#[phone])   ?   Yamileth Flores RN    Subjective/Objective:      Trenton Nova, a 80 y.o. male is on warfarin.     Trenton reports:     Home warfarin dose: as updated on anticoagulation calendar per template     Missed doses: No     Medication changes:  No     S/S of bleeding or thromboembolism:  No     New Injury or illness:  No     Changes in diet or alcohol consumption:  No     Upcoming surgery, procedure or cardioversion:  No    Anticoagulation Episode Summary     Current INR goal:   2.0-3.0   TTR:   49.6 % (1 y)   Next INR check:   7/16/2020   INR from last check:   2.60 (7/2/2020)   Weekly max warfarin dose:      Target end date:      INR check location:      Preferred lab:      Send INR reminders to:   [facility]    Indications    Atrial Fibrillation  [I48.91]  Long term (current) use of anticoagulants [Z79.01]           Comments:            Anticoagulation Care Providers     Provider Role Specialty Phone number    Sarwat Mayorga MD Referring Internal Medicine [phone]

## 2021-06-09 NOTE — TELEPHONE ENCOUNTER
ANTICOAGULATION  MANAGEMENT    Assessment     Today's INR result of 2.1 is Therapeutic (goal INR of 2.0-3.0)        Warfarin taken as previously instructed    No new diet changes affecting INR    No interaction expected between stopping tikosyn and furosemide and warfarin    Continues to tolerate warfarin with no reported s/s of bleeding or thromboembolism     Previous INR was Therapeutic   Per [facility] visit note OV dated 7/15/2020:  May need to consider Amiodarone-- but need to wash out Tikosyn for 1 week  I will talk to Arreaga and schedulers about AF PVI ablation and need for amiodarone as bridge.       Plan:     Spoke with Trenton regarding INR result and instructed:     Warfarin Dosing Instructions:  Continue current warfarin dose    2.5 mg every Tue; 1.25 mg all other days       (0 % change)    Instructed patient to follow up no later than: one week appointment made.  Patient made aware that he will need weekly INR's if starting on Amiodarone and / or being prepped for PVI procedure    Education provided: importance of therapeutic range and target INR goal and significance of current INR result    Trenton verbalizes understanding and agrees to warfarin dosing plan.    Instructed to call the [facility] for any changes, questions or concerns. (#[phone])   ?   Sol Hoff RN    Subjective/Objective:      Trenton Nova, a 80 y.o. male is on warfarin.     Trenton reports:     Home warfarin dose: as updated on anticoagulation calendar per template     Missed doses: No     Medication changes:  Yes: stopped tikosyn and furosemide     S/S of bleeding or thromboembolism:  No     New Injury or illness:  No     Changes in diet or alcohol consumption:  No     Upcoming surgery, procedure or cardioversion:  No    Anticoagulation Episode Summary     Current INR goal:   2.0-3.0   TTR:   49.5 % (1 y)   Next INR check:   7/23/2020   INR from last check:   2.10 (7/16/2020)   Weekly max warfarin dose:      Target end  date:      INR check location:      Preferred lab:      Send INR reminders to:   [facility]    Indications    Atrial Fibrillation [I48.91]  Long term (current) use of anticoagulants [Z79.01]           Comments:            Anticoagulation Care Providers     Provider Role Specialty Phone number    Sarwat Mayorga MD Referring Internal Medicine [phone]

## 2021-06-09 NOTE — TELEPHONE ENCOUNTER
Anticoagulation Management    Unable to reach Trenton today.    Today's INR result of 1.9 is Subtherapeutic (goal INR of 2.0-3.0).     Follow up required to assess for changes .    Left message to take a booster dose of warfarin,  2.5 mg tonight.       ACN to follow up    Lakisha Price RN

## 2021-06-09 NOTE — PROGRESS NOTES
Office Visit - Follow up    Trenton Nova   80 y.o. male    Date of Visit: 7/16/2020    Chief Complaint   Patient presents with     Hospital Visit Follow Up     Atrial Fibrillation       Subjective: Hypertension.    Hospital follow-up for atrial fibrillation.  [facility] from July 7 through July 9, 2020 atrial fibrillation seen by cardiologist and electrophysiologist.  Tikosyn was given during hospitalization.  Has coughed up green phlegm without fever chills night sweats.  Offered antibiotic patient reluctant.  Suggest Metamucil Colace for difficult bowel movements he is having every other day.  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  The patient's weight is stable he is down a bit from 168 previously to 152 but his appetite is good.  The patient is under the care of Dr. RENAE cardiac electrophysiologist at [facility] atrial fibrillation plus Dr. Sarwat Arreaga.  Tikosyn was given 125 mcg every 12 hours.  He stopped it yesterday.  He was hospitalized during that period of time.    No blood in stool or urine or sputum.  Medication list reviewed reconciled in the chart.    ROS: A comprehensive review of systems was performed and was otherwise negative    Medications:  Prior to Admission medications    Medication Sig Start Date End Date Taking? Authorizing Provider   calcium carbonate-vitamin D2 500 mg(1,250mg) -200 unit tablet Take 1 tablet by mouth daily.    Yes PROVIDER, HISTORICAL   cyanocobalamin (VITAMIN B-12) 500 MCG tablet Take 500 mcg by mouth daily.   Yes PROVIDER, HISTORICAL   furosemide (LASIX) 40 MG tablet Take 1 tablet (40 mg total) by mouth daily. 7/13/20  Yes Sarwat Mayorga MD   levothyroxine 50 mcg cap Take 1 tablet by mouth Daily at 6:00 am.   Yes Sarwat Mayorga MD   multivitamin therapeutic (THERAGRAN) tablet Take 1 tablet by mouth daily.   Yes PROVIDER, HISTORICAL   omeprazole (PRILOSEC) 20 MG capsule Take 20 mg by mouth daily.    Yes Sarwat Mayorga MD   rosuvastatin (CRESTOR) 10 MG tablet  3/11/20  Yes PROVIDER, HISTORICAL   terazosin (HYTRIN) 5 MG capsule Take 5 mg by mouth. 9/22/15  Yes PROVIDER, HISTORICAL   traZODone (DESYREL) 50 MG tablet Take 1 tablet (50 mg total) by mouth at bedtime. 4/17/20  Yes Sarwat Mayorga MD   vit C/E/Zn/coppr/lutein/zeaxan (PRESERVISION AREDS-2 ORAL) Take by mouth 2 (two) times a day.   Yes PROVIDER, HISTORICAL   warfarin ANTICOAGULANT (COUMADIN/JANTOVEN) 2.5 MG tablet Take 1&1/2 tabs (3.75mg) on Thursdays and take 1 tab (2.5mg) all other days by mouth daily, as directed.  Adjust dose based on INR results. 12/27/19  Yes Sarwat Mayorga MD   zolpidem (AMBIEN) 10 mg tablet Take 1 tablet (10 mg total) by mouth at bedtime as needed for sleep. 5/29/20  Yes Sarwat Mayorga MD   triamcinolone (KENALOG) 0.1 % cream Apply thin layer to affected areas (arms & hips) twice daily X 1 month 12/31/19   Sarwat Mayorga MD       Allergies:   Allergies   Allergen Reactions     Sulfa (Sulfonamide Antibiotics)        Immunizations:   Immunization History   Administered Date(s) Administered     DT (pediatric) 11/15/2004     IG-IM 04/18/2000     Influenza [id number], 02/09/2010     Influenza high dose,seasonal,PF, 65+ yrs 10/21/2015, 09/14/2016, 11/02/2017, 10/23/2018, 10/29/2019     Influenza, inj, historic,unspecified 09/30/2009, 10/01/2014     Influenza, seasonal,quad inj 6-35 mos 10/04/2013, 10/16/2014     Pneumo Conj 13-V (2010&after) 12/20/2016     Pneumo Polysac 23-V 11/08/2005     Td,adult,historic,unspecified 11/15/2004     Tdap 02/18/2014     ZOSTER, RECOMBINANT, IM 10/01/2019, 12/02/2019       Exam Chest clear to auscultation and percussion.  Heart tones regular rhythm without murmur rub or gallop.  Abdomen soft nontender no organomegaly.  No peritoneal signs.  Extremities free of edema cyanosis or clubbing.  Neck veins nondistended no thyromegaly or scleral icterus noted, carotids full.  Skin  warm and dry easily conversant good spirited.  Normal intelligence.  Neurologically intact no gross localizing findings.    Pulse 70 irregular variable 66-85 consistent with atrial fibrillation O2 sats 98% temperature this afternoon at 4 PM 98.2  F.  Blood pressure 116/56 as alluded to earlier weight 152 pounds BMI 23.46.  He is slightly pale but easily conversant good spirited appears stronger no leg edema no neck vein distention lung fields are clear no rales rhonchi or wheezes.  Not in acute distress not cachectic.  Appears stronger.  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    Atrial fibrillation previously on Tikosyn now on warfarin for anti-clot measure rate is controlled 66-85.    Hypertension controlled.  116/56.    Dependent edema better after 10-day course of furosemide.    Sulfa allergy.    History of smoldering multiple myeloma with myeloma kidney stable.  With next office visit July 28, 2020 will check comprehensive labs to include CA-19-9 plus comprehensive metabolic profile hemogram TSH and INR.    Cardiac electrophysiologist soon-to-be Dr. Sarwat Arreaga previously Dr. RENAE [facility] Department of cardiac electrophysiology.  May require cardioversion.  For atrial fibrillation as noted above.  On warfarin.    Time: total time spent with the patient was 25 minutes of which >50% was spent in counseling and coordination of care    The following low BMI interventions were performed this visit: weight gain advised    Sarwat Mayorga MD    Patient Active Problem List   Diagnosis     TMJ Pain     Neck Pain     Hyperlipidemia     Benign Monoclonal Hypergammaglobulinemia     Atrial Fibrillation     Benign Prostatic Hypertrophy     Primary Osteoarthritis Of The  Cervical Vertebrae     Reactions To Sulfa Drugs     Multiple myeloma (H)     Colon polyp     Long term (current) use of anticoagulants     Shoulder pain

## 2021-06-09 NOTE — TELEPHONE ENCOUNTER
ANTICOAGULATION  MANAGEMENT    Assessment     Today's INR result of 2.10 is Therapeutic (goal INR of 2.0-3.0)        Warfarin taken as previously instructed    - reported only taking 1.25mg warfarin dose last night.    No new diet changes affecting INR    No new medication/supplements affecting INR    Continues to tolerate warfarin with no reported s/s of bleeding or thromboembolism     Previous INR was Therapeutic at 2.10 on 6/16/20.    On 7/7/20 admission to [facility] for Tikosyn initiation with possible Cardioversion.    Plan:     Spoke with Jonatan regarding INR result and instructed:    1.  Ensure to take correct warfarin dose.    Warfarin Dosing Instructions:  (evenings. has 2.5mg tabs)   - tonight, advised to take 2.5mg warfarin dose,   - thereafter, Continue current warfarin dose 2.5 mg daily on Tuesday; and 1.25 mg daily rest of week.    Instructed patient to follow up no later than:  2 wks.    Education provided: importance of consistent vitamin K intake, target INR goal and significance of current INR result, importance of taking warfarin as instructed, no interaction anticipated between warfarin and initiation of Tikoysn on 7/7/20 and importance of notifying clinic for changes in medications    Jonatan verbalizes understanding and agrees to warfarin dosing plan.    Instructed to call the [facility] for any changes, questions or concerns. (#[phone])   ?   Yamileth Flores RN    Subjective/Objective:      Trenton LINDA Nova, a 80 y.o. male is on warfarin.     Trenton reports:     Home warfarin dose: template incorrect; verbally confirmed home dose with Jonatan and updated on anticoagulation calendar     Missed doses: No     Medication changes:  Yes: Multaq discontinued on 6/12/20.    - reported, will be initiated on Tikosyn with hospital admission.     S/S of bleeding or thromboembolism:  No     New Injury or illness:  Yes:  Continues with persistent atrial fibrillation.     Changes in diet or alcohol  consumption:  No     Upcoming surgery, procedure or cardioversion:  Yes:  Tikosyn admission / Cardioversion on 7/7/20 @ [facility].    Anticoagulation Episode Summary     Current INR goal:   2.0-3.0   TTR:   49.5 % (1 y)   Next INR check:   7/8/2020   INR from last check:   2.10 (6/24/2020)   Weekly max warfarin dose:      Target end date:      INR check location:      Preferred lab:      Send INR reminders to:   [facility]    Indications    Atrial Fibrillation [I48.91]  Long term (current) use of anticoagulants [Z79.01]           Comments:            Anticoagulation Care Providers     Provider Role Specialty Phone number    Sarwat Mayorga MD Referring Internal Medicine [phone]

## 2021-06-09 NOTE — TELEPHONE ENCOUNTER
Left detailed message for the patient relaying message below from Dr. Mayorga.  Asked that he call with any further questions and to schedule a virtual visit with Dr. Mayorga.    Eileen LONG CMA/IRINEO....................8:38 AM

## 2021-06-10 NOTE — TELEPHONE ENCOUNTER
Called and left detailed message with Jonatan.     - s/p PVI / Ablation on 8/25/20.     - INR on 8/26/20 tested @ [facility] - was 3.0   - INR on  8/25/20 was 2.5.  Give Vitamin-K 1mg dose. Administered 1mg warfarin dose on this day.     - just for tonight, advised to HOLD warfarin dose tonight.  Then tomorrow, resume 1.25mg daily.    (currently on Amiodarone since 7/24/20  Thru 10/6/20).     - will be checking INR wkly post ablation.     - INR scheduled Fri. 8/28/20 @ [facility]. For INR status check.

## 2021-06-10 NOTE — TELEPHONE ENCOUNTER
Called and left detailes message with Erasmo @ [facility].    - Scheduled for PVI / ablation on 8/25/20 with Dr. Sarwat Arreaga @ [facility] - r/t persistent atrial fibrillation. Will need weekly INR's.    - already scheduled INR for 8/11 and 8/18.  (wkly INR's.)    - needs INR range between 2.0 - 2.5.

## 2021-06-10 NOTE — TELEPHONE ENCOUNTER
ANTICOAGULATION  MANAGEMENT    Assessment     Today's INR result of 2.60 is Therapeutic (goal INR of 2.0-3.0)        Warfarin recently held as instructed which may be affecting INR    - HELD warfarin doses on 8/18-19, as instructed.    BOOST or ENSURE may be affecting diet and INR    - started drinking ENSURE or BOOST one can daily, to supplement daily nutritional intake, due to anorexia.    Potential interaction between Amiodarone and warfarin which may affect subsequent INRs    - Amiodarone dose decreased to 200mg daily on 8/18/20.   - Amiodarone initiated on 7/24/20 with 200mg two times a day for 14 days.    Continues to tolerate warfarin with no reported s/s of bleeding or thromboembolism     Previous INR was Supratherapeutic at 4.30 on  8/18/20.      NOTE: Scheduled for PVI / ablation on 8/25/20 with Dr. Sarwat Arreaga @ [facility] - r/t persistent atrial fibrillation. Will need weekly INR's. Needs INR range between 2.0 - 2.5.    Plan:     Spoke on phone with Jonatan regarding INR result and instructed:     Warfarin Dosing Instructions: (evenings. Has 2.5mg tabs)   - resume warfarin.   - Change warfarin dose to 1.25 mg daily.   - (10 % change)    Instructed patient to follow up no later than: one wk.   - advised to check INR after procedure - scheduled on 8/28/20 @ [facility].    Education provided: importance of consistent vitamin K intake, target INR goal and significance of current INR result and importance of notifying clinic for changes in medications    Jonatan verbalizes understanding and agrees to warfarin dosing plan.    Instructed to call the [facility] for any changes, questions or concerns. (#[phone])   ?   Yamileth Flores RN    Subjective/Objective:      Trenton LINDA Meraradha, a 80 y.o. male is on warfarin.     Trenton reports:     Home warfarin dose: as updated on anticoagulation calendar per template     Missed doses: Yes: held warfarin doses for 2 days.     Medication changes:  Yes: Amiodarone dose decreased to  200mg daily.     S/S of bleeding or thromboembolism:  No     New Injury or illness:  Yes: persistent atrial fibrillation.     Changes in diet or alcohol consumption:  Yes:  Started drinking ENSURE or BOOST protein supplement, due to anorexia (not feeling hungry even after looking at food).     Upcoming surgery, procedure or cardioversion:  Yes.  Scheduled PVI / Ablation on 8/25/20.    Anticoagulation Episode Summary     Current INR goal:   2.0-3.0   TTR:   49.5 % (1 y)   Next INR check:   8/28/2020   INR from last check:   2.60 (8/20/2020)   Weekly max warfarin dose:      Target end date:      INR check location:      Preferred lab:      Send INR reminders to:   [facility]    Indications    Atrial Fibrillation [I48.91]  Long term (current) use of anticoagulants [Z79.01]           Comments:            Anticoagulation Care Providers     Provider Role Specialty Phone number    Sarwat Mayorga MD Referring Internal Medicine [phone]

## 2021-06-10 NOTE — TELEPHONE ENCOUNTER
Who is calling:  Trenton  Reason for Call:  Patient called to let Faviola know that he starting taking Ensure Boost, 8 oz bottle, today. He will be taking 1 daily.  Date of last appointment with primary care: 8/11/2020  Okay to leave a detailed message: Yes

## 2021-06-10 NOTE — TELEPHONE ENCOUNTER
ANTICOAGULATION  MANAGEMENT    Assessment     Today's INR result of 2.40 is Therapeutic (goal INR of 2.0-3.0)        Warfarin taken as previously instructed    No new diet changes affecting INR     Potential interaction between Amiodarone and warfarin which may affect subsequent INRs.   - Iinitiated on 7/24/20, Amiodarone 200mg two times a day for 14 days, thereafter, daily for 60 days, from 7/24 - 10/6/20.    Continues to tolerate warfarin with no reported s/s of bleeding or thromboembolism     Previous INR was Therapeutic at 2.10 on 7/28/20.    Scheduled for PVI / ablation on 8/25/20 with Dr. Sarwat Arreaga @ [facility] - r/t persistent atrial fibrillation. Will need weekly INR's.    Plan:     Left a detailed message for Trenton regarding INR result and instructed:     Warfarin Dosing Instructions:    - Continue current warfarin dose 2.5 mg daily on Tuesdays; and 1.25 mg daily rest of week.    Instructed patient to follow up no later than:  One wk.    Education provided: importance of consistent vitamin K intake and target INR goal and significance of current INR result    Instructed to call the [facility] for any changes, questions or concerns. (#[phone])   ?   Yamileth Flores RN    Subjective/Objective:      Trenton Nova, a 80 y.o. male is on warfarin.     Trenton reports:     Home warfarin dose: as updated on anticoagulation calendar per template     Missed doses: No     Medication changes:  No now on Amiodarone since 7/24/20     S/S of bleeding or thromboembolism:  No     New Injury or illness:  No     Changes in diet or alcohol consumption:  No     Upcoming surgery, procedure or cardioversion:  Yes:  Scheduled for PVI / ablatin on 8/26/20 @ [facility].    Anticoagulation Episode Summary     Current INR goal:   2.0-3.0   TTR:   48.6 % (1 y)   Next INR check:   8/11/2020   INR from last check:   2.40 (8/4/2020)   Weekly max warfarin dose:      Target end date:      INR check location:      Preferred lab:       Send INR reminders to:   [facility]    Indications    Atrial Fibrillation [I48.91]  Long term (current) use of anticoagulants [Z79.01]           Comments:            Anticoagulation Care Providers     Provider Role Specialty Phone number    Sarwat Mayorga MD Referring Internal Medicine [phone]

## 2021-06-10 NOTE — PROGRESS NOTES
Office Visit - Follow up    Trenton Nova   80 y.o. male    Date of Visit: 7/28/2020    Chief Complaint   Patient presents with     Follow-up     ER [place] 7/24/2020  Confusion   TGA     Hypertension     Atrial Fibrillation       Subjective: Hypertension.    Atrial fibrillation on chronic warfarin therapy INR check is due today.    Being treated by Dr. RENAE at [facility] Department of cardiac electrophysiology.  Amiodarone twice a day for 2 weeks then amiodarone once a day.    Atrial fibrillation without thrombolic complications.    Confusion with Ambien now off.    No blood in stool or urine no chest pain shortness of breath.    Medication list reviewed reconciled in the chart.    Sulfa allergy.  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    ROS: A comprehensive review of systems was performed and was otherwise negative    Medications:  Prior to Admission medications    Medication Sig Start Date End Date Taking? Authorizing Provider   amiodarone (PACERONE) 200 MG tablet Take by mouth. 7/24/20 10/6/20 Yes PROVIDER, HISTORICAL   calcium carbonate-vitamin D2 500 mg(1,250mg) -200 unit tablet Take 1 tablet by mouth daily.    Yes PROVIDER, HISTORICAL   cyanocobalamin (VITAMIN B-12) 500 MCG tablet Take 500 mcg by mouth daily.   Yes PROVIDER, HISTORICAL   levothyroxine 50 mcg cap Take 1 tablet by mouth Daily at 6:00 am.   Yes Sarwat Mayorga MD   multivitamin therapeutic (THERAGRAN) tablet Take 1 tablet by mouth daily.   Yes PROVIDER, HISTORICAL   omeprazole (PRILOSEC) 20 MG capsule Take 20 mg by mouth daily.   Yes Sarwat Mayorga MD   rosuvastatin (CRESTOR) 10 MG tablet  3/11/20  Yes PROVIDER, HISTORICAL   tamsulosin (FLOMAX) 0.4 mg cap Take 0.4 mg by mouth.   Yes PROVIDER, HISTORICAL   vit C/E/Zn/coppr/lutein/zeaxan (PRESERVISION AREDS-2 ORAL) Take by mouth 2 (two) times a day.   Yes PROVIDER, HISTORICAL    VITRON-C 65 mg iron- 125 mg TbEC TK 1 T PO QD. DO NOT CRUSH OR CHEW 7/21/20  Yes PROVIDER, HISTORICAL   warfarin ANTICOAGULANT (COUMADIN/JANTOVEN) 2.5 MG tablet Take 1&1/2 tabs (3.75mg) on Thursdays and take 1 tab (2.5mg) all other days by mouth daily, as directed.  Adjust dose based on INR results. 12/27/19  Yes Sarwat Mayorga MD   furosemide (LASIX) 40 MG tablet Take 1 tablet (40 mg total) by mouth daily. 7/13/20   Sarwat Mayorga MD   terazosin (HYTRIN) 5 MG capsule Take 5 mg by mouth. 9/22/15   PROVIDER, HISTORICAL   traZODone (DESYREL) 50 MG tablet Take 1 tablet (50 mg total) by mouth at bedtime. 4/17/20   Sarwat Mayorga MD   triamcinolone (KENALOG) 0.1 % cream Apply thin layer to affected areas (arms & hips) twice daily X 1 month 12/31/19   Sarwat Mayorga MD   zolpidem (AMBIEN) 10 mg tablet Take 1 tablet (10 mg total) by mouth at bedtime as needed for sleep. 5/29/20 7/28/20  Sarwat Mayorga MD       Allergies:   Allergies   Allergen Reactions     Ambien [Zolpidem]      confusion     Sulfa (Sulfonamide Antibiotics)        Immunizations:   Immunization History   Administered Date(s) Administered     DT (pediatric) 11/15/2004     IG-IM 04/18/2000     Influenza [id number], 02/09/2010     Influenza high dose,seasonal,PF, 65+ yrs 10/21/2015, 09/14/2016, 11/02/2017, 10/23/2018, 10/29/2019     Influenza, inj, historic,unspecified 09/30/2009, 10/01/2014     Influenza, seasonal,quad inj 6-35 mos 10/04/2013, 10/16/2014     Pneumo Conj 13-V (2010&after) 12/20/2016     Pneumo Polysac 23-V 11/08/2005     Td,adult,historic,unspecified 11/15/2004     Tdap 02/18/2014     ZOSTER, RECOMBINANT, IM 10/01/2019, 12/02/2019       Exam Chest clear to auscultation and percussion.  Heart tones regular rhythm without murmur rub or gallop.  Abdomen soft nontender no organomegaly.  No peritoneal signs.  Extremities free of edema cyanosis or clubbing.  Neck veins nondistended no thyromegaly or scleral icterus  noted, carotids full.  Skin warm and dry easily conversant good spirited.  Normal intelligence.  Neurologically intact no gross localizing findings.  Losing weight but up 4 pounds from last visit appears thinner pale.  BMI 24.  Not cachectic.  Easily conversant good spirited.  110/60 pulse 64 respirations 18 O2 sats 99% on room air temperature this morning at 10 AM 97.6  F.    Assessment and Plan  Atrial fibrillation with hypertension INR check is due today chronic warfarin therapy.  Confusional spell with Ambien.  DC Ambien.    Status post Whipple procedure for cancer of the head of the pancreas near the ampulla of Vater.  [facility].    Sulfa allergy.  RTC 1 month.  Same meds and cares.  Encouraged continued close follow-up at the [facility] for pancreatic cancer plus Dr. RENAE at [facility] Department of cardiac electrophysiology.    Time: total time spent with the patient was 25 minutes of which >50% was spent in counseling and coordination of care    The following low BMI interventions were performed this visit: weight gain advised    Sarwat Mayorga MD    Patient Active Problem List   Diagnosis     TMJ Pain     Neck Pain     Hyperlipidemia     Benign Monoclonal Hypergammaglobulinemia     Atrial Fibrillation     Benign Prostatic Hypertrophy     Primary Osteoarthritis Of The Cervical Vertebrae     Reactions To Sulfa Drugs     Multiple myeloma (H)     Colon polyp     Long term (current) use of anticoagulants     Shoulder pain

## 2021-06-10 NOTE — TELEPHONE ENCOUNTER
Who is calling:  [facility]  Reason for Call:  Calling to update ACN that patient is scheduled for procedure on 08/25 and INR goals are results of 2.00 to 2.50 to go ahead with procedure, plus weekly INR checks until procedure date- Caller states patient already has INR scheduled for 08/11 and needs to schedule INR on 08/18/20. Caller asked for ACN to call and acknowledge message received  and states it's okay to leave detailed message.  Date of last appointment with primary care: na  Okay to leave a detailed message: Yes

## 2021-06-10 NOTE — TELEPHONE ENCOUNTER
ANTICOAGULATION  MANAGEMENT    Assessment     Today's INR result of 4.30 is Supratherapeutic (goal INR of 2.0-3.0)        Warfarin taken as previously instructed    recommended per Dr. Mayorga start ENSURE / BOOST one can per day may be affecting diet and INR    Potential interaction between Amiodarone 200mg and warfarin which may affect subsequent INRs    - initiation of Amiodarone on 7/24/20.    (200mg one tablet two times a day for 14 days, Now taking one tablet daily).    Continues to tolerate warfarin with no reported s/s of bleeding or thromboembolism    Previous INR was Therapeutic at 2.40 on 8/11/20.    Scheduled for PVI / ablation on 8/25/20 with Dr. Sarwat Arreaga @ [facility] - r/t persistent atrial fibrillation. Will need weekly INR's. Needs INR range between 2.0 - 2.5.       Plan:     Spoke on phone with Jonatan regarding INR result and instructed:    1.  Advised not to take his warfarin on 8/20, till INR result is known.   2.  Will need to prep for upcoming procedure on 8/25.   3.  Advised to follow safety precautions to avoid any serious injuries.    Warfarin Dosing Instructions:    - HOLD warfarin for 2 days, 8/18-19.   - then change warfarin dose to 1.25 mg daily.   - (12.5 % change)    Instructed patient to follow up no later than:  Check INR on 8/20/20 @ [facility].    Education provided: importance of consistent vitamin K intake, impact of vitamin K foods on INR, target INR goal and significance of current INR result, potential interaction between warfarin and Amiodarone and importance of notifying clinic for changes in medications    Jonatan verbalizes understanding and agrees to warfarin dosing plan.    Instructed to call the [facility] for any changes, questions or concerns. (#[phone])   ?   Yamileth Flores RN    Subjective/Objective:      Trenton Meraradha, a 80 y.o. male is on warfarin.     Trenton reports:     Home warfarin dose: as updated on anticoagulation calendar per template     Missed doses: No      Medication changes:  Yes:  New RX for Amiodarone 200mg, since 7/24/20 till 10/6/20.   - will startt with 200mg two times a day for 14 days, thereafter 200mg one tablet daily.     S/S of bleeding or thromboembolism:  No     New Injury or illness:  Yes:  Persistent atrial fibrillation.     Changes in diet or alcohol consumption:  No     Upcoming surgery, procedure or cardioversion:  Yes:  Scheduled for PVI ablation on 8/25/20 @ [facility].    Anticoagulation Episode Summary     Current INR goal:   2.0-3.0   TTR:   49.9 % (1 y)   Next INR check:   8/20/2020   INR from last check:   4.30! (8/18/2020)   Weekly max warfarin dose:      Target end date:      INR check location:      Preferred lab:      Send INR reminders to:   [facility]    Indications    Atrial Fibrillation [I48.91]  Long term (current) use of anticoagulants [Z79.01]           Comments:            Anticoagulation Care Providers     Provider Role Specialty Phone number    Sarwat Mayorga MD Referring Internal Medicine [phone]

## 2021-06-10 NOTE — TELEPHONE ENCOUNTER
Who is calling:  Trenton  Reason for Call:  Patient returning INR call  Date of last appointment with primary care: 8/11/2020  Okay to leave a detailed message: Yes

## 2021-06-10 NOTE — PROGRESS NOTES
Office Visit - Follow up    Trenton Nova   80 y.o. male    Date of Visit: 8/11/2020    Chief Complaint   Patient presents with     Anorexia     Atrial Fibrillation     Medication Question Or Clarification     vitamins     Joint Swelling     Insomnia       Subjective: Pancreatic cancer.    No appetite.  Whipple procedure done [facility] March 19, 2020.  Weight stable 155 pounds previous 156.  Some ankle edema better with furosemide.    Medication list reviewed reconciled in the chart no blood in stool or urine denies chest pain shortness of breath.    Allergy Ambien sulfa.  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  Non-smoker.  Wife with history of melanoma malignant in anus.  X3.    ROS: A comprehensive review of systems was performed and was otherwise negative    Medications:  Prior to Admission medications    Medication Sig Start Date End Date Taking? Authorizing Provider   amiodarone (PACERONE) 200 MG tablet Take by mouth. 7/24/20 10/6/20 Yes PROVIDER, HISTORICAL   calcium carbonate-vitamin D2 500 mg(1,250mg) -200 unit tablet Take 1 tablet by mouth daily.    Yes PROVIDER, HISTORICAL   cyanocobalamin (VITAMIN B-12) 500 MCG tablet Take 500 mcg by mouth daily.   Yes PROVIDER, HISTORICAL   furosemide (LASIX) 40 MG tablet Take 1 tablet (40 mg total) by mouth daily. 7/13/20  Yes Sarwat Mayorga MD   levothyroxine 50 mcg cap Take 1 tablet by mouth Daily at 6:00 am.   Yes Sarwat Mayorga MD   multivitamin therapeutic (THERAGRAN) tablet Take 1 tablet by mouth daily.   Yes PROVIDER, HISTORICAL   omeprazole (PRILOSEC) 20 MG capsule Take 20 mg by mouth daily.   Yes Sarwat Mayorga MD   rosuvastatin (CRESTOR) 10 MG tablet  3/11/20  Yes PROVIDER, HISTORICAL   tamsulosin (FLOMAX) 0.4 mg cap Take 0.4 mg by mouth.   Yes PROVIDER, HISTORICAL   vit C/E/Zn/coppr/lutein/zeaxan (PRESERVISION AREDS-2 ORAL) Take by mouth 2 (two) times a day.   Yes PROVIDER, HISTORICAL   VITRON-C 65 mg iron- 125 mg TbEC TK 1 T PO QD.  DO NOT CRUSH OR CHEW 7/21/20  Yes PROVIDER, HISTORICAL   warfarin ANTICOAGULANT (COUMADIN/JANTOVEN) 2.5 MG tablet Take 1&1/2 tabs (3.75mg) on Thursdays and take 1 tab (2.5mg) all other days by mouth daily, as directed.  Adjust dose based on INR results. 12/27/19  Yes Sarwat Mayorga MD   terazosin (HYTRIN) 5 MG capsule Take 5 mg by mouth. 9/22/15   PROVIDER, HISTORICAL   triamcinolone (KENALOG) 0.1 % cream Apply thin layer to affected areas (arms & hips) twice daily X 1 month 12/31/19   Sarwat Mayorga MD   traZODone (DESYREL) 50 MG tablet Take 1 tablet (50 mg total) by mouth at bedtime. 4/17/20 8/11/20  Sarwat Mayorga MD       Allergies:   Allergies   Allergen Reactions     Ambien [Zolpidem]      confusion     Sulfa (Sulfonamide Antibiotics)        Immunizations:   Immunization History   Administered Date(s) Administered     DT (pediatric) 11/15/2004     IG-IM 04/18/2000     Influenza [id number], 02/09/2010     Influenza high dose,seasonal,PF, 65+ yrs 10/21/2015, 09/14/2016, 11/02/2017, 10/23/2018, 10/29/2019     Influenza, inj, historic,unspecified 09/30/2009, 10/01/2014     Influenza, seasonal,quad inj 6-35 mos 10/04/2013, 10/16/2014     Pneumo Conj 13-V (2010&after) 12/20/2016     Pneumo Polysac 23-V 11/08/2005     Td,adult,historic,unspecified 11/15/2004     Tdap 02/18/2014     ZOSTER, RECOMBINANT, IM 10/01/2019, 12/02/2019       Exam Chest clear to auscultation and percussion.  Heart tones regular rhythm without murmur rub or gallop.  Abdomen soft nontender no organomegaly.  No peritoneal signs.  Extremities free of edema cyanosis or clubbing.  Neck veins nondistended no thyromegaly or scleral icterus noted, carotids full.  Skin warm and dry easily conversant good spirited.  Normal intelligence.  Neurologically intact no gross localizing findings.    110/60 pulse 60 respirations 18 O2 sats 97% temperature 97.5 degrees sallow or early icterus noted.    Assessment and Plan  Malignant pancreatic  cancer status post Whipple procedure March 19, 2020 male.  Check CA-19-9 plus hemogram 2+ comprehensive metabolic profile.  Multiple drug allergies.    History of smoldering myeloma with myeloma kidney.  May be contributing to anorexia.  Without weight loss as of yet.    Time: total time spent with the patient was 25 minutes of which >50% was spent in counseling and coordination of care    The following low BMI interventions were performed this visit: weight gain advised recommend Ensure 1 can twice a day for protein caloric supplement.    Sarwat Mayorga MD    Patient Active Problem List   Diagnosis     TMJ Pain     Neck Pain     Hyperlipidemia     Benign Monoclonal Hypergammaglobulinemia     Atrial Fibrillation     Benign Prostatic Hypertrophy     Primary Osteoarthritis Of The Cervical Vertebrae     Reactions To Sulfa Drugs     Multiple myeloma (H)     Colon polyp     Long term (current) use of anticoagulants     Shoulder pain

## 2021-06-10 NOTE — TELEPHONE ENCOUNTER
ANTICOAGULATION  MANAGEMENT    Assessment     Today's INR result of 2.70 is Therapeutic (goal INR of 2.0-3.0)        Warfarin recently held as instructed which may be affecting INR    - held warfarin dose on 8/26.    No new diet changes affecting INR    - continues drinking Ensure one can per day, started one wk ago.    Potential interaction between Amiodarone and warfarin which may affect subsequent INRs    - Amiodarone 200mg two times a day was initiated on 7/24/20.  Now on 200mg daily since 8/18/20 for 60 days.    Continues to tolerate warfarin with no reported s/s of bleeding or thromboembolism     Previous INR was Therapeutic at 3.00 on 8/26/20.    S/p PVI ablation on 8/25/20 d/t persistent atrial fibrillation.    Plan:     Spoke on phone with Jonatan regarding INR result and instructed:     Warfarin Dosing Instructions:   (has 2.5mg tabs)   - Continue current warfarin dose 1.25 mg daily.    Instructed patient to follow up no later than:  One wk.   - INR scheduled on 93/20 @ [facility].    Education provided: impact of vitamin K foods on INR, target INR goal and significance of current INR result, importance of notifying clinic for changes in medications, monitoring for bleeding signs and symptoms and when to seek medical attention/emergency care    Jonatan verbalizes understanding and agrees to warfarin dosing plan.    Instructed to call the [facility] for any changes, questions or concerns. (#[phone])   ?   Yamileth Flores RN    Subjective/Objective:      Trenton Nova, a 80 y.o. male is on warfarin.     Trenton reports:     Home warfarin dose: as updated on anticoagulation calendar per template     Missed doses: Yes: held warfarin dose on 8/26/20 as instructed.     Medication changes:  Yes:  Since 8/18/20 Amiodarone 200mg decreased daily for the next 60 days.     S/S of bleeding or thromboembolism:  No     New Injury or illness:  Yes:  Persistent atrial fibrillation.     Changes in diet or alcohol consumption:   Yes:  Ensure one can daily for nutritional supplement from anorexia..     Upcoming surgery, procedure or cardioversion:  No    Anticoagulation Episode Summary     Current INR goal:   2.0-3.0   TTR:   49.5 % (1 y)   Next INR check:   9/4/2020   INR from last check:   2.70 (8/28/2020)   Weekly max warfarin dose:      Target end date:      INR check location:      Preferred lab:      Send INR reminders to:   [facility]    Indications    Atrial Fibrillation [I48.91]  Long term (current) use of anticoagulants [Z79.01]           Comments:            Anticoagulation Care Providers     Provider Role Specialty Phone number    Sarwat Mayorga MD Referring Internal Medicine [phone]

## 2021-06-10 NOTE — TELEPHONE ENCOUNTER
ANTICOAGULATION  MANAGEMENT    Assessment     Today's INR result of 2.40 is Therapeutic (goal INR of 2.0-3.0)        Warfarin taken as previously instructed    Anorexia (no appetite) may be affecting diet and INR    - Dr. Mayorga recommended one can of Ensure two times a day for supplement.   - WT is stable at 155 lbs.   - s/p Whipple procedure on 3/19/20, d/t malignant pancreatic ca.    No new medication/supplements affecting INR    Continues to tolerate warfarin with no reported s/s of bleeding or thromboembolism     Previous INR was Therapeutic at 2.400 on 8/6/20.    Scheduled for PVI / ablation on 8/25/20 with Dr. Sarwat Arreaga @ [facility] - r/t persistent atrial fibrillation. Will need weekly INR's. Needs INR range between 2.0 - 2.5.      Plan:     Spoke on phone with Jonatan regarding INR result and instructed:     Warfarin Dosing Instructions:    - Continue current warfarin dose 2.5 mg daily on Tuesdays; and 1.25 mg daily rest of week.    Instructed patient to follow up no later than:    - INR scheduled on 8/18/20 @ [facility].    Education provided: importance of consistent vitamin K intake and target INR goal and significance of current INR result    Jonatan verbalizes understanding and agrees to warfarin dosing plan.    Instructed to call the [facility] for any changes, questions or concerns. (#[phone])   ?   Yamileth Flores RN    Subjective/Objective:      Trenton Nova, a 80 y.o. male is on warfarin.     Trenton reports:     Home warfarin dose: as updated on anticoagulation calendar per template     Missed doses: No     Medication changes:  No     S/S of bleeding or thromboembolism:  No     New Injury or illness:  Yes:  F/u today with Dr. Mayorga due to Anorexia. Stated, when he looks at food, he is not hungry.  But is maintaining his WT.     Changes in diet or alcohol consumption:  Yes:  Was advised to drink Ensure two times a day as a nutritional supplement.     Upcoming surgery, procedure or cardioversion:   Yes:    - Scheduled for PVI / ablation on 8/25/20 with Dr. Sarwat Arreaga @ [facility] - r/t persistent atrial fibrillation. Will need weekly INR's.    Anticoagulation Episode Summary     Current INR goal:   2.0-3.0   TTR:   50.5 % (1 y)   Next INR check:   8/11/2020   INR from last check:   No new INR was available at last check   Most recent INR:    2.40 (8/11/2020)   Weekly max warfarin dose:      Target end date:      INR check location:      Preferred lab:      Send INR reminders to:   [facility]    Indications    Atrial Fibrillation [I48.91]  Long term (current) use of anticoagulants [Z79.01]           Comments:            Anticoagulation Care Providers     Provider Role Specialty Phone number    Sarwat Mayorga MD Referring Internal Medicine [phone]

## 2021-06-10 NOTE — TELEPHONE ENCOUNTER
Who is calling:  Patient  Reason for Call:  The patient is requesting a return call to verify his Warfarin dosing.  Okay to leave a detailed message: Yes

## 2021-06-11 NOTE — TELEPHONE ENCOUNTER
ANTICOAGULATION  MANAGEMENT    Assessment     Today's INR result of 2.20 is Therapeutic (goal INR of 2.0-3.0)        Warfarin taken as previously instructed    No new diet changes affecting INR    - continues drinking plain Ensure protein shake.    Potential interaction between Amiodarone and warfarin which may affect subsequent INRs    - Amiodarone discontinued on 9/10/20.    Continues to tolerate warfarin with no reported s/s of bleeding or thromboembolism     Previous INR was Subtherapeutic at 1.90 on 9/22/20.    Plan:     Spoke on phone with Jonatan regarding INR result and instructed:     Warfarin Dosing Instructions:   (evenings. Has 2.5mg tabs)   - Continue current warfarin dose 2.5 mg daily on Tuesdays; and 1.25 mg daily rest of week.    Instructed patient to follow up no later than:  One wk.   - INR scheduled on 10/6/20 @ [facility].    Education provided: importance of consistent vitamin K intake, target INR goal and significance of current INR result, potential interaction between warfarin and Amiodarone and importance of notifying clinic for changes in medications    Jonatan verbalizes understanding and agrees to warfarin dosing plan.    Instructed to call the [facility] for any changes, questions or concerns. (#[phone])   ?   Yamileth Flores RN    Subjective/Objective:      Trenton MCKEON Avtar, a 80 y.o. male is on warfarin.     Trenton reports:     Home warfarin dose: as updated on anticoagulation calendar per template     Missed doses: No     Medication changes:  No.  Amiodarone from (7/24 thru 9/10/20) and discontinued.     S/S of bleeding or thromboembolism:  No     New Injury or illness:  No     Changes in diet or alcohol consumption:  No.  Continues to supplement nutrition with protein shakes - plain Ensure     Upcoming surgery, procedure or cardioversion:  No    Anticoagulation Episode Summary     Current INR goal:   2.0-3.0   TTR:   46.3 % (1 y)   Next INR check:   10/6/2020   INR from last check:   2.20  (9/29/2020)   Weekly max warfarin dose:      Target end date:      INR check location:      Preferred lab:      Send INR reminders to:   [facility]    Indications    Atrial Fibrillation [I48.91]  Long term (current) use of anticoagulants [Z79.01]           Comments:            Anticoagulation Care Providers     Provider Role Specialty Phone number    Sarwat Mayorga MD Referring Internal Medicine [phone]

## 2021-06-11 NOTE — PROGRESS NOTES
"Trenton Nova is a 80 y.o. male who is being evaluated via a billable telephone visit.      The patient has been notified of following:     \"This telephone visit will be conducted via a call between you and your physician/provider. We have found that certain health care needs can be provided without the need for a physical exam.  This service lets us provide the care you need with a short phone conversation.  If a prescription is necessary we can send it directly to your pharmacy.  If lab work is needed we can place an order for that and you can then stop by our lab to have the test done at a later time.    Telephone visits are billed at different rates depending on your insurance coverage. During this emergency period, for some insurers they may be billed the same as an in-person visit.  Please reach out to your insurance provider with any questions.    If during the course of the call the physician/provider feels a telephone visit is not appropriate, you will not be charged for this service.\"    Patient has given verbal consent to a Telephone visit? Yes    What phone number would you like to be contacted at? [phone]    Patient would like to receive their AVS by AVS Preference: Mail a copy.    Additional provider notes:   Subjective: had a flu shot yesterday and had a horrible episode of GERD/heartburn. This morning, was coughing and choking a bit. One of the sputum was yellow, yellowish-green. No sweats and has some rhinorrhea presumably 2/2 allergies. I trying to socially distance. Is s/p Whipple and has a bit of a lost appetite, but no dysphagia. Lives with wife and she is good health. No shortness of breath or cough, chest pain. Maybe a mild loss of taste. No muscle aches/pains or diarrhea. No f/s/c. Temp 96.6 this afternoon. No Pulse oximeter at home.     Assessment/Plan:  1. Cough  Isolated episode, in setting of allergies and history of a whipple. Does not appear related to symptomatic covid19. " Possibly 2/2 PND or episode of dyspepsia from the night before. Counseled to continue to monitor his symptoms, an let us know if he develops a fever or new symptoms.     Phone call duration:  8 minutes    Nino Cabrera MD;

## 2021-06-11 NOTE — TELEPHONE ENCOUNTER
FYI - Status Update  Who is Calling: Ileana with [facility]  Update: Patient is stopping amiodarone (PACERONE) 200 MG tablet tomorrow, 9/10/20 and this change could impact the patient's INR. This medication could take up to six weeks to completely out of the patient's system and may need close monitoring of INR until then.  Okay to leave a detailed message?:  Yes

## 2021-06-11 NOTE — TELEPHONE ENCOUNTER
Triage call:     Coughed up green and yellow mucous  - just started today   Reports that his nose is also running today   Did get his Flu shot yesterday  Patient otherwise denies additional symptoms at this time    High risk for COVID- recent ablation for A fib and age     COVID 19 Nurse Triage Plan/Patient Instructions    Please be aware that novel coronavirus (COVID-19) may be circulating in the community. If you develop symptoms such as fever, cough, or SOB or if you have concerns about the presence of another infection including coronavirus (COVID-19), please contact your health care provider or visit [email or web address].     Disposition/Instructions    Virtual Visit with provider recommended. Reference Visit Selection Guide.    Thank you for taking steps to prevent the spread of this virus.  o Limit your contact with others.  o Wear a simple mask to cover your cough.  o Wash your hands well and often.    Resources    [facility]: About COVID-19: [email or web address]    CDC: What to Do If You're Sick: www.cdc.gov/coronavirus/2019-ncov/about/steps-when-sick.html    CDC: Ending Home Isolation: www.cdc.gov/coronavirus/2019-ncov/hcp/disposition-in-home-patients.html     CDC: Caring for Someone: www.cdc.gov/coronavirus/2019-ncov/if-you-are-sick/care-for-someone.html     [facility]: Interim Guidance for Hospital Discharge to Home: www.health.[facility].mn.us/diseases/coronavirus/hcp/hospdischarge.pdf    [facility] clinical trials (COVID-19 research studies): [email or web address]-clinical-trials     Below are the COVID-19 hotlines at the [facility] of Health ([facility]). Interpreters are available.   o For health questions: Call 467-724-4585 or 1-358.334.8052 (7 a.m. to 7 p.m.)  o For questions about schools and childcare: Call 942-366-1715 or 1-438.135.2997 (7 a.m. to 7 p.m.)   Nancie Minaya RN BSBA Care Connection Triage/Med Refill 10/1/2020 11:49 AM    Reason for Disposition    HIGH RISK  patient (e.g., age > 64 years, diabetes, heart or lung disease, weak immune system) (Exception: Has already been evaluated by healthcare provider and has no new or worsening symptoms)    Additional Information    Negative: SEVERE difficulty breathing (e.g., struggling for each breath, speaks in single words)    Negative: Difficult to awaken or acting confused (e.g., disoriented, slurred speech)    Negative: Bluish (or gray) lips or face now    Negative: Shock suspected (e.g., cold/pale/clammy skin, too weak to stand, low BP, rapid pulse)    Negative: Sounds like a life-threatening emergency to the triager    Negative: [1] COVID-19 exposure AND [2] no symptoms    Negative: COVID-19 and Breastfeeding, questions about    Negative: [1] Adult with possible COVID-19 symptoms AND [2] triager concerned about severity of symptoms or other causes    Negative: SEVERE or constant chest pain or pressure (Exception: mild central chest pain, present only when coughing)    Negative: MODERATE difficulty breathing (e.g., speaks in phrases, SOB even at rest, pulse 100-120)    Negative: Patient sounds very sick or weak to the triager    Negative: MILD difficulty breathing (e.g., minimal/no SOB at rest, SOB with walking, pulse <100)    Negative: Chest pain or pressure    Negative: Fever > 103 F (39.4 C)    Negative: [1] Fever > 101 F (38.3 C) AND [2] age > 60    Negative: [1] Fever > 100.0 F (37.8 C) AND [2] bedridden (e.g., nursing home patient, CVA, chronic illness, recovering from surgery)    Protocols used: CORONAVIRUS (COVID-19) DIAGNOSED OR [id number] 8.4.20

## 2021-06-11 NOTE — PROGRESS NOTES
Office Visit - Follow up    Trenton Nova   80 y.o. male    Date of Visit: 9/15/2020    Chief Complaint   Patient presents with     Follow-up     1 month follow up - Needs INR - weight loss       Subjective: Bile duct cancer.    Status post Whipple procedure in March [facility].  Can eat weight down CA-19-9 has been rising recheck pending.    Using Ensure.    No blood in stool or urine medication list reviewed reconciled.    Prior history of myeloma kidney with chronic kidney disease.  Has not had lipid panel lately lipid panel ordered and pending.  Along with hemoglobin which has been slightly low.    Allergies include Ambien and sulfa.  Medication list reviewed reconciled in the chart and generally well-tolerated.  Along with problem list no blood in stool or urine no chest pain or shortness of breath.  No syncopal spells.    ROS: A comprehensive review of systems was performed and was otherwise negative    Medications:  Prior to Admission medications    Medication Sig Start Date End Date Taking? Authorizing Provider   calcium carbonate-vitamin D2 500 mg(1,250mg) -200 unit tablet Take 1 tablet by mouth daily.    Yes PROVIDER, HISTORICAL   cyanocobalamin (VITAMIN B-12) 500 MCG tablet Take 500 mcg by mouth daily.   Yes PROVIDER, HISTORICAL   furosemide (LASIX) 40 MG tablet Take 1 tablet (40 mg total) by mouth daily. 7/13/20  Yes Sarwat Mayorga MD   levothyroxine 50 mcg cap Take 1 tablet by mouth Daily at 6:00 am.   Yes Sarwat Mayorga MD   multivitamin therapeutic (THERAGRAN) tablet Take 1 tablet by mouth daily.   Yes PROVIDER, HISTORICAL   omeprazole (PRILOSEC) 20 MG capsule Take 20 mg by mouth daily.   Yes Sarwat Mayorga MD   rosuvastatin (CRESTOR) 10 MG tablet  3/11/20  Yes PROVIDER, HISTORICAL   tamsulosin (FLOMAX) 0.4 mg cap Take 0.4 mg by mouth.   Yes PROVIDER, HISTORICAL   terazosin (HYTRIN) 5 MG capsule Take 5 mg by mouth. 9/22/15  Yes PROVIDER, HISTORICAL    triamcinolone (KENALOG) 0.1 % cream Apply thin layer to affected areas (arms & hips) twice daily X 1 month 12/31/19  Yes Sarwat Mayorga MD   vit C/E/Zn/coppr/lutein/zeaxan (PRESERVISION AREDS-2 ORAL) Take by mouth 2 (two) times a day.   Yes PROVIDER, HISTORICAL   VITRON-C 65 mg iron- 125 mg TbEC TK 1 T PO QD. DO NOT CRUSH OR CHEW 7/21/20  Yes PROVIDER, HISTORICAL   warfarin ANTICOAGULANT (COUMADIN/JANTOVEN) 2.5 MG tablet Take 1&1/2 tabs (3.75mg) on Thursdays and take 1 tab (2.5mg) all other days by mouth daily, as directed.  Adjust dose based on INR results. 12/27/19  Yes Sarwat Mayorga MD   amiodarone (PACERONE) 200 MG tablet Take by mouth. 7/24/20 9/15/20  PROVIDER, HISTORICAL       Allergies:   Allergies   Allergen Reactions     Ambien [Zolpidem]      confusion     Sulfa (Sulfonamide Antibiotics)        Immunizations:   Immunization History   Administered Date(s) Administered     DT (pediatric) 11/15/2004     IG-IM 04/18/2000     Influenza U2g2-37, 02/09/2010     Influenza high dose,seasonal,PF, 65+ yrs 10/21/2015, 09/14/2016, 11/02/2017, 10/23/2018, 10/29/2019     Influenza, inj, historic,unspecified 09/30/2009, 10/01/2014     Influenza, seasonal,quad inj 6-35 mos 10/04/2013, 10/16/2014     Pneumo Conj 13-V (2010&after) 10/01/2016, 12/20/2016     Pneumo Polysac 23-V 11/08/2005     Pneumococcal Vaccine, Unspecified 06/01/2006     Td,adult,historic,unspecified 11/15/2004, 01/01/2005     Tdap 01/01/2013, 02/18/2014     ZOSTER, LIVE 01/01/2007     ZOSTER, RECOMBINANT, IM 05/13/2019, 10/01/2019, 12/02/2019       Exam Chest clear to auscultation and percussion.  Heart tones regular rhythm without murmur rub or gallop.  Abdomen soft nontender no organomegaly.  No peritoneal signs.  Extremities free of edema cyanosis or clubbing.  Neck veins nondistended no thyromegaly or scleral icterus noted, carotids full.  Skin warm and dry easily conversant good spirited.  Normal intelligence.  Neurologically intact  no gross localizing findings.  Nonicteric weight down 4 pounds from previous 60 7/2 inches tall current weight 151 pounds.    118/60 pulse 60 respirations 18 O2 sats not listed temperature this afternoon 97.3  F his color is good slightly pale sallow not in acute distress not toxic.  Not jaundiced.  And not cachectic.    Assessment and Plan  Bile duct cancer status post Whipple [facility] CA-19-9 level pending along with hemoglobin and lipid panel.    Multiple allergies including Ambien sulfa.    Hypertension controlled 118/60.    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  No evidence for hyperkalemia or volume overload or metabolic acidosis.    Time: total time spent with the patient was 25 minutes of which >50% was spent in counseling and coordination of care    The following low BMI interventions were performed this visit: weight gain advised    Recommend Ensure 1 can twice daily for caloric supplement and protein.    Sarwat Mayorga MD    Patient Active Problem List   Diagnosis     TMJ Pain     Neck Pain     Hyperlipidemia     Benign Monoclonal Hypergammaglobulinemia     Atrial Fibrillation     Benign Prostatic Hypertrophy     Primary Osteoarthritis Of The Cervical Vertebrae     Reactions To Sulfa Drugs     Multiple myeloma (H)     Colon polyp     Long term (current) use of anticoagulants     Shoulder pain

## 2021-06-11 NOTE — TELEPHONE ENCOUNTER
Noted.     - Jonatan will discontinue Amiodarone on 9/10/20.     - already scheduled for INR on 9/15/20 during f/u visit with Dr. Mayorga.

## 2021-06-11 NOTE — TELEPHONE ENCOUNTER
FYI - Status Update  Who is Calling: Patient  Update: Patient returned the [facility] call, please call the patient back, thank you.  Okay to leave a detailed message?:  Yes

## 2021-06-11 NOTE — TELEPHONE ENCOUNTER
ANTICOAGULATION  MANAGEMENT    Assessment     Today's INR result of 1.90 is Subtherapeutic (goal INR of 2.0-3.0)        Warfarin taken as previously instructed    Now drinking protein shakes with ENSURE Maximum may be affecting diet and INR.   - he cut down drinking Ensure Maximum 2 days ago, was giving him lot's of gas.    Potential interaction between Amiodarone and warfarin which may affect subsequent INRs    - 8/18/20 decreased Amiodarone 200mg daily for 60 days.    Continues to tolerate warfarin with no reported s/s of bleeding or thromboembolism     Previous INR was Therapeutic at 2.70 on 8/28/20.    S/p PVI ablation on 8/25/20.    Plan:     Spoke on phone with Jonatan regarding INR result and instructed:    1.  Advised to buy plain Ensure and not Ensure Maximum.  He had bad time with gas with Ensure Maximum and cut down wkly intake.    Warfarin Dosing Instructions:  (evenings. Has 2.5mg tabs)   - Change warfarin dose to 2.5 mg daily on Thursdays; and 1.25 mg daily rest of week.   - (14.3 % change)    Instructed patient to follow up no later than:  7-10 days.   - INR scheduled on 9/15/20 during f/u visit with Dr. Mayorga.    Education provided: importance of consistent vitamin K intake, target INR goal and significance of current INR result and importance of notifying clinic for changes in medications    Jonatan verbalizes understanding and agrees to warfarin dosing plan.    Instructed to call the [facility] for any changes, questions or concerns. (#[phone])   ?   Yamileth Flores RN    Subjective/Objective:      Trenton Nova, a 80 y.o. male is on warfarin.     Trenton reports:     Home warfarin dose: as updated on anticoagulation calendar per template     Missed doses: No     Medication changes:  Yes:  Amiodarone dose decreased.     S/S of bleeding or thromboembolism:  No     New Injury or illness:  No     Changes in diet or alcohol consumption:  Yes: drinking protein shakes; ENSURE Maximum one can per day,  however, he states he started to cut down due to bad gassy problems.     Upcoming surgery, procedure or cardioversion:  No    Anticoagulation Episode Summary     Current INR goal:   2.0-3.0   TTR:   49.3 % (1 y)   Next INR check:   9/10/2020   INR from last check:   1.90! (9/3/2020)   Weekly max warfarin dose:      Target end date:      INR check location:      Preferred lab:      Send INR reminders to:   [facility]    Indications    Atrial Fibrillation [I48.91]  Long term (current) use of anticoagulants [Z79.01]           Comments:            Anticoagulation Care Providers     Provider Role Specialty Phone number    Sarwat Mayorga MD Referring Internal Medicine [phone]

## 2021-06-11 NOTE — TELEPHONE ENCOUNTER
ANTICOAGULATION  MANAGEMENT    Assessment     Today's INR result of 1.90 is Subtherapeutic (goal INR of 2.0-3.0)        Warfarin recently held as instructed which may be affecting INR    - held warfarin doses on 9/15-16.    resumed drinking ENSURE Protein drink may be affecting diet and INR    - switched to plain Ensure from Ensure Maximum shakes.  It was giving him bad gas.    Potential interaction between Amiodarone and warfarin which may affect subsequent INRs    - on 9/10/20, Amiodarone discontinued.    Continues to tolerate warfarin with no reported s/s of bleeding or thromboembolism     Previous INR was Supratherapeutic at 3.70 on 9/15/20.    Plan:     Spoke on phone with Jonatan regarding INR result and instructed:     Warfarin Dosing Instructions:  (evening. Has 2.5mg tabs)   - Change warfarin dose to 2.5 mg daily on Tuesdays; and 1.25 mg daily rest of week.   - (14.3 % change)    Instructed patient to follow up no later than:  One wk.   - INR scheduled on9/29/20 @ [facility]    Education provided: importance of consistent vitamin K intake, target INR goal and significance of current INR result and importance of notifying clinic for changes in medications    Jonatan verbalizes understanding and agrees to warfarin dosing plan.    Instructed to call the [facility] for any changes, questions or concerns. (#[phone])   ?   Yamileth Flores RN    Subjective/Objective:      Trenton LINDA Meraradha, a 80 y.o. male is on warfarin.     Trenton reports:     Home warfarin dose: as updated on anticoagulation calendar per template     Missed doses: Yes:  Held warfarin dose for 2 days.     Medication changes:  Yes: Amiodarone discontinued on 9/10.     S/S of bleeding or thromboembolism:  No     New Injury or illness:  No     Changes in diet or alcohol consumption:  Yes:  Continues to supplement daily nutrition with plain Ensure one can per day, due to Anorexia.       Upcoming surgery, procedure or cardioversion:  No    Anticoagulation  Episode Summary     Current INR goal:   2.0-3.0   TTR:   47.0 % (1 y)   Next INR check:   9/29/2020   INR from last check:   1.90! (9/22/2020)   Weekly max warfarin dose:      Target end date:      INR check location:      Preferred lab:      Send INR reminders to:   [facility]    Indications    Atrial Fibrillation [I48.91]  Long term (current) use of anticoagulants [Z79.01]           Comments:            Anticoagulation Care Providers     Provider Role Specialty Phone number    Sarwat Mayorga MD Referring Internal Medicine [phone]

## 2021-06-11 NOTE — TELEPHONE ENCOUNTER
ANTICOAGULATION  MANAGEMENT    Assessment     Today's INR result of 3.70 is Supratherapeutic (goal INR of 2.0-3.0)        Warfarin taken as previously instructed    Anorexia / Diarrhea may be affecting diet and INR    - reported has not been drinking ENSURE maximum protein shake for the past 2 days, d/t diarrhea.one can per day.  He will resume 9/16 and will switch to just drinking plain ENSURE, d/t to bad gas.   - reported a WT loss and down to 149 lbs.  (last physical on 1/28/20 WT was 168 lbs).      Potential interaction between Amiodarone and warfarin which may affect subsequent INRs    - on 9/10/20 discontinued Amiodarone.    Continues to tolerate warfarin with no reported s/s of bleeding or thromboembolism     Previous INR was Subtherapeutic at 1.90 on 9/3/20.    Plan:     Left detailed message for Jonatan regarding INR result and instructed:    1.  Will closely monitor INR, since Amiodarone was discontinued  5 days ago and d/t Amiodarone's long half life.    Warfarin Dosing Instructions:  (evenings. Has 2.5mg tabs)   - HOLD warfarin for 2 days, 9/15-16,   - then change warfarin dose to 1.25 mg daily.   - (12.5 % change)    Instructed patient to follow up no later than:  One wk.   - INR scheduled on 9/22/20 @ [facility].    Education provided: target INR goal and significance of current INR result, potential interaction between warfarin and Amiodarone discontinued and importance of notifying clinic for changes in medications    Instructed to call the [facility] for any changes, questions or concerns. (#[phone])   ?   Yamileth Flores RN    Subjective/Objective:      Trenton Nova, a 80 y.o. male is on warfarin.     Trenton reports:     Home warfarin dose: as updated on anticoagulation calendar per template     Missed doses: No     Medication changes:  Yes: discontinue Amiodarone on 9/10/20.     S/S of bleeding or thromboembolism:  No     New Injury or illness:  Yes: s/p bile duct cancer.     Changes in diet or  alcohol consumption:  Yes: Anorexia.  Will resume drinking protein shake and will switch to just plain ENSURE.     Upcoming surgery, procedure or cardioversion:  No    Anticoagulation Episode Summary     Current INR goal:   2.0-3.0   TTR:   47.6 % (1 y)   Next INR check:   9/23/2020   INR from last check:   3.70! (9/15/2020)   Weekly max warfarin dose:      Target end date:      INR check location:      Preferred lab:      Send INR reminders to:   [facility]    Indications    Atrial Fibrillation [I48.91]  Long term (current) use of anticoagulants [Z79.01]           Comments:            Anticoagulation Care Providers     Provider Role Specialty Phone number    Sarwat Mayorga MD Referring Internal Medicine [phone]

## 2021-06-11 NOTE — PROGRESS NOTES
Office Visit - Physical    Trenton Nova   77 y.o. male    Date of Visit: 6/27/2017    Chief Complaint   Patient presents with     Pre-op Exam     Scope right knee Dr. MICKIE Mazariegos on 6/29/2017 at [facility] fax [phone]       Subjective: Preoperative examination in anticipation of right knee arthroscopic surgery at [facility] by Dr. FUNEZ of [facility] on Thursday, June 29, 2017.  [facility] surgery [place].    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    Colonoscopy dated February 21, 2017 showed 2 tubular adenomatous colon polyps.  Non-smoker no alcohol for many years allergy to drugs include only sulfa.    ROS: A comprehensive review of systems was performed and was otherwise negative    Medications:   Prior to Admission medications    Medication Sig Start Date End Date Taking? Authorizing Provider   levothyroxine 50 mcg cap Take 1 tablet by mouth Daily at 6:00 am.   Yes Sarwat Mayorga MD   omeprazole (PRILOSEC) 20 MG capsule Take 20 mg by mouth daily.   Yes Sarwat Mayorga MD   simvastatin (ZOCOR) 20 MG tablet Take 20 mg by mouth bedtime.   Yes Sarwat Mayorga MD   sotalol (BETAPACE) 80 MG tablet 40 mg 2 times a day at 6:00 am and 4:00 pm. 6/25/15  Yes PROVIDER, HISTORICAL   terazosin (HYTRIN) 1 MG capsule Take 1 mg by mouth bedtime.   Yes PROVIDER, HISTORICAL   calcium carbonate-vitamin D2 500 mg(1,250mg) -200 unit tablet Take 1 tablet by mouth 2 (two) times a day.    PROVIDER, HISTORICAL   multivitamin therapeutic (THERAGRAN) tablet Take 1 tablet by mouth daily.    PROVIDER, HISTORICAL   tadalafil (CIALIS) 5 MG tablet Take 5 mg by mouth daily as needed for erectile dysfunction.    PROVIDER, HISTORICAL   warfarin (COUMADIN) 2.5 MG tablet Take 0.5 - 1 tablet by mouth daily as directed. 3/12/15   Sarwat Mayorga MD       Allergies:  Allergies   Allergen Reactions     Sulfa  (Sulfonamide Antibiotics)        Immunizations:   Immunization History   Administered Date(s) Administered     DT (pediatric) 11/15/2004     Influenza [id number], 2010     Influenza high dose, seasonal 10/21/2015, 2016     Influenza, inj, historic 2009     Influenza, seasonal,quad inj 6-35 mos 10/04/2013, 10/16/2014     Pneumo Conj 13-V (2010&after) 2016     Pneumo Polysac 23-V 2005     Td, historic 11/15/2004     Tdap 2014       Health Maintenance: Immunizations reviewed and up-to-date.    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  Hyperlipidemia and hypothyroidism on replacement.    History of atrial fibrillation status post permanent pacemaker.    Past Surgical History: Prior shoulder surgery.    Hernia repair.    Cervical neck fusion posterior approach.    Permanent pacemaker for atrial fibrillation.  Dr. [facility].    Family History: Mother  88.    Father  uncertain cause and age the patient's father left the family when the patient was age 2.    2 children well  twice first wife  of brain cancer.  Second wife is had history of melanoma in anus but no recurrence after resection.    Social History: Enjoys playing golf ulnar of [employer].  Much of the year spent in [place].  Winter months.    Exam Chest clear to auscultation and percussion.  Heart tones regular rhythm without murmur rub or gallop.  Abdomen soft nontender no organomegaly.  No peritoneal signs.  Extremities free of edema cyanosis or clubbing.  Neck veins nondistended no thyromegaly or scleral icterus noted, carotids full.  Skin warm and dry easily conversant good spirited.  Normal intelligence.  Neurologically intact no gross localizing findings.    Assessment and Plan  Meniscus tear knee need surgical intervention.  Preoperatively electrocardiogram reviewed no acute changes rate 75  atrial paced rhythm with prolonged AV conduction noted.    Check hemogram and basic metabolic profile today.    Smoldering multiple myeloma.    Myeloma kidney by history.    Sulfa allergy.    Atrial fibrillation status post permanent pacemaker implantation.    Chronic warfarin therapy.    Hypothyroidism and acid reflux and hyperlipidemia.    Tubular adenomatous colon polyp see colonoscopy report February 21, 2017.    Medically acceptable risk for anticipated surgery right knee.    Total time spent with the patient today was 40 minutes of which greater than 50% was spent in counseling and coordination of care.    Sarwat Mayorga MD    Patient Active Problem List   Diagnosis     TMJ Pain     Neck Pain     Hyperlipidemia     Benign Monoclonal Hypergammaglobulinemia     Atrial Fibrillation     Benign Prostatic Hypertrophy     Primary Osteoarthritis Of The Cervical Vertebrae     Reactions To Sulfa Drugs     Removal of staples     Multiple myeloma     Colon polyp     Long term (current) use of anticoagulants     Shoulder pain

## 2021-06-12 NOTE — TELEPHONE ENCOUNTER
Left message to call back for: more info  Information to relay to patient:  Please see below  Tracey LESTER

## 2021-06-12 NOTE — PROGRESS NOTES
"Trenton Nova is a 80 y.o. male who is being evaluated via a billable telephone visit.      The patient has been notified of following:     \"This telephone visit will be conducted via a call between you and your physician/provider. We have found that certain health care needs can be provided without the need for a physical exam.  This service lets us provide the care you need with a short phone conversation.  If a prescription is necessary we can send it directly to your pharmacy.  If lab work is needed we can place an order for that and you can then stop by our lab to have the test done at a later time.    Telephone visits are billed at different rates depending on your insurance coverage. During this emergency period, for some insurers they may be billed the same as an in-person visit.  Please reach out to your insurance provider with any questions.    If during the course of the call the physician/provider feels a telephone visit is not appropriate, you will not be charged for this service.\"    Patient has given verbal consent to a Telephone visit? Yes    What phone number would you like to be contacted at? [phone]    Patient would like to receive their AVS by AVS Preference: Kadeem.    Additional provider notes: Cough with yellow-green sputum.    Chest x-ray plus Z-Dakota ordered.    Previously used Z-Dakota with benefit.  Sick for about a week with this cough but no fever chills night sweats no pain in his chest.    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  Myeloma kidney.    Assessment/Plan:  Cough.  Chest x-ray Z-Dakota.    Multiple myeloma smoldering with myeloma kidney chronic kidney disease.    Status post Whipple procedure [facility] [place] [facility] for bile duct cancer.  No clinical evidence of recurrence.    Palpable node left posterior neck area it needs further evaluation with office visit.  This may be a metastatic deposit.      Phone " call duration:  21 minutes    Karen Dunn, [facility]

## 2021-06-12 NOTE — TELEPHONE ENCOUNTER
Question following Office Visit  When did you see your provider: 9/6/20  What is your question: Patient is now  coughing up a green phlegm, and is concerned if he should be taking anything different.  Please advise  Okay to leave a detailed message: Yes

## 2021-06-12 NOTE — TELEPHONE ENCOUNTER
Who is calling:  Patient  Reason for Call:  Returning Charito's call from this morning, the patient had an appointment via phone, however had not turned his pone back on, so he missed his 0740 appointment  Would appreciate a call back from Charito.  .Date of last appointment with primary care: 10/6/20  Okay to leave a detailed message: No

## 2021-06-12 NOTE — TELEPHONE ENCOUNTER
Patient Returning Call  Reason for call:  Returning call.  Information relayed to patient:    Relayed below message to patient.  Patient has additional questions:  Yes  If YES, what are your questions/concerns:    Patient states he has a productive cough with light green mucus.  This happens about 3-4 times daily.  No fever  No pain  No burning sensation in lungs  No shortness of breath  Patient is drinking water and has not been taking any medications for his symptoms.  Patient states he is feeling ok.  Okay to leave a detailed message?: Yes

## 2021-06-12 NOTE — PATIENT INSTRUCTIONS - HE
1.  Continue omeprazole 40 mg in the a.m.    2.  Add famotidine 40 mg in the p.m.    3.  Avoid eating for 2 hours prior to bedtime.  Elevate the head of the bed.    4.  See Dr. Wolf if symptoms remain troublesome on this regimen

## 2021-06-12 NOTE — TELEPHONE ENCOUNTER
"  Last night woke up with a cough and coughing out mucus.  Trenton also had chest pain last night and heart burn and a sour taste in his mouth.   Trenton does not have a constant cough.      COVID 19 Nurse Triage Plan/Patient Instructions    Please be aware that novel coronavirus (COVID-19) may be circulating in the community. If you develop symptoms such as fever, cough, or SOB or if you have concerns about the presence of another infection including coronavirus (COVID-19), please contact your health care provider or visit [email or web address].     Disposition/Instructions    Virtual Visit with provider recommended. Reference Visit Selection Guide.    Thank you for taking steps to prevent the spread of this virus.  o Limit your contact with others.  o Wear a simple mask to cover your cough.  o Wash your hands well and often.    Resources    [facility]: About COVID-19: [email or web address]    CDC: What to Do If You're Sick: www.cdc.gov/coronavirus/2019-ncov/about/steps-when-sick.html    CDC: Ending Home Isolation: www.cdc.gov/coronavirus/2019-ncov/hcp/disposition-in-home-patients.html     CDC: Caring for Someone: www.cdc.gov/coronavirus/2019-ncov/if-you-are-sick/care-for-someone.html     [facility]: Interim Guidance for Hospital Discharge to Home: www.health.[facility].mn.us/diseases/coronavirus/hcp/hospdischarge.pdf    [facility] clinical trials (COVID-19 research studies): clinicalaffairs.[facility]-clinical-trials     Below are the COVID-19 hotlines at the [facility] ([facility]). Interpreters are available.   o For health questions: Call 125-287-1342 or 1-735.534.8817 (7 a.m. to 7 p.m.)  o For questions about schools and childcare: Call 040-883-0711 or 1-985.954.5822 (7 a.m. to 7 p.m.)       Reason for Disposition    [1] Patient claims chest pain is same as previously diagnosed \"heartburn\" AND [2] describes burning in chest AND [3] accompanying sour taste in mouth    Additional " "Information    Negative: Severe difficulty breathing (e.g., struggling for each breath, speaks in single words)    Negative: Difficult to awaken or acting confused (e.g., disoriented, slurred speech)    Negative: Shock suspected (e.g., cold/pale/clammy skin, too weak to stand, low BP, rapid pulse)    Negative: [1] Chest pain lasts > 5 minutes AND [2] history of heart disease  (i.e., heart attack, bypass surgery, angina, angioplasty, CHF; not just a heart murmur)    Negative: [1] Chest pain lasts > 5 minutes AND [2] described as crushing, pressure-like, or heavy    Negative: [1] Chest pain lasts > 5 minutes AND [2] age > 50    Negative: [1] Chest pain lasts > 5 minutes AND [2] age > 30 AND [3] at least one cardiac risk factor (i.e., hypertension, diabetes, obesity, smoker or strong family history of heart disease)    Negative: [1] Chest pain lasts > 5 minutes AND [2] not relieved with nitroglycerin    Negative: Passed out (i.e., lost consciousness, collapsed and was not responding)    Negative: Heart beating < 50 beats per minute OR > 140 beats per minute    Negative: Visible sweat on face or sweat dripping down face    Negative: Sounds like a life-threatening emergency to the triager    Negative: SEVERE chest pain    Negative: [1] Intermittent  chest pain or \"angina\" AND [2] increasing in severity or frequency  (Exception: pains lasting a few seconds)    Negative: Pain also present in shoulder(s) or arm(s) or jaw  (Exception: pain is clearly made worse by movement)    Negative: Difficulty breathing    Negative: Dizziness or lightheadedness    Negative: Coughing up blood    Negative: Cocaine use within last 3 days    Negative: History of prior \"blood clot\" in leg or lungs (i.e., deep vein thrombosis, pulmonary embolism)    Negative: Recent illness requiring prolonged bedrest (i.e., immobilization)    Negative: Hip or leg fracture in past 2 months (e.g., had cast on leg or ankle)    Negative: Major surgery in the past " "month    Negative: Recent long-distance travel with prolonged time in car, bus, plane, or train (i.e., within past 2 weeks; 6 or  more hours duration)    Negative: Chest pain lasts > 5 minutes (Exceptions: chest pain occurring > 3 days ago and now asymptomatic; same as previously diagnosed heartburn and has accompanying sour taste in mouth)    Negative: Taking a deep breath makes pain worse    Negative: Patient sounds very sick or weak to the triager    Negative: [1] Chest pain lasts > 5 minutes AND [2] occurred > 3 days ago (72 hours) AND [3] NO chest pain or cardiac symptoms now    Negative: [1] Chest pain lasting <= 5 minutes AND [2] NO chest pain or cardiac symptoms now(Exceptions: pains lasting a few seconds)    Negative: Fever > 100.5 F (38.1 C)    Negative: Rash in same area as pain (may be described as \"small blisters\")    Protocols used: CHEST PAIN-A-AH      "

## 2021-06-12 NOTE — TELEPHONE ENCOUNTER
Spoke with patient and informed him that the appointment has been scheduled and he can get his INR checked Monday when he is here to see Dr Mayorga.     Alexa Tillman, CMA

## 2021-06-12 NOTE — TELEPHONE ENCOUNTER
Danilo inquire from the patient what is taking right now for his symptoms.  During my visit with him on the 10/1/2020, he noted the cough symptoms occurred only once so I suspect that there have been further episodes? Is this correct?

## 2021-06-12 NOTE — TELEPHONE ENCOUNTER
ANTICOAGULATION  MANAGEMENT    Assessment     Today's INR result of 2.30 is Therapeutic (goal INR of 2.0-3.0)        Warfarin taken as previously instructed    No new diet changes affecting INR    - up 3 lbs.   - continues with one can of protein shake.    No new medication/supplements affecting INR    - cough better after ABX.   - completed 10/19, Azithromycin oral and eye ointment.    Continues to tolerate warfarin with no reported s/s of bleeding or thromboembolism     Previous INR was Subtherapeutic at 1.80 on 10/12/20.    F/u today with Dr. Mayorga - left side neck mass. (will f/u with Dr. Tilley w/ possible biopsy of neck mass.    Leaving for [place] for 2 months.  Will be back in Dec. 2020 to spend [place] with family.  He will get INR checked in [place].    Plan:     Spoke on phone with Jonatan regarding INR result and instructed:     Warfarin Dosing Instructions:  (evenings. Has 2.5mg tabs)   - Continue current warfarin dose 2.5 mg daily on Mon/Thurs; and 1.25 mg daily rest of week.    Instructed patient to follow up no later than:  1-2 wks.   - advised to check INR when he is in [place].  (has a clinic and doctor there and will have his INR faxed to [facility].    Education provided: importance of consistent vitamin K intake, target INR goal and significance of current INR result, importance of notifying clinic for changes in medications, monitoring for clotting signs and symptoms and travel related clotting risk and prevention    Jonatan verbalizes understanding and agrees to warfarin dosing plan.    Instructed to call the [facility] for any changes, questions or concerns. (#[phone])   ?   Yamileth Flores RN    Subjective/Objective:      Trenton Nova, a 80 y.o. male is on warfarin.     Trenton reports:     Home warfarin dose: as updated on anticoagulation calendar per template     Missed doses: No     Medication changes:  No.  Completed Azithromycin today.  He feels really good.     S/S of bleeding or  thromboembolism:  No     New Injury or illness:  No     Changes in diet or alcohol consumption:  No     Upcoming surgery, procedure or cardioversion:  No    Anticoagulation Episode Summary     Current INR goal:  2.0-3.0   TTR:  49.8 % (1 y)   Next INR check:  11/2/2020   INR from last check:  2.30 (10/19/2020)   Weekly max warfarin dose:     Target end date:     INR check location:     Preferred lab:     Send INR reminders to:  [facility]    Indications    Atrial Fibrillation [I48.91]  Long term (current) use of anticoagulants [Z79.01]           Comments:           Anticoagulation Care Providers     Provider Role Specialty Phone number    Sarwat Mayorga MD Referring Internal Medicine [phone]

## 2021-06-12 NOTE — TELEPHONE ENCOUNTER
ANTICOAGULATION  MANAGEMENT    Assessment     Today's INR result of 2.70 is Therapeutic (goal INR of 2.0-3.0)        Warfarin taken as previously instructed    loss of appetite may be affecting diet and INR    - does continue to supplement with protein shakes.    No interaction expected between Famotidine and warfarin    Potential interaction between Amiodarone and warfarin which may affect subsequent INRs    - 10/6, added new RX for Famotidine 40mg every evening.   - will continue with Omeprazole 20mg two times a day before meals.   - Amiodarone discontinued on 9/10/20.    Continues to tolerate warfarin with no reported s/s of bleeding or thromboembolism     Previous INR was Therapeutic at 2.20 on 9/29/20.    Virtual visit with Dr. Christianson - for acute chest pains r/t GERD. (woke up last night with harsh coughing w/ mucus, also severe heart burn and a sour taste in his mouth.)    Plan:     Spoke on phone with Jonatan regarding INR result and instructed:     Warfarin Dosing Instructions:   (evenings. Has 2.5mg tabs)   - Continue current warfarin dose 2.5 mg daily on Tuesdays; and 1.25 mg daily rest of week.    Instructed patient to follow up no later than: one wk.   - INR scheduled on 10/12/20 @ [facility].    Education provided: impact of vitamin K foods on INR, target INR goal and significance of current INR result, no interaction anticipated between warfarin and Famotidine and importance of notifying clinic for changes in medications    Jonatan verbalizes understanding and agrees to warfarin dosing plan.    Instructed to call the [facility] for any changes, questions or concerns. (#[phone])   ?   Yamileth Flores RN    Subjective/Objective:      Trenton Nova, a 80 y.o. male is on warfarin.     Trenton reports:     Home warfarin dose: as updated on anticoagulation calendar per template     Missed doses: No     Medication changes:  Yes:   Added Famotidine at bedtime.   - Amiodarone from (7/24 thru 9/10/20) and  discontinued.     S/S of bleeding or thromboembolism:  No     New Injury or illness:  Yes: GERD     Changes in diet or alcohol consumption:  No     Upcoming surgery, procedure or cardioversion:  No    Anticoagulation Episode Summary     Current INR goal:  2.0-3.0   TTR:  47.3 % (1 y)   Next INR check:  10/13/2020   INR from last check:  2.70 (10/6/2020)   Weekly max warfarin dose:     Target end date:     INR check location:     Preferred lab:     Send INR reminders to:  [facility]    Indications    Atrial Fibrillation [I48.91]  Long term (current) use of anticoagulants [Z79.01]           Comments:           Anticoagulation Care Providers     Provider Role Specialty Phone number    Sarwat Mayorga MD Referring Internal Medicine [phone]

## 2021-06-12 NOTE — TELEPHONE ENCOUNTER
What the patient is taking right now for his symptoms.  During my visit with him on the 10/1/2020, he noted the cough symptoms occurred only once so I suspect that there have been further episodes?

## 2021-06-12 NOTE — TELEPHONE ENCOUNTER
Spoke with patient and he states that when he talked to you yesterday, you said to see him Monday 10/19 at 1:00pm. Is this correct? Ok to schedule?     Alexa Tillman, CMA

## 2021-06-12 NOTE — TELEPHONE ENCOUNTER
Patient Returning Call  Reason for call:  Return call  Information relayed to patient:  n/a  Patient has additional questions:  Yes  If YES, what are your questions/concerns:  Patient is returning call.  Okay to leave a detailed message?: Yes  [phone]

## 2021-06-12 NOTE — PROGRESS NOTES
"Trenton Nova is a 80 y.o. male who is being evaluated via a billable telephone visit.      The patient has been notified of following:     \"This telephone visit will be conducted via a call between you and your physician/provider. We have found that certain health care needs can be provided without the need for a physical exam.  This service lets us provide the care you need with a short phone conversation.  If a prescription is necessary we can send it directly to your pharmacy.  If lab work is needed we can place an order for that and you can then stop by our lab to have the test done at a later time.    Telephone visits are billed at different rates depending on your insurance coverage. During this emergency period, for some insurers they may be billed the same as an in-person visit.  Please reach out to your insurance provider with any questions.    If during the course of the call the physician/provider feels a telephone visit is not appropriate, you will not be charged for this service.\"    Patient has given verbal consent to a Telephone visit? Yes    What phone number would you like to be contacted at? [phone]    Patient would like to receive their AVS by AVS Preference: Kadeem.    Additional provider notes:   ASSESSMENT:  1.  Chest pain:  Trenton reports that he awoke at 2:30 in the morning with severe burning in the throat and reflux  symptoms.  He then developed sweats and a harsh cough.  The burning improved after 1/2-hour.  He has a known history of esophageal reflux.  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  He had a similar episode 1 week ago also at night.  He has had no change in exercise tolerance, or chest discomfort with exertion.  He had a stress test in 2018 which was negative for ischemia.  He is already on omeprazole, with the dose recently increased to 40 mg daily by Dr. Reno.  Current symptoms sound most consistent with reflux rather " than cardiac.  He could have bile reflux given his previous surgery.  He will continue the omeprazole.  Famotidine 40 mg in the p.m. will be added.  He also was advised to avoid eating for 2 hours before bedtime and to elevate the head of the bed.    2.  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  0 of 27 lymph nodes were positive for metastasis.  Whipple procedure was done at [facility] on 3/19/2020.    3.  Atrial fibrillation:  He underwent a recent ablation procedure for this    PLAN:  1.  Continue omeprazole 40 mg in the a.m.    2.  Add famotidine 40 mg in the p.m.    3.  Elevate the head of the bed.  Avoid eating for 2 hours before bedtime.    4.  If symptoms persist, he may require further GI evaluation.  A repeat stress test may be prudent prior to endoscopy.  He should call Dr. Wolf for further issues.            ASSESSED PROBLEMS:  1. Acute chest pain     2. Gastroesophageal reflux disease, unspecified whether esophagitis present     3. Primary adenocarcinoma of ampulla of Vater (H)         CHIEF COMPLAINT:  Chief Complaint   Patient presents with     Gastroesophageal Reflux     cough  then pain in chest  last night     Nasal Congestion       HISTORY OF PRESENT ILLNESS:  Trenton is a 80 y.o. male is evaluated on a telephone visit for chest pain.  He is states that he awoke last night at 2:30 AM.  He noted severe burning in the chest and throat consistent with previous episodes of reflux.  He subsequently developed sweats and a harsh productive cough.  The burning chest discomfort lasted for about 30 minutes before improving.  He had a similar episode 1 week ago again at night.  He is chronically on omeprazole.  The dose was increased to 40 mg daily.  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  He also had a recent ablation procedure for atrial fibrillation.  He last had a stress test in 2018.  There was no evidence for reversible  ischemia at that time.  He has not had any recent change in exercise tolerance.  He notes appetite has been marginal since his surgery.    REVIEW OF SYSTEMS:    Comprehensive review of systems is otherwise negative.    PFSH:  Lives independently    TOBACCO USE:  Social History     Tobacco Use   Smoking Status Former Smoker   Smokeless Tobacco Never Used   Tobacco Comment    quit 8/8/1988       VITALS:  There were no vitals filed for this visit.  Wt Readings from Last 3 Encounters:   09/15/20 151 lb (68.5 kg)   08/11/20 155 lb (70.3 kg)   07/28/20 156 lb (70.8 kg)       PHYSICAL EXAM:  Constitutional:   Reveals an alert talkative man who does not sound in respiratory distress.    Vitals: per nursing noted  Psychiatric:  Memory intact, mood appropriate.    DATA REVIEWED:  Additional History from Old Records Summarized (2): [facility] records and cardiology records reviewed  Decision to Obtain Records (1): None.   Radiology Tests Summarized or Ordered (1): Previous CT scan reviewed  Labs Reviewed or Ordered (1): Pathology records from his Whipple procedure were reviewed  Medicine Test Summarized or Ordered (1): Stress test reviewed from 2018  Independent Review of EKG or X-RAY(2 each): None.    The visit lasted a total of 21 minutes face to face with the patient. Over 50% of the time was spent counseling and educating the patient about evaluation of chest pain    Dragon dictation was used for this note. Speech recognition errors are a possibility.     MEDICATIONS:  Current Outpatient Medications   Medication Sig Dispense Refill     calcium carbonate-vitamin D2 500 mg(1,250mg) -200 unit tablet Take 1 tablet by mouth daily.        cyanocobalamin (VITAMIN B-12) 500 MCG tablet Take 500 mcg by mouth daily.       erythromycin ophthalmic ointment APPLY 0.25INCH TO LIDS QPM       levothyroxine 50 mcg cap Take 1 tablet by mouth Daily at 6:00 am.       multivitamin therapeutic (THERAGRAN) tablet Take 1 tablet by mouth daily.        omeprazole (PRILOSEC) 20 MG capsule Take 20 mg by mouth 2 (two) times a day before meals.        rosuvastatin (CRESTOR) 10 MG tablet        tamsulosin (FLOMAX) 0.4 mg cap Take 0.4 mg by mouth.       terazosin (HYTRIN) 5 MG capsule Take 5 mg by mouth.       vit C/E/Zn/coppr/lutein/zeaxan (PRESERVISION AREDS-2 ORAL) Take by mouth 2 (two) times a day.       VITRON-C 65 mg iron- 125 mg TbEC TK 1 T PO QD. DO NOT CRUSH OR CHEW       warfarin ANTICOAGULANT (COUMADIN/JANTOVEN) 2.5 MG tablet Take 1&1/2 tabs (3.75mg) on Thursdays and take 1 tab (2.5mg) all other days by mouth daily, as directed.  Adjust dose based on INR results. 115 tablet 1     furosemide (LASIX) 40 MG tablet Take 1 tablet (40 mg total) by mouth daily. 30 tablet 3     triamcinolone (KENALOG) 0.1 % cream Apply thin layer to affected areas (arms & hips) twice daily X 1 month 60 g 3     No current facility-administered medications for this visit.                Phone call duration:  21 minutes    Karen Dunn, [facility]

## 2021-06-12 NOTE — TELEPHONE ENCOUNTER
Spoke with patient and he is ok to wait for Monday when Dr Mayorga returns. Giselle Britton, CMA

## 2021-06-12 NOTE — PROGRESS NOTES
Admission History & Physical  Trenton Nova, 1940, [id number]    [facility]  Sarwat Mayorga MD, [phone]    Extended Emergency Contact Information  Primary Emergency Contact: Sarahi Nova   [facility]  Home Phone: [phone]  Relation: Spouse     Assessment and Plan:   Assessment: Plantar fasciitis left foot  Plan: I have recommended a plantar fasciotomy of the left foot.  Active Problems:    * No active hospital problems. *      Chief Complaint:  Severe heel pain left foot ×4 months     HPI:    Trenton Nova is a 77 y.o. old male who presented to the clinic today complaining of a very painful left heel.  The patient indicated that he has had this pain for 4 months.  He has been treated in the past with 3 cortisone injections as well as ultrasound.  He has been wearing orthotics as well.  He has tried anti-inflammatory medication.  He indicated that he has had very little relief.  The pain is an aching type pain which is aggravated with weightbearing and ambulation.  The pain is more pronounced when he first gets out of bed in the mornings.  He does get some mild relief with nonweightbearing.  History is provided by patient    Medical History  Active Ambulatory (Non-Hospital) Problems    Diagnosis     Shoulder pain     Long term (current) use of anticoagulants     Multiple myeloma     Colon polyp     Removal of staples     TMJ Pain     Neck Pain     Hyperlipidemia     Benign Monoclonal Hypergammaglobulinemia     Atrial Fibrillation     Benign Prostatic Hypertrophy     Primary Osteoarthritis Of The Cervical Vertebrae     Reactions To Sulfa Drugs     History reviewed. No pertinent past medical history.  Patient Active Problem List    Diagnosis Date Noted     Shoulder pain 01/18/2016     Long term (current) use of anticoagulants 01/30/2015     Multiple myeloma 12/16/2014     Colon polyp 12/16/2014     Removal of staples 11/07/2014     TMJ Pain      Neck Pain       "Hyperlipidemia      Benign Monoclonal Hypergammaglobulinemia      Atrial Fibrillation      Benign Prostatic Hypertrophy      Primary Osteoarthritis Of The Cervical Vertebrae      Reactions To Sulfa Drugs      Surgical History  He  has a past surgical history that includes Knee surgery and Shoulder surgery.   Past Surgical History:   Procedure Laterality Date     KNEE SURGERY       SHOULDER SURGERY      Social History  Reviewed, and he  reports that he has quit smoking. He has never used smokeless tobacco. He reports that he does not drink alcohol or use illicit drugs.  Social History   Substance Use Topics     Smoking status: Former Smoker     Smokeless tobacco: Never Used      Comment: quit 8/8/1988     Alcohol use No      Allergies  Allergies   Allergen Reactions     Sulfa (Sulfonamide Antibiotics)     Family History  Reviewed, and family history is not on file.   Psychosocial Needs  Social History     Social History Narrative     Additional psychosocial needs reviewed per nursing assessment.       Prior to Admission Medications     (Not in a hospital admission)        Review of Systems - Negative     /60  Resp 16  Ht 5' 7.5\" (1.715 m)  Wt 175 lb (79.4 kg)  BMI 27 kg/m2    Objective findings: Gen.: The patient is alert and in no acute distress      Integument: Nails bilateral feet normal length and color. Skin bilateral feet warm supple and intact.      Vascular: DP and PT pulses +2 over 4 bilateral feet. Capillary refill less than 2 seconds bilateral feet.      Neurologic: Negative clonus, negative Babinski bilateral feet.      Musculoskeletal: Range of motion within normal limits bilateral feet. Muscle power +5 over 5 bilaterally in all components.  There was severe pain on palpation of the plantar medial aspect of the left heel.  There is no edema or erythema surrounding both heels.      Assessment: Plantar fasciitis left foot     Plan: An x-ray of the left foot was taken today.  I informed the " patient that the x-rays were negative for any osseous abnormalities.  I informed the patient also that since he has not responded to conservative measures I am going to recommend a plantar fasciotomy of the left heel in an attempt to excessively treat his heel pain.  He was told that this procedure will be done on an outpatient basis under local anesthesia.  He was told the procedure takes approximately 15 minutes to perform.  He would then be discharged and able to weight-bear in a postop shoe for 2 weeks.  He was asked to go n.p.o. at midnight prior to the procedure and he was asked to see his primary care physician for preoperative consultation.

## 2021-06-12 NOTE — PROGRESS NOTES
Office Visit - Follow up    Trenton Nova   80 y.o. male    Date of Visit: 10/19/2020    Chief Complaint   Patient presents with     Cough     follow up     Mass     left side neck     Thyroid Problem       Subjective: Cough.    Recent diagnosis of cancer of the bile ducts near the ampulla of Vater.  Status post Whipple procedure.    Cough better with Z-Dakota.  Chest x-ray was ordered for October 12, 2020.    The patient has not had a chest x-ray yet chest x-ray has been ordered again now.    Lump left side of neck feels tired but it is mobile and small welt 1 to 2 cm in size circular diameter.  Follow-up TSH has been seen by Dr. Sarwat Arreaga and with Dr. RENAE at [facility] Department of cardiac electrophysiology.  INR is pending the patient is on chronic warfarin therapy he has had a history of atrial fibrillation.  No blood in stool or urine medication list reviewed reconciled.  Weight up 3 pounds from previous BMI 24 PIP    ROS: A comprehensive review of systems was performed and was otherwise negative    Medications:  Prior to Admission medications    Medication Sig Start Date End Date Taking? Authorizing Provider   calcium carbonate-vitamin D2 500 mg(1,250mg) -200 unit tablet Take 1 tablet by mouth daily.    Yes PROVIDER, HISTORICAL   cyanocobalamin (VITAMIN B-12) 500 MCG tablet Take 500 mcg by mouth daily.   Yes PROVIDER, HISTORICAL   famotidine (PEPCID) 40 MG tablet Take 1 tablet (40 mg total) by mouth every evening. 10/6/20  Yes Enmanuel Christianson MD   levothyroxine 50 mcg cap Take 1 tablet by mouth Daily at 6:00 am.   Yes Sarwat Mayorga MD   multivitamin therapeutic (THERAGRAN) tablet Take 1 tablet by mouth daily.   Yes PROVIDER, HISTORICAL   omeprazole (PRILOSEC) 20 MG capsule Take 20 mg by mouth 2 (two) times a day before meals.    Yes Sarwat Mayorga MD   rosuvastatin (CRESTOR) 10 MG tablet  3/11/20  Yes PROVIDER, HISTORICAL   tamsulosin (FLOMAX) 0.4 mg cap Take 0.4 mg by mouth.   Yes  PROVIDER, HISTORICAL   terazosin (HYTRIN) 5 MG capsule Take 5 mg by mouth. 9/22/15  Yes PROVIDER, HISTORICAL   vit C/E/Zn/coppr/lutein/zeaxan (PRESERVISION AREDS-2 ORAL) Take by mouth 2 (two) times a day.   Yes PROVIDER, HISTORICAL   VITRON-C 65 mg iron- 125 mg TbEC TK 1 T PO QD. DO NOT CRUSH OR CHEW 7/21/20  Yes PROVIDER, HISTORICAL   warfarin ANTICOAGULANT (COUMADIN/JANTOVEN) 2.5 MG tablet Take 1&1/2 tabs (3.75mg) on Thursdays and take 1 tab (2.5mg) all other days by mouth daily, as directed.  Adjust dose based on INR results. 12/27/19  Yes Sarwat Mayorga MD   erythromycin ophthalmic ointment APPLY 0.25INCH TO LIDS QPM 9/25/20 10/19/20 Yes PROVIDER, HISTORICAL   furosemide (LASIX) 40 MG tablet Take 1 tablet (40 mg total) by mouth daily. 7/13/20   Sarwat Mayorga MD   triamcinolone (KENALOG) 0.1 % cream Apply thin layer to affected areas (arms & hips) twice daily X 1 month 12/31/19   Sarwat Mayorga MD   azithromycin (ZITHROMAX) 250 MG tablet Take 500mg (2 tablets) day one, and then 250mg (1 tablet) days 2-5 10/12/20 10/19/20  Sarwat Mayorga MD       Allergies:   Allergies   Allergen Reactions     Ambien [Zolpidem]      confusion     Sulfa (Sulfonamide Antibiotics)        Immunizations:   Immunization History   Administered Date(s) Administered     DT (pediatric) 11/15/2004     IG-IM 04/18/2000     Influenza P5o7-03, 02/09/2010     Influenza high dose,seasonal,PF, 65+ yrs 10/21/2015, 09/14/2016, 11/02/2017, 10/23/2018, 10/29/2019, 09/30/2020     Influenza, inj, historic,unspecified 09/30/2009, 10/01/2014     Influenza, seasonal,quad inj 6-35 mos 10/04/2013, 10/16/2014     Pneumo Conj 13-V (2010&after) 10/01/2016, 12/20/2016     Pneumo Polysac 23-V 11/08/2005     Pneumococcal Vaccine, Unspecified 06/01/2006     Td,adult,historic,unspecified 11/15/2004, 01/01/2005     Tdap 01/01/2013, 02/18/2014     ZOSTER, LIVE 01/01/2007     ZOSTER, RECOMBINANT, IM 05/13/2019, 10/01/2019, 12/02/2019       Exam  Chest clear to auscultation and percussion.  Heart tones regular rhythm without murmur rub or gallop.  Abdomen soft nontender no organomegaly.  No peritoneal signs.  Extremities free of edema cyanosis or clubbing.  Neck veins nondistended no thyromegaly or scleral icterus noted, carotids full.  Skin warm and dry easily conversant good spirited.  Normal intelligence.  Neurologically intact no gross localizing findings.  Hard palpable nodule or an old left neck submandibular in location near the angle of the jaw on the left side it is mobile but hard in consistency.  BMI is 24 weight 154 pounds up 3 pounds from previous 67-1/2 inches tall.    128/68 pulse 66 regular O2 sats 98% temperature 97.0 degrees this afternoon.  Consulting oncologist with [facility] is Dr. Johan Tilley.    Assessment and Plan  Cough uncertain etiology Z-Dakota helped check chest x-ray today.    Bile duct cancer status post Whipple procedure [facility].  Followed by Dr. Johan Tilley at [facility] chest x-ray is pending needs reconsultation with Dr. Elise regarding palpable node left neck knee may need biopsy.    Hypothyroidism on replacement 50 mcg of levothyroxine daily may need to decrease pending TSH.    Atrial fibrillation chronic on warfarin therapy check INR today.    Multiple drug allergies including Ambien and sulfa.    Time: total time spent with the patient was 25 minutes of which >50% was spent in counseling and coordination of care        Sarwat Mayorga MD    Patient Active Problem List   Diagnosis     TMJ Pain     Neck Pain     Hyperlipidemia     Benign Monoclonal Hypergammaglobulinemia     Atrial Fibrillation     Benign Prostatic Hypertrophy     Primary Osteoarthritis Of The Cervical Vertebrae     Reactions To Sulfa Drugs     Multiple myeloma (H)     Colon polyp     Long term (current) use of anticoagulants     Shoulder pain     Primary adenocarcinoma of ampulla of Vater (H)

## 2021-06-13 NOTE — TELEPHONE ENCOUNTER
Who is calling:  Patient  Reason for Call:  Return phone call from [facility]  Date of last appointment with primary care: na  Okay to leave a detailed message: Yes

## 2021-06-13 NOTE — TELEPHONE ENCOUNTER
"Anticoagulation Annual Referral Renewal Review    Trenton Nova's chart reviewed for annual renewal of referral to anticoagulation monitoring.        Criteria for anticoagulation nurse and/or pharmacist renewal met   Warfarin indication: Atrial Fibrillation Yes, per indication   Current with INR monitoring/compliant Yes Yes   Date of last office visit 12/17/2020 Yes, had office visit within last year   Time in Therapeutic Range (TTR) 53.6 % No, TTR < 60 %       Trenton Nova did NOT meet all criteria for anticoagulation management program initiated renewal and requires provider review. Using dot phrase, \".acmrenewalprovider\", please advise if Trenton's anticoagulation management referral should be renewed or if patient should be seen in office to review anticoagulation therapy      Yamileth Flores RN  2:22 PM      "

## 2021-06-13 NOTE — TELEPHONE ENCOUNTER
Anticoagulation Management    Unable to reach Jonatan today.    Today's INR result of 3.20 is Supratherapeutic (goal INR of 2.0-3.0).     Follow up required to confirm doses taken and assess for changes (no template).    Left message to hold warfarin tonight.           ACN to follow up - Wed. 11/11.    Yamileth Flores RN

## 2021-06-13 NOTE — PROGRESS NOTES
Office Visit - Follow up    Trenton Nova   80 y.o. male    Date of Visit: 12/17/2020    Chief Complaint   Patient presents with     Abdominal Pain     stomach cramping for 2.5 months        Subjective: Bile duct cancer.    Stomach cramping comes and goes.  Better over the last 2 weeks.  Started Metamucil.    The patient is status post Whipple procedure at the [facility] some months ago there is no clinical evidence of recurrence CA 19-9 level is pending.    The patient's weight is going down his weight today 151 down 3 pounds from last visit the patient has trouble maintaining his weight he is eating well without nausea or vomiting.    Medication list reviewed reconciled in the chart generally well-tolerated.    No melena no blood in stool or urine no vomiting.  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  Jaundice is faded.  After Whipple procedure.    ROS: A comprehensive review of systems was performed and was otherwise negative    Medications:  Prior to Admission medications    Medication Sig Start Date End Date Taking? Authorizing Provider   cyanocobalamin (VITAMIN B-12) 500 MCG tablet Take 500 mcg by mouth daily.   Yes PROVIDER, HISTORICAL   famotidine (PEPCID) 40 MG tablet Take 1 tablet (40 mg total) by mouth every evening. 10/6/20  Yes Enmanuel Christianson MD   furosemide (LASIX) 40 MG tablet Take 1 tablet (40 mg total) by mouth daily. 7/13/20  Yes Sarwat Mayorga MD   levothyroxine (SYNTHROID) 100 MCG tablet Take 1 tablet (100 mcg total) by mouth daily. 10/20/20  Yes Sarwat Mayorga MD   metoprolol succinate (TOPROL-XL) 25 MG Take 25 mg by mouth daily. 12/10/20  Yes PROVIDER, HISTORICAL   multivitamin therapeutic (THERAGRAN) tablet Take 1 tablet by mouth daily.   Yes PROVIDER, HISTORICAL   omeprazole  (PRILOSEC) 20 MG capsule Take 20 mg by mouth 2 (two) times a day before meals.    Yes Sarwat Mayorga MD   rosuvastatin (CRESTOR) 10 MG tablet  3/11/20  Yes PROVIDER, HISTORICAL   tamsulosin (FLOMAX) 0.4 mg cap Take 0.4 mg by mouth.   Yes PROVIDER, HISTORICAL   terazosin (HYTRIN) 5 MG capsule Take 5 mg by mouth. 9/22/15  Yes PROVIDER, HISTORICAL   triamcinolone (KENALOG) 0.1 % cream Apply thin layer to affected areas (arms & hips) twice daily X 1 month 12/31/19  Yes Sarwat Mayorga MD   vit C/E/Zn/coppr/lutein/zeaxan (PRESERVISION AREDS-2 ORAL) Take by mouth 2 (two) times a day.   Yes PROVIDER, HISTORICAL   VITRON-C 65 mg iron- 125 mg TbEC TK 1 T PO QD. DO NOT CRUSH OR CHEW 7/21/20  Yes PROVIDER, HISTORICAL   warfarin ANTICOAGULANT (COUMADIN/JANTOVEN) 2.5 MG tablet Take 1&1/2 tabs (3.75mg) on Thursdays and take 1 tab (2.5mg) all other days by mouth daily, as directed.  Adjust dose based on INR results. 12/27/19  Yes Sarwat Mayorga MD       Allergies:   Allergies   Allergen Reactions     Ambien [Zolpidem]      confusion     Sulfa (Sulfonamide Antibiotics)        Immunizations:   Immunization History   Administered Date(s) Administered     DT (pediatric) 11/15/2004     IG-IM 04/18/2000     Influenza [id number], 02/09/2010     Influenza high dose,seasonal,PF, 65+ yrs 10/21/2015, 09/14/2016, 11/02/2017, 10/23/2018, 10/29/2019, 09/30/2020     Influenza, inj, historic,unspecified 09/30/2009, 10/01/2014     Influenza, seasonal,quad inj 6-35 mos 10/04/2013, 10/16/2014     Pneumo Conj 13-V (2010&after) 10/01/2016, 12/20/2016     Pneumo Polysac 23-V 11/08/2005     Pneumococcal Vaccine, Unspecified 06/01/2006     Td,adult,historic,unspecified 11/15/2004, 01/01/2005     Tdap 01/01/2013, 02/18/2014     ZOSTER, LIVE 01/01/2007     ZOSTER, RECOMBINANT, IM 05/13/2019, 10/01/2019, 12/02/2019       Exam Chest clear to auscultation and percussion.  Heart tones regular rhythm without murmur rub or gallop.  Abdomen soft  nontender no organomegaly.  No peritoneal signs.  Extremities free of edema cyanosis or clubbing.  Neck veins nondistended no thyromegaly or scleral icterus noted, carotids full.  Skin warm and dry easily conversant good spirited.  Normal intelligence.  Neurologically intact no gross localizing findings.  Patient's BMI is 23.3 weight 151 pounds down 3 pounds from last visit he is 5 foot 7-1/2 inches tall.    130/74 6 D4 is the pulse respirations 18 O2 sats 98% on room air temperature this morning 97.4 degrees.  The patient obviously has lost weight but is not cachectic there is no evidence for scleral icterus and he is not overall jaundiced the patient denies dena colored stools or cola colored urine.  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  He spent the last 6 weeks in [place].    Assessment and Plan  Bile duct cancer with history of smoldering multiple myeloma and myeloma kidney with chronic kidney disease.    Allergy zolpidem.    Sulfa allergy as well.  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    Today check hemogram comprehensive metabolic profile and CA 19-9 level.  RTC 1 month's time.  Encourage high-calorie food ingestion.  Patient has met with the dietitian in this regard.  For weight loss evaluation and treatment.    Time: total time spent with the patient was 25 minutes of which >50% was spent in counseling and coordination of care    The following low BMI interventions were performed this visit: weight gain advised    Sarwat Mayorga MD    Patient Active Problem List   Diagnosis     TMJ Pain     Neck Pain     Hyperlipidemia     Benign Monoclonal Hypergammaglobulinemia     Atrial Fibrillation     Benign Prostatic Hypertrophy     Primary Osteoarthritis Of The Cervical Vertebrae     Reactions To Sulfa Drugs     Multiple myeloma (H)     Colon polyp     Long term (current) use of anticoagulants     Shoulder  pain     Primary adenocarcinoma of ampulla of Vater (H)

## 2021-06-13 NOTE — TELEPHONE ENCOUNTER
WHEN LAYO CALLS BACK.  (was playing golf and he said he would call back after he finished his game).     - PLEASE GIVE HIS NEW WARFARIN DOSE.    - today / tonight, advised one time booster with 2.5mg warfarin dose    - 2.5mg Tues/Fri and 1.25 all other days.      - then recheck INR in 1-2 wks, to ensure INR stabililty.

## 2021-06-13 NOTE — PROGRESS NOTES
"Trenton Nova is a 80 y.o. male who is being evaluated via a billable telephone visit.      The patient has been notified of following:     \"This telephone visit will be conducted via a call between you and your physician/provider. We have found that certain health care needs can be provided without the need for a physical exam.  This service lets us provide the care you need with a short phone conversation.  If a prescription is necessary we can send it directly to your pharmacy.  If lab work is needed we can place an order for that and you can then stop by our lab to have the test done at a later time.    Telephone visits are billed at different rates depending on your insurance coverage. During this emergency period, for some insurers they may be billed the same as an in-person visit.  Please reach out to your insurance provider with any questions.    If during the course of the call the physician/provider feels a telephone visit is not appropriate, you will not be charged for this service.\"    Patient has given verbal consent to a Telephone visit? Yes    What phone number would you like to be contacted at? [phone]    Patient would like to receive their AVS by AVS Preference: Mail a copy.    Additional provider notes: Hypertension.   -Controlled  -Continue current medication    Bile duct cancer he is status post Whipple procedure at the [facility].  The patient is on Pepcid and omeprazole he would not take both I prefer famotidine or Pepcid 40 mg daily.  I did instruct him regarding Metamucil for fiber.  The abdominal pain is in the mid epigastric area and is unassociated with any melena nausea vomiting he has had trouble gaining weight his weight is stable however is not losing weight he is currently at 150 pounds he also denies any cola colored urine or dena colored stools.  No blood in stool or urine denies chest pain or shortness of breath.    Assessment/Plan:  Hypertension.    Bile duct cancer.  Rule " out recurrence.  Recent CT scan abdomen negative.  Suggest CA 19-9 level plus Metamucil for irritable bowel syndrome continue famotidine stop omeprazole call in 1 week's time regarding status.  Formal report of CT abdomen and pelvis pending.  Done in [place].      Phone call duration:  21 minutes    Karen Dunn, [facility]

## 2021-06-13 NOTE — TELEPHONE ENCOUNTER
Received a faxed INR result for Trenton Nova  From [facility]  INR result dated 11/24/2020 is 1.9

## 2021-06-13 NOTE — TELEPHONE ENCOUNTER
Provider Review: Anticoagulation Annual Referral Renewal    ACM Renewal Decision:  Renew ACM warfarin management      INR Range:   Continue management at current INR goal   Anticipated Duration of Therapy (from today):  Long-term anticoagulation      Sarwat Mayorga MD  2:48 PM

## 2021-06-13 NOTE — TELEPHONE ENCOUNTER
FYI - Status Update  Who is Calling: Patient  Update: Calling to see if INR result of 1.9 that was taken on 11.24.20 faxed into our INR clinic. Would like a call back in regards to his result and dosing   Okay to leave a detailed message?:  Yes

## 2021-06-13 NOTE — TELEPHONE ENCOUNTER
Received a faxed INR result for Trenton Nova  From [facility]  INR result dated 11/10/2020 is 3.20

## 2021-06-13 NOTE — PROGRESS NOTES
Office Visit - Follow up    Trenton Nova   77 y.o. male    Date of Visit: 9/27/2017    Chief Complaint   Patient presents with     Pre-op Exam     Dr Mynor Rizvi foot plantar facitis 10/06/17       Subjective: Multiple myeloma.  Smoldering.    Patient was slated for preoperative examination in anticipation of plantar fasciitis surgery with Dr. Marquise Lowe on the left foot.  The patient feels better the patient has little or no pain from plantar fasciitis in the left foot.  He was asking for my opinion regarding proceeding with preoperative examination and foot surgery.  In that the patient is improved with conservative measures I have advised against going ahead with plantar fasciitis surgery.    No blood in stool or urine no chest pain or shortness of breath.  Problems with erectile dysfunction persists despite treatment with sialoliths.  No problems with current medication list.  Chronic kidney disease related to myeloma kidney.  Sulfa allergy.  Followed by nephrology in terms of myeloma kidney and chronic kidney disease as well.    ROS: A comprehensive review of systems was performed and was otherwise negative    Medications:  Prior to Admission medications    Medication Sig Start Date End Date Taking? Authorizing Provider   calcium carbonate-vitamin D2 500 mg(1,250mg) -200 unit tablet Take 1 tablet by mouth 2 (two) times a day.   Yes PROVIDER, HISTORICAL   levothyroxine 50 mcg cap Take 1 tablet by mouth Daily at 6:00 am.   Yes Sarwat Mayorga MD   multivitamin therapeutic (THERAGRAN) tablet Take 1 tablet by mouth daily.   Yes PROVIDER, HISTORICAL   omeprazole (PRILOSEC) 20 MG capsule Take 20 mg by mouth daily.   Yes Sarwat Mayorga MD   simvastatin (ZOCOR) 20 MG tablet Take 20 mg by mouth bedtime.   Yes Sarwat Mayorga MD   sotalol (BETAPACE) 80 MG tablet 40 mg 2 times a day at 6:00 am and 4:00 pm. 6/25/15  Yes PROVIDER, HISTORICAL   tadalafil (CIALIS) 5 MG tablet Take 5 mg by mouth daily  as needed for erectile dysfunction.   Yes PROVIDER, HISTORICAL   terazosin (HYTRIN) 1 MG capsule Take 1 mg by mouth bedtime.   Yes PROVIDER, HISTORICAL   warfarin (COUMADIN) 2.5 MG tablet Take 0.5 - 1 tablet by mouth daily as directed. 3/12/15  Yes Sarwat Mayorga MD       Allergies:   Allergies   Allergen Reactions     Sulfa (Sulfonamide Antibiotics)        Immunizations:   Immunization History   Administered Date(s) Administered     DT (pediatric) 11/15/2004     Influenza L7h4-80, 02/09/2010     Influenza high dose, seasonal 10/21/2015, 09/14/2016     Influenza, inj, historic 09/30/2009     Influenza, seasonal,quad inj 6-35 mos 10/04/2013, 10/16/2014     Pneumo Conj 13-V (2010&after) 12/20/2016     Pneumo Polysac 23-V 11/08/2005     Td, historic 11/15/2004     Tdap 02/18/2014       Exam Chest clear to auscultation and percussion.  Heart tones regular rhythm without murmur rub or gallop.  Abdomen soft nontender no organomegaly.  No peritoneal signs.  Extremities free of edema cyanosis or clubbing.  Neck veins nondistended no thyromegaly or scleral icterus noted, carotids full.  Skin warm and dry easily conversant good spirited.  Normal intelligence.  Neurologically intact no gross localizing findings.    Assessment and Plan  Multiple myeloma with chronic kidney disease myeloma kidney.    Sulfa allergy.    Plantar fasciitis improved left lower extremity left foot.  Patient has decided against proceeding with foot surgery for plantar fasciitis improved with conservative measures and proper foot wear with support.    Hypothyroidism on thyroid supplement clinically euthyroid.    Hyperlipidemia on statin therapy.        Time: total time spent with the patient was 25 minutes of which >50% was spent in counseling and coordination of care    The following high BMI interventions were performed this visit: encouragement to exercise    Sarwat Mayorga MD    Patient Active Problem List   Diagnosis     TMJ Pain     Neck  Pain     Hyperlipidemia     Benign Monoclonal Hypergammaglobulinemia     Atrial Fibrillation     Benign Prostatic Hypertrophy     Primary Osteoarthritis Of The Cervical Vertebrae     Reactions To Sulfa Drugs     Removal of staples     Multiple myeloma     Colon polyp     Long term (current) use of anticoagulants     Shoulder pain

## 2021-06-13 NOTE — TELEPHONE ENCOUNTER
Jonatan called back,     - patient did not call back till 11/30/20.     - no new chaanges with his regiments.     - advised to change wkly warfarin dose to 2.5mg on Mon/Thurs and 1.25mb all other days.     - will be returning to [place] on 12/9/20 to spend [place] with Family.     - advised to recheck INR on 12/7/20 prior to leaving [place], to ensure INR stability during his flight back [facility].

## 2021-06-13 NOTE — TELEPHONE ENCOUNTER
Received a faxed INR result for Trenton Nova  From [place], [place]  INR result dated 12/8/2020 is 1.9

## 2021-06-14 NOTE — TELEPHONE ENCOUNTER
Received a faxed INR result for Trenton Nova  From [facility] [place], [place]  INR result dated 1/4/2021 is 2.1

## 2021-06-14 NOTE — TELEPHONE ENCOUNTER
Patient calling this morning to ask Dr Mayorga if it's okay for patient to get the covid 19 vaccine?  He has an opportunity on Sunday 1/31 to get the vaccine and is unsure if it's safe for him to get the vaccine with his complex medical history.  Please call patient to discuss.    [phone]

## 2021-06-14 NOTE — PROGRESS NOTES
Assessment & Plan     Pneumonia right chest.  Hospital stay January 15 through the 17th 2021.  Testing positive for COVID-19 later testing negative.    Atrial fibrillation with rapid ventricular response.  Completed antibiotic therapy and apical rate improved 80.    Multiple myeloma with myeloma kidney and chronic kidney disease.    Biliary duct cancer ampulla Vater status post Whipple procedure at [facility] no clinical evidence of recurrence.  Latest CA 19-9 normal.    Review of external notes as documented above           25 minutes spent on the date of the encounter doing chart review, review of test results, interpretation of tests, patient visit, documentation and discussion with family            No follow-ups on file.    Sarwat Mayorga MD  [facility]     Trenton Nova is 80 y.o. and presents to clinic today for the following health issues   HPI       Hypertension Follow-up      Do you check your blood pressure regularly outside of the clinic? Yes     Are you following a low salt diet? No    Are your blood pressures ever more than 140 on the top number (systolic) OR more       than 90 on the bottom number (diastolic), for example 140/90? No      How many servings of fruits and vegetables do you eat daily?  0-1    On average, how many sweetened beverages do you drink each day (Examples: soda, juice, sweet tea, etc.  Do NOT count diet or artificially sweetened beverages)?   0    How many days per week do you exercise enough to make your heart beat faster? 3 or less    How many minutes a day do you exercise enough to make your heart beat faster? 9 or less    How many days per week do you miss taking your medication? 0        Review of Systems  No blood in stool or urine.  Denies hemoptysis.  Feeling better appetite is still down weight is down and his weight is down 3 pounds from previous.  No loss of taste of smell or taste.    Medication list reviewed reconciled  "in the chart.  Generally well-tolerated.    Non-smoker no excess alcohol.  Accompanied by his wife who is very supportive herself a cancer survivor melanoma in anus with recurrence.      Objective    /68 (Patient Site: Right Arm, Patient Position: Sitting)   Pulse 78   Ht 5' 7.5\" (1.715 m)   Wt 148 lb (67.1 kg)   SpO2 96%   BMI 22.84 kg/m    Body mass index is 22.84 kg/m .  Physical Exam  Chest clear to auscultation and percussion.  Heart tones regular rhythm without murmur rub or gallop.  Abdomen soft nontender no organomegaly.  No peritoneal signs.  Extremities free of edema cyanosis or clubbing.  Neck veins nondistended no thyromegaly or scleral icterus noted, carotids full.  Skin warm and dry easily conversant good spirited.  Normal intelligence.  Neurologically intact no gross localizing findings.              "

## 2021-06-14 NOTE — TELEPHONE ENCOUNTER
ANTICOAGULATION  MANAGEMENT    Assessment     Today's INR result of 2.10 is Therapeutic (goal INR of 2.0-3.0)        Warfarin taken as previously instructed    No new diet changes affecting INR    No new medication/supplements affecting INR    Continues to tolerate warfarin with no reported s/s of bleeding or thromboembolism     Previous INR was Supratherapeutic at 3.40 on 12/21/20.    Back in [place] for the winter.    Plan:     Spoke on phone with Jonatan regarding INR result and instructed:     Warfarin Dosing Instructions:    - Continue current warfarin dose 2.5 mg daily on Wed/Sat; and 1.25 mg daily rest of week.    Instructed patient to follow up no later than:  2 wks.   - INR scheduled on 1/19/2021@ [facility]    Education provided: importance of consistent vitamin K intake, target INR goal and significance of current INR result and importance of notifying clinic for changes in medications    Jonatan verbalizes understanding and agrees to warfarin dosing plan.    Instructed to call the [facility] for any changes, questions or concerns. (#[phone])   ?   Yamileth Flores RN    Subjective/Objective:      Trenton Nova, a 80 y.o. male is on warfarin.     Trenton reports:     Home warfarin dose: verbally confirmed home dose with Jonatan and updated on anticoagulation calendar     Missed doses: No     Medication changes:  No     S/S of bleeding or thromboembolism:  No     New Injury or illness:  No     Changes in diet or alcohol consumption:  No     Upcoming surgery, procedure or cardioversion:  No    Anticoagulation Episode Summary     Current INR goal:  2.0-3.0   TTR:  58.6 % (1 y)   Next INR check:  1/18/2021   INR from last check:  2.10 (1/4/2021)   Weekly max warfarin dose:     Target end date:     INR check location:     Preferred lab:     Send INR reminders to:  [facility]    Indications    Atrial Fibrillation [I48.91]  Long term (current) use of anticoagulants [Z79.01]           Comments:            Anticoagulation Care Providers     Provider Role Specialty Phone number    Sarwat Mayorga MD Referring Internal Medicine [phone]

## 2021-06-14 NOTE — TELEPHONE ENCOUNTER
Patient calling stating that he just got off the phone w/ Charito and he has more questions he would like to discuss.      1:  Last 2 days he's lost 2 pounds, no appetite either.  How to proceed?    2:  Patient requesting further clarification on a few of the items that were discussed on previous call.    Please call patient to discuss.

## 2021-06-15 NOTE — PROGRESS NOTES
Assessment and Plan:   Annual wellness visit  Patient has been advised of split billing requirements and indicates understanding: Yes  1. Routine general medical examination at a health care facility  Annual wellness visit and physical examination.  All the labs listed below were done for screening purposes.  - HM2(CBC w/o Differential)  - Comprehensive Metabolic Panel  - Lipid Cascade  - Thyroid Stimulating Hormone (TSH)  - Urinalysis-UC if Indicated  - Carbohydrate Antigen 19-9 (CA 19-9)     The patient's current medical problems were reviewed.    I have had an Advance Directives discussion with the patient.  The following health maintenance schedule was reviewed with the patient and provided in printed form in the after visit summary:   Health Maintenance   Topic Date Due     COVID-19 Vaccine (2 of 2 - Pfizer series) 02/21/2021     MEDICARE ANNUAL WELLNESS VISIT  02/08/2022     FALL RISK ASSESSMENT  02/08/2022     TD 18+ HE  02/18/2024     ADVANCE CARE PLANNING  01/28/2025     LIPID  09/15/2025     Pneumococcal Vaccine: Pediatrics (0 to 5 Years) and At-Risk Patients (6 to 64 Years)  Completed     Pneumococcal Vaccine: 65+ Years  Completed     INFLUENZA VACCINE RULE BASED  Completed     ZOSTER VACCINES  Completed     HEPATITIS B VACCINES  Aged Out        Subjective:   Chief Complaint: Trenton Nova is an 80 y.o. male here for an Annual Wellness visit.   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    Fatigue no energy.    Seen by Dr. Adalberto Coombs from nephrology on February 5, 2021.  Labs in that consultation reviewed in detail.    Short of breath with activity.  Cardioversion planned with Dr. Sarwat Arreaga at [facility] part of the cardiac electrophysiology section there.  The patient has had intermittent paroxysmal atrial fibrillation with shortness of breath with exertion.  At rest no shortness of  breath.    Non-smoker no alcohol.    Allergies sulfa and Ambien.    Last colonoscopy dated 2017 with Dr. Bowman showed 2 tubular adenomatous colon polyps.    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  CA 19-9 has been followed postoperatively.  There has been a gentle rise in the CA 19-9 and his weight is decreased by about 9 more pounds.  He is progressively becoming weaker even weaker in his voice we did discuss at length and encouraged hydration.  His wife was present during the exam.    Followed by Dr. LEVINE at [facility] and there digital rectal exams plus genital examination scrotum groin area plus prostate check along with PSA not repeated by this examiner.    Paroxysmal atrial fibrillation and prior history of radiofrequency ablation.  Tikosyn has been talked about along with permanent pacemaker and radiofrequency ablation.    Successful cervical disc surgery in his neck    Appendectomy.    Hernia repair.    Prior history of arthroscopic shoulder and knee surgeries.    Followed by nephrology Dr. 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    Hyperlipidemia and history of erectile dysfunction.    Mother  88.    Father was estranged from the family the circumstances of his death are unknown.    2 children well patient is been  twice  once.  First wife  age 66 malignant brain tumor.    Second wife continues to survive a cancer survivor herself of malignant melanoma and anal.        Review of Systems:    Please see above.  The rest of the review of systems are negative for all systems.    Patient Care Team:  Sarwat Mayorga MD as PCP - General  Nicky Shaver MD (Dermatology)  Johan Dasilva MD as Physician (Hematology and Oncology)  Sarwat Mayorga MD as Assigned PCP     Patient Active Problem  List   Diagnosis     TMJ Pain     Neck Pain     Hyperlipidemia     Benign Monoclonal Hypergammaglobulinemia     Atrial Fibrillation     Benign Prostatic Hypertrophy     Primary Osteoarthritis Of The Cervical Vertebrae     Reactions To Sulfa Drugs     Multiple myeloma (H)     Colon polyp     Long term (current) use of anticoagulants     Shoulder pain     Primary adenocarcinoma of ampulla of Vater (H)     No past medical history on file.   Past Surgical History:   Procedure Laterality Date     KNEE SURGERY       PANCREATICODUODENECTOMY  03/19/2020    For adenocarcinoma of the ampulla of Vater     SHOULDER SURGERY        No family history on file.   Social History     Socioeconomic History     Marital status:      Spouse name: Not on file     Number of children: Not on file     Years of education: Not on file     Highest education level: Not on file   Occupational History     Not on file   Social Needs     Financial resource strain: Not on file     Food insecurity     Worry: Not on file     Inability: Not on file     Transportation needs     Medical: Not on file     Non-medical: Not on file   Tobacco Use     Smoking status: Former Smoker     Smokeless tobacco: Never Used     Tobacco comment: quit 8/8/1988   Substance and Sexual Activity     Alcohol use: No     Drug use: No     Sexual activity: Not on file   Lifestyle     Physical activity     Days per week: Not on file     Minutes per session: Not on file     Stress: Not on file   Relationships     Social connections     Talks on phone: Not on file     Gets together: Not on file     Attends Confucianist service: Not on file     Active member of club or organization: Not on file     Attends meetings of clubs or organizations: Not on file     Relationship status: Not on file     Intimate partner violence     Fear of current or ex partner: Not on file     Emotionally abused: Not on file     Physically abused: Not on file     Forced sexual activity: Not on file   Other  "Topics Concern     Not on file   Social History Narrative     Not on file      Current Outpatient Medications   Medication Sig Dispense Refill     brimonidine-timoloL (COMBIGAN) 0.2-0.5 % ophthalmic solution 1 drop.       cyanocobalamin (VITAMIN B-12) 500 MCG tablet Take 500 mcg by mouth daily.       famotidine (PEPCID) 40 MG tablet Take 1 tablet (40 mg total) by mouth every evening. 30 tablet 11     furosemide (LASIX) 40 MG tablet Take 1 tablet (40 mg total) by mouth daily. 30 tablet 3     levothyroxine (SYNTHROID) 100 MCG tablet Take 1 tablet (100 mcg total) by mouth daily. 90 tablet 3     metoprolol succinate (TOPROL-XL) 25 MG Take 25 mg by mouth daily.       multivitamin therapeutic (THERAGRAN) tablet Take 1 tablet by mouth daily.       omeprazole (PRILOSEC) 20 MG capsule Take 20 mg by mouth 2 (two) times a day before meals.        rosuvastatin (CRESTOR) 10 MG tablet        tamsulosin (FLOMAX) 0.4 mg cap Take 0.4 mg by mouth.       terazosin (HYTRIN) 5 MG capsule Take 5 mg by mouth.       vit C/E/Zn/coppr/lutein/zeaxan (PRESERVISION AREDS-2 ORAL) Take by mouth 2 (two) times a day.       VITRON-C 65 mg iron- 125 mg TbEC TK 1 T PO QD. DO NOT CRUSH OR CHEW       warfarin ANTICOAGULANT (COUMADIN/JANTOVEN) 2.5 MG tablet Take 1&1/2 tabs (3.75mg) on Thursdays and take 1 tab (2.5mg) all other days by mouth daily, as directed.  Adjust dose based on INR results. 115 tablet 1     triamcinolone (KENALOG) 0.1 % cream Apply thin layer to affected areas (arms & hips) twice daily X 1 month 60 g 3     No current facility-administered medications for this visit.       Objective:   Vital Signs:   Visit Vitals  /58 (Patient Site: Right Arm, Patient Position: Sitting)   Pulse 90   Temp 96.8  F (36  C)   Ht 5' 6.75\" (1.695 m)   Wt 139 lb (63 kg)   SpO2 95%   BMI 21.93 kg/m           VisionScreening:  No exam data present     PHYSICAL EXAM  Chest clear to auscultation and percussion.  Heart tones regular rhythm without murmur " rub or gallop.  Abdomen soft nontender no organomegaly.  No peritoneal signs.  Extremities free of edema cyanosis or clubbing.  Neck veins nondistended no thyromegaly or scleral icterus noted, carotids full.  Skin warm and dry easily conversant good spirited.  Normal intelligence.  Neurologically intact no gross localizing findings.  Appears weaker weight is down 9 pounds skin negative lymph negative neuro negative psych normal there is a bluish discoloration to his feet reflective of dependent rubor venous insufficiency distal pulses are intact and strong in all 4 extremities HEENT negative wears hearing aids bilaterally easily conversant good spirited not in acute distress not toxic.    5 foot 7 and inches tall 139 pounds down 9 pounds from previous    110/58 pulse rate is irregularly irregular 74-90 consistent with atrial fibrillation with controlled ventricular response rate.  O2 sats room air 95% temperature this morning 96.8 degrees he is on chronic warfarin therapy as well his meds and cares were reviewed in detail along with problem list.    Assessment Results 2/8/2021   Activities of Daily Living No help needed   Instrumental Activities of Daily Living No help needed   Get Up and Go Score Less than 12 seconds   Mini Cog Total Score -   Some recent data might be hidden     A Mini-Cog score of 0-2 suggests the possibility of dementia, score of 3-5 suggests no dementia    Identified Health Risks:     The patient was provided with suggestions to help him develop a healthy physical lifestyle.   He is at risk for lack of exercise and has been provided with information to increase physical activity for the benefit of his well-being.  The patient was counseled and encouraged to consider modifying their diet and eating habits. He was provided with information on recommended healthy diet options.  The patient's PHQ-9 score is consistent with mild depression. He was provided with information regarding depression and  was advised to schedule a follow up appointment in 12 weeks to further address this issue.  Patient's advanced directive was discussed and I am comfortable with the patient's wishes.

## 2021-06-15 NOTE — TELEPHONE ENCOUNTER
Who is calling:  patient  Reason for Call:  Had INR done yesterday at [facility], and has not gotten a call for the dosing and return date  Date of last appointment with primary care: n/a  Okay to leave a detailed message: Yes

## 2021-06-15 NOTE — TELEPHONE ENCOUNTER
Who is calling:  Patient  Reason for Call:  The patient is calling [facility] to consult regarding his appointment with his pulmonary specialist. Please call the provided to advise.    Okay to leave a detailed message: Yes

## 2021-06-15 NOTE — PROGRESS NOTES
Bile duct cancer with left-sided chest pain pleuritic in nature.  Pain extends from the left lateral chest anterior axillary line mid axillary line to the oblique muscle area on the left side.  Is up 4 pounds from previous.  Check CT scan of chest abdomen pelvis with contrast.  Latest CA 19-9 level normal at 19.  Previously higher.    Multiple drug allergies including Ambien sulfa.    Myeloma kidney with history of multiple myeloma nonprogressive smoldering.  Use arthritis strength acetaminophen for pain with topical heat 2 tablets of the former 3 times a day on a scheduled basis for the least 7 days.    Assessment & Plan     Bile duct cancer with history of Whipple procedure and left-sided chest pain extending from his left chest    Review of external notes as documented in note  25 minutes spent on the date of the encounter doing chart review, patient visit and documentation        No follow-ups on file.    Sarwat Mayorga MD  [facility]   Trenton Nova is 80 y.o. and presents today for the following health issues   HPI       Hypertension Follow-up      Do you check your blood pressure regularly outside of the clinic? Yes     Are you following a low salt diet? No    Are your blood pressures ever more than 140 on the top number (systolic) OR more       than 90 on the bottom number (diastolic), for example 140/90? No      How many servings of fruits and vegetables do you eat daily?  0-1    On average, how many sweetened beverages do you drink each day (Examples: soda, juice, sweet tea, etc.  Do NOT count diet or artificially sweetened beverages)?   1    How many days per week do you exercise enough to make your heart beat faster? 3 or less    How many minutes a day do you exercise enough to make your heart beat faster? 9 or less    How many days per week do you miss taking your medication? 0        Review of Systems  No blood in stool or urine weight up 4 pounds from  "previous now 143.  Denies dark-colored urine and dena colored stools.  No loss of appetite.  Prior history includes normalization of the CA 19-9 level and mild anemia that is normochromic normocytic and iron rich diet was recommended with leafy green vegetables and lean red meat.    Medication list reviewed reconciled.  Non-smoker no excess alcohol.      Objective    /68 (Patient Site: Right Arm, Patient Position: Sitting)   Pulse 73   Temp 97.2  F (36.2  C)   Ht 5' 6.75\" (1.695 m)   Wt 143 lb (64.9 kg)   SpO2 97%   BMI 22.57 kg/m    Body mass index is 22.57 kg/m .  Physical Exam  Chest clear to auscultation and percussion.  Heart tones regular rhythm without murmur rub or gallop.  Abdomen soft nontender no organomegaly.  No peritoneal signs.  Extremities free of edema cyanosis or clubbing.  Neck veins nondistended no thyromegaly or scleral icterus noted, carotids full.  Skin warm and dry easily conversant good spirited.  Normal intelligence.  Neurologically intact no gross localizing findings.  Neatly attired frail not in acute distress not toxic.  Not jaundiced no scleral icterus.  Easily conversant good spirited intelligent.              "

## 2021-06-15 NOTE — TELEPHONE ENCOUNTER
ANTICOAGULATION  MANAGEMENT    Assessment     Today's INR result of 2.2 is Therapeutic (goal INR of 2.0-3.0)        Warfarin taken as previously instructed    No new diet changes affecting INR    Interaction between Levaquin and warfarin may be affecting INR - until 2/28    Continues to tolerate warfarin with no reported s/s of bleeding or thromboembolism     Previous INR was Subtherapeutic    Plan:     Spoke on phone with Trenton regarding INR result and instructed:      Warfarin Dosing Instructions:  Continue current warfarin dose 2.5 mg daily on Tue, Fri; and 1.25 mg daily rest of week  (0 % change)    Instructed patient to follow up no later than: Tue 3/2    Education provided: target INR goal and significance of current INR result, importance of following up for INR monitoring at instructed interval, importance of taking warfarin as instructed, importance of notifying clinic for changes in medications and importance of notifying clinic for diarrhea, nausea/vomiting, reduced intake and/or illness    Trenton verbalizes understanding and agrees to warfarin dosing plan.    Instructed to call the [facility] for any changes, questions or concerns. (#[phone])   ?   Yanira Gonzalez RN    Subjective/Objective:      Trenton Nova, a 80 y.o. male is on warfarin. Trenton Chowdhury reports:     Home warfarin dose: template incorrect; verbally confirmed home dose with Trenton and updated on anticoagulation calendar     Missed doses: No     Medication changes:  Yes: still taking levaquin     S/S of bleeding or thromboembolism:  No     New Injury or illness:  No     Changes in diet or alcohol consumption:  No     Upcoming surgery, procedure or cardioversion:  No    Anticoagulation Episode Summary     Current INR goal:  2.0-3.0   TTR:  50.6 % (1 y)   Next INR check:  3/2/2021   INR from last check:  2.20 (2/26/2021)   Weekly max warfarin dose:     Target end date:     INR check location:     Preferred lab:     Send INR  reminders to:  [facility]    Indications    Atrial Fibrillation [I48.91]  Long term (current) use of anticoagulants [Z79.01]           Comments:           Anticoagulation Care Providers     Provider Role Specialty Phone number    Sarwat Mayorga MD Referring Internal Medicine [phone]

## 2021-06-15 NOTE — TELEPHONE ENCOUNTER
Called and spoke Jonatan.     - reported seeing his lung doctor and he brought along his CT scan of chest / abdomen / pelvis taken on 2/22/21 for his review.     - doctor took him off / stop Levaquin 750mg.     - wife possibly might have cancer (rectal) too and has appt with her doctor on 3/4.  Then he will leave to go back to [place].     - advised to continue same warfarin dose.     - and recheck INR in 2 wks.

## 2021-06-15 NOTE — PROGRESS NOTES
Assessment and Plan:   Annual wellness visit and physical exam.    Fasting.  Tried Tamiflu for cough positive influenza type B.  Had been in [place] could not exercise.  He is 77 years of age with history of smoldering multiple myeloma.    1. 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  - HM2(CBC w/o Differential)  - Comprehensive Metabolic Panel  - Lipid Cascade  - Thyroid Stimulating Hormone (TSH)  - Urinalysis-UC if Indicated  - PSA (Prostatic-Specific Antigen), Annual Screen  - XR Chest 2 Views     The patient's current medical problems were reviewed.    I have had an Advance Directives discussion with the patient.  The following health maintenance schedule was reviewed with the patient and provided in printed form in the after visit summary:   Health Maintenance   Topic Date Due     ZOSTER VACCINE  03/20/2000     INFLUENZA VACCINE RULE BASED (1) 08/01/2017     FALL RISK ASSESSMENT  01/16/2019     ADVANCE DIRECTIVES DISCUSSED WITH PATIENT  02/02/2020     TD 18+ HE  02/18/2024     PNEUMOCOCCAL POLYSACCHARIDE VACCINE AGE 65 AND OVER  Completed     PNEUMOCOCCAL CONJUGATE VACCINE FOR ADULTS (PCV13 OR PREVNAR)  Completed        Subjective:   Chief Complaint: Trenton Nova is an 77 y.o. male here for an Annual Wellness visit.   HPI: 77-year-old male here for annual wellness visit and physical exam.    Try Tamiflu for 5 day.  For influenza smear positive for type B.  Patient has no energy and cough non-smoker no excess alcohol he is allergic to sulfa.  When offered genital rectal exam he declined today.  Return to clinic 1 month's time.    Multiple prior operations including radiofrequency or catheter ablation for atrial fibrillation on chronic warfarin therapy.  Followed by [facility] cardiac electrophysiologist Dr. RENAE.  Has had prior cervical disc surgery plus appendectomy and hernia  repair.  Multiple other prior orthopedic surgeries including arthroscopic knee and shoulder surgeries.    Multiple myeloma or smoldering in type with myeloma kidney and chronic kidney disease and hypothyroidism with acid reflux.  No history of MI or stroke.    History of hyperlipidemia and erectile dysfunction.    Mother  88 old age.  Father was estranged from the family because of death is unknown.  2 children first wife  at age 66 of a brain tumor from home he was  for many years second wife well with history of malignant melanoma of the anus and rectum.    Last colonoscopy for this patient done 2017 showed 2 tubular adenomatous colon polyps but no cancer.    Review of Systems:    Please see above.  The rest of the review of systems are negative for all systems.    Patient Care Team:  Sarwat Mayorga MD as PCP - General  Nicky Shaver MD (Dermatology)     Patient Active Problem List   Diagnosis     TMJ Pain     Neck Pain     Hyperlipidemia     Benign Monoclonal Hypergammaglobulinemia     Atrial Fibrillation     Benign Prostatic Hypertrophy     Primary Osteoarthritis Of The Cervical Vertebrae     Reactions To Sulfa Drugs     Removal of staples     Multiple myeloma     Colon polyp     Long term (current) use of anticoagulants     Shoulder pain     No past medical history on file.   Past Surgical History:   Procedure Laterality Date     KNEE SURGERY       SHOULDER SURGERY        No family history on file.   Social History     Social History     Marital status:      Spouse name: N/A     Number of children: N/A     Years of education: N/A     Occupational History     Not on file.     Social History Main Topics     Smoking status: Former Smoker     Smokeless tobacco: Never Used      Comment: quit 1988     Alcohol use No     Drug use: No     Sexual activity: Not on file     Other Topics Concern     Not on file     Social History Narrative      Current Outpatient  "Prescriptions   Medication Sig Dispense Refill     calcium carbonate-vitamin D2 500 mg(1,250mg) -200 unit tablet Take 1 tablet by mouth 2 (two) times a day.       levothyroxine 50 mcg cap Take 1 tablet by mouth Daily at 6:00 am.       multivitamin therapeutic (THERAGRAN) tablet Take 1 tablet by mouth daily.       omeprazole (PRILOSEC) 20 MG capsule Take 20 mg by mouth daily.       simvastatin (ZOCOR) 20 MG tablet Take 20 mg by mouth at bedtime. 5 mg every other day       sotalol (BETAPACE) 80 MG tablet 40 mg 2 times a day at 6:00 am and 4:00 pm.       terazosin (HYTRIN) 1 MG capsule Take 1 mg by mouth bedtime.       tadalafil (CIALIS) 5 MG tablet Take 5 mg by mouth daily as needed for erectile dysfunction.       warfarin (COUMADIN) 2.5 MG tablet TAKE 1 TABLET BY MOUTH DAILY 4 DAYS A WEEK AND 1/2 TABLET DAILY 3 DAYS A WEEK AS DIRECTED 90 tablet 0     No current facility-administered medications for this visit.       Objective:   Vital Signs:   Visit Vitals     /74 (Patient Site: Right Arm, Patient Position: Sitting)     Pulse 84     Temp 97.8  F (36.6  C)     Ht 5' 7.25\" (1.708 m)     Wt 173 lb (78.5 kg)     SpO2 97%     BMI 26.89 kg/m2        VisionScreening:  No exam data present     PHYSICAL EXAM  Chest clear to auscultation and percussion.  Heart tones regular rhythm without murmur rub or gallop.  Abdomen soft nontender no organomegaly.  No peritoneal signs.  Extremities free of edema cyanosis or clubbing.  Neck veins nondistended no thyromegaly or scleral icterus noted, carotids full.  Skin warm and dry easily conversant good spirited.  Normal intelligence.  Neurologically intact no gross localizing findings.  Rest of exam negative in its entirety he did have a wet sounding cough during exam with clear lung fields posteriorly no rales rhonchi or wheezes chest x-ray pending Z-Dakota ordered.    Head eyes ears nose throat negative wears hearing aids bilaterally rest of examination negative genital rectal " deferred per patient's request will return to clinic in 1 month's time for reassessment of cough etc. plus check then genital and rectal check good pulses noted in all 4 extremities no carotid bruits or thyromegaly no lymphadenopathy appreciated lymph bearing areas neck supple good range of motion no nuchal rigidity.  No carotid bruits.  No thyromegaly.  Rest of examination negative.    Assessment Results 1/16/2018   Activities of Daily Living No help needed   Instrumental Activities of Daily Living No help needed   Get Up and Go Score Less than 12 seconds   Mini Cog Total Score 5   Some recent data might be hidden     A Mini-Cog score of 0-2 suggests the possibility of dementia, score of 3-5 suggests no dementia    Identified Health Risks:     The patient was counseled and encouraged to consider modifying their diet and eating habits. He was provided with information on recommended healthy diet options.  Patient's advanced directive was discussed and I am comfortable with the patient's wishes.

## 2021-06-15 NOTE — TELEPHONE ENCOUNTER
ANTICOAGULATION  MANAGEMENT    Assessment     Today's INR result of 2.40 is Therapeutic (goal INR of 2.0-3.0)        Warfarin taken as previously instructed    No new diet changes affecting INR    No new medication/supplements affecting INR    - has 4 days left of Levofloxacin 750mg daily.    Continues to tolerate warfarin with no reported s/s of bleeding or thromboembolism     Previous INR was Therapeutic at 2.20 on 2/26/21.    Appt with Pulmonologist on 3/3/21.    Plan:     Spoke on phone with Jonatna regarding INR result and instructed:   - Jonatan will call if any changes with meds or ABX.      Warfarin Dosing Instructions:    - Continue current warfarin dose 2.5 mg daily on Tues/Fri; and 1.25 mg daily rest of week.    Instructed patient to follow up no later than:  2 wks.    Education provided: importance of consistent vitamin K intake, target INR goal and significance of current INR result and importance of notifying clinic for changes in medications    Jonatan verbalizes understanding and agrees to warfarin dosing plan.    Instructed to call the [facility] for any changes, questions or concerns. (#[phone])   ?   Yamileth Flores RN    Subjective/Objective:      Trenton LINDA Nova, a 80 y.o. male is on warfarin. Trenton Chowdhury reports:     Home warfarin dose: as updated on anticoagulation calendar per template     Missed doses: No     Medication changes:  Yes:  Levofloxacin     S/S of bleeding or thromboembolism:  No     New Injury or illness:  Yes:  Bilateral pneumonia.     Changes in diet or alcohol consumption:  No     Upcoming surgery, procedure or cardioversion:  No    Anticoagulation Episode Summary     Current INR goal:  2.0-3.0   TTR:  51.7 % (1 y)   Next INR check:  3/16/2021   INR from last check:  2.40 (3/2/2021)   Weekly max warfarin dose:     Target end date:     INR check location:     Preferred lab:     Send INR reminders to:  [facility]    Indications    Atrial Fibrillation [I48.91]  Long  term (current) use of anticoagulants [Z79.01]           Comments:           Anticoagulation Care Providers     Provider Role Specialty Phone number    Sarwat Mayorga MD Referring Internal Medicine [phone]

## 2021-06-15 NOTE — TELEPHONE ENCOUNTER
INITIAL INR > 5.5 NOTIFICATION- Anticoagulation Management Program    Trenton Nova had an initial point of care INR of 5.7.     Venous confirmation of INR required per protocol. STAT venous sample drawn and being sent out for confirmation.    Anticoagulation template scanned into EHR. Patient phone call with Anticoagulation Management Program ([facility]) being arranged for patient triage prior to discharge.     Mine Fox

## 2021-06-15 NOTE — TELEPHONE ENCOUNTER
Who is calling:  Ileana with [facility]  Reason for Call:  Ileana was calling [facility] regarding patient's upcoming cardioversion at their facility on 02/15/21. She was looking to discuss goal inr range (2-3) and any additional question/concerns  Date of last appointment with primary care: 02/08/21  Okay to leave a detailed message: Yes

## 2021-06-15 NOTE — TELEPHONE ENCOUNTER
Called and spoke with Ileana with [facility]     - CHANTELLE with Cardioversion on 2/15/21.     - would like INR between 2-3 and not to drop below 2.0.     - INR scheduled on Fri. 2/12/21 @ [facility].     - Covid-19 testing also on 2/12/21 @ [facility].

## 2021-06-15 NOTE — TELEPHONE ENCOUNTER
"ANTICOAGULATION  MANAGEMENT    Trenton Nova is on warfarin and had a point of care INR result > 5.5 or an \"error message\" on point of care meter today.    Morning INR; STAT venous INR processing requested from lab    Spoke with Trenton via phone while at the lab and reviewed triage questions for potential signs and symptoms of bleeding.      Patient Response     Have you had any bleeding in the last week?   No   Have you passed any red, black, or tarry stools in last week?   No   Have you vomited/spit up any red or coffee ground material in last week?   No   Have you had any new, severe abdominal pain or bloating develop in last week?   Yes: did see doctor today for diarrhea, no blood, resolving   Have you fallen or had any injuries in last week?   No   Do you currently have a severe, sudden onset headache?   No   Do you currently have any severe sudden changes in your vision?   No   Do you currently have any new onset numbness, weakness/paralysis?   No     Assessment/Plan:     Trenton's responses were negative for signs and symptoms of bleeding; may discharge from clinic    Trenton instructed to:       Hold warfarin today until venous INR result returns and receives follow up call from [facility]    Seek medical attention for new signs and symptoms of bleeding or a fall with injury.       "

## 2021-06-15 NOTE — TELEPHONE ENCOUNTER
Called and spoke with Jonatan.     - he verbalized warfarin dose and also scheduled next INR on 2/19/21.     - he reported feeling good now after Cardioversion on 2/15/21.  He converted to sinus rhythm.

## 2021-06-15 NOTE — TELEPHONE ENCOUNTER
Anticoagulation Management    Unable to reach Jonatan today.    Today's INR result of 2.10 is Therapeutic (goal INR of 2.0-3.0).   - HELD warfarin doses on 2/8-9 and again on 2/12-13.      Follow up required to confirm doses taken and assess for changes (no template).    - s/p Cardioversion on 2/15/21.    Left message to continue current dose of warfarin 1.25 mg tonight.       [facility] to follow up - on Wed. 2/17.    Yamileth Flores RN

## 2021-06-16 PROBLEM — J18.9 PNEUMONIA DUE TO INFECTIOUS ORGANISM: Status: ACTIVE | Noted: 2021-01-15

## 2021-06-16 PROBLEM — I48.91 ATRIAL FIBRILLATION WITH RVR (H): Status: ACTIVE | Noted: 2021-01-15

## 2021-06-16 PROBLEM — C24.1 PRIMARY ADENOCARCINOMA OF AMPULLA OF VATER (H): Status: ACTIVE | Noted: 2020-10-06

## 2021-06-16 PROBLEM — I10 ESSENTIAL HYPERTENSION: Status: ACTIVE | Noted: 2018-04-23

## 2021-06-16 PROBLEM — D49.0 NEOPLASM OF UNSPECIFIED BEHAVIOR OF DIGESTIVE SYSTEM: Status: ACTIVE | Noted: 2020-02-06

## 2021-06-16 PROBLEM — D63.1 ANEMIA IN CHRONIC KIDNEY DISEASE: Status: ACTIVE | Noted: 2020-02-06

## 2021-06-16 PROBLEM — R53.81 PHYSICAL DEBILITY: Status: ACTIVE | Noted: 2021-01-15

## 2021-06-16 PROBLEM — Z23 ENCOUNTER FOR IMMUNIZATION: Status: ACTIVE | Noted: 2021-03-23

## 2021-06-16 PROBLEM — N40.0 PROSTATISM: Status: ACTIVE | Noted: 2020-02-06

## 2021-06-16 PROBLEM — C25.3 MALIGNANT NEOPLASM OF PANCREATIC DUCT (H): Status: ACTIVE | Noted: 2020-01-01

## 2021-06-16 PROBLEM — C24.1: Status: ACTIVE | Noted: 2020-11-23

## 2021-06-16 PROBLEM — N18.9 ANEMIA IN CHRONIC KIDNEY DISEASE: Status: ACTIVE | Noted: 2020-02-06

## 2021-06-16 NOTE — TELEPHONE ENCOUNTER
Spoke to Trenton who wanted to be sure his INR results were faxed from lab in [place]. Advised that they were.     Patient verbalized understanding and had no other questions.

## 2021-06-16 NOTE — TELEPHONE ENCOUNTER
Telephone Encounter by Yamileth Flores RN at 3/27/2020 12:21 PM     Author: Yamileth Flores RN Service: -- Author Type: Registered Nurse    Filed: 3/27/2020  1:47 PM Encounter Date: 3/27/2020 Status: Attested    : Yamileth Flores RN (Registered Nurse) Cosigner: Sarwat Mayorga MD at 3/27/2020  1:49 PM    Attestation signed by Sarwat Mayorga MD at 3/27/2020  1:49 PM    ok                ANTICOAGULATION  MANAGEMENT    Assessment     Today's INR result of 2.00 is Therapeutic (goal INR of 2.0-3.0)        Warfarin recently held as instructed which may be affecting INR    - reported taking 2 day booster doses on 3/24-25, as instructed.   - resumed warfarin on 3/25.    No new diet changes affecting INR    No new medication/supplements affecting INR    Continues to tolerate warfarin with no reported s/s of bleeding or thromboembolism     Previous INR was Subtherapeutic at 1.50 on 3/20/20.    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    Plan:     Spoke with Trenton regarding INR result and instructed:     Warfarin Dosing Instructions:    - Continue current warfarin dose 5 mg daily on Mon/Thurs; and 2.5 mg daily rest of week.    Instructed patient to follow up no later than:  One wk.   - INR scheduled on 4/3/20 @ [facility].    Education provided: target INR goal and significance of current INR result, importance of notifying clinic for changes in medications, monitoring for bleeding signs and symptoms and when to seek medical attention/emergency care    Trenton verbalizes understanding and agrees to warfarin dosing plan.    Instructed to call the [facility] for any changes, questions or concerns. (#[phone])   ?   Yamileth Flores RN    Subjective/Objective:      Trenton Nova, a 80 y.o. male is on warfarin.     Trenton reports:     Home warfarin dose: as updated on anticoagulation calendar per template     Missed doses: Yes:  Warfarin doses held  for 10 days, from 3/14 -23.     Medication changes:  Yes: Hydromorphone PRN for pain, if not relieved with Tylenol, and Augmentin two times a day for 5 days, 3/24-29.     S/S of bleeding or thromboembolism:  No     New Injury or illness:  Yes: Malignant Neoplasm of Ampulla Vater.  Reported in a lot of pain from recent surgery on 3/19/20, but recovering ok.     Changes in diet or alcohol consumption:  No     Upcoming surgery, procedure or cardioversion:  No    Anticoagulation Episode Summary     Current INR goal:   2.0-3.0   TTR:   64.5 % (1 y)   Next INR check:   4/3/2020   INR from last check:   2.00 (3/27/2020)   Weekly max warfarin dose:      Target end date:      INR check location:      Preferred lab:      Send INR reminders to:   [facility]    Indications    Atrial Fibrillation [I48.91]  Long term (current) use of anticoagulants [Z79.01]           Comments:            Anticoagulation Care Providers     Provider Role Specialty Phone number    Sarwat Mayorga MD Referring Internal Medicine [phone]

## 2021-06-16 NOTE — TELEPHONE ENCOUNTER
Per care everywhere patient was seen by [facility] yesterday but there is no INR result available in care everywhere     Julio, Yoshi R   Internal Medicine   NPI: [id number]   [address] [address]       Phone: [phone]      Called and left a message at above # on VM requesting that INR be faxed.     ANTICOAGULATION  MANAGEMENT    Assessment     Yesterday's INR result of 2.2 is Therapeutic (goal INR of 2.0-3.0). Result self-reported by patient, awaiting faxed confirmation from outside lab in [place].       Warfarin taken as previously instructed    No new diet changes affecting INR    No new medication/supplements affecting INR    Continues to tolerate warfarin with no reported s/s of bleeding or thromboembolism     Previous INR was Therapeutic    Plan:     Spoke on phone with Trenton regarding INR result and instructed:      Warfarin Dosing Instructions:  Continue current warfarin dose    2.5 mg every Tue, Fri; 1.25 mg all other days       Instructed patient to follow up no later than: 1 month    Education provided: target INR goal and significance of current INR result    Trenton verbalizes understanding and agrees to warfarin dosing plan. He is aware [facility] is awaiting faxed confirmation of these results and will call him back if they are not received, he states he did make sure the lab had the info to fax results. He states he can go by the lab and request they resend if results are not faxed today.     Instructed to call the [facility] for any changes, questions or concerns. (#[phone])   ?   Lubna Kamara RN    Subjective/Objective:      Trenton Nova, a 80 y.o. male is on warfarin. Trenton Chowdhury reports:     Home warfarin dose: verbally confirmed home dose with Trenton and updated on anticoagulation calendar     Missed doses: No     Medication changes:  No     S/S of bleeding or thromboembolism:  No     New Injury or illness:  No     Changes  in diet or alcohol consumption:  No     Upcoming surgery, procedure or cardioversion:  No    Anticoagulation Episode Summary     Current INR goal:  2.0-3.0   TTR:  55.6 % (1 y)   Next INR check:  4/14/2021   INR from last check:  2.20 (3/16/2021)   Weekly max warfarin dose:     Target end date:     INR check location:     Preferred lab:     Send INR reminders to:  [facility]    Indications    Atrial Fibrillation [I48.91]  Long term (current) use of anticoagulants [Z79.01]           Comments:           Anticoagulation Care Providers     Provider Role Specialty Phone number    Sarwat Mayorga MD Referring Internal Medicine [phone]

## 2021-06-16 NOTE — TELEPHONE ENCOUNTER
Who is calling:  Patient  Reason for Call:  Patient returning call from [facility] from 04/12.   Date of last appointment with primary care:   Okay to leave a detailed message: No, patient is having trouble with phone.

## 2021-06-16 NOTE — TELEPHONE ENCOUNTER
FYI - Status Update  Who is Calling: Patient  Update: requests call back from [facility] to discuss INR results  Okay to leave a detailed message?:  No

## 2021-06-16 NOTE — TELEPHONE ENCOUNTER
ANTICOAGULATION  MANAGEMENT    Assessment     Today's INR result of 2.10 is Therapeutic (goal INR of 2.0-3.0)        Warfarin recently held as instructed which may be affecting INR    - held warfarin on 4/16/21, as instructed.    No new diet changes affecting INR    Potential interaction between Amiodarone and warfarin which may affect subsequent INRs    - now on Amiodarone 200mg two times a day till 4/30, then will decrease to daily.    Continues to tolerate warfarin with no reported s/s of bleeding or thromboembolism     Previous INR was Supratherapeutic at 3.60 on 4/19/21.    Arriving home to [place] on Tues. 5/4/21.    Plan:     Spoke on phone with Jonatan regarding INR result and instructed:      Warfarin Dosing Instructions:  (evenings. 1mg / 2.5mg tabs)   - Continue current warfarin dose 1 mg daily on Mon/Thurs; and 1.25 mg daily rest of week.    Instructed patient to follow up no later than:  One wk.   - INR scheduled on Wed. 5/5/21 @ [facility]    Education provided: target INR goal and significance of current INR result, potential interaction between warfarin and Amiodarone and importance of notifying clinic for changes in medications    Jonatan verbalizes understanding and agrees to warfarin dosing plan.    Instructed to call the [facility] for any changes, questions or concerns. (#[phone])   ?   Yamileth Flores RN    Subjective/Objective:      Trenton LINDA Nova, a 81 y.o. male is on warfarin. Trenton Chowdhury reports:     Home warfarin dose: verbally confirmed home dose with Jonatan and updated on anticoagulation calendar     Missed doses: Yes:  Held warfarin dose on 4/19.     Medication changes:  Yes: decreased Amiodarone 200mg two times a day for the last 3 wks.     S/S of bleeding or thromboembolism:  No     New Injury or illness:  No     Changes in diet or alcohol consumption:  No     Upcoming surgery, procedure or cardioversion:  No    Anticoagulation Episode Summary     Current INR goal:  2.0-3.0   TTR:  60.0 % (1  y)   Next INR check:  4/30/2021   INR from last check:  2.10 (4/23/2021)   Weekly max warfarin dose:     Target end date:     INR check location:     Preferred lab:     Send INR reminders to:  [facility]    Indications    Atrial Fibrillation [I48.91]  Long term (current) use of anticoagulants [Z79.01]           Comments:           Anticoagulation Care Providers     Provider Role Specialty Phone number    Sarwat Mayorga MD Referring Internal Medicine [phone]

## 2021-06-16 NOTE — TELEPHONE ENCOUNTER
ANTICOAGULATION  MANAGEMENT    Assessment     Today's INR result of 2.6 is Therapeutic (goal INR of 2.0-3.0)        Warfarin taken as previously instructed    No new diet changes affecting INR    Interaction between amiodarone and warfarin may be affecting INR    Continues to tolerate warfarin with no reported s/s of bleeding or thromboembolism     Previous INR was Therapeutic    Plan:     Spoke on phone with Trenton regarding INR result and instructed:      Warfarin Dosing Instructions:  Continue current warfarin dose 2.5 mg daily on Tue, Fri; and 1.25 mg daily rest of week  (0 % change)    Instructed patient to follow up no later than: at cardioversion on 4/8    Education provided: target INR goal and significance of current INR result and potential interaction between warfarin and amiodarone    Trenton verbalizes understanding and agrees to warfarin dosing plan.    Instructed to call the [facility] for any changes, questions or concerns. (#[phone])   ?   Yanira Gonzalez RN    Subjective/Objective:      Trenton Nova, a 81 y.o. male is on warfarin. Trenton Chowdhury reports:     Home warfarin dose: verbally confirmed home dose with Trenton and updated on anticoagulation calendar     Missed doses: No     Medication changes:  Yes: amiodarone on 3/23     S/S of bleeding or thromboembolism:  No     New Injury or illness:  No     Changes in diet or alcohol consumption:  No     Upcoming surgery, procedure or cardioversion:  Yes: cardioversion on 4/8    Anticoagulation Episode Summary     Current INR goal:  2.0-3.0   TTR:  59.0 % (1 y)   Next INR check:  4/8/2021   INR from last check:  2.60 (3/31/2021)   Weekly max warfarin dose:     Target end date:     INR check location:     Preferred lab:     Send INR reminders to:  [facility]    Indications    Atrial Fibrillation [I48.91]  Long term (current) use of anticoagulants [Z79.01]           Comments:           Anticoagulation Care Providers     Provider Role  Specialty Phone number    Sarwat Mayorga MD Referring Internal Medicine [phone]

## 2021-06-16 NOTE — PATIENT INSTRUCTIONS - HE
The night before your surgery, it is okay to take your regular night time medications  On the day of the surgery, please only take the tamsulosin, metoprolol and levothyroxine  I will call Nicole at [facility] regarding whether you need to undergo covid testing. I recommend you also call them. They should be the one ordering the test  I am unsure if you would qualify for this, but please consider speaking with your cardiologist about the WATCHMAN device

## 2021-06-16 NOTE — TELEPHONE ENCOUNTER
Received a faxed INR result for Trenton Nova  From [facility]  INR result dated 4/23/2021 is 2.10

## 2021-06-16 NOTE — TELEPHONE ENCOUNTER
Received a faxed INR result for Trenton Nova  From [facility]  INR result dated 4/19/2021 is 3.6

## 2021-06-16 NOTE — TELEPHONE ENCOUNTER
Who is calling:  patient  Reason for Call:  Return call   Date of last appointment with primary care:   Okay to leave a detailed message: Yes

## 2021-06-16 NOTE — TELEPHONE ENCOUNTER
ANTICOAGULATION  MANAGEMENT    Assessment     Today's INR result of 3.00 is Therapeutic (goal INR of 2.0-3.0)        Warfarin recently held as instructed which may be affecting INR      - HELD warfarin doses on 4/8-9.Potential interaction between Amiodarone and warfarin which may affect subsequent INRs.   - initiated on 3/23/21, Amiodarone 200mg three times a day for one week,    - for the past 2 wks, has been taking Amiodarone 200mg two times a day for one month,    - then thereafter Amiodarone 200mg daily.    Continues to tolerate warfarin with no reported s/s of bleeding or thromboembolism     Previous INR was Supratherapeutic at 5.00 on 5/9/21.    S/p Cardioversion on 4/8/21 @ [facility].    Reported doing well and playing golf.    Plan:     Left detailed message for Jonatan regarding INR result and instructed:   - [facility] will f/u with Jonatan on 4/13.      Warfarin Dosing Instructions:    - Continue current warfarin dose 1.25 mg daily.    Instructed patient to follow up no later than:  Thursday, 4/15/21.    Education provided: target INR goal and significance of current INR result, potential interaction between warfarin and Amiodarone and importance of notifying clinic for changes in medications    Instructed to call the [facility] for any changes, questions or concerns. (#[phone])   ?   Yamileth Flores RN     Medication changes:  Yes:  Started on Amiodarone.     Anticoagulation Episode Summary     Current INR goal:  2.0-3.0   TTR:  59.5 % (1 y)   Next INR check:  4/15/2021   INR from last check:  3.00 (4/12/2021)   Weekly max warfarin dose:     Target end date:     INR check location:     Preferred lab:     Send INR reminders to:  [facility]    Indications    Atrial Fibrillation [I48.91]  Long term (current) use of anticoagulants [Z79.01]           Comments:           Anticoagulation Care Providers     Provider Role Specialty Phone number    Sarwat Mayorga MD Referring Internal Medicine [phone]

## 2021-06-16 NOTE — TELEPHONE ENCOUNTER
FYI - Status Update  Who is Calling: Patient  Update: Returning call, requests you call him again.  Okay to leave a detailed message?:  Yes

## 2021-06-16 NOTE — TELEPHONE ENCOUNTER
"Telephone Encounter by Mirta Peacock, PharmD at 3/13/2020 11:10 AM     Author: Mirta Peacock, PharmD Service: -- Author Type: Pharmacist    Filed: 3/13/2020  2:32 PM Encounter Date: 3/13/2020 Status: Attested    : Mirta Peacock, PharmD (Pharmacist) Cosigner: Sarwat Mayorga MD at 3/13/2020  3:34 PM    Attestation signed by Sarwat Mayorga MD at 3/13/2020  3:34 PM    k                [facility]-PROCEDURAL ANTICOAGULATION  MANAGEMENT    Assessment     Warfarin interruption plan for Whipple pancreatectomy on 3/19/20.    Atrial Fibrillation    Per Chart review: received per-procedure clearance from DR. Ruben Morgan, [facility] at 3/11 visit with instructions:    \"Okay to hold warfarin 5 days prior to procedure, without bridging, and resume at the same outpatient dose once safe from a surgical perspective. Again no bridging postop\"        Risk stratification for thromboembolism: low. (2017 ACC periprocedure pathway for NVAF)       [id number] = 2    NVAF: 2017 ACC periprocedure pathway for NVAF advises NO bridge for low risk stratification ([id number] score <=4 or and no prior hx of stroke, TIA or systemic embolism     Plan     Agree with plan from [facility].       Pre-Procedure:    Hold warfarin until after procedure starting: Saturday 3/14/20    No Bridge       Post-Procedure (inpatient):    Suggest resume home warfarin dose if okay with provider doing procedure on night of procedure, 5 mg Mon, Thur and 2.5 mg rest of week    No bridge    Call INR clinic after hospital discharge to arrange next INR   ?   Spoke to Trenton and reviewed instructions. Discussed monitoring for s/sx of stroke with low INR and when to seek medical attention.  Trenton notes he's anticipating ~ 5 days hospitalization.   Trenton verbalized understanding and agrees to plan.      Mirta Peacock, PharmD    Subjective/Objective:      Trenton Nova, a 79 y.o. male    Reason for Anticoagulation: Atrial " Pt is out of dept Fibrillation    Goal INR Range: 2-3    Patient bridged in past: No    Wt Readings from Last 3 Encounters:   01/28/20 168 lb (76.2 kg)   12/23/19 173 lb (78.5 kg)   09/17/19 177 lb (80.3 kg)      Ideal body weight: 67.2 kg (148 lb 4.1 oz)  Adjusted ideal body weight: 70.8 kg (156 lb 2.5 oz)     Lab Results   Component Value Date    INR 1.80 (H) 02/28/2020    INR 2.60 (H) 01/28/2020    INR 3.00 (!) 01/13/2020     Lab Results   Component Value Date    HGB 11.4 (L) 01/28/2020    HCT 33.9 (L) 01/28/2020     01/28/2020

## 2021-06-16 NOTE — TELEPHONE ENCOUNTER
INR results received via fax from [facility].    3/16    INR 2.2

## 2021-06-16 NOTE — TELEPHONE ENCOUNTER
FYI - Status Update  Who is Calling:   Update: Patient is at [facility] for a cardioversion procedure and would like to speak with the [facility]. The patient's INR was 5 today, 4/8/21, and would like to discuss what the patient should do for their warfarin. [facility] was going to instruct the patient to hold the warfarin, but is concerned the patient might drop to low if held. Please call Karen back at [phone], thank you.  Okay to leave a detailed message?:  Yes

## 2021-06-16 NOTE — TELEPHONE ENCOUNTER
Telephone Encounter by Yamileth Flores RN at 3/24/2020  3:17 PM     Author: Yamileth Flores RN Service: -- Author Type: Registered Nurse    Filed: 3/24/2020  4:12 PM Encounter Date: 3/24/2020 Status: Attested    : Yamileth Flores RN (Registered Nurse) Cosigner: Sarwat Mayorga MD at 3/24/2020  4:39 PM    Attestation signed by Sarwat Mayorga MD at 3/24/2020  4:39 PM    Needs telephone office gabo w/ me first thenTdap                Called and spoke with Trenton.     - he reports being discharged from [facility], 3/24/20 and discharging physician recommended to resume warfarin therapy tonight.    Reviewed care everywhere from [facility].   - 3/20/20, INR was 1.50   - 3/19/20, INR was 1.40               warfarin HELD for 10 days with no bridging from 3/14 - 24/20.   - 3/11/20, INR was 2.90     - recently diagnosed with Malignant Neoplasm of Ampulla Vater.   - 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     - only med changes:   Hydromorphone PRN for pain, if not relieved with Tylenol, and Augmentin two times a day for 5 days, 3/24-29.     - will take 5mg warfarin dose 3/24 and 3/25, then resume usual warfarin dose.     - scheduled to check INR on Fri. 3/27/20 @ [facility].

## 2021-06-16 NOTE — TELEPHONE ENCOUNTER
Ordered new warfarin tabs (1mg)     - will fill new RX @ [facility] in [place].     - Jonatan currently in [place] and will call nearest [facility] ([place]) to  RX.

## 2021-06-16 NOTE — TELEPHONE ENCOUNTER
FYI - Status Update  Who is Calling: Patient  Update: Patient would like a call back to discuss their dosing instructions with the ACN. Please call the patient back, thank you.  Okay to leave a detailed message?:  Yes

## 2021-06-16 NOTE — TELEPHONE ENCOUNTER
Received a faxed INR result for Trenton Nova  From [facility]  INR result dated 4/15/2021 is 3.4

## 2021-06-16 NOTE — TELEPHONE ENCOUNTER
FYI - Status Update  Who is Calling: Patient  Update: Patient had their INR checked in [place] yesterday, 3/16/21. Patient stated the INR was 2.2, patient would please like a call back as soon as possible, thank you.  Okay to leave a detailed message?:  No

## 2021-06-16 NOTE — TELEPHONE ENCOUNTER
"Reason for Call:  Other call back      Detailed comments: Patient calling today to get scheduled for a pre-op visit for a cardioversion heart procedure that he's hoping to have done Thursday 3/25/21.  First available pre op opening isn't until Friday 3/26/21.  Writer looked into Dr Mayorga's schedule in more detail and found possible options on Thursday 3/25/21 for pre-op.  Offered to patient, who stated those will not work.  Patient then stated he would like a call from [facility], as \"Dimas is a good friend of mine, he'll get me in somewhere before Thursday\".      Please call patient to discuss being seen for pre-op before Thursday 3/25/21.    Phone Number Patient can be reached at: Home number on file [phone] (home)    Best Time: Any time    Can we leave a detailed message on this number?: Yes    Call taken on 3/22/2021 at 1:31 PM by Kenneth Wilson    "

## 2021-06-16 NOTE — TELEPHONE ENCOUNTER
Called and spoke with Jnoatan.      just needed to reverify his warfarin dose - he stated correct dose.

## 2021-06-16 NOTE — TELEPHONE ENCOUNTER
ANTICOAGULATION  MANAGEMENT    Assessment     Today's INR result of 2.90 is Therapeutic (goal INR of 2.0-3.0)        Warfarin taken as previously instructed    No new diet changes affecting INR    Potential interaction between Amiodarone and warfarin which may affect subsequent INRs - 3/23/21 started on Amiodarone 200mg three times a day for one week, then 200mg two times a day for one month, then thereafter daily.    Continues to tolerate warfarin with no reported s/s of bleeding or thromboembolism     Previous INR was Therapeutic at 2.20 on 2/16/21.    Scheduled Cardioversion on 4/8/21.  (currently in [place] and will return to [place] for this procedure).    Plan:     Spoke on phone with Jonatan regarding INR result and instructed:   - will continue same warfarin dose, d/t I persistent Atrial fibrillation.   - adjust warfarin dose based on INR results, d/t new start with Amiodarone 200mg three times a day on 3/23/21.   - Advised Jonatan, to call our ACN when he gets his INR checked, so we can call him in a timely manner to adjust warfarin dose.      Warfarin Dosing Instructions:  (evenings. 2.5mg tabs)   - Continue current warfarin dose 2.5 mg daily on Tues/Fri; and 1.25 mg daily rest of week.    Instructed patient to follow up no later than:  Advised to check INR on 3/30.    Education provided: target INR goal and significance of current INR result, potential interaction between warfarin and Amiodarone and importance of notifying clinic for changes in medications    Jonatan verbalizes understanding and agrees to warfarin dosing plan.    Instructed to call the [facility] for any changes, questions or concerns. (#[phone])   ?   Yamileth Flores RN    Subjective/Objective:      Trenton Nova, a 81 y.o. male is on warfarin. Trenton Chowdhury reports:     Home warfarin dose: verbally confirmed home dose with Jonatan and updated on anticoagulation calendar     Missed doses: No     Medication changes:  Yes: started on Amiodarone on  3/23.     S/S of bleeding or thromboembolism:  No     New Injury or illness:  Yes:  Currently in persistent atrial Fib.     Changes in diet or alcohol consumption:  No     Upcoming surgery, procedure or cardioversion:  No    Anticoagulation Episode Summary     Current INR goal:  2.0-3.0   TTR:  58.2 % (1 y)   Next INR check:  3/30/2021   INR from last check:  2.90 (3/23/2021)   Weekly max warfarin dose:     Target end date:     INR check location:     Preferred lab:     Send INR reminders to:  [facility]    Indications    Atrial Fibrillation [I48.91]  Long term (current) use of anticoagulants [Z79.01]           Comments:           Anticoagulation Care Providers     Provider Role Specialty Phone number    Sarwat Mayorga MD Referring Internal Medicine [phone]

## 2021-06-16 NOTE — TELEPHONE ENCOUNTER
Unfortunately my schedule is full and I am not able to see the patient before Thursday 25 March 2021.  The patient may have to see one of the other providers in our group.

## 2021-06-16 NOTE — TELEPHONE ENCOUNTER
Telephone Encounter by Mery Day LPN at 1/24/2020  1:54 PM     Author: Mery Day LPN Service: -- Author Type: Licensed Nurse    Filed: 1/24/2020  1:55 PM Encounter Date: 1/24/2020 Status: Signed    : Mery Day LPN (Licensed Nurse)       Patient Returning Call  Reason for call:  Message from clinic  Information relayed to patient: Misty Bustillo, [facility]           1/24/20 11:41 AM   Note      Left message to call back for: Trenton  Information to relay to patient:  Okay for preop and physical together. Pt is to call back with what surgery he is having, when and where? Please add to appt notes. Thanks.          Patient has additional questions:  No  If YES, what are your questions/concerns:  Patient is having surgery at [facility], February 4, 2020 at 6:00 am for a bile duct obstruction.    Okay to leave a detailed message?: Yes

## 2021-06-16 NOTE — PROGRESS NOTES
[facility]  [address] [place] [address], [address]  [facility] [address]  Dept: [phone]  Dept Fax: [phone]  Primary Provider: Sarwat Mayorga MD  Pre-op Performing Provider: MARSHAL HUMPHREY    PREOPERATIVE EVALUATION:  Today's date: 3/23/2021    Trenton Nova is a 81 y.o. male who presents for a preoperative evaluation.    Surgical Information:  Surgery/Procedure: cardioversion  Surgery Location: [facility]  Surgeon: KARLOS Lackey  Surgery Date: 03/25/21  Time of Surgery: unsure  Where patient plans to recover: At home with family  Fax number for surgical facility: Attn: KARLOS Lackey [phone]    Type of Anesthesia Anticipated: Local    Assessment & Plan      The proposed surgical procedure is considered LOW risk.    Preoperative examination  Persistent atrial fibrillation (H)  Stage 3a chronic kidney disease  Hypothyroidism, unspecified type  Benign prostatic hyperplasia with incomplete bladder emptying  Chronic systolic heart failure  Okay to pursue procedure when scheduled by [facility].  We will continue taking his Coumadin.  On the day of the procedure, will take his metoprolol, tamsulosin and levothyroxine.  Counseled to hold all other medications.  Please note he takes furosemide on a as needed basis.  Did briefly discuss the watchman procedure with him, however I also reviewed with him the typical indications, which he does not qualify for.     Implanted Device:   - Type of device: pacemaker Patient advised to bring device information on day of surgery.   - : Tattva, Model: A2DR01, 5076-45, 5076-52    Risks and Recommendations:  The patient has the following additional risks and recommendations for perioperative complications:   - No identified additional risk factors other than previously addressed  The night before your surgery, it is okay to take your regular night time medications  On the day of the surgery, please  only take the tamsulosin, metoprolol and levothyroxine    RECOMMENDATION:  APPROVAL GIVEN to proceed with proposed procedure, without further diagnostic evaluation.    Review of external notes as documented above   Discussion of management of his procedure with nurse practitioner from [facility]    40 minutes spent on the date of the encounter doing chart review, history and exam, documentation and further activities as noted above    Subjective     HPI related to upcoming procedure: Doing well with no specific complaints.  Expects to be undergoing a cardioversion sometime in the next couple of weeks.  I spoke to one of the nurse practitioners (Nicole Jalloh) from the [facility] ([phone]).  Recent PNA in January and February, the latter treated with levofloxacin for 5 days.  Has completed his Covid vaccine series but no formal Covid test set up because the cardioversion has not yet been scheduled.  Reports he went back into atrial fibrillation on 3/17/2021.  His wife Sarahi will be taking him to the procedure.  Expects for it to be a day procedure.  INR most recently 2.9 on 3/22/2021.    Preop Questions 3/23/2021   Have you ever had a heart attack or stroke? No   Have you ever had surgery on your heart or blood vessels, such as a stent placement, a coronary artery bypass, or surgery on an artery in your head, neck, heart, or legs? No   Do you have chest pain with activity? No   Do you have a history of  heart failure? No   Do you currently have a cold, bronchitis or symptoms of other infection? No   Do you have a cough, shortness of breath, or wheezing? YES - FLOR up stairs due to AFib   Do you or anyone in your family have previous history of blood clots? No   Do you or does anyone in your family have a serious bleeding problem such as prolonged bleeding following surgeries or cuts? No   Have you ever had problems with anemia or been told to take iron pills? No   Have  you had any abnormal blood loss such as black, tarry or bloody stools? YES - 60 years ago when he had a peptic ulcers   Have you ever had a blood transfusion? No   Are you willing to have a blood transfusion if it is medically needed before, during, or after your surgery? Yes   Have you or any of your relatives ever had problems with anesthesia? No   Do you have sleep apnea, excessive snoring or daytime drowsiness? No   Do you have any artifical heart valves or other implanted medical devices like a pacemaker, defibrillator, or continuous glucose monitor? YES - pacemaker and 4 titanium pins in the neck   What type of device do you have? pacemaker   Name of the clinic that manages your device:  [facility]   Do you have artificial joints? No   Are you allergic to latex? No     Health Care Directive:  Patient has a Health Care Directive on file.     Preoperative Review of :    reviewed - no record of controlled substances prescribed. -15 tablets over the last filled in June 2020    Review of Systems  Constitutional, neuro, ENT, endocrine, pulmonary, cardiac, gastrointestinal, genitourinary, musculoskeletal, integument and psychiatric systems are negative, except as otherwise noted.    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  Past Surgical History:   Procedure Laterality Date     KNEE SURGERY       PANCREATICODUODENECTOMY  03/19/2020    For adenocarcinoma of the ampulla of Vater     SHOULDER SURGERY       Current Outpatient Medications   Medication Sig Dispense Refill     brimonidine-timoloL (COMBIGAN) 0.2-0.5 % ophthalmic solution 1 drop.       cyanocobalamin (VITAMIN B-12) 500 MCG tablet Take 500 mcg by mouth daily.       famotidine (PEPCID) 40 MG tablet Take 1 tablet (40 mg total) by mouth every evening. 30 tablet 11     furosemide (LASIX) 40 MG tablet Take 1 tablet (40 mg total) by mouth daily. (Patient taking differently: Take 40 mg by mouth daily as needed. Takes for a couple of days after noticing LE edema, and then stops) 30 tablet 3     levothyroxine (SYNTHROID) 100 MCG tablet Take 1 tablet (100 mcg total) by mouth daily. 90 tablet 3     metoprolol succinate (TOPROL-XL) 25 MG Take 25 mg by mouth daily.       multivitamin therapeutic (THERAGRAN) tablet Take 1 tablet by mouth daily.       omeprazole (PRILOSEC) 20 MG capsule Take 20 mg by mouth 2 (two) times a day before meals.        rosuvastatin (CRESTOR) 10 MG tablet        tamsulosin (FLOMAX) 0.4 mg cap Take 0.4 mg by mouth.       terazosin (HYTRIN) 5 MG capsule Take 5 mg by mouth.       triamcinolone (KENALOG) 0.1 % cream Apply thin layer to affected areas (arms & hips) twice daily X 1 month 60 g 3     vit C/E/Zn/coppr/lutein/zeaxan (PRESERVISION AREDS-2 ORAL) Take by mouth 2 (two) times a day.       VITRON-C 65 mg iron- 125 mg TbEC TK 1 T PO QD. DO NOT CRUSH OR CHEW       warfarin ANTICOAGULANT (COUMADIN/JANTOVEN) 2.5 MG tablet Take 1&1/2 tabs (3.75mg) on Thursdays and take 1 tab (2.5mg) all other days by mouth daily, as directed.  Adjust dose based on INR results. 115 tablet 1     No current facility-administered medications for  "this visit.      Allergies   Allergen Reactions     Ambien [Zolpidem]      confusion     Sulfa (Sulfonamide Antibiotics)      Social History     Tobacco Use     Smoking status: Former Smoker     Smokeless tobacco: Never Used     Tobacco comment: quit 8/8/1988   Substance Use Topics     Alcohol use: No      No family history on file.  Social History     Substance and Sexual Activity   Drug Use No        Objective     /54 (Patient Site: Right Arm, Patient Position: Sitting, Cuff Size: Adult Regular)   Pulse 64   Temp 97.3  F (36.3  C) (Temporal)   Resp 18   Ht 5' 6.75\" (1.695 m)   Wt 147 lb 12.8 oz (67 kg)   SpO2 99%   BMI 23.32 kg/m    Physical Exam    GENERAL APPEARANCE: healthy, alert and no distress     RESP: fine bibasilar inspiratory cracklesthat did not resolve with cough - no rales, rhonchi or wheezes     CV: irregularly irregular but no tachycardia, normal S1 S2, and no murmur, click or rub     ABDOMEN:  soft, nontender, no HSM or masses and bowel sounds normal     MS: extremities normal- no gross deformities noted, FROM in all extremities.     SKIN: no suspicious lesions or rashes     NEURO: Normal strength and tone, mentation intact and speech normal      PSYCH: mentation appears normal. and affect normal/bright    Recent Labs   Lab Test 03/16/21 03/02/21  1446 02/08/21  1116 02/08/21  1116 01/22/21  1335 01/22/21  1335   HGB  --   --   --  12.6*  --  12.8*   PLT  --   --   --  322  --  466*   INR 2.20* 2.40*   < >  --    < >  --    NA  --   --   --  137  --  135*   K  --   --   --  4.8  --  5.3*   CREATININE  --   --   --  1.39*  --  1.35*    < > = values in this interval not displayed.      PRE-OP Diagnostics:   No labs were ordered during this visit.  Labs recently done at [facility]- pertinent for creatinine 1.49 which is his baseline, GFR 45.  CBC from 2/8/2021 pertinent for hemoglobin 12.6 and WBC of 7.4.    No EKG this visit, completed in the last 90 days. - 2/16/21 at [facility].  " Echocardiogram from [facility] on 2/15/2021-no left atrial appendage thrombus, small iatrogenic ASD, moderate tricuspid valve regurgitation, EF 35-40%.    REVISED CARDIAC RISK INDEX (RCRI)   The patient has the following serious cardiovascular risks for perioperative complications:   - No serious cardiac risks = 0 points    RCRI INTERPRETATION: 0 points: Class I (very low risk - 0.4% complication rate)    Signed Electronically by: Nino Cabrera MD    Copy of this evaluation report is provided to requesting physician.

## 2021-06-17 ENCOUNTER — AMBULATORY - HEALTHEAST (OUTPATIENT)
Dept: LAB | Facility: CLINIC | Age: 81
End: 2021-06-17

## 2021-06-17 ENCOUNTER — COMMUNICATION - HEALTHEAST (OUTPATIENT)
Dept: ANTICOAGULATION | Facility: CLINIC | Age: 81
End: 2021-06-17

## 2021-06-17 DIAGNOSIS — I48.19 PERSISTENT ATRIAL FIBRILLATION (H): ICD-10-CM

## 2021-06-17 DIAGNOSIS — Z79.01 LONG TERM (CURRENT) USE OF ANTICOAGULANTS: ICD-10-CM

## 2021-06-17 LAB — INR PPP: 1.8 (ref 0.9–1.1)

## 2021-06-17 NOTE — TELEPHONE ENCOUNTER
Telephone Encounter by Yanira Gonzalez RN at 4/9/2021 10:43 AM     Author: Yanira Gonzalez RN Service: -- Author Type: Registered Nurse    Filed: 4/9/2021 11:23 AM Encounter Date: 4/9/2021 Status: Signed    : Yanira Gonzalez RN (Registered Nurse)       ANTICOAGULATION  MANAGEMENT    Assessment     Today's INR result of 5.0 is Supratherapeutic (goal INR of 2.0-3.0)        Warfarin taken as previously instructed    No new diet changes affecting INR    Interaction between amiodarone and warfarin may be affecting INR    Continues to tolerate warfarin with no reported s/s of bleeding or thromboembolism     Previous INR was Supratherapeutic    Plan:     Spoke on phone with Trenton regarding INR result and instructed:      Warfarin Dosing Instructions:  hold warfarin today only then change warfarin dose to 1.25 mg daily (12.5 % change)  Discussed he may need a new tablet size    Instructed patient to follow up no later than: Mon 4/12    Education provided: target INR goal and significance of current INR result, importance of following up for INR monitoring at instructed interval, importance of taking warfarin as instructed, monitoring for bleeding signs and symptoms and when to seek medical attention/emergency care    Trenton verbalizes understanding and agrees to warfarin dosing plan.    Instructed to call the [facility] for any changes, questions or concerns. (#[phone])   ?   Yanira Gonzalez RN    Subjective/Objective:      Trenton MCKEON Ederadha, a 81 y.o. male is on warfarin. Trenton Chowdhury reports:     Home warfarin dose: verbally confirmed home dose with Trenton and updated on anticoagulation calendar     Missed doses: No     Medication changes:  No     S/S of bleeding or thromboembolism:  No     New Injury or illness:  No     Changes in diet or alcohol consumption:  No     Upcoming surgery, procedure or cardioversion:  No    Anticoagulation Episode Summary     Current INR goal:  2.0-3.0   TTR:  59.3 % (1  y)   Next INR check:  4/12/2021   INR from last check:  5.00 (4/9/2021)   Weekly max warfarin dose:     Target end date:     INR check location:     Preferred lab:     Send INR reminders to:  [facility]    Indications    Atrial Fibrillation [I48.91]  Long term (current) use of anticoagulants [Z79.01]           Comments:           Anticoagulation Care Providers     Provider Role Specialty Phone number    Sarwat Mayorga MD Referring Internal Medicine [phone]

## 2021-06-17 NOTE — TELEPHONE ENCOUNTER
ANTICOAGULATION  MANAGEMENT    Assessment     Today's INR result of 2.6 is Therapeutic (goal INR of 2.0-3.0)        Warfarin taken as previously instructed    No new diet changes affecting INR    Amiodarone dose decreased on 4/30/21    Continues to tolerate warfarin with no reported s/s of bleeding or thromboembolism     Previous INR was Therapeutic    Plan:     Spoke on phone with Trenton regarding INR result and instructed:      Warfarin Dosing Instructions:  Continue current warfarin dose 1 mg daily on Monday/Thursday; and 1.25 mg daily rest of week  (0 % change)    Instructed patient to follow up no later than: 1 week    Education provided: importance of therapeutic range, target INR goal and significance of current INR result, importance of following up for INR monitoring at instructed interval, importance of taking warfarin as instructed and potential interaction between warfarin and amiodarone dose decrease    Jonatan verbalizes understanding and agrees to warfarin dosing plan.    Instructed to call the [facility] for any changes, questions or concerns. (#[phone])   ?   Wendy Amaya RN    Subjective/Objective:      Trenton Nova, a 81 y.o. male is on warfarin. Trenton Chowdhury reports:     Home warfarin dose: verbally confirmed home dose with Jonatan and updated on anticoagulation calendar     Missed doses: No     Medication changes:  Yes: Amiodarone dose decreased 4/30/21     S/S of bleeding or thromboembolism:  No     New Injury or illness:  No     Changes in diet or alcohol consumption:  No     Upcoming surgery, procedure or cardioversion:  No    Anticoagulation Episode Summary     Current INR goal:  2.0-3.0   TTR:  62.8 % (1 y)   Next INR check:  5/10/2021   INR from last check:  2.60 (5/3/2021)   Weekly max warfarin dose:     Target end date:     INR check location:     Preferred lab:     Send INR reminders to:  [facility]    Indications    Atrial Fibrillation [I48.91]  Long term  (current) use of anticoagulants [Z79.01]           Comments:           Anticoagulation Care Providers     Provider Role Specialty Phone number    Sarwat Mayorga MD Referring Internal Medicine [phone]

## 2021-06-17 NOTE — TELEPHONE ENCOUNTER
Telephone Encounter by Yamileth Flores RN at 12/8/2020  9:45 AM     Author: Yamileth Flores RN Service: -- Author Type: Registered Nurse    Filed: 12/8/2020 10:51 AM Encounter Date: 12/8/2020 Status: Signed    : Yamileth Flores RN (Registered Nurse)       ANTICOAGULATION  MANAGEMENT    Assessment     Today's INR result of 1.90 is Subtherapeutic (goal INR of 2.0-3.0)        Warfarin taken as previously instructed    No new diet changes affecting INR    No new medication/supplements affecting INR    Continues to tolerate warfarin with no reported s/s of bleeding or thromboembolism     Previous INR was Supratherapeutic at 1.90 on 11/24/20.    Reported feeling good, just having some stomach issues.  Coming back on 12/9/20 to [place].    Plan:     Spoke on phone with Jonatan regarding INR result and instructed:     Warfarin Dosing Instructions:    - Change warfarin dose to 2.5 mg daily on Tues/Thurs/Sat; and 1.25 mg daily rest of week   - (11.1 % change)    Instructed patient to follow up no later than:  2 wks.   - INR scheduled on 12/21/20 @ [facility].    Education provided: importance of consistent vitamin K intake, target INR goal and significance of current INR result, importance of notifying clinic for changes in medications and importance of notifying clinic for diarrhea, nausea/vomiting, reduced intake and/or illness    Jonatan verbalizes understanding and agrees to warfarin dosing plan.    Instructed to call the [facility] for any changes, questions or concerns. (#[phone])   ?   Yamileth Flores RN    Subjective/Objective:      Trenton Nova, a 80 y.o. male is on warfarin.     Trenton reports:     Home warfarin dose: verbally confirmed home dose with Jonatan and updated on anticoagulation calendar     Missed doses: No     Medication changes:  No     S/S of bleeding or thromboembolism:  No     New Injury or illness:  No     Changes in diet or alcohol consumption:  No     Upcoming surgery, procedure or  cardioversion:  No    Anticoagulation Episode Summary     Current INR goal:  2.0-3.0   TTR:  53.6 % (1 y)   Next INR check:  12/22/2020   INR from last check:  1.90 (12/8/2020)   Weekly max warfarin dose:     Target end date:     INR check location:     Preferred lab:     Send INR reminders to:  [facility]    Indications    Atrial Fibrillation [I48.91]  Long term (current) use of anticoagulants [Z79.01]           Comments:           Anticoagulation Care Providers     Provider Role Specialty Phone number    Sarwat Mayorga MD Referring Internal Medicine [phone]

## 2021-06-17 NOTE — TELEPHONE ENCOUNTER
Telephone Encounter by Yamileth Flores RN at 2/12/2021 10:07 AM     Author: Yamileth Flores RN Service: -- Author Type: Registered Nurse    Filed: 2/12/2021 12:28 PM Encounter Date: 2/12/2021 Status: Signed    : Yamileth Flores RN (Registered Nurse)       ANTICOAGULATION  MANAGEMENT    Assessment     Today's INR result of 3.60 is Supratherapeutic (goal INR of 2.0-3.0)        Warfarin recently held as instructed which may be affecting INR    - held warfarin on 2/8-9    No new diet changes affecting INR   - reported WT staying stable.   - WT is down to 139 lbs on 2/8/21.              - last WT on 1/22/21 was 148 lbs.              - continues to drink one can of protein shake daily.              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     No new medication/supplements affecting INR    Continues to tolerate warfarin with no reported s/s of bleeding or thromboembolism     Previous INR was Supratherapeutic at 4.35 from 2/8/21.    Scheduled on 2/15 for CHANTELLE and Cardioversion @ [facility].    Plan:     Spoke on phone with Trenton regarding INR result and instructed:   - will need to change warfarin tablets and order 1mg after next INR.      Warfarin Dosing Instructions:  (evenings. 2.5mg tabs)   - HOLD warfarin doses for 2 days, 2/12-13.   - Change warfarin dose to 1.25 mg daily.   - (12.5 % change)    Instructed patient to follow up no later than: recheck INR on 2/17/21.    Education provided: importance of consistent vitamin K intake, target INR goal and significance of current INR result, importance of notifying clinic for changes in medications and importance of notifying clinic of upcoming surgeries and procedures 2 weeks in advance    Jonatan verbalizes understanding and agrees to warfarin dosing plan.    Instructed to call the [facility] for any changes, questions or concerns. (#[phone])   ?   Yamileth Flores RN    Subjective/Objective:      Trenton Nova,  a 80 y.o. male is on warfarin. Trenton Chowdhury reports:     Home warfarin dose: as updated on anticoagulation calendar per template     Missed doses: Yes: held warfarin dose on 2/8 and 2/9.     Medication changes:  No     S/S of bleeding or thromboembolism:  No     New Injury or illness:  Yes: persistent atrial fibrillation.       Changes in diet or alcohol consumption:  No     Upcoming surgery, procedure or cardioversion:  Yes: scheduled on 2/15/21 for CHANTELLE / Cardioversion.    Anticoagulation Episode Summary     Current INR goal:  2.0-3.0   TTR:  51.8 % (1 y)   Next INR check:  2/17/2021   INR from last check:  3.60 (2/12/2021)   Weekly max warfarin dose:     Target end date:     INR check location:     Preferred lab:     Send INR reminders to:  [facility]    Indications    Atrial Fibrillation [I48.91]  Long term (current) use of anticoagulants [Z79.01]           Comments:           Anticoagulation Care Providers     Provider Role Specialty Phone number    Sarwat Mayorga MD Referring Internal Medicine [phone]

## 2021-06-17 NOTE — TELEPHONE ENCOUNTER
Telephone Encounter by Yamileth Flores, RN at 1/27/2021  8:43 AM     Author: Yamileth Flores, RN Service: -- Author Type: Registered Nurse    Filed: 1/27/2021 10:56 AM Encounter Date: 1/27/2021 Status: Signed    : Yamileth Flores RN (Registered Nurse)       ANTICOAGULATION  MANAGEMENT    Assessment     Today's INR result of 1.80 is Subtherapeutic (goal INR of 2.0-3.0)        Warfarin recently held as instructed which may be affecting INR    - HELD warfarin for 2 days, 1/22-23.    Not much appetite may be affecting diet and INR    - has lost 2 lbs since 1/22/21 OV.   - continues to drink one can of protein shake per day.    No new medication/supplements affecting INR    - just completed Azithromycin and Ceftin on 1/21/21.   - discontinued Dexamethasone after 2nd PCR covid test was negative.    Continues to tolerate warfarin with no reported s/s of bleeding or thromboembolism     Previous INR was Supratherapeutic at 4.00 on 1/22/21.    Recent hospitalization 1/15-17, for Covid-19 pneumonia.  Recently returned from [place] on 1/10/21.    Reported, has been in atrial fibrillation since 12/28/20 and has appt with Cardiologist on 2/1/21.  (has been very fatigued with exertion).    Plan:     Spoke on phone with Jonatan regarding INR result and instructed:     Warfarin Dosing Instructions:  (evenings. 2.5mg tabs)   - Continue current warfarin dose 2.5 mg daily on Wed/Sat; and 1.25 mg daily rest of week.    Instructed patient to follow up no later than:  One wk.   - INR scheduled on 2/3/21 @ [facility].    Education provided: importance of consistent vitamin K intake, target INR goal and significance of current INR result, importance of notifying clinic for changes in medications and when to seek medical attention/emergency care    Jonatan verbalizes understanding and agrees to warfarin dosing plan.    Instructed to call the [facility] for any changes, questions or concerns. (#[phone])   ?   Yamileth Flores  RN    Subjective/Objective:      Trenton LINDA Avtar, a 80 y.o. male is on warfarin.     Trenton reports:     Home warfarin dose: as updated on anticoagulation calendar per template     Missed doses: Yes:  Held 2 days of warfarin.     Medication changes:  Yes: completed Azithromycin and Ceftin     S/S of bleeding or thromboembolism:  No     New Injury or illness:  Yes:  Recovering from covid-19 pneumonia.     Changes in diet or alcohol consumption:  Yes:  Decreased appetite / wt loss.     Upcoming surgery, procedure or cardioversion:  No    Anticoagulation Episode Summary     Current INR goal:  2.0-3.0   TTR:  55.6 % (1 y)   Next INR check:  2/3/2021   INR from last check:  1.80 (1/27/2021)   Weekly max warfarin dose:     Target end date:     INR check location:     Preferred lab:     Send INR reminders to:  [facility]    Indications    Atrial Fibrillation [I48.91]  Long term (current) use of anticoagulants [Z79.01]           Comments:           Anticoagulation Care Providers     Provider Role Specialty Phone number    Sarwat Mayorga MD Referring Internal Medicine [phone]

## 2021-06-17 NOTE — TELEPHONE ENCOUNTER
Telephone Encounter by Yamileth Flores RN at 11/11/2020  8:31 AM     Author: Yamileth Flores RN Service: -- Author Type: Registered Nurse    Filed: 11/11/2020  8:44 AM Encounter Date: 11/10/2020 Status: Signed    : Yamileth Flores RN (Registered Nurse)       ANTICOAGULATION  MANAGEMENT    Assessment     Today's INR result of 3.20 is Supratherapeutic (goal INR of 2.0-3.0)        Warfarin taken as previously instructed    No new diet changes affecting INR    No new medication/supplements affecting INR    Continues to tolerate warfarin with no reported s/s of bleeding or thromboembolism     Previous INR was Therapeutic at 2.30 on 10/19/20.    Currently vacationing - [place] for 2 months.  However, will spend [place] in [place] with family and returning to [place] on 12/9/20.    Plan:     Spoke on phone with Jonatan regarding INR result and instructed:     Warfarin Dosing Instructions:  (evenings. Has 2.5mg tabs)   - reported HOLDING his warfarin dose last night, as instructed.   - Change warfarin dose to 2.5 mg daily on Thursdays; and 1.25 mg daily rest of week.   - (11.1 % change)    Instructed patient to follow up no later than:  1-2 wks.    Education provided: importance of consistent vitamin K intake and target INR goal and significance of current INR result    Jonatan verbalizes understanding and agrees to warfarin dosing plan.    Instructed to call the [facility] for any changes, questions or concerns. (#[phone])   ?   Yamileth Flores RN    Subjective/Objective:      Trenton Nova, a 80 y.o. male is on warfarin.     Trenton reports:     Home warfarin dose: verbally confirmed home dose with Jonatan and updated on anticoagulation calendar     Missed doses: No     Medication changes:  No     S/S of bleeding or thromboembolism:  No     New Injury or illness:  No     Changes in diet or alcohol consumption:  No     Upcoming surgery, procedure or cardioversion:  No    Anticoagulation Episode Summary      Current INR goal:  2.0-3.0   TTR:  52.5 % (1 y)   Next INR check:  11/24/2020   INR from last check:  3.20 (11/10/2020)   Weekly max warfarin dose:     Target end date:     INR check location:     Preferred lab:     Send INR reminders to:  [facility]    Indications    Atrial Fibrillation [I48.91]  Long term (current) use of anticoagulants [Z79.01]           Comments:           Anticoagulation Care Providers     Provider Role Specialty Phone number    Sarwat Mayorga MD Referring Internal Medicine [phone]

## 2021-06-17 NOTE — TELEPHONE ENCOUNTER
Telephone Encounter by Yamileth Flores RN at 2/3/2021 10:40 AM     Author: Yamileth Flores RN Service: -- Author Type: Registered Nurse    Filed: 2/3/2021 11:42 AM Encounter Date: 2/3/2021 Status: Signed    : Yamileth Flores RN (Registered Nurse)       ANTICOAGULATION  MANAGEMENT    Assessment     Today's INR result of 5.10 is Supratherapeutic (goal INR of 2.0-3.0)        Warfarin taken as previously instructed    - reported taking the one time booster of 2.5mg dose on 1/28, as instructed due to subtherapeutic INR.    Decreased greens/vitamin K intake may be affecting INR    - in the last 3 days appetite improving.   - reported WT is now down around 140's.    No new medication/supplements affecting INR    Continues to tolerate warfarin with no reported s/s of bleeding or thromboembolism     Previous INR was Supratherapeutic at 3.20 on 1/29/21.    NOTE:  Abnormal LFT and INR was 3.2 on 1/29/21.  (INR on 1/27 was 1.80).    Still in atrial fibrillation.  Possible Cardioversion next week.  Awaiting to hear from [facility].    Plan:     Spoke on phone with Jonatan regarding INR result and instructed:    - will prep for Cardioversion.      Warfarin Dosing Instructions:  (evening. 2.5mg tabs)   - advised to HOLD warfarin dose tonight,   - then continue current warfarin dose 2.5 mg daily on Wed/Sat; and 1.25 mg daily rest of week.    Instructed patient to follow up no later than: 7-10 days.   - INR scheduled on Mon. 2/8/21 during OV with Dr. Mayorga,    Education provided: importance of consistent vitamin K intake, impact of vitamin K foods on INR, target INR goal and significance of current INR result and importance of notifying clinic of upcoming surgeries and procedures 2 weeks in advance    Jonatan verbalizes understanding and agrees to warfarin dosing plan.    Instructed to call the [facility] for any changes, questions or concerns. (#[phone])   ?   Yamileth Flores RN    Subjective/Objective:       Trenton Nova, a 80 y.o. male is on warfarin. Trenton      Trenton reports:     Home warfarin dose: verbally confirmed home dose with Jonatan and updated on anticoagulation calendar     Missed doses: No     Medication changes:  No     S/S of bleeding or thromboembolism:  No     New Injury or illness:  Yes:  Currently in atrial fibrillation with shortness of breath / fatigue.     Changes in diet or alcohol consumption:  Yes: daily nutritional intake improving.     Upcoming surgery, procedure or cardioversion:  Yes:  Will be scheduled for cardioversion.    Anticoagulation Episode Summary     Current INR goal:  2.0-3.0   TTR:  54.3 % (1 y)   Next INR check:  2/8/2021   INR from last check:  5.10 (2/3/2021)   Weekly max warfarin dose:     Target end date:     INR check location:     Preferred lab:     Send INR reminders to:  [facility] [place]    Indications    Atrial Fibrillation [I48.91]  Long term (current) use of anticoagulants [Z79.01]           Comments:           Anticoagulation Care Providers     Provider Role Specialty Phone number    Sarwat Mayorga MD Referring Internal Medicine [phone]

## 2021-06-17 NOTE — TELEPHONE ENCOUNTER
Telephone Encounter by Yamileth Flores RN at 12/21/2020  9:09 AM     Author: Yamileth Flores RN Service: -- Author Type: Registered Nurse    Filed: 12/21/2020 10:59 AM Encounter Date: 12/21/2020 Status: Signed    : Yamileth Flores RN (Registered Nurse)       ANTICOAGULATION  MANAGEMENT    Assessment     Today's INR result of 3.40 is Supratherapeutic (goal INR of 2.0-3.0)        Warfarin taken as previously instructed    No new diet changes affecting INR    - WT loss of 3 lbs since last check on 10/19/20.   - off / on stomach cramping.    No new medication/supplements affecting INR    Continues to tolerate warfarin with no reported s/s of bleeding or thromboembolism     Previous INR was Subtherapeutic at 1.90 from 12/8/20.    Now driving back down to [place] for the rest of the winter.    Plan:     Spoke on phone with Jonatan regarding INR result and instructed:     Warfarin Dosing Instructions:  (evenings. 5mg tabs)   - Change warfarin dose to 2.5 mg daily on Wed/Sat; and 1.25 mg daily rest of week.   - (10 % change)    Instructed patient to follow up no later than: 1-2 wks.   - will check INR after arriving in [place] the week of 1/4/2021.    Education provided: importance of consistent vitamin K intake, impact of vitamin K foods on INR, target INR goal and significance of current INR result, importance of notifying clinic for changes in medications, monitoring for bleeding signs and symptoms, when to seek medical attention/emergency care and travel related clotting risk and prevention    Jonatan verbalizes understanding and agrees to warfarin dosing plan.    Instructed to call the [facility] for any changes, questions or concerns. (#[phone])   ?   Yamileth Flores RN    Subjective/Objective:      Trenton Nova, a 80 y.o. male is on warfarin.     Trenton reports:     Home warfarin dose: as updated on anticoagulation calendar per template     Missed doses: No     Medication changes:  No     S/S  of bleeding or thromboembolism:  No     New Injury or illness:  No     Changes in diet or alcohol consumption:  No     Upcoming surgery, procedure or cardioversion:  No    Anticoagulation Episode Summary     Current INR goal:  2.0-3.0   TTR:  55.9 % (1 y)   Next INR check:  1/4/2021   INR from last check:  3.40 (12/21/2020)   Weekly max warfarin dose:     Target end date:     INR check location:     Preferred lab:     Send INR reminders to:  [facility] [place]    Indications    Atrial Fibrillation [I48.91]  Long term (current) use of anticoagulants [Z79.01]           Comments:           Anticoagulation Care Providers     Provider Role Specialty Phone number    Sarwat Mayorga MD Referring Internal Medicine [phone]

## 2021-06-17 NOTE — PROGRESS NOTES
"    Assessment & Plan     Hypertension controlled. Lipid panel pending    Atrial fibrillation check INR today. Rate is controlled no palpitations stable. Previously seen by Dr. Jatni Johnston at [facility] Department of cardiac electrophysiology. No thromboembolic complications related to same.      Whipple procedure 14 months prior for bile duct cancer at [facility] no clinical evidence of recurrence.  Review of external notes as documented in note  25 minutes spent on the date of the encounter doing chart review, patient visit and documentation        Return in about 4 weeks (around 6/18/2021).    Sarwat Mayorga MD  [facility]    Subjective   Trenton Nova is 81 y.o. and presents today for the following health issues   HPI         Weight loss history of atrial fibrillation latest CA 19-9 level normal. Latest imaging studies CT abdomen and pelvis showed no sign of recurrence of bile duct cancer status post Whipple procedure. Has been seen by oncology as well.    Review of Systems  no blood in stool or urine no chest pain shortness of breath medication list reviewed reticulocyte reconciled in the chart. Feels amiodarone has been helping stay in sinus mechanism. Seen by cardiac electrophysiologist Dr. RENAE [facility] Department of cardiac electrophysiology. On chronic warfarin therapy INR check is pending no bleeding internally or intestinally or urinary from this. No excessive skin bleeding or ecchymosis.      Objective    /72   Pulse 60   Ht 5' 6.75\" (1.695 m)   Wt 149 lb (67.6 kg)   SpO2 98%   BMI 23.51 kg/m    Body mass index is 23.51 kg/m .  Physical Exam  chest is clear to auscultation and percussion there are few moist rales at the right base and left base more so on the right posteriorly neck veins are nondistended neuro no carotid bruits heart tones regular rhythm without murmur rub or gallop abdomen soft benign without masses no " organomegaly extremities are free of edema cyanosis or clubbing.

## 2021-06-17 NOTE — PROGRESS NOTES
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  Check today hemogram comprehensive metabolic profile CA 19-9 plus CT abdomen and pelvis.  Weight loss 7 pounds plus anorexia.    Review of external notes as documented in note  25 minutes spent on the date of the encounter doing chart review, patient visit, documentation and discussion with family        No follow-ups on file.    Sarwat Mayorga MD  [facility]   Trenton Nova is 81 y.o. and presents today for the following health issues   HPI       Weight loss 7 pounds while in [place] measured 154 pounds now 147.  Anorectic.  No vomiting or diarrhea.      Review of Systems  No blood in stool or urine no chest pain or shortness of breath.  No jaundice.  No dena colored stools or cola colored urine.      Objective    /72 (Patient Site: Right Arm, Patient Position: Sitting)   Pulse 65   Temp 97.2  F (36.2  C)   Ht 5' 6.75\" (1.695 m)   Wt 147 lb (66.7 kg)   SpO2 97%   BMI 23.20 kg/m    Body mass index is 23.2 kg/m .  Physical Exam  Chest clear anicteric heart tones normal abdomen benign extremities free of edema neck veins nondistended no thyromegaly no carotid bruits wears glasses no scleral icterus rest of exam negative.  Weight 147 stable unchanged from last visit.  BMI 23+ vital signs stable afebrile.              "

## 2021-06-17 NOTE — TELEPHONE ENCOUNTER
Telephone Encounter by Yanira Gonzalez RN at 4/8/2021 10:14 AM     Author: Yanira Gonzalez RN Service: -- Author Type: Registered Nurse    Filed: 4/8/2021 10:17 AM Encounter Date: 4/8/2021 Status: Signed    : Yanira Gonzalez RN (Registered Nurse)       ANTICOAGULATION  MANAGEMENT    Assessment     Today's INR result of 5.0 is Supratherapeutic (goal INR of 2.0-3.0)        Warfarin taken as previously instructed    No new diet changes affecting INR    Interaction between amiodarone and warfarin may be affecting INR    Continues to tolerate warfarin with no reported s/s of bleeding or thromboembolism     Previous INR was Therapeutic     Venous INR done today and [facility] prior to cardioversion.  Pt will be flying back to [place] tomorrow afternoon    Plan:     Spoke on phone with [employer] Karen nurse regarding INR result and instructed:      Warfarin Dosing Instructions:  Hold warfarin today only then change warfarin dose to 2.5 mg daily on Tue; and 1.25 mg daily rest of week  (11.1 % change) (may need bigger decrease, will evaluate at INR tomorrow, but not wanting to drop INR too fast due to cardioversion today and having to Fly to [place] tomorrow afternoon)    Instructed patient to follow up no later than: Fri    Education provided: target INR goal and significance of current INR result    Karen verbalizes understanding and agrees to warfarin dosing plan.    Instructed to call the [facility] for any changes, questions or concerns. (#[phone])   ?   Yanira Gonzalez RN    Subjective/Objective:      Trenton Nova, a 81 y.o. male is on warfarin. Trenton Chowdhury reports:     Home warfarin dose: verbally confirmed home dose with Karen and updated on anticoagulation calendar     Missed doses: No     Medication changes:  No     S/S of bleeding or thromboembolism:  No     New Injury or illness:  No     Changes in diet or alcohol consumption:  No     Upcoming surgery, procedure or cardioversion:   Yes: cardioversion today    Anticoagulation Episode Summary     Current INR goal:  2.0-3.0   TTR:  59.4 % (1 y)   Next INR check:  4/9/2021   INR from last check:  5.00 (4/8/2021)   Weekly max warfarin dose:     Target end date:     INR check location:     Preferred lab:     Send INR reminders to:  [facility]    Indications    Atrial Fibrillation [I48.91]  Long term (current) use of anticoagulants [Z79.01]           Comments:           Anticoagulation Care Providers     Provider Role Specialty Phone number    Sarwat Mayorga MD Referring Internal Medicine [phone]

## 2021-06-17 NOTE — TELEPHONE ENCOUNTER
Telephone Encounter by Yamileth Flores RN at 5/17/2021  1:47 PM     Author: Yamileth Flores RN Service: -- Author Type: Registered Nurse    Filed: 5/17/2021  3:14 PM Encounter Date: 5/17/2021 Status: Signed    : Yamileth Flores RN (Registered Nurse)       ANTICOAGULATION  MANAGEMENT    Assessment     Today's INR result of 3.40 is Supratherapeutic (goal INR of 2.0-3.0)        Warfarin taken as previously instructed    Reported nutritional intake is still up and down may be affecting diet and INR.   - WT is now up to 148 lbs.    Potential interaction between Amiodarone and warfarin which may affect subsequent INRs    - Since 4/30/21 Amiodarone decreased to 200mg daily.    Continues to tolerate warfarin with no reported s/s of bleeding or thromboembolism     Previous INR was Therapeutic at 2.90 on 5/3/21.    NOTE:  TSH level elevated at 7.35 / however, Free T4 was normal.      Plan:     Spoke on phone with Jonatan regarding INR result and instructed:   - INR trending up again.      Warfarin Dosing Instructions:  (evenings. 1mg / 2.5mg tabs)   - today, advised one time lower dose with 0.5mg warfarin dose.   - then change warfarin dose to 1 mg daily on Mon/Wed/Fri; and 1.25 mg daily rest of week.   - (3 % change)    Instructed patient to follow up no later than:  1 wk.   - INR scheduled on 5/21/21 @ [email or web address]    Education provided: importance of consistent vitamin K intake, target INR goal and significance of current INR result, potential interaction between warfarin and Amiodarone and importance of notifying clinic for diarrhea, nausea/vomiting, reduced intake and/or illness    Jonatan verbalizes understanding and agrees to warfarin dosing plan.    Instructed to call the [facility] for any changes, questions or concerns. (#[phone])   ?   Yamileth Flores RN    Subjective/Objective:      Trenton LINDA Meraradha, a 81 y.o. male is on warfarin. Trenton Chowdhury reports:     Home warfarin dose: as updated on  anticoagulation calendar per template     Missed doses: No     Medication changes:  Yes:  Amiodarone dose decreased on 4/30/21.     S/S of bleeding or thromboembolism:  No     New Injury or illness:  No     Changes in diet or alcohol consumption:  No     Upcoming surgery, procedure or cardioversion:  No    Anticoagulation Episode Summary     Current INR goal:  2.0-3.0   TTR:  65.1 % (1 y)   Next INR check:  5/24/2021   INR from last check:  3.40 (5/17/2021)   Weekly max warfarin dose:     Target end date:     INR check location:     Preferred lab:     Send INR reminders to:  [facility]    Indications    Atrial Fibrillation [I48.91]  Long term (current) use of anticoagulants [Z79.01]           Comments:           Anticoagulation Care Providers     Provider Role Specialty Phone number    Sarwat Mayorga MD Referring Internal Medicine [phone]

## 2021-06-17 NOTE — TELEPHONE ENCOUNTER
ANTICOAGULATION  MANAGEMENT    Assessment     Today's INR result of 2.60 is Therapeutic (goal INR of 2.0-3.0)        Less warfarin taken than instructed which may be affecting INR    No new diet changes affecting INR    Potential interaction between Triamcinolone cream and warfarin which may affect subsequent INRs    - 5/21 renewed to have on hand, Triamcinolone cream applied two times a day, PRN.    Continues to tolerate warfarin with no reported s/s of bleeding or thromboembolism     Previous INR was Supratherapeutic at 3.40 on 5/17/21.    Plan:     Spoke on phone with Jonatan regarding INR result and instructed:   - ensure to take correct warfarin dose.      Warfarin Dosing Instructions:  (evenings. 1mg / 2.5mg tabs)   - Continue current warfarin dose 1 mg daily on Mon/Wed/Fri; and 1.25 mg daily rest of week    Instructed patient to follow up no later than: one wk.   - INR scheduled on 5/28/21 @ [facility]    Education provided: importance of consistent vitamin K intake, target INR goal and significance of current INR result, importance of taking warfarin as instructed and importance of notifying clinic for changes in medications    Jonatan verbalizes understanding and agrees to warfarin dosing plan.    Instructed to call the [facility] for any changes, questions or concerns. (#[phone])   ?   Yamileth Flores RN    Subjective/Objective:      Trenton Nova, a 81 y.o. male is on warfarin. Trenton Chowdhury reports:     Home warfarin dose: template incorrect; verbally confirmed home dose with Jonatan and updated on anticoagulation calendar     Missed doses: No     Medication changes:  No     S/S of bleeding or thromboembolism:  No     New Injury or illness:  No     Changes in diet or alcohol consumption:  No     Upcoming surgery, procedure or cardioversion:  No    Anticoagulation Episode Summary     Current INR goal:  2.0-3.0   TTR:  65.8 % (1 y)   Next INR check:  5/28/2021   INR from last check:  2.60 (5/21/2021)   Weekly  max warfarin dose:     Target end date:     INR check location:     Preferred lab:     [facility]    Indications    Atrial Fibrillation [I48.91]  Long term (current) use of anticoagulants [Z79.01]           Comments:           Anticoagulation Care Providers     Provider Role Specialty Phone number    Sarwat Mayorga MD Referring Internal Medicine [phone]

## 2021-06-17 NOTE — TELEPHONE ENCOUNTER
Telephone Encounter by Yamileth Flores RN at 5/21/2020 10:31 AM     Author: Yamileth Flores RN Service: -- Author Type: Registered Nurse    Filed: 5/21/2020 12:06 PM Encounter Date: 5/21/2020 Status: Attested    : Yamileth Flores RN (Registered Nurse) Cosigner: Sarwat Mayorga MD at 5/21/2020 12:50 PM    Attestation signed by Sarwat Mayorga MD at 5/21/2020 12:50 PM    ok                ANTICOAGULATION  MANAGEMENT    Assessment     Today's INR result of 3.50 is Supratherapeutic (goal INR of 2.0-3.0)        Warfarin taken as previously instructed    Still not much of an appetite may be affecting diet and INR    Potential interaction between Melatonin / Dronedarone and warfarin which may affect subsequent INRs    - reported able to sleep for 7 hours with combination Melatonin and Benadryl.    Continues to tolerate warfarin with no reported s/s of bleeding or thromboembolism     Previous INR was slightly Supratherapeutic at 3.10 on 5/15/20.    Reported WT is now down to 152 lbs.  Prior to surgery he was 170 lbs.    Plan:     Spoke with Mr. Nova regarding INR result and instructed:    1.  Continue to closely monitor INR and adjust dose based on result, due to known interaction of Dronedarone and Melatonin with warfarin.   2.  Advised to inform Dr. Mayorga today during his tele visit, re:  Loss of appetite and possibly include protein supplements such as BOOST or ENSURE are meal placements.    Warfarin Dosing Instructions:  (has 2.5mg tabs)   - advised to HOLD warfarin dose tonight,  then change warfarin dose to 1.25 mg daily.   - (12.5 % change)    Instructed patient to follow up no later than:  One wk.   - INR schedule don 5/26/20 @ [facility].    Education provided: impact of vitamin K foods on INR, target INR goal and significance of current INR result, monitoring for bleeding signs and symptoms, when to seek medical attention/emergency care and importance of notifying clinic for diarrhea,  nausea/vomiting, reduced intake and/or illness    Mr. Nova verbalizes understanding and agrees to warfarin dosing plan.    Instructed to call the [facility] for any changes, questions or concerns. (#[phone])   ?   Yamileth Flores RN    Subjective/Objective:      Trenton Nova, a 80 y.o. male is on warfarin.     Trenton reports:     Home warfarin dose: as updated on anticoagulation calendar per template     Missed doses: No     Medication changes:  Yes:  On 5/13/20 started on Melatonin along with Benadry for insomnia. Reported now able to sleep for 7 hours.   - Started Dronedarone on 5/6/20.     S/S of bleeding or thromboembolism:  No     New Injury or illness:  No.   - 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     Changes in diet or alcohol consumption:  Yes: reported daily nutritional intake is not very good.  He has no appetite to eat.  WT loss of 20 lbs since surgery.     Upcoming surgery, procedure or cardioversion:  No    Anticoagulation Episode Summary     Current INR goal:   2.0-3.0   TTR:   50.1 % (1 y)   Next INR check:   5/28/2020   INR from last check:   3.50! (5/21/2020)   Weekly max warfarin dose:      Target end date:      INR check location:      Preferred lab:      Send INR reminders to:   [facility]    Indications    Atrial Fibrillation [I48.91]  Long term (current) use of anticoagulants [Z79.01]           Comments:            Anticoagulation Care Providers     Provider Role Specialty Phone number    Sarwat Mayorga MD Referring Internal Medicine [phone]

## 2021-06-17 NOTE — TELEPHONE ENCOUNTER
Telephone Encounter by Yamileth Flores RN at 1/22/2021  2:17 PM     Author: Yamileth Flores RN Service: -- Author Type: Registered Nurse    Filed: 1/27/2021  8:50 AM Encounter Date: 1/22/2021 Status: Addendum    : Yamileth Flores RN (Registered Nurse)    Related Notes: Original Note by Yamileth Flores RN (Registered Nurse) filed at 1/22/2021  4:29 PM       ANTICOAGULATION  MANAGEMENT    Assessment     Today's INR result of 4.00 is Supratherapeutic (goal INR of 2.0-3.0)        Warfarin recently held as instructed which may be affecting INR    - warfarin doses held on 1/16-17.    No new diet changes affecting INR    - did not have symptoms that affected sense of smell or taste.   - the sight of food just did not make him hungry.   - continues with one protein shake can per day.    Potential interaction between Dexamethasone and warfarin which may affect subsequent INRs    - discontinued Dexamethasone after 2nd PCR covid test was negative.   - discharged with oral Ceftin 500ng two times a day for 5 days and Azithromycin 500mg daily for 3 days, to complete 5 day course for bacterial pneumonia.    Continues to tolerate warfarin with no reported s/s of bleeding or thromboembolism     Previous INR was Supratherapeutic at 3.90 on 1/17/21.     Returned to [place] from [place] on 1/10/21.    Recent hospitalization from 1/15 - 17/21 for recent Covid-19 pneumonia.    Plan:     Spoke on phone with Jonatan regarding INR result and instructed:     Warfarin Dosing Instructions:  (evenings. 2.5mg tabs)   - advised to HOLD warfarin doses for 2 days, 1/22-23 then continue current warfarin dose 2.5 mg daily on Wed/Sat; and 1.25 mg daily rest of week.    Instructed patient to follow up no later than:  INR scheduled on 1/27/21 @ [facility]    Education provided: importance of consistent vitamin K intake, target INR goal and significance of current INR result, potential interaction between warfarin and Ceftin / Azithromycin and  importance of notifying clinic for changes in medications    Jonatan verbalizes understanding and agrees to warfarin dosing plan.    Instructed to call the [facility] for any changes, questions or concerns. (#[phone])   ?   Yamileth Flores RN    Subjective/Objective:      Trenton Nova, a 80 y.o. male is on warfarin.     Trenton reports:     Home warfarin dose: verbally confirmed home dose with Jonatan and updated on anticoagulation calendar     Missed doses: Yes: was held warfarin for 2 days.     Medication changes:  Yes:  Ceftin / Azithromycin     S/S of bleeding or thromboembolism:  No     New Injury or illness:  Yes: covid-pneumonia.     Changes in diet or alcohol consumption:  No     Upcoming surgery, procedure or cardioversion:  No    Anticoagulation Episode Summary     Current INR goal:  2.0-3.0   TTR:  56.3 % (1 y)   Next INR check:  1/29/2021   INR from last check:  3.90 (1/17/2021)   Weekly max warfarin dose:     Target end date:     INR check location:     Preferred lab:     Send INR reminders to:  [facility]    Indications    Atrial Fibrillation [I48.91]  Long term (current) use of anticoagulants [Z79.01]           Comments:           Anticoagulation Care Providers     Provider Role Specialty Phone number    Sarwat Mayorga MD Referring Internal Medicine [phone]

## 2021-06-17 NOTE — TELEPHONE ENCOUNTER
Telephone Encounter by Yamileth Flores RN at 4/19/2021  1:30 PM     Author: Yamileth Flores RN Service: -- Author Type: Registered Nurse    Filed: 4/19/2021  1:42 PM Encounter Date: 4/19/2021 Status: Signed    : Yamileth Flores RN (Registered Nurse)       ANTICOAGULATION  MANAGEMENT    Assessment     Today's INR result of 3.60 is Supratherapeutic (goal INR of 2.0-3.0)        Warfarin recently held as instructed which may be affecting INR    - held warfarin dose on 4/15, as instructed.    No new diet changes affecting INR    Potential interaction between Amiodarone and warfarin which may affect subsequent INRs    - Amiodarone 200mg dose decreased from three times a day to two times a day, for 30 days.   - on 4/30, will decrease Amiodarone 200mg daily.    Continues to tolerate warfarin with no reported s/s of bleeding or thromboembolism     Previous INR was Supratherapeutic at 3.40 on 4/15/21.    Will be returning to [place] in May 2021.    Plan:     Spoke on phone with Jonatan regarding INR result and instructed:   - will continue to adjust warfarin dose based on INR results.      Warfarin Dosing Instructions:  (evenings. Has 1mg and 2.5mg tabs)   - HOLD warfarin dose tonight, then change warfarin dose to 1 mg daily on Tues/Thurs; and 1.25 mg daily rest of week.    Instructed patient to follow up no later than:  INR on 4/23/21.   - appt was booked for INR check on 4/22.    Education provided: importance of consistent vitamin K intake, target INR goal and significance of current INR result, potential interaction between warfarin and Amiodarone, importance of notifying clinic for changes in medications and monitoring for bleeding signs and symptoms    Jonatan verbalizes understanding and agrees to warfarin dosing plan.    Instructed to call the [facility] for any changes, questions or concerns. (#[phone])   ?   Yamileth Flores RN    Subjective/Objective:      Trenton Nova, a 81 y.o. male is on  warfarin. Trenton Chowdhury reports:     Home warfarin dose: verbally confirmed home dose with Jonatan and updated on anticoagulation calendar     Missed doses: No     Medication changes:  Yes: Amidarone     S/S of bleeding or thromboembolism:  No     New Injury or illness:  No     Changes in diet or alcohol consumption:  No     Upcoming surgery, procedure or cardioversion:  No    Anticoagulation Episode Summary     Current INR goal:  2.0-3.0   TTR:  59.4 % (1 y)   Next INR check:  4/23/2021   INR from last check:  3.60 (4/19/2021)   Weekly max warfarin dose:     Target end date:     INR check location:     Preferred lab:     [facility]    Indications    Atrial Fibrillation [I48.91]  Long term (current) use of anticoagulants [Z79.01]           Comments:           Anticoagulation Care Providers     Provider Role Specialty Phone number    Sarwat Mayorga MD Referring Internal Medicine [phone]

## 2021-06-17 NOTE — PATIENT INSTRUCTIONS - HE
Patient Instructions by Sarwat Mayorga MD at 1/23/2019  8:00 AM     Author: Sarwat Mayorga MD Service: -- Author Type: Physician    Filed: 1/23/2019  8:17 AM Encounter Date: 1/23/2019 Status: Signed    : Sarwat Mayorga MD (Physician)         Patient Education   Understanding USDA MyPlate  The USDA (US Department of Agriculture) has guidelines to help you make healthy food choices. These are called MyPlate. MyPlate shows the food groups that make up healthy meals using the image of a place setting. Before you eat, think about the healthiest choices for what to put onto your plate or into your cup or bowl. To learn more about building a healthy plate, visit www.choosemyplate.gov.       The Food Groups    Fruits: Any fruit or 100% fruit juice counts as part of the Fruit Group. Fruits may be fresh, canned, frozen, or dried, and may be whole, cut-up, or pureed. Make half your plate fruits and vegetables.    Vegetables: Any vegetable or 100% vegetable juice counts as a member of the Vegetable Group. Vegetables may be fresh, frozen, canned, or dried. They can be served raw or cooked and may be whole, cut-up, or mashed. Make half your plate fruits and vegetables.     Grains: All foods made from grains are part of the Grains Group. These include wheat, rice, oats, cornmeal, and barley such as bread, pasta, oatmeal, cereal, tortillas, and grits. Grains should be no more than a quarter of your plate. At least half of your grains should be whole grains.    Protein: This group includes meat, poultry, seafood, beans and peas, eggs, processed soy products (like tofu), nuts (including nut butters), and seeds. Make protein choices no more than a quarter of your plate. Meat and poultry choices should be lean or low fat.    Dairy: All fluid milk products and foods made from milk that contain calcium, like yogurt and cheese are part of the Dairy Group. (Foods that have little calcium, such as cream, butter, and  cream cheese, are not part of the group.) Most dairy choices should be low-fat or fat-free.    Oils: These are fats that are liquid at room temperature. They include canola, corn, olive, soybean, and sunflower oil. Foods that are mainly oil include mayonnaise, certain salad dressings, and soft margarines. You should have only 5 to 7 teaspoons of oils a day. You probably already get this much from the food you eat.  Use Mind Candy to Help Build Your Meals  The [employer] can help you plan and track your meals and activity. You can look up individual foods to see or compare their nutritional value. You can get guidelines for what and how much you should eat. You can compare your food choices. And you can assess personal physical activities and see ways you can improve. Go to www.Profyle.gov/Pathwrightcker/.    2356-7517 [employer]. [address] [place] [address]. All rights reserved. This information is not intended as a substitute for professional medical care. Always follow your healthcare professional's instructions.           Patient Education   Signs of Hearing Loss  Hearing loss is a problem shared by many people. In fact, it is one of the most common health conditions, particularly as people age. Most people over age 65 have some hearing loss, and by age 80, almost everyone does. Because hearing loss usually occurs slowly over the years, you may not realize your hearing ability has gotten worse.       Have your hearing checked  Contact your [facility] care provider if you:    Have to strain to hear normal conversation.    Have to watch other peoples faces very carefully to follow what theyre saying.    Need to ask people to repeat what theyve said.    Often misunderstand what people are saying.    Turn the volume of the television or radio up so high that others complain.    Feel that people are mumbling when theyre talking to you.    Find that the effort to hear leaves you feeling  tired and irritated.    Notice, when using the phone, that you hear better with 1 ear than the other.    8930-7964 [employer]. [address] [place] [address], [place] [address]. All rights reserved. This information is not intended as a substitute for professional medical care. Always follow your healthcare professional's instructions.           Advance Directive  Patients advance directive was discussed and I am comfortable with the patients wishes.  Patient Education   Personalized Prevention Plan  You are due for the preventive services outlined below.  Your care team is available to assist you in scheduling these services.  If you have already completed any of these items, please share that information with your care team to update in your medical record.  Health Maintenance   Topic Date Due   ? ZOSTER VACCINES (1 of 2) 03/20/1990   ? FALL RISK ASSESSMENT  01/23/2020   ? ADVANCE DIRECTIVES DISCUSSED WITH PATIENT  01/16/2023   ? TD 18+ HE  02/18/2024   ? PNEUMOCOCCAL POLYSACCHARIDE VACCINE AGE 65 AND OVER  Completed   ? INFLUENZA VACCINE RULE BASED  Completed   ? PNEUMOCOCCAL CONJUGATE VACCINE FOR ADULTS (PCV13 OR PREVNAR)  Completed

## 2021-06-17 NOTE — TELEPHONE ENCOUNTER
Telephone Encounter by Yamileth Flores RN at 5/4/2020 11:25 AM     Author: Yamileth Flores RN Service: -- Author Type: Registered Nurse    Filed: 5/4/2020 12:15 PM Encounter Date: 5/4/2020 Status: Attested    : Yamileth Flores RN (Registered Nurse) Cosigner: Sarwat Mayorga MD at 5/4/2020 12:30 PM    Attestation signed by Sarwat Mayorga MD at 5/4/2020 12:30 PM    ok                ANTICOAGULATION  MANAGEMENT    Assessment     Today's INR result of 4.10 is Supratherapeutic (goal INR of 2.0-3.0)        Warfarin taken as previously instructed    No new diet changes affecting INR    No new medication/supplements affecting INR    Continues to tolerate warfarin with no reported s/s of bleeding or thromboembolism     Previous INR was Supratherapeutic at 3.30 on 4/27/20.    Possible consideration to repeat Cardioversion due to persistent atrial fibrillation.    Back in atrial fibrillation and symptomatic fatigue / no energy and shortness of breath with minimal exertion.    Plan:     Spoke with Mr. Nova regarding INR result and instructed:    1.  Will start to prep for possible Cardioversion.  (awaiting for labs done today).   2.  Will possibly add medication Sotalol vs. Multaq.    Warfarin Dosing Instructions:   (evenings, has 2.5mg tabs)   - today, advised one time lower dose with 1.25mg warfarin, then change warfarin dose to 2.5 mg daily on Mon/Wed/Fri; and 1.25 mg daily rest of week.   - (9.1 % change)    Instructed patient to follow up no later than:  One wk.   - INR scheduled on 5/11/20 @ [facility].    Education provided: target INR goal and significance of current INR result, importance of notifying clinic for changes in medications and importance of notifying clinic of upcoming surgeries and procedures 2 weeks in advance    Mr. Nova verbalizes understanding and agrees to warfarin dosing plan.    Instructed to call the [facility] for any changes, questions or concerns. (#[phone])   ?    Yamileth Flores RN    Subjective/Objective:      Trenton Nova, a 80 y.o. male is on warfarin.     Trenton reports:     Home warfarin dose: verbally confirmed home dose with Mr. Nova and updated on anticoagulation calendar     Missed doses: No     Medication changes:  No     S/S of bleeding or thromboembolism:  No     New Injury or illness:  Yes:  Persistent atrial fibrillation.   - 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     Changes in diet or alcohol consumption:  No     Upcoming surgery, procedure or cardioversion:  Yes: possible consideration to repeat Cardioversion.    Anticoagulation Episode Summary     Current INR goal:   2.0-3.0   TTR:   54.7 % (1 y)   Next INR check:   5/11/2020   INR from last check:   4.10! (5/4/2020)   Weekly max warfarin dose:      Target end date:      INR check location:      Preferred lab:      Send INR reminders to:   [facility]    Indications    Atrial Fibrillation [I48.91]  Long term (current) use of anticoagulants [Z79.01]           Comments:            Anticoagulation Care Providers     Provider Role Specialty Phone number    Sarwat Mayorga MD Referring Internal Medicine [phone]

## 2021-06-17 NOTE — TELEPHONE ENCOUNTER
Telephone Encounter by Yamileth Flores RN at 4/21/2020  2:24 PM     Author: Yamileth Flores RN Service: -- Author Type: Registered Nurse    Filed: 4/21/2020  2:38 PM Encounter Date: 4/21/2020 Status: Attested    : Yamileth Flores RN (Registered Nurse) Cosigner: Daniel Del Rosario MD at 4/21/2020  3:51 PM    Attestation signed by Daniel Del Rosario MD at 4/21/2020  3:51 PM    [facility]            Summary: Dr. Mayorga - [facility]      ANTICOAGULATION  MANAGEMENT    Assessment     Today's INR result of 5.53 is Supratherapeutic (goal INR of 2.0-3.0)     - INR verified thru [facility]      Warfarin taken as previously instructed    reported poor appetite and not able to sleep  may be affecting diet and INR    No new medication/supplements affecting INR    Continues to tolerate warfarin with no reported s/s of bleeding or thromboembolism     Previous INR was Therapeuticat 3.10 on 4/14/20; s/p Cardioversion on 4/14/20.    Plan:     Spoke with Jonatan regarding INR result and instructed:     Warfarin Dosing Instructions:  (has 2.5mg tabs)   - advised to HOLD warfarin for 2 days, 4/21-22.    - then change warfarin dose to 1.25 mg daily on Mon/Thurs; and 2.5 mg daily rest of week.   - (14.3 % change)    Instructed patient to follow up no later than:  5-7 days.   - INR scheduled on 4/27/20 @ [facility].    Education provided: importance of consistent vitamin K intake, impact of vitamin K foods on INR, target INR goal and significance of current INR result, monitoring for bleeding signs and symptoms and when to seek medical attention/emergency care    Jonatan verbalizes understanding and agrees to warfarin dosing plan.    Instructed to call the [facility] for any changes, questions or concerns. (#[phone])   ?   Yamileth Flores RN    Subjective/Objective:      Trenton Meraradha, a 80 y.o. male is on warfarin.     Trenton reports:     Home warfarin dose: verbally confirmed home dose with Jonatan and updated on anticoagulation  calendar     Missed doses: No     Medication changes:  No     S/S of bleeding or thromboembolism:  No.  Denied any s/sx of any bleeding.     New Injury or illness:  No.  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     Changes in diet or alcohol consumption:  Yes:  Reported poor appetite.     Upcoming surgery, procedure or cardioversion:  No    Anticoagulation Episode Summary     Current INR goal:   2.0-3.0   TTR:   58.3 % (1 y)   Next INR check:   4/27/2020   INR from last check:   5.53! (4/21/2020)   Weekly max warfarin dose:      Target end date:      INR check location:      Preferred lab:      Send INR reminders to:   [facility]    Indications    Atrial Fibrillation [I48.91]  Long term (current) use of anticoagulants [Z79.01]           Comments:            Anticoagulation Care Providers     Provider Role Specialty Phone number    Sarwat Mayorga MD Referring Internal Medicine [phone]

## 2021-06-17 NOTE — TELEPHONE ENCOUNTER
Telephone Encounter by Yamileth Flores, RN at 2/8/2021  1:08 PM     Author: Yamileth Flores, RN Service: -- Author Type: Registered Nurse    Filed: 2/8/2021  3:11 PM Encounter Date: 2/8/2021 Status: Signed    : Yamileth Flores RN (Registered Nurse)       ANTICOAGULATION  MANAGEMENT    Assessment     Today's INR result of 4.35 is Supratherapeutic (goal INR of 2.0-3.0)        Warfarin recently held as instructed which may be affecting INR    - HELD warfarin on 2/3/21.    No new diet changes affecting INR    - today, WT is down to 139 lbs   - last WT on 1/22/21 was 148 lbs.   - continues one can of protein shake daily.   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    No new medication/supplements affecting INR    - diarrhea 2x   - schedued for 2nd Covid-19 vaccine on 2/21/21.    Continues to tolerate warfarin with no reported s/s of bleeding or thromboembolism     Previous INR was Supratherapeutic at 5.10 on 2/3/21.    Currently in atrial fibrillation with shortness of breath.    Scheduled on 2/15 for CHANTELLE and Cardioversion @ [facility].    Plan:     Spoke on phone with Jonatan regarding INR result and instructed:      Warfarin Dosing Instructions:    - HOLD warfarin for 2 days, 2/8-9.   - then change warfarin dose to 2.5 mg daily on Saturdays; and 1.25 mg daily rest of week.   - (11.1 % change)    Instructed patient to follow up no later than:  One wk after Cardioversion.    Education provided: impact of vitamin K foods on INR, target INR goal and significance of current INR result, importance of notifying clinic for changes in medications, when to seek medical attention/emergency care and importance of notifying clinic for diarrhea, nausea/vomiting, reduced intake and/or illness    Jonatan verbalizes understanding and agrees to warfarin dosing plan.    Instructed to call the [facility] for any changes, questions or concerns. (#[phone])   ?   Yamileth Flores  RN    Subjective/Objective:      Trenton Nova, a 80 y.o. male is on warfarin. Trenton Chowdhury reports:     Home warfarin dose: verbally confirmed home dose with Jonatan and updated on anticoagulation calendar     Missed doses: Yes:  Held warfarin dose on 2/3, as instructed.     Medication changes:  No     S/S of bleeding or thromboembolism:  No     New Injury or illness:  Yes:  In atrial fibrillation.     Changes in diet or alcohol consumption:  Yes:  Poor nutritional intake, but starting to eat a little better.     Upcoming surgery, procedure or cardioversion:  Yes: CHANTELLE and Cardioversion on 2/15/21.    Anticoagulation Episode Summary     Current INR goal:  2.0-3.0   TTR:  52.9 % (1 y)   Next INR check:  2/22/2021   INR from last check:  4.35 (2/8/2021)   Weekly max warfarin dose:     Target end date:     INR check location:     Preferred lab:     Send INR reminders to:  [facility]    Indications    Atrial Fibrillation [I48.91]  Long term (current) use of anticoagulants [Z79.01]           Comments:           Anticoagulation Care Providers     Provider Role Specialty Phone number    Sarwat Mayorga MD Referring Internal Medicine [phone]

## 2021-06-17 NOTE — TELEPHONE ENCOUNTER
Telephone Encounter by Yamileth Flores RN at 2/19/2021  8:22 AM     Author: Yamileth Flores RN Service: -- Author Type: Registered Nurse    Filed: 2/19/2021 10:40 AM Encounter Date: 2/19/2021 Status: Signed    : Yamileth Flores RN (Registered Nurse)       ANTICOAGULATION  MANAGEMENT    Assessment     Today's INR result of 1.40 is Subtherapeutic (goal INR of 2.0-3.0)        Warfarin recently held as instructed which may be affecting INR    - warfarin held on 2/8-9 and 2/12-13    No new diet changes affecting INR    - appetite is back.    No new medication/supplements affecting INR    Continues to tolerate warfarin with no reported s/s of bleeding or thromboembolism     Previous INR was Therapeutic at 2.10 on 2/15/21.    S/p successful CHANTELLE Cardioversion on 2/15/21 d/t persistent atrial fib.    Plan:     Spoke on phone with Jonatan regarding INR result and instructed:      Warfarin Dosing Instructions:  (evenings. 2.5mg tabs)   - Change warfarin dose to  2.5 mg daily on Fridays; and 1.25 mg daily rest of week.   - (14.3 % change)    Instructed patient to follow up no later than:  INR scheduled on 2/23/21 @ [facility].    Education provided: importance of consistent vitamin K intake, target INR goal and significance of current INR result and importance of notifying clinic for changes in medicationsV    Jonatan verbalizes understanding and agrees to warfarin dosing plan.    Instructed to call the [facility] for any changes, questions or concerns. (#[phone])   ?   Yamileth Flores RN    Subjective/Objective:      Trenton Nova, a 80 y.o. male is on warfarin. Trenton Chowdhury reports:     Home warfarin dose: as updated on anticoagulation calendar per template     Missed doses: No     Medication changes:  No     S/S of bleeding or thromboembolism:  No     New Injury or illness:  Yes:  Successful Cardioversion on 2/15/21.     - has now recovered thought to be covid-19 pneumonia.  However, tests were  negative.  Had lost 16 lbs since returning on 1/15/21 from spending winter in [place].     Changes in diet or alcohol consumption:  No     Upcoming surgery, procedure or cardioversion:  No    Anticoagulation Episode Summary     Current INR goal:  2.0-3.0   TTR:  50.5 % (1 y)   Next INR check:  2/22/2021   INR from last check:  1.40 (2/19/2021)   Weekly max warfarin dose:     Target end date:     INR check location:     Preferred lab:     Send INR reminders to:  [facility]    Indications    Atrial Fibrillation [I48.91]  Long term (current) use of anticoagulants [Z79.01]           Comments:           Anticoagulation Care Providers     Provider Role Specialty Phone number    Sarwat Mayorga MD Referring Internal Medicine [phone]

## 2021-06-17 NOTE — TELEPHONE ENCOUNTER
Telephone Encounter by Yamileth Flores RN at 10/12/2020  9:28 AM     Author: Yamileth Flores RN Service: -- Author Type: Registered Nurse    Filed: 10/12/2020 11:09 AM Encounter Date: 10/12/2020 Status: Signed    : Yamileth Flores RN (Registered Nurse)       ANTICOAGULATION  MANAGEMENT    Assessment     Today's INR result of 1.80 is Subtherapeutic (goal INR of 2.0-3.0)        Warfarin taken as previously instructed    Loss of appetite / Anorexia may be affecting diet and INR    - drinks one can of Ensure per day.    Potential interaction between Amiodarone / Azithromycin and warfarin which may affect subsequent INRs    - started today, Azithromycin 250mg daily.   - added Famotidine 40mg every evening before bedtime.   - Omeprazole 20mg two times a day before meals.   - Amiodarone discontinued on 9/10/20.    Continues to tolerate warfarin with no reported s/s of bleeding or thromboembolism     Previous INR was Therapeutic at 2.70 on 10/6/20.    Plan:     Spoke on phone with  regarding INR result and instructed:     Warfarin Dosing Instructions:   (evenings. Has 2.5mg tabs)   - Change warfarin dose to 2.5 mg daily on Mon/Thurs; and 1.25 mg daily rest of week.    - (12.5 % change)    Instructed patient to follow up no later than:  One wk.   - INR scheduled for 10/19/20 will coincide visit during f/u with Dr. Mayorga.    Education provided: target INR goal and significance of current INR result, potential interaction between warfarin and Azithromycin / Amiodarone and importance of notifying clinic for changes in medications    Jonatan verbalizes understanding and agrees to warfarin dosing plan.    Instructed to call the [facility] for any changes, questions or concerns. (#[phone])   ?   Yamileth Flores RN    Subjective/Objective:      Trenton Meraradha, a 80 y.o. male is on warfarin.     Trenton reports:     Home warfarin dose: as updated on anticoagulation calendar per template     Missed doses:  No     Medication changes:  Yes: today, Azithromycin 250mg.  Has been sick with cough w/ yellow-green sputum for one wk.     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     Upcoming surgery, procedure or cardioversion:  No    Anticoagulation Episode Summary     Current INR goal:  2.0-3.0   TTR:  48.6 % (1 y)   Next INR check:  10/19/2020   INR from last check:  1.80 (10/12/2020)   Weekly max warfarin dose:     Target end date:     INR check location:     Preferred lab:     Send INR reminders to:  [facility]    Indications    Atrial Fibrillation [I48.91]  Long term (current) use of anticoagulants [Z79.01]           Comments:           Anticoagulation Care Providers     Provider Role Specialty Phone number    Sarwat Mayorga MD Referring Internal Medicine [phone]

## 2021-06-17 NOTE — TELEPHONE ENCOUNTER
ANTICOAGULATION  MANAGEMENT    Assessment     Today's INR result of 2.9 is Therapeutic (goal INR of 2.0-3.0)        Warfarin taken as previously instructed    No new diet changes affecting INR    Amiodarone decreased to 200 mg daily on 4/30/21    Continues to tolerate warfarin with no reported s/s of bleeding or thromboembolism     Previous INR was Therapeutic    Plan:     Spoke on phone with Trenton regarding INR result and instructed:      Warfarin Dosing Instructions:  Continue current warfarin dose 1 mg daily on Monday/Thursday; and 1.25 mg daily rest of week  (0 % change)    Instructed patient to follow up no later than: 1 week    Education provided: importance of therapeutic range, target INR goal and significance of current INR result, importance of following up for INR monitoring at instructed interval, importance of taking warfarin as instructed and potential interaction between warfarin and decrease in Amiodarone dose    Jonatan verbalizes understanding and agrees to warfarin dosing plan.    Instructed to call the [facility] for any changes, questions or concerns. (#[phone])   ?   Wendy Amaya RN    Subjective/Objective:      Trenton Nova, a 81 y.o. male is on warfarin. Trenton Chowdhury reports:     Home warfarin dose: verbally confirmed home dose with Jonatan and updated on anticoagulation calendar     Missed doses: No     Medication changes:  Yes: 4/30/21 Amiodarone dose decreased to 200 mg daily     S/S of bleeding or thromboembolism:  No     New Injury or illness:  No     Changes in diet or alcohol consumption:  No     Upcoming surgery, procedure or cardioversion:  No    Anticoagulation Episode Summary     Current INR goal:  2.0-3.0   TTR:  64.7 % (1 y)   Next INR check:  5/17/2021   INR from last check:  2.90 (5/10/2021)   Weekly max warfarin dose:     Target end date:     INR check location:     Preferred lab:     Send INR reminders to:  [facility]    Atrial  Fibrillation [I48.91]  Long term (current) use of anticoagulants [Z79.01]           Comments:           Anticoagulation Care Providers     Provider Role Specialty Phone number    Sarwat Mayorga MD Referring Internal Medicine [phone]

## 2021-06-17 NOTE — TELEPHONE ENCOUNTER
Telephone Encounter by Yamileth Flores RN at 11/27/2020  8:35 AM     Author: Yamileth Flores RN Service: -- Author Type: Registered Nurse    Filed: 11/30/2020  8:10 AM Encounter Date: 11/27/2020 Status: Addendum    : Yamileth Flores RN (Registered Nurse)    Related Notes: Original Note by Yamileth Flores RN (Registered Nurse) filed at 11/27/2020  4:25 PM       ANTICOAGULATION  MANAGEMENT    Assessment     Today's INR result of 1.90 is Subtherapeutic (goal INR of 2.0-3.0)        Warfarin taken as previously instructed    No new diet changes affecting INR    No new medication/supplements affecting INR    Continues to tolerate warfarin with no reported s/s of bleeding or thromboembolism     Previous INR was Supratherapeutic at 3.20 on 11/10/20.    Currently in [place] and will return to [place] on 12/9/20.    Plan:     Spoke on phone with [facility] regarding INR result and instructed:    - reported, he will call back after his [employer] game.    Warfarin Dosing Instructions:   -  Change warfarin dose to 2.5 mg daily on Mon/Thur; and 1.25 mg daily rest of week.   - (12.5 % change)    Instructed patient to follow up no later than:  2 wks.    - advised to recheck INR on 12/7/20 before flying back to [place].    Education provided: importance of consistent vitamin K intake and target INR goal and significance of current INR result    Instructed to call the [facility] for any changes, questions or concerns. (#[phone])   ?   Yamileth Flores RN    Subjective/Objective:      Trenton Nova, a 80 y.o. male is on warfarin.     Trenton reports:     Home warfarin dose: verbally confirmed home dose with Jonatan and updated on anticoagulation calendar     Missed doses: No     Medication changes:  No     S/S of bleeding or thromboembolism:  No     New Injury or illness:  No     Changes in diet or alcohol consumption:  No     Upcoming surgery, procedure or cardioversion:  No    Anticoagulation Episode Summary     Current INR  goal:  2.0-3.0   TTR:  54.0 % (1 y)   Next INR check:  12/11/2020   INR from last check:  1.90 (11/24/2020)   Weekly max warfarin dose:     Target end date:     INR check location:     Preferred lab:     Send INR reminders to:  [facility]    Indications    Atrial Fibrillation [I48.91]  Long term (current) use of anticoagulants [Z79.01]           Comments:           Anticoagulation Care Providers     Provider Role Specialty Phone number    Sarwat Mayorga MD Referring Internal Medicine [phone]

## 2021-06-17 NOTE — TELEPHONE ENCOUNTER
Telephone Encounter by Miryam Valenzuela RN at 4/3/2020  2:43 PM     Author: Miryam Valenzuela RN Service: -- Author Type: Registered Nurse    Filed: 4/3/2020  3:02 PM Encounter Date: 4/3/2020 Status: Attested    : Miryam Valenzuela RN (Registered Nurse)    Related Notes: Original Note by Miryam Valenzuela RN (Registered Nurse) filed at 4/3/2020  2:59 PM    Cosigner: Sarwat Mayorga MD at 4/3/2020  3:03 PM    Attestation signed by Sarwat Mayorga MD at 4/3/2020  3:03 PM    ok                ANTICOAGULATION  MANAGEMENT    Assessment     Today's INR result of 5.1 is Supratherapeutic (goal INR of 2.0-3.0)        Warfarin taken as previously instructed    recent Whipple procedure may be affecting diet and INR 3/19    Continues to tolerate warfarin with no reported s/s of bleeding or thromboembolism     Previous INR was Therapeutic     Ternton knows to be seen for any active bleeding or head injury while inr is high    Plan:     Spoke with Trenton regarding INR result and instructed:     Warfarin Dosing Instructions:  hold warfarin today and tomorrow then change warfarin dose to 5 mg daily on thu; and 2.5 mg daily rest of week  (11 % change)    Instructed patient to follow up no later than: thu 4/9 scheduled    Trenton verbalizes understanding and agrees to warfarin dosing plan.    Instructed to call the [facility] for any changes, questions or concerns. (#[phone])   ?   Miryam Valenzuela RN    Subjective/Objective:      Trenton Meraradha, a 80 y.o. male is on warfarin.     Trenton reports:     Home warfarin dose: verbally confirmed home dose with Trenton and updated on anticoagulation calendar     Missed doses: No     Medication changes:  No     S/S of bleeding or thromboembolism:  No     New Injury or illness:  No     Changes in diet or alcohol consumption:  No     Upcoming surgery, procedure or cardioversion:  No    Anticoagulation Episode Summary     Current INR goal:   2.0-3.0   TTR:   63.2 % (1 y)   Next INR  check:   4/3/2020   INR from last check:   2.00 (3/27/2020)   Most recent INR:    5.19! (4/3/2020)   Weekly max warfarin dose:      Target end date:      INR check location:      Preferred lab:      Send INR reminders to:   [facility]    Indications    Atrial Fibrillation [I48.91]  Long term (current) use of anticoagulants [Z79.01]           Comments:            Anticoagulation Care Providers     Provider Role Specialty Phone number    Sarwat Mayorga MD Referring Internal Medicine [phone]

## 2021-06-17 NOTE — TELEPHONE ENCOUNTER
Telephone Encounter by Yamileth Flores RN at 4/15/2021  8:44 AM     Author: Yamileth Flores RN Service: -- Author Type: Registered Nurse    Filed: 4/15/2021  9:54 AM Encounter Date: 4/15/2021 Status: Signed    : Yamileth Flores RN (Registered Nurse)       ANTICOAGULATION  MANAGEMENT    Assessment     Today's INR result of 3.40 is Supratherapeutic (goal INR of 2.0-3.0)        Warfarin recently held as instructed which may be affecting INR    - held warfarin on 4/8 - 4/9.    No new diet changes affecting INR    Potential interaction between Amiodarone and warfarin which may affect subsequent INRs    - Amiodarone 200mg dose decreased from three times a day to two times a day for another 2 wks.  Thereafter, will decreased to once a day.   - initiated Amiodarone on 3/23/21.    Continues to tolerate warfarin with no reported s/s of bleeding or thromboembolism     Previous INR was Therapeutic at 3.00 on 4/12/21.    S/p Card[facility]version on 4/8/21 @ [facility]    Plan:     Spoke on phone with Jonatan regarding INR result and instructed:   - will order lower dose warfarin tab (1mg) - #90.        Warfarin Dosing Instructions:  (evenings. 2.5mg tabs)   - Change warfarin dose - NONE on Thursdays; and 1.25 mg daily rest of week.   - (14.3 % change)    Instructed patient to follow up no later than: Monday, 4/19/21.    Education provided: importance of consistent vitamin K intake, target INR goal and significance of current INR result, potential interaction between warfarin and Amiodarone and importance of notifying clinic for changes in medications    Jonatan verbalizes understanding and agrees to warfarin dosing plan.    Instructed to call the [facility] for any changes, questions or concerns. (#[phone])   ?   Yamileth Flores RN    Subjective/Objective:      Trenton MCKEON Ederadha, a 81 y.o. male is on warfarin. Trenton Chowdhury reports:     Home warfarin dose: verbally confirmed home dose with Jonatan and updated on  anticoagulation calendar     Missed doses: No     Medication changes:  Yes: Amiodarone.     S/S of bleeding or thromboembolism:  No     New Injury or illness:  No     Changes in diet or alcohol consumption:  No     Upcoming surgery, procedure or cardioversion:  No    Anticoagulation Episode Summary     Current INR goal:  2.0-3.0   TTR:  59.4 % (1 y)   Next INR check:  4/19/2021   INR from last check:  3.40 (4/15/2021)   Weekly max warfarin dose:     Target end date:     INR check location:     Preferred lab:     Send INR reminders to:  [facility]    Indications    Atrial Fibrillation [I48.91]  Long term (current) use of anticoagulants [Z79.01]           Comments:           Anticoagulation Care Providers     Provider Role Specialty Phone number    Sarwat Mayorga MD Referring Internal Medicine [phone]

## 2021-06-17 NOTE — TELEPHONE ENCOUNTER
Telephone Encounter by Yamileth Flores, RN at 2/23/2021  9:23 AM     Author: Yamileth Flores, RN Service: -- Author Type: Registered Nurse    Filed: 2/23/2021  1:07 PM Encounter Date: 2/23/2021 Status: Signed    : Yamileth Flores RN (Registered Nurse)       ANTICOAGULATION  MANAGEMENT    Assessment     Today's INR result of 1.70 is Subtherapeutic (goal INR of 2.0-3.0)        Warfarin taken as previously instructed    No new diet changes affecting INR    - WT is up 4 lbs from recent WT loss.    Potential interaction between Levofloxacin / Arthritis Tylenol and warfarin which may affect subsequent INRs    - 2/23 - 28, Levofloxacin 750mg daily for 10 days, f/t denis. Pneumonia   - and was advised scheduled - Arthritis Tylenol 650mg 2 tabs three times a day for rib pain.    Continues to tolerate warfarin with no reported s/s of bleeding or thromboembolism     Previous INR was Subtherapeutic at 1.40 on 2/19/21.    Seen on 2/22 for abdominal pain  (hx of bile duct ca).    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    2/23/21 - tested for Covid-19 virus.     Plan:     Spoke on phone with Jonatan regarding INR result and instructed:   - gave one time warfarin booster dose tonight, d/t subtherapeutic INR and recent cardioversion on 2/15/21.   - will check INR on 2/26 d/t to known Levofloxacin interaction, and adjust dose based on INR result.      Warfarin Dosing Instructions:    - today, advised 2.5 mg warfarin dose,    - then take 1.25mg warfarin dose, till next INR.    Instructed patient to follow up no later than: schedule INR Fri. 2/26 @ [facility]    Education provided: target INR goal and significance of current INR result, potential interaction between warfarin and Levofloxacin and Arthritis Tylenol and importance of notifying clinic for changes in medications    Jonatan verbalizes understanding and agrees to  warfarin dosing plan.    Instructed to call the [facility] for any changes, questions or concerns. (#[phone])   ?   Yamileth Flores RN    Subjective/Objective:      Trenton Nova, a 80 y.o. male is on warfarin. Trenton Chowdhury reports:     Home warfarin dose: as updated on anticoagulation calendar per template     Missed doses: No     Medication changes:  Yes: Levofloxacin 750mg and Arthritis Tylenol 650mg     S/S of bleeding or thromboembolism:  No     New Injury or illness:  Yes:  Ant. Pneumonia.     Changes in diet or alcohol consumption:  No     Upcoming surgery, procedure or cardioversion:  No    Anticoagulation Episode Summary     Current INR goal:  2.0-3.0   TTR:  50.3 % (1 y)   Next INR check:  2/26/2021   INR from last check:  1.70 (2/23/2021)   Weekly max warfarin dose:     Target end date:     INR check location:     Preferred lab:     Send INR reminders to:  [facility]    Indications    Atrial Fibrillation [I48.91]  Long term (current) use of anticoagulants [Z79.01]           Comments:           Anticoagulation Care Providers     Provider Role Specialty Phone number    Sarwat Mayorga MD Referring Internal Medicine [phone]

## 2021-06-18 NOTE — LETTER
Letter by Sarwat Mayorga MD at      Author: Sarwat Mayorga MD Service: -- Author Type: --    Filed:  Encounter Date: 1/24/2019 Status: (Other)       Trenton Nova  [address] [facility] [address]             January 24, 2019         Dear Mr. Nova,    Below are the results from your recent visit:    Resulted Orders   HM2(CBC w/o Differential)   Result Value Ref Range    WBC 6.4 4.0 - 11.0 thou/uL    RBC 3.91 (L) 4.40 - 6.20 mill/uL    Hemoglobin 12.8 (L) 14.0 - 18.0 g/dL    Hematocrit 37.5 (L) 40.0 - 54.0 %    MCV 96 80 - 100 fL    MCH 32.8 27.0 - 34.0 pg    MCHC 34.2 32.0 - 36.0 g/dL    RDW 12.5 11.0 - 14.5 %    Platelets 162 140 - 440 thou/uL    MPV 8.6 7.0 - 10.0 fL   Comprehensive Metabolic Panel   Result Value Ref Range    Sodium 139 136 - 145 mmol/L    Potassium 4.3 3.5 - 5.0 mmol/L    Chloride 106 98 - 107 mmol/L    CO2 21 (L) 22 - 31 mmol/L    Anion Gap, Calculation 12 5 - 18 mmol/L    Glucose 97 70 - 125 mg/dL    BUN 29 (H) 8 - 28 mg/dL    Creatinine 1.71 (H) 0.70 - 1.30 mg/dL    GFR MDRD Af Amer 47 (L) >60 mL/min/1.73m2    GFR MDRD Non Af Amer 39 (L) >60 mL/min/1.73m2    Bilirubin, Total 0.8 0.0 - 1.0 mg/dL    Calcium 9.6 8.5 - 10.5 mg/dL    Protein, Total 7.6 6.0 - 8.0 g/dL    Albumin 3.9 3.5 - 5.0 g/dL    Alkaline Phosphatase 67 45 - 120 U/L    AST 21 0 - 40 U/L    ALT 16 0 - 45 U/L    Narrative    Fasting Glucose reference range is 70-99 mg/dL per  American Diabetes Association (ADA) guidelines.   Lipid Cascade   Result Value Ref Range    Cholesterol 159 <=199 mg/dL    Triglycerides 150 (H) <=149 mg/dL    HDL Cholesterol 51 >=40 mg/dL    LDL Calculated 78 <=129 mg/dL    Patient Fasting > 8hrs? Yes    Thyroid Stimulating Hormone (TSH)   Result Value Ref Range    TSH 3.19 0.30 - 5.00 uIU/mL   Urinalysis-UC if Indicated   Result Value Ref Range    Color, UA Yellow Colorless, Yellow, Straw, Light Yellow    Clarity, UA Clear Clear    Glucose, UA Negative Negative    Bilirubin, UA  Negative Negative    Ketones, UA Negative Negative    Specific Gravity, UA 1.025 1.005 - 1.030    Blood, UA Negative Negative    pH, UA 5.5 5.0 - 8.0    Protein, UA Negative Negative mg/dL    Urobilinogen, UA 0.2 E.U./dL 0.2 E.U./dL, 1.0 E.U./dL    Nitrite, UA Negative Negative    Leukocytes, UA Negative Negative    Narrative    Microscopic not indicated  UC not indicated   PSA (Prostatic-Specific Antigen), Annual Screen   Result Value Ref Range    PSA 1.0 0.0 - 6.5 ng/mL    Narrative    Method is Abbott Prostate-Specific Antigen (PSA)  Standard-WHO 1st International (90:10)       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  Normal white count and platelet count noted.    Please call with questions or contact us using [employer].    Sincerely,        Electronically signed by Sarwat Mayorga MD

## 2021-06-18 NOTE — LETTER
Letter by Sarwat Mayorga MD at      Author: Sarwat Mayorga MD Service: -- Author Type: --    Filed:  Encounter Date: 1/24/2019 Status: (Other)       Trenton Nova  [address] [facility] [address]             January 28, 2019         Dear Mr. Nova,    Below are the results from your recent visit:    Resulted Orders   HM2(CBC w/o Differential)   Result Value Ref Range    WBC 6.4 4.0 - 11.0 thou/uL    RBC 3.91 (L) 4.40 - 6.20 mill/uL    Hemoglobin 12.8 (L) 14.0 - 18.0 g/dL    Hematocrit 37.5 (L) 40.0 - 54.0 %    MCV 96 80 - 100 fL    MCH 32.8 27.0 - 34.0 pg    MCHC 34.2 32.0 - 36.0 g/dL    RDW 12.5 11.0 - 14.5 %    Platelets 162 140 - 440 thou/uL    MPV 8.6 7.0 - 10.0 fL   Comprehensive Metabolic Panel   Result Value Ref Range    Sodium 139 136 - 145 mmol/L    Potassium 4.3 3.5 - 5.0 mmol/L    Chloride 106 98 - 107 mmol/L    CO2 21 (L) 22 - 31 mmol/L    Anion Gap, Calculation 12 5 - 18 mmol/L    Glucose 97 70 - 125 mg/dL    BUN 29 (H) 8 - 28 mg/dL    Creatinine 1.71 (H) 0.70 - 1.30 mg/dL    GFR MDRD Af Amer 47 (L) >60 mL/min/1.73m2    GFR MDRD Non Af Amer 39 (L) >60 mL/min/1.73m2    Bilirubin, Total 0.8 0.0 - 1.0 mg/dL    Calcium 9.6 8.5 - 10.5 mg/dL    Protein, Total 7.6 6.0 - 8.0 g/dL    Albumin 3.9 3.5 - 5.0 g/dL    Alkaline Phosphatase 67 45 - 120 U/L    AST 21 0 - 40 U/L    ALT 16 0 - 45 U/L    Narrative    Fasting Glucose reference range is 70-99 mg/dL per  American Diabetes Association (ADA) guidelines.   Lipid Cascade   Result Value Ref Range    Cholesterol 159 <=199 mg/dL    Triglycerides 150 (H) <=149 mg/dL    HDL Cholesterol 51 >=40 mg/dL    LDL Calculated 78 <=129 mg/dL    Patient Fasting > 8hrs? Yes    Thyroid Stimulating Hormone (TSH)   Result Value Ref Range    TSH 3.19 0.30 - 5.00 uIU/mL   Urinalysis-UC if Indicated   Result Value Ref Range    Color, UA Yellow Colorless, Yellow, Straw, Light Yellow    Clarity, UA Clear Clear    Glucose, UA Negative Negative    Bilirubin, UA  Negative Negative    Ketones, UA Negative Negative    Specific Gravity, UA 1.025 1.005 - 1.030    Blood, UA Negative Negative    pH, UA 5.5 5.0 - 8.0    Protein, UA Negative Negative mg/dL    Urobilinogen, UA 0.2 E.U./dL 0.2 E.U./dL, 1.0 E.U./dL    Nitrite, UA Negative Negative    Leukocytes, UA Negative Negative    Narrative    Microscopic not indicated  UC not indicated   PSA (Prostatic-Specific Antigen), Annual Screen   Result Value Ref Range    PSA 1.0 0.0 - 6.5 ng/mL    Narrative    Method is Abbott Prostate-Specific Antigen (PSA)  Standard-WHO 1st International (90:10)       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  Normal white count and platelet count noted.    Please call with questions or contact us using [employer].    Sincerely,        Electronically signed by Sarwat Mayorga MD

## 2021-06-18 NOTE — LETTER
Letter by Sarwat Mayorga MD at      Author: Sarwat Mayorga MD Service: -- Author Type: --    Filed:  Encounter Date: 1/31/2019 Status: (Other)       Trenton Nova  [address] [facility] [address]             January 31, 2019         Dear Mr. Nova,    Below are the results from your recent visit:    Resulted Orders   HM2(CBC w/o Differential)   Result Value Ref Range    WBC 6.4 4.0 - 11.0 thou/uL    RBC 3.91 (L) 4.40 - 6.20 mill/uL    Hemoglobin 12.8 (L) 14.0 - 18.0 g/dL    Hematocrit 37.5 (L) 40.0 - 54.0 %    MCV 96 80 - 100 fL    MCH 32.8 27.0 - 34.0 pg    MCHC 34.2 32.0 - 36.0 g/dL    RDW 12.5 11.0 - 14.5 %    Platelets 162 140 - 440 thou/uL    MPV 8.6 7.0 - 10.0 fL   Comprehensive Metabolic Panel   Result Value Ref Range    Sodium 139 136 - 145 mmol/L    Potassium 4.3 3.5 - 5.0 mmol/L    Chloride 106 98 - 107 mmol/L    CO2 21 (L) 22 - 31 mmol/L    Anion Gap, Calculation 12 5 - 18 mmol/L    Glucose 97 70 - 125 mg/dL    BUN 29 (H) 8 - 28 mg/dL    Creatinine 1.71 (H) 0.70 - 1.30 mg/dL    GFR MDRD Af Amer 47 (L) >60 mL/min/1.73m2    GFR MDRD Non Af Amer 39 (L) >60 mL/min/1.73m2    Bilirubin, Total 0.8 0.0 - 1.0 mg/dL    Calcium 9.6 8.5 - 10.5 mg/dL    Protein, Total 7.6 6.0 - 8.0 g/dL    Albumin 3.9 3.5 - 5.0 g/dL    Alkaline Phosphatase 67 45 - 120 U/L    AST 21 0 - 40 U/L    ALT 16 0 - 45 U/L    Narrative    Fasting Glucose reference range is 70-99 mg/dL per  American Diabetes Association (ADA) guidelines.   Lipid Cascade   Result Value Ref Range    Cholesterol 159 <=199 mg/dL    Triglycerides 150 (H) <=149 mg/dL    HDL Cholesterol 51 >=40 mg/dL    LDL Calculated 78 <=129 mg/dL    Patient Fasting > 8hrs? Yes    Thyroid Stimulating Hormone (TSH)   Result Value Ref Range    TSH 3.19 0.30 - 5.00 uIU/mL   Urinalysis-UC if Indicated   Result Value Ref Range    Color, UA Yellow Colorless, Yellow, Straw, Light Yellow    Clarity, UA Clear Clear    Glucose, UA Negative Negative    Bilirubin, UA  Negative Negative    Ketones, UA Negative Negative    Specific Gravity, UA 1.025 1.005 - 1.030    Blood, UA Negative Negative    pH, UA 5.5 5.0 - 8.0    Protein, UA Negative Negative mg/dL    Urobilinogen, UA 0.2 E.U./dL 0.2 E.U./dL, 1.0 E.U./dL    Nitrite, UA Negative Negative    Leukocytes, UA Negative Negative    Narrative    Microscopic not indicated  UC not indicated   PSA (Prostatic-Specific Antigen), Annual Screen   Result Value Ref Range    PSA 1.0 0.0 - 6.5 ng/mL    Narrative    Method is Abbott Prostate-Specific Antigen (PSA)  Standard-WHO 1st International (90:10)   Electrophoresis, Protein, Serum, Cascade   Result Value Ref Range    Albumin % 61.6 51.0 - 67.0 %    Albumin  4.6 3.2 - 4.7 g/dL    Alpha 1 % 2.4 2.0 - 4.0 %    Alpha 1 0.2 0.1 - 0.3 g/dL    Alpha 2 % 10.5 5.0 - 13.0 %    Alpha 2 0.8 0.4 - 0.9 g/dL    % Beta 16.8 10.0 - 17.0 %    Beta 1.2 0.7 - 1.2 g/dL    Gamma Globulin % 8.7 (L) 9.0 - 20.0 %    Gamma Globulin 0.6 0.6 - 1.4 g/dL    ELP Comment       Restricted peak detected in beta region. See immunofixation electrophoresis results.    Protein, Total 7.4 6.0 - 8.0 g/dL    Monoclonal Peak 0.8 g/dL      Comment:      Monoclonal quantitation is approximate as it migrates with normal beta globulin.    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    Sincerely,        Electronically signed by Sarwat Mayorga MD

## 2021-06-18 NOTE — PATIENT INSTRUCTIONS - HE
Patient Instructions by Sarwat Mayorga MD at 1/28/2020  8:00 AM     Author: Sarwat Mayorga MD Service: -- Author Type: Physician    Filed: 1/28/2020  8:13 AM Encounter Date: 1/28/2020 Status: Signed    : Sarwat Mayorga MD (Physician)         Patient Education     Your Health Risk Assessment indicates you feel you are not in good physical health.    A healthy lifestyle helps keep the body fit and the mind alert. It helps protect you from disease, helps you fight disease, and helps prevent chronic disease (disease that doesn't go away) from getting worse. This is important as you get older and begin to notice twinges in muscles and joints and a decline in the strength and stamina you once took for granted. A healthy lifestyle includes good healthcare, good nutrition, weight control, recreation, and regular exercise. Avoid harmful substances and do what you can to keep safe. Another part of a healthy lifestyle is stay mentally active and socially involved.    Good healthcare     Have a wellness visit every year.     If you have new symptoms, let us know right away. Don't wait until the next checkup.     Take medicines exactly as prescribed and keep your medicines in a safe place. Tell us if your medicine causes problems.   Healthy diet and weight control     Eat 3 or 4 small, nutritious, low-fat, high-fiber meals a day. Include a variety of fruits, vegetables, and whole-grain foods.     Make sure you get enough calcium in your diet. Calcium, vitamin D, and exercise help prevent osteoporosis (bone thinning).     If you live alone, try eating with others when you can. That way you get a good meal and have company while you eat it.     Try to keep a healthy weight. If you eat more calories than your body uses for energy, it will be stored as fat and you will gain weight.     Recreation   Recreation is not limited to sports and team events. It includes any activity that provides relaxation,  interest, enjoyment, and exercise. Recreation provides an outlet for physical, mental, and social energy. It can give a sense of worth and achievement. It can help you stay healthy.       Patient Education     Exercise for a Healthier Heart  You may wonder how you can improve the health of your heart. If youre thinking about exercise, youre on the right track. You dont need to become an athlete, but you do need a certain amount of brisk exercise to help strengthen your heart. If you have been diagnosed with a heart condition, your doctor may recommend exercise to help stabilize your condition. To help make exercise a habit, choose safe, fun activities.       Be sure to check with your health care provider before starting an exercise program.    Why exercise?  Exercising regularly offers many healthy rewards. It can help you do all of the following:    Improve your blood cholesterol levels to help prevent further heart trouble    Lower your blood pressure to help prevent a stroke or heart attack    Control diabetes, or reduce your risk of getting this disease    Improve your heart and lung function    Reach and maintain a healthy weight    Make your muscles stronger and more limber so you can stay active    Prevent falls and fractures by slowing the loss of bone mass (osteoporosis)    Manage stress better  Exercise tips  Ease into your routine. Set small goals. Then build on them.  Exercise on most days. Aim for a total of 150 or more minutes of moderate to  vigorous intensity activity each week. Consider 40 minutes, 3 to 4 times a week. For best results, activity should last for 40 minutes on average. It is OK to work up to the 40 minute period over time. Examples of moderate-intensity activity is walking one mile in 15 minutes or 30 to 45 minutes of yard work.  Step up your daily activity level. Along with your exercise program, try being more active throughout the day. Walk instead of drive. Do more household  tasks or yard work.  Choose one or more activities you enjoy. Walking is one of the easiest things you can do. You can also try swimming, riding a bike, or taking an exercise class.  Stop exercising and call your doctor if you:    Have chest pain or feel dizzy or lightheaded    Feel burning, tightness, pressure, or heaviness in your chest, neck, shoulders, back, or arms    Have unusual shortness of breath    Have increased joint or muscle pain    Have palpitations or an irregular heartbeat      3532-1703 [employer]. [address] [place] [address]. All rights reserved. This information is not intended as a substitute for professional medical care. Always follow your healthcare professional's instructions.         Patient Education   Understanding [employer] MyPlate  The USDA (US Department of Agriculture) has guidelines to help you make healthy food choices. These are called MyPlate. MyPlate shows the food groups that make up healthy meals using the image of a place setting. Before you eat, think about the healthiest choices for what to put onto your plate or into your cup or bowl. To learn more about building a healthy plate, visit www.choosemyplate.gov.       The Food Groups    Fruits: Any fruit or 100% fruit juice counts as part of the Fruit Group. Fruits may be fresh, canned, frozen, or dried, and may be whole, cut-up, or pureed. Make half your plate fruits and vegetables.    Vegetables: Any vegetable or 100% vegetable juice counts as a member of the Vegetable Group. Vegetables may be fresh, frozen, canned, or dried. They can be served raw or cooked and may be whole, cut-up, or mashed. Make half your plate fruits and vegetables.     Grains: All foods made from grains are part of the Grains Group. These include wheat, rice, oats, cornmeal, and barley such as bread, pasta, oatmeal, cereal, tortillas, and grits. Grains should be no more than a quarter of your plate. At least half of your grains  should be whole grains.    Protein: This group includes meat, poultry, seafood, beans and peas, eggs, processed soy products (like tofu), nuts (including nut butters), and seeds. Make protein choices no more than a quarter of your plate. Meat and poultry choices should be lean or low fat.    Dairy: All fluid milk products and foods made from milk that contain calcium, like yogurt and cheese are part of the Dairy Group. (Foods that have little calcium, such as cream, butter, and cream cheese, are not part of the group.) Most dairy choices should be low-fat or fat-free.    Oils: These are fats that are liquid at room temperature. They include canola, corn, olive, soybean, and sunflower oil. Foods that are mainly oil include mayonnaise, certain salad dressings, and soft margarines. You should have only 5 to 7 teaspoons of oils a day. You probably already get this much from the food you eat.  Use [employer] to Help Build Your Meals  The SuperTracker can help you plan and track your meals and activity. You can look up individual foods to see or compare their nutritional value. You can get guidelines for what and how much you should eat. You can compare your food choices. And you can assess personal physical activities and see ways you can improve. Go to www.[employer].gov/supertracker/.    1488-0718 [employer]. [address] [place] [address], [place] [address]. All rights reserved. This information is not intended as a substitute for professional medical care. Always follow your healthcare professional's instructions.             Advance Directive  Patients advance directive was discussed and I am comfortable with the patients wishes.  Patient Education   Personalized Prevention Plan  You are due for the preventive services outlined below.  Your care team is available to assist you in scheduling these services.  If you have already completed any of these items, please share that information with your care  team to update in your medical record.  Health Maintenance   Topic Date Due   ? MEDICARE ANNUAL WELLNESS VISIT  01/23/2020   ? FALL RISK ASSESSMENT  12/23/2020   ? LIPID  01/23/2024   ? ADVANCE CARE PLANNING  01/23/2024   ? TD 18+ HE  02/18/2024   ? PNEUMOCOCCAL IMMUNIZATION 65+ LOW/MEDIUM RISK  Completed   ? INFLUENZA VACCINE RULE BASED  Completed   ? ZOSTER VACCINES  Completed

## 2021-06-18 NOTE — PATIENT INSTRUCTIONS - HE
Patient Instructions by Sarwat Mayorga MD at 2/8/2021 10:40 AM     Author: Sarwat Mayorga MD Service: -- Author Type: Physician    Filed: 2/8/2021 10:58 AM Encounter Date: 2/8/2021 Status: Signed    : Sarwat Mayorga MD (Physician)         Patient Education     Your Health Risk Assessment indicates you feel you are not in good physical health.    A healthy lifestyle helps keep the body fit and the mind alert. It helps protect you from disease, helps you fight disease, and helps prevent chronic disease (disease that doesn't go away) from getting worse. This is important as you get older and begin to notice twinges in muscles and joints and a decline in the strength and stamina you once took for granted. A healthy lifestyle includes good healthcare, good nutrition, weight control, recreation, and regular exercise. Avoid harmful substances and do what you can to keep safe. Another part of a healthy lifestyle is stay mentally active and socially involved.    Good healthcare     Have a wellness visit every year.     If you have new symptoms, let us know right away. Don't wait until the next checkup.     Take medicines exactly as prescribed and keep your medicines in a safe place. Tell us if your medicine causes problems.   Healthy diet and weight control     Eat 3 or 4 small, nutritious, low-fat, high-fiber meals a day. Include a variety of fruits, vegetables, and whole-grain foods.     Make sure you get enough calcium in your diet. Calcium, vitamin D, and exercise help prevent osteoporosis (bone thinning).     If you live alone, try eating with others when you can. That way you get a good meal and have company while you eat it.     Try to keep a healthy weight. If you eat more calories than your body uses for energy, it will be stored as fat and you will gain weight.     Recreation   Recreation is not limited to sports and team events. It includes any activity that provides relaxation, interest,  enjoyment, and exercise. Recreation provides an outlet for physical, mental, and social energy. It can give a sense of worth and achievement. It can help you stay healthy.       Patient Education     Exercise for a Healthier Heart  You may wonder how you can improve the health of your heart. If youre thinking about exercise, youre on the right track. You dont need to become an athlete, but you do need a certain amount of brisk exercise to help strengthen your heart. If you have been diagnosed with a heart condition, your doctor may recommend exercise to help stabilize your condition. To help make exercise a habit, choose safe, fun activities.       Be sure to check with your health care provider before starting an exercise program.    Why exercise?  Exercising regularly offers many healthy rewards. It can help you do all of the following:    Improve your blood cholesterol levels to help prevent further heart trouble    Lower your blood pressure to help prevent a stroke or heart attack    Control diabetes, or reduce your risk of getting this disease    Improve your heart and lung function    Reach and maintain a healthy weight    Make your muscles stronger and more limber so you can stay active    Prevent falls and fractures by slowing the loss of bone mass (osteoporosis)    Manage stress better  Exercise tips  Ease into your routine. Set small goals. Then build on them.  Exercise on most days. Aim for a total of 150 or more minutes of moderate to  vigorous intensity activity each week. Consider 40 minutes, 3 to 4 times a week. For best results, activity should last for 40 minutes on average. It is OK to work up to the 40 minute period over time. Examples of moderate-intensity activity is walking one mile in 15 minutes or 30 to 45 minutes of yard work.  Step up your daily activity level. Along with your exercise program, try being more active throughout the day. Walk instead of drive. Do more household tasks or yard  work.  Choose one or more activities you enjoy. Walking is one of the easiest things you can do. You can also try swimming, riding a bike, or taking an exercise class.  Stop exercising and call your doctor if you:    Have chest pain or feel dizzy or lightheaded    Feel burning, tightness, pressure, or heaviness in your chest, neck, shoulders, back, or arms    Have unusual shortness of breath    Have increased joint or muscle pain    Have palpitations or an irregular heartbeat      6698-1874 [employer]. [address] [place] [address]. All rights reserved. This information is not intended as a substitute for professional medical care. Always follow your healthcare professional's instructions.         Patient Education   Understanding [employer] MyPlate  The USDA (US Department of Agriculture) has guidelines to help you make healthy food choices. These are called MyPlate. MyPlate shows the food groups that make up healthy meals using the image of a place setting. Before you eat, think about the healthiest choices for what to put onto your plate or into your cup or bowl. To learn more about building a healthy plate, visit www.choosemyplate.gov.       The Food Groups    Fruits: Any fruit or 100% fruit juice counts as part of the Fruit Group. Fruits may be fresh, canned, frozen, or dried, and may be whole, cut-up, or pureed. Make half your plate fruits and vegetables.    Vegetables: Any vegetable or 100% vegetable juice counts as a member of the Vegetable Group. Vegetables may be fresh, frozen, canned, or dried. They can be served raw or cooked and may be whole, cut-up, or mashed. Make half your plate fruits and vegetables.     Grains: All foods made from grains are part of the Grains Group. These include wheat, rice, oats, cornmeal, and barley such as bread, pasta, oatmeal, cereal, tortillas, and grits. Grains should be no more than a quarter of your plate. At least half of your grains should be whole  grains.    Protein: This group includes meat, poultry, seafood, beans and peas, eggs, processed soy products (like tofu), nuts (including nut butters), and seeds. Make protein choices no more than a quarter of your plate. Meat and poultry choices should be lean or low fat.    Dairy: All fluid milk products and foods made from milk that contain calcium, like yogurt and cheese are part of the Dairy Group. (Foods that have little calcium, such as cream, butter, and cream cheese, are not part of the group.) Most dairy choices should be low-fat or fat-free.    Oils: These are fats that are liquid at room temperature. They include canola, corn, olive, soybean, and sunflower oil. Foods that are mainly oil include mayonnaise, certain salad dressings, and soft margarines. You should have only 5 to 7 teaspoons of oils a day. You probably already get this much from the food you eat.  Use [employer] to Help Build Your Meals  The SuperTracker can help you plan and track your meals and activity. You can look up individual foods to see or compare their nutritional value. You can get guidelines for what and how much you should eat. You can compare your food choices. And you can assess personal physical activities and see ways you can improve. Go to www.[employer] Drumm.gov/supertracker/.    2582-4789 [employer]. [address] [place] [address]. All rights reserved. This information is not intended as a substitute for professional medical care. Always follow your healthcare professional's instructions.           Patient Education   Depression and Suicide in Older Adults  Nearly 2 million older Americans have some type of depression. Sadly, some of them even take their own lives. Yet depression among older adults is often ignored. Learn the warning signs. You may help spare a loved one needless pain. You may also save a life.       What Is Depression?  Depression is a mood disorder that affects the way you  think and feel. The most common symptom is a feeling of deep sadness. People who are depressed also may seem tired and listless. And nothing seems to give them pleasure. Its normal to grieve or be sad sometimes. But sadness lessens or passes with time. Depression rarely goes away or improves on its own. Other symptoms of depression are:    Sleeping more or less than normal    Eating more or less than normal    Having headaches, stomachaches, or other pains that dont go away    Feeling nervous, empty, or worthless    Crying a great deal    Thinking or talking about suicide or death    Feeling confused or forgetful  What Causes It?  The causes of depression arent fully known. Certain chemicals in the brain play a role. Depression does run in families. And life stresses can also trigger depression in some people. That may be the case with older adults. They often face great burdens, such as the death of friends or a spouse. They may have failing health. And they are more likely to be alone, lonely, or poor.  How You Can Help  Often, depressed people may not want to ask for help. When they do, they may be ignored. Or, they may receive the wrong treatment. You can help by showing parents and older friends love and support. If they seem depressed, help them find the right treatment. Talk to your doctor. Or contact a local mental health center, social service agency, or hospital. With modern treatment, no one has to suffer from depression.  Resources:    National [employer] of Mental Health  968.626.4736  www.nimh.nih.gov    National [facility] on Mental Illness  785.935.6913  www.kendy.org    M[facility]  988.506.3055  www.[facility].org    National Suicide Hotline  106.786.1871 (800-SUICIDE)      3229-9477 [employer]. [address] [place] [address], [place] [address]. All rights reserved. This information is not intended as a substitute for professional medical care. Always follow your healthcare professional's  instructions.           Advance Directive  Patients advance directive was discussed and I am comfortable with the patients wishes.  Patient Education   Personalized Prevention Plan  You are due for the preventive services outlined below.  Your care team is available to assist you in scheduling these services.  If you have already completed any of these items, please share that information with your care team to update in your medical record.  Health Maintenance   Topic Date Due   ? COVID-19 Vaccine (2 of 2 - Pfizer series) 02/21/2021   ? MEDICARE ANNUAL WELLNESS VISIT  02/08/2022   ? FALL RISK ASSESSMENT  02/08/2022   ? TD 18+ HE  02/18/2024   ? ADVANCE CARE PLANNING  01/28/2025   ? LIPID  09/15/2025   ? Pneumococcal Vaccine: Pediatrics (0 to 5 Years) and At-Risk Patients (6 to 64 Years)  Completed   ? Pneumococcal Vaccine: 65+ Years  Completed   ? INFLUENZA VACCINE RULE BASED  Completed   ? ZOSTER VACCINES  Completed   ? HEPATITIS B VACCINES  Aged Out

## 2021-06-19 NOTE — LETTER
Letter by Sarwat Mayorga MD at      Author: Sarwat Mayorga MD Service: -- Author Type: --    Filed:  Encounter Date: 6/21/2019 Status: (Other)         Trenton Nova  [address] [facility] [address]             June 21, 2019         Dear Mr. Nova,    Below are the results from your recent visit:    Resulted Orders   HM2(CBC w/o Differential)   Result Value Ref Range    WBC 5.6 4.0 - 11.0 thou/uL    RBC 3.80 (L) 4.40 - 6.20 mill/uL    Hemoglobin 12.5 (L) 14.0 - 18.0 g/dL    Hematocrit 37.0 (L) 40.0 - 54.0 %    MCV 97 80 - 100 fL    MCH 32.8 27.0 - 34.0 pg    MCHC 33.7 32.0 - 36.0 g/dL    RDW 12.2 11.0 - 14.5 %    Platelets 165 140 - 440 thou/uL    MPV 8.2 7.0 - 10.0 fL   Comprehensive Metabolic Panel   Result Value Ref Range    Sodium 139 136 - 145 mmol/L    Potassium 4.2 3.5 - 5.0 mmol/L    Chloride 106 98 - 107 mmol/L    CO2 24 22 - 31 mmol/L    Anion Gap, Calculation 9 5 - 18 mmol/L    Glucose 92 70 - 125 mg/dL    BUN 26 8 - 28 mg/dL    Creatinine 1.80 (H) 0.70 - 1.30 mg/dL    GFR MDRD Af Amer 44 (L) >60 mL/min/1.73m2    GFR MDRD Non Af Amer 37 (L) >60 mL/min/1.73m2    Bilirubin, Total 0.7 0.0 - 1.0 mg/dL    Calcium 9.1 8.5 - 10.5 mg/dL    Protein, Total 7.3 6.0 - 8.0 g/dL    Albumin 3.7 3.5 - 5.0 g/dL    Alkaline Phosphatase 58 45 - 120 U/L    AST 18 0 - 40 U/L    ALT 9 0 - 45 U/L    Narrative    Fasting Glucose reference range is 70-99 mg/dL per  American Diabetes Association (ADA) guidelines.   BNP(B-type Natriuretic Peptide)   Result Value Ref Range     (H) 0 - 84 pg/mL       All very good results but for evidence of chronic kidney disease that may be related to multiple myeloma called myeloma kidney with serum creatinine at 1.80.  This mild elevation in serum creatinine is indicative of chronic kidney disease which is probably the reason why normochromic normocytic anemia seen on previous laboratory testing as noted today.  This could contribute to the sensation of feeling  short of breath with activity or exertion or stairclimbing    Please call with questions or contact us using [employer].    Sincerely,        Electronically signed by Sarwat Mayorga MD

## 2021-06-20 NOTE — LETTER
Letter by Sarwat Mayorga MD at      Author: Sarwat Mayorga MD Service: -- Author Type: --    Filed:  Encounter Date: 3/30/2020 Status: (Other)         Trenton Nova  [address] [facility] [address]             March 30, 2020         Dear Mr. Nova,    Below are the results from your recent visit:    Resulted Orders   HM2(CBC w/o Differential)   Result Value Ref Range    WBC 10.2 4.0 - 11.0 thou/uL    RBC 3.39 (L) 4.40 - 6.20 mill/uL    Hemoglobin 10.8 (L) 14.0 - 18.0 g/dL    Hematocrit 32.6 (L) 40.0 - 54.0 %    MCV 96 80 - 100 fL    MCH 31.8 27.0 - 34.0 pg    MCHC 33.1 32.0 - 36.0 g/dL    RDW 12.9 11.0 - 14.5 %    Platelets 297 140 - 440 thou/uL    MPV 7.9 7.0 - 10.0 fL   Basic Metabolic Panel   Result Value Ref Range    Sodium 136 136 - 145 mmol/L    Potassium 3.7 3.5 - 5.0 mmol/L    Chloride 103 98 - 107 mmol/L    CO2 24 22 - 31 mmol/L    Anion Gap, Calculation 9 5 - 18 mmol/L    Glucose 101 70 - 125 mg/dL    Calcium 7.9 (L) 8.5 - 10.5 mg/dL    BUN 14 8 - 28 mg/dL    Creatinine 1.39 (H) 0.70 - 1.30 mg/dL    GFR MDRD Af Amer 60 (L) >60 mL/min/1.73m2    GFR MDRD Non Af Amer 49 (L) >60 mL/min/1.73m2    Narrative    Fasting Glucose reference range is 70-99 mg/dL per  American Diabetes Association (ADA) guidelines.   Carbohydrate Antigen 19-9 (CA 19-9)   Result Value Ref Range    Cancer Antigen-GI (CA 19-9) 25 0 - 37 U/mL      Comment:      INTERPRETIVE INFORMATION: Cancer Antigen-GI (CA 19-9)    This test uses Roche CA 19-9 electrochemiluminescent immunoassay.   Results obtained with different test methods or kits cannot be   used interchangeably. CA 19-9 value is useful in monitoring   pancreatic, hepatobiliary, gastric, hepatocellular, and colorectal   cancer. CA 19-9 value, regardless of level, should not be   interpreted as absolute evidence of the presence or absence of   malignant disease.  Performed by [facility],  [address] [place] [address] [phone]  [email or web address],  Adelfo Doss MD, Lab. Director       All very good results   ------       Improvement in CKD w/ decrease in creatinine   ------   Mild normochromic normocytic anemia with normal platelet count and white blood count noted.     Please call with questions or contact us using [employer].    Sincerely,        Electronically signed by Sarwat Mayorga MD

## 2021-06-20 NOTE — LETTER
Letter by Sarwat Mayorga MD at      Author: Sarwat Mayorga MD Service: -- Author Type: --    Filed:  Encounter Date: 8/14/2020 Status: (Other)         Trenton Nova  [address] [facility] [address]             August 14, 2020         Dear Mr. Nova,    Below are the results from your recent visit:    Resulted Orders   HM2(CBC w/o Differential)   Result Value Ref Range    WBC 5.6 4.0 - 11.0 thou/uL    RBC 3.69 (L) 4.40 - 6.20 mill/uL    Hemoglobin 11.4 (L) 14.0 - 18.0 g/dL    Hematocrit 35.3 (L) 40.0 - 54.0 %    MCV 96 80 - 100 fL    MCH 31.0 27.0 - 34.0 pg    MCHC 32.4 32.0 - 36.0 g/dL    RDW 14.0 11.0 - 14.5 %    Platelets 151 140 - 440 thou/uL    MPV 8.3 7.0 - 10.0 fL   Comprehensive Metabolic Panel   Result Value Ref Range    Sodium 138 136 - 145 mmol/L    Potassium 4.7 3.5 - 5.0 mmol/L    Chloride 103 98 - 107 mmol/L    CO2 23 22 - 31 mmol/L    Anion Gap, Calculation 12 5 - 18 mmol/L    Glucose 89 70 - 125 mg/dL    BUN 24 8 - 28 mg/dL    Creatinine 1.60 (H) 0.70 - 1.30 mg/dL    GFR MDRD Af Amer 51 (L) >60 mL/min/1.73m2    GFR MDRD Non Af Amer 42 (L) >60 mL/min/1.73m2    Bilirubin, Total 0.5 0.0 - 1.0 mg/dL    Calcium 8.5 8.5 - 10.5 mg/dL    Protein, Total 6.9 6.0 - 8.0 g/dL    Albumin 3.5 3.5 - 5.0 g/dL    Alkaline Phosphatase 157 (H) 45 - 120 U/L    AST 44 (H) 0 - 40 U/L    ALT 52 (H) 0 - 45 U/L    Narrative    Fasting Glucose reference range is 70-99 mg/dL per  American Diabetes Association (ADA) guidelines.   Carbohydrate Antigen 19-9 (CA 19-9)   Result Value Ref Range    Cancer Antigen-GI (CA 19-9) 106 (H) 0 - 37 U/mL      Comment:      INTERPRETIVE INFORMATION: Cancer Antigen-GI (CA 19-9)    This test uses Roche CA 19-9 electrochemiluminescent immunoassay.   Results obtained with different test methods or kits cannot be   used interchangeably. CA 19-9 value is useful in monitoring   pancreatic, hepatobiliary, gastric, hepatocellular, and colorectal   cancer. CA 19-9 value,  regardless of level, should not be   interpreted as absolute evidence of the presence or absence of   malignant disease.  Performed By: [facility] [facility]  [address] [place] [address]  : Adelfo Doss MD, MS       Going the wrong direction and we will keep an eye on it closely           Stable chronic kidney disease and no evidence for acidosis or hyperkalemia and mild improvement inliver function tests and this is good. The best way to preserve kidney function is to normalize blood pressure. 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          Please call with questions or contact us using [employer]mant[employer]t.    Sincerely,        Electronically signed by Sarwat Mayorga MD

## 2021-06-20 NOTE — LETTER
Letter by Sarwat Mayorga MD at      Author: Sarwat Mayorga MD Service: -- Author Type: --    Filed:  Encounter Date: 8/13/2020 Status: (Other)         Trenton Nova  [address] [facility] [address]             August 13, 2020         Dear Mr. Nova,    Below are the results from your recent visit:    Resulted Orders   HM2(CBC w/o Differential)   Result Value Ref Range    WBC 5.6 4.0 - 11.0 thou/uL    RBC 3.69 (L) 4.40 - 6.20 mill/uL    Hemoglobin 11.4 (L) 14.0 - 18.0 g/dL    Hematocrit 35.3 (L) 40.0 - 54.0 %    MCV 96 80 - 100 fL    MCH 31.0 27.0 - 34.0 pg    MCHC 32.4 32.0 - 36.0 g/dL    RDW 14.0 11.0 - 14.5 %    Platelets 151 140 - 440 thou/uL    MPV 8.3 7.0 - 10.0 fL   Comprehensive Metabolic Panel   Result Value Ref Range    Sodium 138 136 - 145 mmol/L    Potassium 4.7 3.5 - 5.0 mmol/L    Chloride 103 98 - 107 mmol/L    CO2 23 22 - 31 mmol/L    Anion Gap, Calculation 12 5 - 18 mmol/L    Glucose 89 70 - 125 mg/dL    BUN 24 8 - 28 mg/dL    Creatinine 1.60 (H) 0.70 - 1.30 mg/dL    GFR MDRD Af Amer 51 (L) >60 mL/min/1.73m2    GFR MDRD Non Af Amer 42 (L) >60 mL/min/1.73m2    Bilirubin, Total 0.5 0.0 - 1.0 mg/dL    Calcium 8.5 8.5 - 10.5 mg/dL    Protein, Total 6.9 6.0 - 8.0 g/dL    Albumin 3.5 3.5 - 5.0 g/dL    Alkaline Phosphatase 157 (H) 45 - 120 U/L    AST 44 (H) 0 - 40 U/L    ALT 52 (H) 0 - 45 U/L    Narrative    Fasting Glucose reference range is 70-99 mg/dL per  American Diabetes Association (ADA) guidelines.       Stable chronic kidney disease and no evidence for acidosis or hyperkalemia and mild improvement inliver function tests and this is good. The best way to preserve kidney function is to normalize blood pressure. 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           Mild normochromic normocytic anemia unchanged and slightly improved.  Normal white blood count and normal platelet count noted.          Please call  with questions or contact us using [employer].    Sincerely,        Electronically signed by Sarwat Mayorga MD

## 2021-06-20 NOTE — LETTER
Letter by Sarwat Mayorga MD at      Author: Sarwat Mayorga MD Service: -- Author Type: --    Filed:  Encounter Date: 5/29/2020 Status: (Other)         Trenton Nova  [address] [facility] [address]             May 29, 2020         Dear Mr. Nova,    Below are the results from your recent visit:    Resulted Orders   HM2(CBC w/o Differential)   Result Value Ref Range    WBC 6.1 4.0 - 11.0 thou/uL    RBC 3.72 (L) 4.40 - 6.20 mill/uL    Hemoglobin 11.0 (L) 14.0 - 18.0 g/dL    Hematocrit 33.8 (L) 40.0 - 54.0 %    MCV 91 80 - 100 fL    MCH 29.5 27.0 - 34.0 pg    MCHC 32.4 32.0 - 36.0 g/dL    RDW 14.8 (H) 11.0 - 14.5 %    Platelets 160 140 - 440 thou/uL    MPV 8.3 7.0 - 10.0 fL   Comprehensive Metabolic Panel   Result Value Ref Range    Sodium 140 136 - 145 mmol/L    Potassium 4.0 3.5 - 5.0 mmol/L    Chloride 107 98 - 107 mmol/L    CO2 24 22 - 31 mmol/L    Anion Gap, Calculation 9 5 - 18 mmol/L    Glucose 112 70 - 125 mg/dL    BUN 24 8 - 28 mg/dL    Creatinine 1.61 (H) 0.70 - 1.30 mg/dL    GFR MDRD Af Amer 50 (L) >60 mL/min/1.73m2    GFR MDRD Non Af Amer 41 (L) >60 mL/min/1.73m2    Bilirubin, Total 0.4 0.0 - 1.0 mg/dL    Calcium 8.7 8.5 - 10.5 mg/dL    Protein, Total 6.8 6.0 - 8.0 g/dL    Albumin 3.5 3.5 - 5.0 g/dL    Alkaline Phosphatase 113 45 - 120 U/L    AST 99 (H) 0 - 40 U/L    ALT 97 (H) 0 - 45 U/L    Narrative    Fasting Glucose reference range is 70-99 mg/dL per  American Diabetes Association (ADA) guidelines.   Thyroid Stimulating Hormone (TSH)   Result Value Ref Range    TSH 4.00 0.30 - 5.00 uIU/mL   Urinalysis-UC if Indicated   Result Value Ref Range    Color, UA Yellow Colorless, Yellow, Straw, Light Yellow    Clarity, UA Clear Clear    Glucose, UA Negative Negative    Bilirubin, UA Negative Negative    Ketones, UA Negative Negative    Specific Gravity, UA 1.025 1.005 - 1.030    Blood, UA Negative Negative    pH, UA 5.5 5.0 - 8.0    Protein, UA Negative Negative mg/dL     Urobilinogen, UA 0.2 E.U./dL 0.2 E.U./dL, 1.0 E.U./dL    Nitrite, UA Negative Negative    Leukocytes, UA Negative Negative    Narrative    Microscopic not indicated  UC not indicated       All ok and good     Stable chronic kidney disease and mild elevation in liver function tests and the latter are improved     Please call with questions or contact us using MyChart.    Sincerely,        Electronically signed by Sarwat Mayorga MD

## 2021-06-20 NOTE — LETTER
Letter by Sarwat Mayorga MD at      Author: Sarwat Mayorga MD Service: -- Author Type: --    Filed:  Encounter Date: 9/17/2020 Status: (Other)         Trenton Nova  [address] [facility] [address]             September 17, 2020         Dear Mr. Nova,    Below are the results from your recent visit:    Resulted Orders   Hemoglobin   Result Value Ref Range    Hemoglobin 11.7 (L) 14.0 - 18.0 g/dL   Lipid Cascade   Result Value Ref Range    Cholesterol 92 <=199 mg/dL    Triglycerides 75 <=149 mg/dL    HDL Cholesterol 52 >=40 mg/dL    LDL Calculated 25 <=129 mg/dL    Patient Fasting > 8hrs? No    Carbohydrate Antigen 19-9 (CA 19-9)   Result Value Ref Range    Cancer Antigen-GI (CA 19-9) 39 (H) 0 - 37 U/mL      Comment:      INTERPRETIVE INFORMATION: Cancer Antigen-GI (CA 19-9)    This test uses Roche CA 19-9 electrochemiluminescent immunoassay.   Results obtained with different test methods or kits cannot be   used interchangeably. CA 19-9 value is useful in monitoring   pancreatic, hepatobiliary, gastric, hepatocellular, and colorectal   cancer. CA 19-9 value, regardless of level, should not be   interpreted as absolute evidence of the presence or absence of   malignant disease.  Performed By: [facility]  [address] [place] [id number]  : Jimena Horta MD        Message   Received: Today   Message Contents   Sarwat Mayorga MD  P Sarwat Mayorga Care Team Pool               All very good results and decreasing values of this CA 19 9 antigen is very good news.          Please call with questions or contact us using Level.    Sincerely,        Electronically signed by Sarwat Mayorga MD

## 2021-06-20 NOTE — LETTER
Letter by Sarwat Mayorga MD at      Author: Sarwat Mayorga MD Service: -- Author Type: --    Filed:  Encounter Date: 1/30/2020 Status: (Other)         Trenton Nova  [address] [facility] [address]             January 30, 2020         Dear Mr. Nova,    Below are the results from your recent visit:    Resulted Orders   HM2(CBC w/o Differential)   Result Value Ref Range    WBC 6.7 4.0 - 11.0 thou/uL    RBC 3.31 (L) 4.40 - 6.20 mill/uL    Hemoglobin 11.4 (L) 14.0 - 18.0 g/dL    Hematocrit 33.9 (L) 40.0 - 54.0 %     (H) 80 - 100 fL    MCH 34.3 (H) 27.0 - 34.0 pg    MCHC 33.5 32.0 - 36.0 g/dL    RDW 12.5 11.0 - 14.5 %    Platelets 178 140 - 440 thou/uL    MPV 8.3 7.0 - 10.0 fL   Comprehensive Metabolic Panel   Result Value Ref Range    Sodium 140 136 - 145 mmol/L    Potassium 4.2 3.5 - 5.0 mmol/L    Chloride 105 98 - 107 mmol/L    CO2 23 22 - 31 mmol/L    Anion Gap, Calculation 12 5 - 18 mmol/L    Glucose 126 (H) 70 - 125 mg/dL    BUN 22 8 - 28 mg/dL    Creatinine 1.52 (H) 0.70 - 1.30 mg/dL    GFR MDRD Af Amer 54 (L) >60 mL/min/1.73m2    GFR MDRD Non Af Amer 44 (L) >60 mL/min/1.73m2    Bilirubin, Total 2.9 (H) 0.0 - 1.0 mg/dL    Calcium 8.9 8.5 - 10.5 mg/dL    Protein, Total 7.2 6.0 - 8.0 g/dL    Albumin 2.8 (L) 3.5 - 5.0 g/dL    Alkaline Phosphatase 577 (H) 45 - 120 U/L     (H) 0 - 40 U/L     (H) 0 - 45 U/L    Narrative    Fasting Glucose reference range is 70-99 mg/dL per  American Diabetes Association (ADA) guidelines.   Lipid Cascade   Result Value Ref Range    Cholesterol 187 <=199 mg/dL    Triglycerides 149 <=149 mg/dL    HDL Cholesterol 61 >=40 mg/dL    LDL Calculated 96 <=129 mg/dL    Patient Fasting > 8hrs? Yes    Thyroid Stimulating Hormone (TSH)   Result Value Ref Range    TSH 3.20 0.30 - 5.00 uIU/mL   Urinalysis-UC if Indicated   Result Value Ref Range    Color, UA Yellow Colorless, Yellow, Straw, Light Yellow    Clarity, UA Clear Clear    Glucose, UA Negative  Negative    Bilirubin, UA Small (!) Negative    Ketones, UA Negative Negative    Specific Gravity, UA 1.020 1.005 - 1.030    Blood, UA Negative Negative    pH, UA 5.5 5.0 - 8.0    Protein, UA Negative Negative mg/dL    Urobilinogen, UA 1.0 E.U./dL 0.2 E.U./dL, 1.0 E.U./dL    Nitrite, UA Negative Negative    Leukocytes, UA Negative Negative    Narrative    Microscopic not indicated  UC not indicated       PREOP ercp  ------  Mild macrocytic normochromic anemia associated with normal white blood count and platelet count.  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  Recent diagnosis of cholestasis with ERCP forthcoming.  Preoperative check.    All very good results    Please call with questions or contact us using WineSimplet.    Sincerely,        Electronically signed by Sarwat Mayorga MD

## 2021-06-21 NOTE — LETTER
Letter by Sarwat Mayorga MD at      Author: Sarwat Mayorga MD Service: -- Author Type: --    Filed:  Encounter Date: 5/25/2021 Status: (Other)         Trenton MCKEON Ederadha  [address] [facility] [address]             May 25, 2021         Dear Mr. Nova,    Below are the results from your recent visit:    Resulted Orders   Lipid Cascade   Result Value Ref Range    Cholesterol 96 <=199 mg/dL    Triglycerides 67 <=149 mg/dL    HDL Cholesterol 55 >=40 mg/dL    LDL Calculated 28 <=129 mg/dL    Patient Fasting > 8hrs? Yes         All very good results    Please call with questions or contact us using [employer].    Sincerely,        Electronically signed by Sarwat Mayorga MD

## 2021-06-21 NOTE — LETTER
Letter by Sarwat Mayorga MD at      Author: Sarwat Mayorga MD Service: -- Author Type: --    Filed:  Encounter Date: 1/28/2021 Status: (Other)         Trenton Nova  [address] [facility] [address]             January 28, 2021         Dear Mr. Nova,    Below are the results from your recent visit:    Resulted Orders   COVID-19 Virus (Coronavirus) Antibody & Titer Reflex   Result Value Ref Range    COVID-19 Antibody Screen Negative       Comment:      No COVID-19 antibodies detected. Patients with recent COVID-19 vaccination or  recent symptom onset from COVID-19 infection may not produce sufficient levels  of detectable antibodies. Immunocompromised COVID-19 patients may take longer  to develop antibodies.    COVID-19 IgG Titer Not Applicable       Comment:      Qualitative screen for total antibodies to COVID-19 (SARS-CoV-2) with  semi-quantitative measurement of IgG COVID-19 antibodies by endpoint titer.  COVID-19 antibodies may be elevated due to vaccination, or a past or current  COVID-19 infection.  This test has not been reviewed by the FDA. Negative results do not rule out  COVID-19 infection. Results from antibody testing should not be used as the  sole basis to diagnose or exclude SARS-CoV-2 infection or to inform infection  status. COVID-19 PCR test should be ordered if current infection is suspected.  False positive results may occur in rare cases due to cross-reacting  antibodies.  This test was developed and its performance characteristics determined by the  [facility] ([facility]),  which is regulated under CLIA as qualified to perform high-complexity testing.  This test has not been reviewed by the FDA.  Testing performed by [facility],  [facility], [address], [address], [place] [address]             All very good results          Please call with questions or  contact us using [employer]imit[employer]et.    Sincerely,        Electronically signed by Sarwat Mayorga MD

## 2021-06-21 NOTE — LETTER
Letter by Sarwat Mayorga MD at      Author: Sarwat Mayorga MD Service: -- Author Type: --    Filed:  Encounter Date: 2/9/2021 Status: (Other)         Trenton Nova  [address] [facility] [address]             February 9, 2021         Dear Mr. Nova,    Below are the results from your recent visit:    Resulted Orders   HM2(CBC w/o Differential)   Result Value Ref Range    WBC 7.4 4.0 - 11.0 thou/uL    RBC 3.89 (L) 4.40 - 6.20 mill/uL    Hemoglobin 12.6 (L) 14.0 - 18.0 g/dL    Hematocrit 39.4 (L) 40.0 - 54.0 %     (H) 80 - 100 fL    MCH 32.4 27.0 - 34.0 pg    MCHC 32.0 32.0 - 36.0 g/dL    RDW 13.9 11.0 - 14.5 %    Platelets 322 140 - 440 thou/uL    MPV 9.0 7.0 - 10.0 fL   Comprehensive Metabolic Panel   Result Value Ref Range    Sodium 137 136 - 145 mmol/L    Potassium 4.8 3.5 - 5.0 mmol/L    Chloride 103 98 - 107 mmol/L    CO2 24 22 - 31 mmol/L    Anion Gap, Calculation 10 5 - 18 mmol/L    Glucose 107 70 - 125 mg/dL    BUN 19 8 - 28 mg/dL    Creatinine 1.39 (H) 0.70 - 1.30 mg/dL    GFR MDRD Af Amer 60 (L) >60 mL/min/1.73m2    GFR MDRD Non Af Amer 49 (L) >60 mL/min/1.73m2    Bilirubin, Total 0.4 0.0 - 1.0 mg/dL    Calcium 8.5 8.5 - 10.5 mg/dL    Protein, Total 6.9 6.0 - 8.0 g/dL    Albumin 2.9 (L) 3.5 - 5.0 g/dL    Alkaline Phosphatase 168 (H) 45 - 120 U/L    AST 37 0 - 40 U/L    ALT 39 0 - 45 U/L    Narrative    Fasting Glucose reference range is 70-99 mg/dL per  American Diabetes Association (ADA) guidelines.   Lipid Cascade   Result Value Ref Range    Cholesterol 87 <=199 mg/dL    Triglycerides 122 <=149 mg/dL    HDL Cholesterol 40 >=40 mg/dL    LDL Calculated 23 <=129 mg/dL    Patient Fasting > 8hrs? Yes    Thyroid Stimulating Hormone (TSH)   Result Value Ref Range    TSH 4.46 0.30 - 5.00 uIU/mL   Urinalysis-UC if Indicated   Result Value Ref Range    Color, UA Yellow Colorless, Yellow, Straw, Light Yellow    Clarity, UA Clear Clear    Glucose, UA Negative Negative    Bilirubin,  UA Negative Negative    Ketones, UA Negative Negative    Specific Gravity, UA 1.025 1.005 - 1.030    Blood, UA Negative Negative    pH, UA 5.0 5.0 - 8.0    Protein, UA 30 mg/dL (!) Negative mg/dL    Urobilinogen, UA 0.2 E.U./dL 0.2 E.U./dL, 1.0 E.U./dL    Nitrite, UA Negative Negative    Leukocytes, UA Negative Negative    Bacteria, UA None Seen None Seen hpf    RBC, UA 0-2 None Seen, 0-2 hpf    WBC, UA None Seen None Seen, 0-5 hpf    Squam Epithel, UA None Seen None Seen, 0-5 lpf    Narrative    UC not indicated       Stable chronic kidney disease without evidence for hyperkalemia or metabolic acidosis.  This is due to myeloma kidney.  All other labs are quite good.          Mild macrocytic anemia with normal white count and platelet count.     Urinalysis is all clear.         Please call with questions or contact us using [employer].    Sincerely,        Electronically signed by Sarwat Mayorga MD

## 2021-06-21 NOTE — LETTER
Letter by Sarwat Mayorga MD at      Author: Sarwat Mayorga MD Service: -- Author Type: --    Filed:  Encounter Date: 5/25/2021 Status: (Other)         Trenton MCKEON Ederadha  [address] [facility] [address]             May 25, 2021         Dear Mr. Nova,    Below are the results from your recent visit:    Resulted Orders   Lipid Cascade   Result Value Ref Range    Cholesterol 96 <=199 mg/dL    Triglycerides 67 <=149 mg/dL    HDL Cholesterol 55 >=40 mg/dL    LDL Calculated 28 <=129 mg/dL    Patient Fasting > 8hrs? Yes         All very good results    Please call with questions or contact us using Setgot.    Sincerely,        Electronically signed by Sarwat Mayorga MD

## 2021-06-21 NOTE — LETTER
Letter by Sarwat Mayorga MD at      Author: Sarwat Mayorga MD Service: -- Author Type: --    Filed:  Encounter Date: 1/25/2021 Status: (Other)         Trenton Nova  [address] [facility] [address]             January 25, 2021         Dear Mr. Nova,    Below are the results from your recent visit:    Resulted Orders   HM2(CBC w/o Differential)   Result Value Ref Range    WBC 9.3 4.0 - 11.0 thou/uL    RBC 3.86 (L) 4.40 - 6.20 mill/uL    Hemoglobin 12.8 (L) 14.0 - 18.0 g/dL    Hematocrit 38.4 (L) 40.0 - 54.0 %    MCV 99 80 - 100 fL    MCH 33.1 27.0 - 34.0 pg    MCHC 33.3 32.0 - 36.0 g/dL    RDW 11.1 11.0 - 14.5 %    Platelets 466 (H) 140 - 440 thou/uL    MPV 7.2 7.0 - 10.0 fL   Comprehensive Metabolic Panel   Result Value Ref Range    Sodium 135 (L) 136 - 145 mmol/L    Potassium 5.3 (H) 3.5 - 5.0 mmol/L    Chloride 99 98 - 107 mmol/L    CO2 25 22 - 31 mmol/L    Anion Gap, Calculation 11 5 - 18 mmol/L    Glucose 96 70 - 125 mg/dL    BUN 27 8 - 28 mg/dL    Creatinine 1.35 (H) 0.70 - 1.30 mg/dL    GFR MDRD Af Amer >60 >60 mL/min/1.73m2    GFR MDRD Non Af Amer 51 (L) >60 mL/min/1.73m2    Bilirubin, Total 0.6 0.0 - 1.0 mg/dL    Calcium 8.4 (L) 8.5 - 10.5 mg/dL    Protein, Total 6.7 6.0 - 8.0 g/dL    Albumin 2.8 (L) 3.5 - 5.0 g/dL    Alkaline Phosphatase 326 (H) 45 - 120 U/L    AST 88 (H) 0 - 40 U/L     (H) 0 - 45 U/L    Narrative    Fasting Glucose reference range is 70-99 mg/dL per  American Diabetes Association (ADA) guidelines.       Improved kidney function with decrease in serum creatinine.     Mild hyperkalemia.  With mild hyponatremia.  Patient should stop any and all potassium supplements and restrict tap water intake to no more than 4 cups/day maximum.  This will help raise the serum sodium level.     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..  Needs repeat liver function tests with CA 19-9 with next visit in 1 month's time.     All other labs are quite good.    Please call with questions or contact us using Neop[employer]at.    Sincerely,        Electronically signed by Sarwat Mayorga MD

## 2021-06-21 NOTE — LETTER
Letter by Sarwat Mayorga MD at      Author: Sarwat Mayorga MD Service: -- Author Type: --    Filed:  Encounter Date: 10/20/2020 Status: (Other)         Trenton Nova  [address] [facility] [address]             October 20, 2020         Dear Mr. Nova,    Below are the results from your recent visit:    Resulted Orders   Thyroid Stimulating Hormone (TSH)   Result Value Ref Range    TSH 13.04 (H) 0.30 - 5.00 uIU/mL       Patient needs to increase levothyroxine to 100 mcg daily.  Dispense number 90 tablets take 1 tablet daily     Please call with questions or contact us using [employer].    Sincerely,        Electronically signed by Sarwat Mayorga MD

## 2021-06-21 NOTE — LETTER
Letter by Sarwat Mayorga MD at      Author: Sarwat Mayorga MD Service: -- Author Type: --    Filed:  Encounter Date: 5/3/2021 Status: (Other)         Trenton Nova  [address] [facility] [address]             May 3, 2021         Dear Mr. Nova,    Below are the results from your recent visit:    Resulted Orders   HM2(CBC w/o Differential)   Result Value Ref Range    WBC 8.4 4.0 - 11.0 thou/uL    RBC 3.75 (L) 4.40 - 6.20 mill/uL    Hemoglobin 12.1 (L) 14.0 - 18.0 g/dL    Hematocrit 37.8 (L) 40.0 - 54.0 %     (H) 80 - 100 fL    MCH 32.3 27.0 - 34.0 pg    MCHC 32.0 32.0 - 36.0 g/dL    RDW 14.1 11.0 - 14.5 %    Platelets 207 140 - 440 thou/uL    MPV 10.3 (H) 7.0 - 10.0 fL   Comprehensive Metabolic Panel   Result Value Ref Range    Sodium 135 (L) 136 - 145 mmol/L    Potassium 5.1 (H) 3.5 - 5.0 mmol/L    Chloride 100 98 - 107 mmol/L    CO2 23 22 - 31 mmol/L    Anion Gap, Calculation 12 5 - 18 mmol/L    Glucose 87 70 - 125 mg/dL    BUN 37 (H) 8 - 28 mg/dL    Creatinine 2.21 (H) 0.70 - 1.30 mg/dL    GFR MDRD Af Amer 35 (L) >60 mL/min/1.73m2    GFR MDRD Non Af Amer 29 (L) >60 mL/min/1.73m2    Bilirubin, Total 0.6 0.0 - 1.0 mg/dL    Calcium 8.8 8.5 - 10.5 mg/dL    Protein, Total 7.7 6.0 - 8.0 g/dL    Albumin 3.7 3.5 - 5.0 g/dL    Alkaline Phosphatase 277 (H) 45 - 120 U/L    AST 44 (H) 0 - 40 U/L    ALT 45 0 - 45 U/L    Narrative    Fasting Glucose reference range is 70-99 mg/dL per  American Diabetes Association (ADA) guidelines.   Carbohydrate Antigen 19-9 (CA 19-9)   Result Value Ref Range    Cancer Antigen-GI (CA 19-9) 33 0 - 37 U/mL      Comment:      INTERPRETIVE INFORMATION: Cancer Antigen-GI (CA 19-9)    This test uses Roche CA 19-9 electrochemiluminescent immunoassay.   Results obtained with different test methods or kits cannot be   used interchangeably. CA 19-9 value is useful in monitoring   pancreatic, hepatobiliary, gastric, hepatocellular, and colorectal   cancer. CA 19-9  value, regardless of level, should not be   interpreted as absolute evidence of the presence or absence of   malignant disease.  Performed By: [email or web address]  [address] [place] [address]  : Jimena Horta MD       All very good results and no chemical evidence for bile duct cancer or pancreatic CA recurrence.           function from myeloma kidney has worsened.     Patient needs to stop any and all potassium supplement.     The best way to preserve kidney function is to keep the blood pressure normal and to stay well-hydrated.     We will need to recheck the kidney function tests in 1 month's time.  It may be necessary at that time if  kidney function has worsened that the patient would have to have a reconsultation with nephrology.     For now better hydration and keeping blood pressure normal will be helpful.  Stop any and all potassium supplements.  No evidence for acidosis.     Part of kidney failure from myeloma kidney may be the reason for lack of appetite.  Please call patient for me.  I will send an order for nephrology consult at this time if the patient wants to see one of the nephrologist.  Sooner rather than later.         Please call with questions or contact us using [employer]ising.    Sincerely,        Electronically signed by Sarwat Mayorga MD

## 2021-06-21 NOTE — LETTER
Letter by Epic User at      Author: Epic, User Service: -- Author Type: --    Filed:  Encounter Date: 2/9/2021 Status: (Other)         Trenton MCKEON Ederadha  [address] [facility] [address]             February 11, 2021         Dear Mr. Nova,    Below are the results from your recent visit:    Resulted Orders   HM2(CBC w/o Differential)   Result Value Ref Range    WBC 7.4 4.0 - 11.0 thou/uL    RBC 3.89 (L) 4.40 - 6.20 mill/uL    Hemoglobin 12.6 (L) 14.0 - 18.0 g/dL    Hematocrit 39.4 (L) 40.0 - 54.0 %     (H) 80 - 100 fL    MCH 32.4 27.0 - 34.0 pg    MCHC 32.0 32.0 - 36.0 g/dL    RDW 13.9 11.0 - 14.5 %    Platelets 322 140 - 440 thou/uL    MPV 9.0 7.0 - 10.0 fL   Comprehensive Metabolic Panel   Result Value Ref Range    Sodium 137 136 - 145 mmol/L    Potassium 4.8 3.5 - 5.0 mmol/L    Chloride 103 98 - 107 mmol/L    CO2 24 22 - 31 mmol/L    Anion Gap, Calculation 10 5 - 18 mmol/L    Glucose 107 70 - 125 mg/dL    BUN 19 8 - 28 mg/dL    Creatinine 1.39 (H) 0.70 - 1.30 mg/dL    GFR MDRD Af Amer 60 (L) >60 mL/min/1.73m2    GFR MDRD Non Af Amer 49 (L) >60 mL/min/1.73m2    Bilirubin, Total 0.4 0.0 - 1.0 mg/dL    Calcium 8.5 8.5 - 10.5 mg/dL    Protein, Total 6.9 6.0 - 8.0 g/dL    Albumin 2.9 (L) 3.5 - 5.0 g/dL    Alkaline Phosphatase 168 (H) 45 - 120 U/L    AST 37 0 - 40 U/L    ALT 39 0 - 45 U/L    Narrative    Fasting Glucose reference range is 70-99 mg/dL per  American Diabetes Association (ADA) guidelines.   Lipid Cascade   Result Value Ref Range    Cholesterol 87 <=199 mg/dL    Triglycerides 122 <=149 mg/dL    HDL Cholesterol 40 >=40 mg/dL    LDL Calculated 23 <=129 mg/dL    Patient Fasting > 8hrs? Yes    Thyroid Stimulating Hormone (TSH)   Result Value Ref Range    TSH 4.46 0.30 - 5.00 uIU/mL   Urinalysis-UC if Indicated   Result Value Ref Range    Color, UA Yellow Colorless, Yellow, Straw, Light Yellow    Clarity, UA Clear Clear    Glucose, UA Negative Negative    Bilirubin, UA Negative Negative     Ketones, UA Negative Negative    Specific Gravity, UA 1.025 1.005 - 1.030    Blood, UA Negative Negative    pH, UA 5.0 5.0 - 8.0    Protein, UA 30 mg/dL (!) Negative mg/dL    Urobilinogen, UA 0.2 E.U./dL 0.2 E.U./dL, 1.0 E.U./dL    Nitrite, UA Negative Negative    Leukocytes, UA Negative Negative    Bacteria, UA None Seen None Seen hpf    RBC, UA 0-2 None Seen, 0-2 hpf    WBC, UA None Seen None Seen, 0-5 hpf    Squam Epithel, UA None Seen None Seen, 0-5 lpf    Narrative    UC not indicated   Carbohydrate Antigen 19-9 (CA 19-9)   Result Value Ref Range    Cancer Antigen-GI (CA 19-9) 19 0 - 37 U/mL      Comment:      INTERPRETIVE INFORMATION: Cancer Antigen-GI (CA 19-9)    This test uses Roche CA 19-9 electrochemiluminescent immunoassay.   Results obtained with different test methods or kits cannot be   used interchangeably. CA 19-9 value is useful in monitoring   pancreatic, hepatobiliary, gastric, hepatocellular, and colorectal   cancer. CA 19-9 value, regardless of level, should not be   interpreted as absolute evidence of the presence or absence of   malignant disease.  Performed By: [facility]  [address] [place] [address]  : Jimena Horta MD       All very good results and no sign chemically of bile duct cancer recurrence!          Stable chronic kidney disease without evidence for hyperkalemia or metabolic acidosis.  This is due to myeloma kidney.  All other labs are quite good.          Mild macrocytic anemia with normal white count and platelet count.     Urinalysis is all clear         Please call with questions or contact us using [employer].    Sincerely,        Electronically signed by Sarwat Mayorga MD

## 2021-06-21 NOTE — LETTER
Letter by Sarwat Mayorga MD at      Author: Sarwat Mayorga MD Service: -- Author Type: --    Filed:  Encounter Date: 2/25/2021 Status: (Other)         Trenton Nova  [address] [facility] [address]             February 25, 2021         Dear Mr. Nova,    Below are the results from your recent visit:    Resulted Orders   COVID-19 Virus PCR MRF   Result Value Ref Range    COVID-19 VIRUS SPECIMEN SOURCE Nasopharyngeal     2019-nCOV       Test received-See reflex to IDDL test SARS CoV2 (COVID-19) Virus RT-PCR      Comment:        Performed and/or entered by:  [facility]  [address] [place] [address]    SARS-CoV-2 (COVID-19)-PCR   Result Value Ref Range    SARS-CoV-2 Virus Specimen Source Nasopharyngeal     SARS-CoV-2 PCR Result NEGATIVE       Comment:      SARS-CoV2 (COVID-19) RNA not detected, presumed negative.    SARS-COV-2 PCR COMMENT       Testing was performed using the tracey SARS-CoV-2 assay on the tracey Branch0      Comment:      System.2  This test should be ordered for the detection of SARS-CoV-2 in individuals who  meet SARS-CoV-2 clinical and/or epidemiological criteria. Test performance is  unknown in asymptomatic patients.  This test is for in vitro diagnostic use under the FDA EUA for laboratories  certified under CLIA to perform high and/or moderate complexity testing. This  test has not been FDA cleared or approved.  A negative result does not rule out the presence of PCR inhibitors in the  specimen or target RNA in concentration below the limit of detection for the  assay. The possibility of a false negative should be considered if the  patient's recent exposure or clinical presentation suggests COVID-19.  This test was validated by the [facility]. This laboratory is certified under the Clinical Laboratory  Improvement Amendments of 1988 (CLIA-88) as qualified to perform high  and/or  moderate complexity laboratory testing.    Performed and/or entered  by:  [facility]  [address] [place] [address]       All very good results     Please call with questions or contact us using MyChart.    Sincerely,        Electronically signed by Sarwat Mayorga MD

## 2021-06-21 NOTE — LETTER
Letter by Sarwat Mayorga MD at      Author: Sarwat Mayorga MD Service: -- Author Type: --    Filed:  Encounter Date: 12/21/2020 Status: (Other)         Trenton Nova  [address] [facility] [address]             December 21, 2020         Dear Mr. Nova,    Below are the results from your recent visit:    Resulted Orders   HM2(CBC w/o Differential)   Result Value Ref Range    WBC 5.4 4.0 - 11.0 thou/uL    RBC 3.64 (L) 4.40 - 6.20 mill/uL    Hemoglobin 12.1 (L) 14.0 - 18.0 g/dL    Hematocrit 36.0 (L) 40.0 - 54.0 %    MCV 99 80 - 100 fL    MCH 33.3 27.0 - 34.0 pg    MCHC 33.7 32.0 - 36.0 g/dL    RDW 12.4 11.0 - 14.5 %    Platelets 149 140 - 440 thou/uL    MPV 8.2 7.0 - 10.0 fL   Comprehensive Metabolic Panel   Result Value Ref Range    Sodium 140 136 - 145 mmol/L    Potassium 4.8 3.5 - 5.0 mmol/L    Chloride 106 98 - 107 mmol/L    CO2 24 22 - 31 mmol/L    Anion Gap, Calculation 10 5 - 18 mmol/L    Glucose 85 70 - 125 mg/dL    BUN 23 8 - 28 mg/dL    Creatinine 1.43 (H) 0.70 - 1.30 mg/dL    GFR MDRD Af Amer 58 (L) >60 mL/min/1.73m2    GFR MDRD Non Af Amer 48 (L) >60 mL/min/1.73m2    Bilirubin, Total 0.5 0.0 - 1.0 mg/dL    Calcium 8.6 8.5 - 10.5 mg/dL    Protein, Total 7.1 6.0 - 8.0 g/dL    Albumin 3.7 3.5 - 5.0 g/dL    Alkaline Phosphatase 180 (H) 45 - 120 U/L    AST 39 0 - 40 U/L    ALT 33 0 - 45 U/L    Narrative    Fasting Glucose reference range is 70-99 mg/dL per  American Diabetes Association (ADA) guidelines.   Carbohydrate Antigen 19-9 (CA 19-9)   Result Value Ref Range    Cancer Antigen-GI (CA 19-9) 26 0 - 37 U/mL      Comment:      INTERPRETIVE INFORMATION: Cancer Antigen-GI (CA 19-9)    This test uses Roche CA 19-9 electrochemiluminescent immunoassay.   Results obtained with different test methods or kits cannot be   used interchangeably. CA 19-9 value is useful in monitoring   pancreatic, hepatobiliary, gastric, hepatocellular, and colorectal   cancer. CA 19-9 value, regardless of  level, should not be   interpreted as absolute evidence of the presence or absence of   malignant disease.  Performed By: [facility]  [address] [place] [address]  : Jimena Horta MD       All very good results and no chemical sign of bile duct cancer recurrence    Please call with questions or contact us using MyChart.    Sincerely,        Electronically signed by Sarwat Mayorga MD

## 2021-06-22 NOTE — TELEPHONE ENCOUNTER
INR result is 1.9 - had it done in [place] yesterday and they did not send to [facility]   INR   Date Value Ref Range Status   12/24/2018 3.00 (H) 0.90 - 1.10 Final       Will the patient be seen, or did they already see, MD or CNP today? No    Most Recent Warfarin dose day/week  Sunday Monday Tuesday Wednesday Thursday Friday Saturday      2.5 1.25 2.5 2.5     Sunday Monday Tuesday Wednesday Thursday Friday Saturday   2.5 1.25 2.5           Has the patient missed any doses of Coumadin, Warfarin, Jantoven in the past 7 days? No    Has the patients medications changed since the last visit? No    Has the patient experienced any bleeding recently? No    Has the patient experienced any injuries or illness recently? No    Has the patient experienced any 'new' shortness of breath, severe headaches, or changes in vision recently? No    Has the patient had any changes in their diet, or alcohol consumption? No    Is the patient here today to prepare for any type of upcoming surgery, procedure, or for a cardioversion procedure? No    What phone number can we reach the patient at today? home phone listed in demographics.

## 2021-06-22 NOTE — TELEPHONE ENCOUNTER
Called [facility] ([phone])     - verified INR was done on 1/8/19 and it was 2.90.    (pt reported as 1.90).     - they will fax INR results and will await faxed INR results.

## 2021-06-22 NOTE — PROGRESS NOTES
Office Visit - Follow up    Trenton Nova   78 y.o. male    Date of Visit: 12/18/2018    Chief Complaint   Patient presents with     Pain     rib  left side  for 8 weeks         Subjective: Multiple myeloma.    Rib pain left side for 8 weeks since October 29, 2018.    The patient had a chiropractic manipulation for his lower lumbar back and doing so he injured his left rib.    X-rays have been done per his report some of his medical care has been rendered in [place].  Also CT scan was done and an incidental finding of a nodule in the right middle lobe near the major fissure was noted.  The patient is a non-smoker the patient does have a history of multiple myeloma.  According to the CT scan of the chest report done December 3, 2018 it was recommended by the radiologist in [place] to have a repeat scan in 6 months time.  I advised the patient up with us.    No blood in stool or urine no shortness of breath no cough medication list February 21, 2017 allCLEAR.  Allergies include sulfa.  Other past health history is positive for chronic warfarin use plus hypothyroidism plus hyperlipidemia and erectile dysfunction.  History of atrial fibrillation is the reason for chronic warfarin use.    ROS: A comprehensive review of systems was performed and was otherwise negative    Medications:  Prior to Admission medications    Medication Sig Start Date End Date Taking? Authorizing Provider   calcium carbonate-vitamin D2 500 mg(1,250mg) -200 unit tablet Take 1 tablet by mouth 2 (two) times a day.   Yes PROVIDER, HISTORICAL   levothyroxine 50 mcg cap Take 1 tablet by mouth Daily at 6:00 am.   Yes Sarwat Mayorga MD   multivitamin therapeutic (THERAGRAN) tablet Take 1 tablet by mouth daily.   Yes PROVIDER, HISTORICAL   omeprazole (PRILOSEC) 20 MG capsule Take 20 mg by mouth daily.   Yes Sarwat Mayorga MD   simvastatin (ZOCOR) 20 MG tablet Take 20 mg by mouth at bedtime 5 mg every other day.         Yes Sarwat Mayorga  MD LINDA   terazosin (HYTRIN) 1 MG capsule Take 1 mg by mouth bedtime.   Yes PROVIDER, HISTORICAL   warfarin (COUMADIN/JANTOVEN) 2.5 MG tablet Take 1/2 tab (1.25mg) to 1 tab (2.5mg) by mouth daily, as directed.  Adjust dose based on INR results.. 12/12/18  Yes Sarwat Mayorga MD   sotalol (BETAPACE) 80 MG tablet 40 mg daily .       6/25/15   PROVIDER, HISTORICAL   tadalafil (CIALIS) 5 MG tablet Take 5 mg by mouth daily as needed for erectile dysfunction.    PROVIDER, HISTORICAL       Allergies:   Allergies   Allergen Reactions     Sulfa (Sulfonamide Antibiotics)        Immunizations:   Immunization History   Administered Date(s) Administered     DT (pediatric) 11/15/2004     IG-IM 04/18/2000     Influenza [id number], 02/09/2010     Influenza high dose, seasonal 10/21/2015, 09/14/2016, 11/02/2017, 10/23/2018     Influenza, inj, historic,unspecified 09/30/2009, 10/01/2014     Influenza, seasonal,quad inj 6-35 mos 10/04/2013, 10/16/2014     Pneumo Conj 13-V (2010&after) 12/20/2016     Pneumo Polysac 23-V 11/08/2005     Td,adult,historic,unspecified 11/15/2004     Tdap 02/18/2014       Exam Chest clear to auscultation and percussion.  Heart tones regular rhythm without murmur rub or gallop.  Abdomen soft nontender no organomegaly.  No peritoneal signs.  Extremities free of edema cyanosis or clubbing.  Neck veins nondistended no thyromegaly or scleral icterus noted, carotids full.  Skin warm and dry easily conversant good spirited.  Normal intelligence.  Neurologically intact no gross localizing findings.    Assessment and Plan  Multiple myeloma with left lower rib pain and recent x-rays allCLEAR but for a CT scan of the chest finding of additional diagnosis of pulmonary nodule right side.  Advised arthritis strength acetaminophen 650 mg tablets take 2 tablets 3 times a day on a scheduled basis for 10 days straight.    Atrial fibrillation on chronic warfarin therapy.  The questions posed regarding sotalol  therapy.    Sulfa allergy.    Hyperlipidemia on statin therapy.  Outside reports reviewed from [facility] [place] [facility] early December 2018 and CT scans etc.    Hypothyroidism clinically euthyroid continue thyroid supplement levothyroxine 50 mcg daily.  RTC 6 months time or as needed sooner as the need arises.    Time: total time spent with the patient was 25 minutes of which >50% was spent in counseling and coordination of care    The following high BMI interventions were performed this visit: encouragement to exercise    Sarwat Mayorga MD    Patient Active Problem List   Diagnosis     TMJ Pain     Neck Pain     Hyperlipidemia     Benign Monoclonal Hypergammaglobulinemia     Atrial Fibrillation     Benign Prostatic Hypertrophy     Primary Osteoarthritis Of The Cervical Vertebrae     Reactions To Sulfa Drugs     Removal of staples     Multiple myeloma (H)     Colon polyp     Long term (current) use of anticoagulants     Shoulder pain

## 2021-06-23 NOTE — TELEPHONE ENCOUNTER
Anticoagulation Annual Referral Renewal Review    Trenton Nova's chart reviewed for annual renewal of referral to anticoagulation monitoring.        Criteria for anticoagulation nurse and/or pharmacist renewal met   Warfarin indication: Atrial Fibrillation Yes, per indication   Current with INR monitoring/compliant Yes Yes   Date of last office visit 12/18/2018 Yes, had office visit within last year   Time in Therapeutic Range (TTR) 81.21 % Yes, TTR > 60%       Trenton Nova met all criteria for anticoagulation management program initiated renewal.  New INR standing orders and anticoagulation referral renewal placed.      Yamileth Flores RN  1:25 PM

## 2021-06-23 NOTE — TELEPHONE ENCOUNTER
Received a faxed INR result for Trenton Nova  [facility]  INR result dated 1/8/2019 is  2.90

## 2021-06-23 NOTE — PROGRESS NOTES
Assessment and Plan:   Annual wellness visit.    1. Routine general medical examination at a health care facility  Annual wellness visit  - [facility](CBC w/o Differential)  - Comprehensive Metabolic Panel  - Lipid Cascade  - Thyroid Stimulating Hormone (TSH)  - Urinalysis-UC if Indicated  - Electrophoresis, Protein, Serum, Cascade    2. Screening for prostate cancer  Annual wellness visit and screen for prostate cancer.  - PSA (Prostatic-Specific Antigen), Annual Screen     The patient's current medical problems were reviewed.    I have had an Advance Directives discussion with the patient.  The following health maintenance schedule was reviewed with the patient and provided in printed form in the after visit summary:   Health Maintenance   Topic Date Due     ZOSTER VACCINES (1 of 2) 03/20/1990     FALL RISK ASSESSMENT  01/23/2020     ADVANCE DIRECTIVES DISCUSSED WITH PATIENT  01/16/2023     TD 18+ HE  02/18/2024     PNEUMOCOCCAL POLYSACCHARIDE VACCINE AGE 65 AND OVER  Completed     INFLUENZA VACCINE RULE BASED  Completed     PNEUMOCOCCAL CONJUGATE VACCINE FOR ADULTS (PCV13 OR PREVNAR)  Completed        Subjective:   Chief Complaint: Trenton Nova is an 78 y.o. male here for an Annual Wellness visit.   HPI: Annual wellness visit and physical exam for this 78-year-old male with history of myeloma kidney and smoldering multiple myeloma.    Colonoscopy dated February 21, 2017 showed 2 tubular adenomatous colon polyps.    Non-smoker no alcohol.  He is allergic to sulfa.    Genitourinary examination as well as rectal exam to be done by consulting urologist Dr. LEVINE at [facility] next week.    Radiofrequency ablation for atrial fibrillation on chronic warfarin therapy followed by Dr. RENAE at [facility].    Cervical disc surgery.    Appendectomy and hernia repair.    Arthroscopic and shoulder and knee surgeries.    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    Followed by  [facility] as well as nephrology service.    History of erectile dysfunction with injection therapy plus hyperlipidemia.    Mother  88 old age.    Father is estranged from family his death unknown.  2 children well first wife  of brain tumor age 66 he had been  from his first wife for many years prior to her death.    Second wife with history of malignant melanoma of the anus and rectum and recurrence.    Review of Systems:    Please see above.  The rest of the review of systems are negative for all systems.    Patient Care Team:  Sarwat Mayorga MD as PCP - General  Nicky Shaver MD (Dermatology)  Johan Dasilva MD as Physician (Hematology and Oncology)     Patient Active Problem List   Diagnosis     TMJ Pain     Neck Pain     Hyperlipidemia     Benign Monoclonal Hypergammaglobulinemia     Atrial Fibrillation     Benign Prostatic Hypertrophy     Primary Osteoarthritis Of The Cervical Vertebrae     Reactions To Sulfa Drugs     Removal of staples     Multiple myeloma (H)     Colon polyp     Long term (current) use of anticoagulants     Shoulder pain     History reviewed. No pertinent past medical history.   Past Surgical History:   Procedure Laterality Date     KNEE SURGERY       SHOULDER SURGERY        History reviewed. No pertinent family history.   Social History     Socioeconomic History     Marital status:      Spouse name: Not on file     Number of children: Not on file     Years of education: Not on file     Highest education level: Not on file   Social Needs     Financial resource strain: Not on file     Food insecurity - worry: Not on file     Food insecurity - inability: Not on file     Transportation needs - medical: Not on file     Transportation needs - non-medical: Not on file   Occupational History     Not on file   Tobacco Use     Smoking status: Former Smoker     Smokeless tobacco: Never Used     Tobacco comment: quit 1988   Substance and Sexual  "Activity     Alcohol use: No     Drug use: No     Sexual activity: Not on file   Other Topics Concern     Not on file   Social History Narrative     Not on file      Current Outpatient Medications   Medication Sig Dispense Refill     calcium carbonate-vitamin D2 500 mg(1,250mg) -200 unit tablet Take 1 tablet by mouth 2 (two) times a day.       cyanocobalamin (VITAMIN B-12) 500 MCG tablet Take 500 mcg by mouth daily.       levothyroxine 50 mcg cap Take 1 tablet by mouth Daily at 6:00 am.       multivitamin therapeutic (THERAGRAN) tablet Take 1 tablet by mouth daily.       omeprazole (PRILOSEC) 20 MG capsule Take 20 mg by mouth daily.       simvastatin (ZOCOR) 20 MG tablet Take 20 mg by mouth at bedtime 5 mg every other day.             sotalol (BETAPACE) 80 MG tablet 40 mg daily .             tadalafil (CIALIS) 5 MG tablet Take 5 mg by mouth daily as needed for erectile dysfunction.       terazosin (HYTRIN) 1 MG capsule Take 1 mg by mouth bedtime.       warfarin (COUMADIN/JANTOVEN) 2.5 MG tablet Take 1/2 tab (1.25mg) to 1 tab (2.5mg) by mouth daily, as directed.  Adjust dose based on INR results.. 90 tablet 1     No current facility-administered medications for this visit.       Objective:   Vital Signs:   Visit Vitals  /78 (Patient Site: Right Arm, Patient Position: Sitting)   Pulse 68   Ht 5' 7.25\" (1.708 m)   Wt 179 lb (81.2 kg)   BMI 27.83 kg/m         VisionScreening:  No exam data present     PHYSICAL EXAM  Chest clear to auscultation and percussion.  Heart tones regular rhythm without murmur rub or gallop.  Abdomen soft nontender no organomegaly.  No peritoneal signs.  Extremities free of edema cyanosis or clubbing.  Neck veins nondistended no thyromegaly or scleral icterus noted, carotids full.  Skin warm and dry easily conversant good spirited.  Normal intelligence.  Neurologically intact no gross localizing findings.  Skin negative lymph negative neuro negative psych normal easily conversant good " spirited HEENT negative back straight no severe spine tenderness well-healed posterior approach cervical disc surgery scar good pulse noted in all 4 extremities.  Abdomen benign no carotid bruits thyromegaly.  Genital rectal examination to be done by Dr. LEVINE of [facility] next week not repeated by this examiner.    Assessment Results 1/23/2019   Activities of Daily Living No help needed   Instrumental Activities of Daily Living No help needed   Get Up and Go Score Less than 12 seconds   Mini Cog Total Score 4   Some recent data might be hidden     A Mini-Cog score of 0-2 suggests the possibility of dementia, score of 3-5 suggests no dementia    Identified Health Risks:     The patient was counseled and encouraged to consider modifying their diet and eating habits. He was provided with information on recommended healthy diet options.  The patient was provided with written information regarding signs of hearing loss.  Patient's advanced directive was discussed and I am comfortable with the patient's wishes.

## 2021-06-23 NOTE — TELEPHONE ENCOUNTER
ANTICOAGULATION  MANAGEMENT    Assessment     Today's INR result of 2.90 is Therapeutic (goal INR of 2.0-3.0)        Warfarin taken as previously instructed    No new diet changes affecting INR    No new medication/supplements affecting INR    Continues to tolerate warfarin with no reported s/s of bleeding or thromboembolism     Previous INR was Therapeutic at 3.00 on 12/24/18.    Currently in [place] for the winter and returning on in 3 wks to [place] for 6 wks.  Still works part-time and spend time with his 15 grand-kids.  Will return to [place] from Mar-Apr 2019 for 6 wks.    Plan:     Spoke with Trenton regarding INR result and instructed:     Warfarin Dosing Instructions:    - Continue current warfarin dose 1.25 mg daily on Mon/Thurs; and 2.5 mg daily rest of week.    Instructed patient to follow up no later than:  2-4 wks.   - scheduled on 1/27/19 @ [facility].    Education provided: target INR goal and significance of current INR result and importance of notifying clinic for changes in medications    Trenton verbalizes understanding and agrees to warfarin dosing plan.    Instructed to call the [facility] for any changes, questions or concerns. (#[phone])   ?   Yamileth Flores RN    Subjective/Objective:      Trenton Nova, a 78 y.o. male is on warfarin.     Trenton reports:     Home warfarin dose: verbally confirmed home dose with Trenton and updated on anticoagulation calendar     Missed doses: No     Medication changes:  Yes:  Reported finished taking Arthritis Tylenol for 10 days as scheduled one wk ago.     S/S of bleeding or thromboembolism:  No     New Injury or illness:  Yes:  Reported rib pain has decreased, but not totally resolved.      Changes in diet or alcohol consumption:  No     Upcoming surgery, procedure or cardioversion:  No    Anticoagulation Episode Summary     Current INR goal:   2.0-3.0   TTR:   80.1 % (3.9 y)   Next INR check:   2/6/2019   INR from last check:   2.90 (1/10/2019)   Most recent  INR:    2.90 (1/10/2019)   Weekly max warfarin dose:      Target end date:      INR check location:      Preferred lab:      Send INR reminders to:   ANTICOAGULATION POOL C (DTN,VAD,CGR,GAV)    Indications    Atrial Fibrillation [I48.91]  Long term (current) use of anticoagulants [Z79.01]           Comments:            Anticoagulation Care Providers     Provider Role Specialty Phone number    Sarwat Mayorga MD Referring Internal Medicine [phone]

## 2021-06-23 NOTE — TELEPHONE ENCOUNTER
ANTICOAGULATION  MANAGEMENT    Assessment     Today's INR result of 2.10 is Therapeutic (goal INR of 2.0-3.0)        Warfarin taken as previously instructed    No new diet changes affecting INR    No new medication/supplements affecting INR    Continues to tolerate warfarin with no reported s/s of bleeding or thromboembolism     Previous INR was Therapeutic at 2.90 on 1/9/19.    Recently returned from [place] and will be in [place] for 6 wks to spend time with family / 15 grandchildren.  Will return to [place] in Mar-Apr 2019 for another 6 wks.    Plan:     Spoke with Jonatan regarding INR result and instructed:   1.  advised to check INR prior to leaving for [place].      Warfarin Dosing Instructions:  (has 2.5mg tabs).   - Continue current warfarin dose 1.25 mg daily on Mon/Thurs; and 2.5 mg daily rest of week.    Instructed patient to follow up no later than:  4 wks.   - scheduled on 2/25/19 @ [facility]     Education provided: importance of consistent vitamin K intake and target INR goal and significance of current INR result    Jonatan verbalizes understanding and agrees to warfarin dosing plan.    Instructed to call the [facility] for any changes, questions or concerns. (#[phone])   ?   Yamileth Flores RN    Subjective/Objective:      Trenton Nova, a 78 y.o. male is on warfarin.     Trenton reports:     Home warfarin dose: as updated on anticoagulation calendar per template     Missed doses: No     Medication changes:  No     S/S of bleeding or thromboembolism:  No     New Injury or illness:  No     Changes in diet or alcohol consumption:  No     Upcoming surgery, procedure or cardioversion:  No    Anticoagulation Episode Summary     Current INR goal:   2.0-3.0   TTR:   80.4 % (4 y)   Next INR check:   2/26/2019   INR from last check:   2.10 (1/29/2019)   Weekly max warfarin dose:      Target end date:      INR check location:      Preferred lab:      Send INR reminders to:   ANTICOAGULATION POOL C (DTN,VAD,CGR,GAV)     Indications    Atrial Fibrillation [I48.91]  Long term (current) use of anticoagulants [Z79.01]           Comments:            Anticoagulation Care Providers     Provider Role Specialty Phone number    Sarwat Mayorga MD Referring Internal Medicine [phone]

## 2021-06-24 NOTE — TELEPHONE ENCOUNTER
ANTICOAGULATION  MANAGEMENT    Assessment     Today's INR result of 2.30 is Therapeutic (goal INR of 2.0-3.0)        Warfarin taken as previously instructed    No new diet changes affecting INR    No new medication/supplements affecting INR    Continues to tolerate warfarin with no reported s/s of bleeding or thromboembolism     Previous INR was Therapeutic at 2.10 on 1/29/19.    Will return to [place] in 2/29 - Apr 2019 for another 6 wks.  (will return in May)    Plan:     Spoke with Mr. Nova regarding INR result and instructed:     Warfarin Dosing Instructions:  (has 2.5mg tabs)   - Continue current warfarin dose 1.25 mg daily on Mon/Thurs; and 2.5 mg daily rest of week.    Instructed patient to follow up no later than:  4-6 wks.   - has orders for INR checks at [facility] in [place].    Education provided: importance of consistent vitamin K intake, target INR goal and significance of current INR result, importance of taking warfarin as instructed, importance of notifying clinic for changes in medications, monitoring for bleeding signs and symptoms and when to seek medical attention/emergency care    Mr. Nova verbalizes understanding and agrees to warfarin dosing plan.    Instructed to call the [facility] for any changes, questions or concerns. (#[phone])   ?   Yamileth Flores RN    Subjective/Objective:      Trenton Nova, a 78 y.o. male is on warfarin.     Trenton reports:     Home warfarin dose: as updated on anticoagulation calendar per template     Missed doses: No     Medication changes:  No     S/S of bleeding or thromboembolism:  No     New Injury or illness:  No     Changes in diet or alcohol consumption:  No     Upcoming surgery, procedure or cardioversion:  No    Anticoagulation Episode Summary     Current INR goal:   2.0-3.0   TTR:   80.8 % (4 y)   Next INR check:   4/8/2019   INR from last check:   2.30 (2/25/2019)   Weekly max warfarin dose:      Target end date:      INR  check location:      Preferred lab:      Send INR reminders to:   ANTICOAGULATION POOL C (DTN,VAD,CGR,GAV)    Indications    Atrial Fibrillation [I48.91]  Long term (current) use of anticoagulants [Z79.01]           Comments:            Anticoagulation Care Providers     Provider Role Specialty Phone number    Sarwat Mayorga MD Referring Internal Medicine [phone]

## 2021-06-25 NOTE — TELEPHONE ENCOUNTER
ANTICOAGULATION  MANAGEMENT    Assessment     Today's INR result of 2.40 is Therapeutic (goal INR of 2.0-3.0)        Warfarin taken differently than instructed, but no impact to total weekly dose    - updated anticoag. Calendar on how patient is taking warfarin.    No new diet changes affecting INR    No new medication/supplements affecting INR    Continues to tolerate warfarin with no reported s/s of bleeding or thromboembolism     Previous INR was Supratherapeutic at 3.20 on 5/28/21.    Plan:     Spoke on phone with Jonatan regarding INR result and instructed:      Warfarin Dosing Instructions:  (evenings. 1mg / 2.5mg tabs)   -  Continue current warfarin dose 1.25 mg daily on Sun/Tues/Thurs; and 1 mg daily rest of week    Instructed patient to follow up no later than: 1-2 wks.   - INR scheduled on 6/17/21 @ [facility]    Education provided: importance of consistent vitamin K intake and target INR goal and significance of current INR result    Jonatan verbalizes understanding and agrees to warfarin dosing plan.    Instructed to call the [facility] for any changes, questions or concerns. (#[phone])   ?   Yamileth Flores RN    Subjective/Objective:      Trenton LINDA Nova, a 81 y.o. male is on warfarin. Trenton Chowdhury reports:     Home warfarin dose: template incorrect; verbally confirmed home dose with Jonatan and updated on anticoagulation calendar     Missed doses: No     Medication changes:  No     S/S of bleeding or thromboembolism:  No     New Injury or illness:  No     Changes in diet or alcohol consumption:  No     Upcoming surgery, procedure or cardioversion:  No    Anticoagulation Episode Summary     Current INR goal:  2.0-3.0   TTR:  65.2 % (1 y)   Next INR check:  6/18/2021   INR from last check:  2.40 (6/4/2021)   Weekly max warfarin dose:     Target end date:     INR check location:     Preferred lab:     Send INR reminders to:  MALINDA MARIA    Indications    Atrial Fibrillation [I48.91]  Long term  (current) use of anticoagulants [Z79.01]           Comments:           Anticoagulation Care Providers     Provider Role Specialty Phone number    Sarwat Mayorga MD Referring Internal Medicine [phone]

## 2021-06-28 NOTE — PROGRESS NOTES
Progress Notes by Rylee Lea, RN at 10/22/2019 11:36 AM     Author: Rylee Lea RN Service: -- Author Type: Registered Nurse    Filed: 10/22/2019 11:42 AM Encounter Date: 10/22/2019 Status: Signed    : Rylee Lea RN (Registered Nurse)

## 2021-07-01 ENCOUNTER — COMMUNICATION - HEALTHEAST (OUTPATIENT)
Dept: ANTICOAGULATION | Facility: CLINIC | Age: 81
End: 2021-07-01

## 2021-07-01 ENCOUNTER — AMBULATORY - HEALTHEAST (OUTPATIENT)
Dept: LAB | Facility: CLINIC | Age: 81
End: 2021-07-01

## 2021-07-01 DIAGNOSIS — I48.19 PERSISTENT ATRIAL FIBRILLATION (H): ICD-10-CM

## 2021-07-01 DIAGNOSIS — Z79.01 LONG TERM (CURRENT) USE OF ANTICOAGULANTS: ICD-10-CM

## 2021-07-01 LAB — INR PPP: 2.7 (ref 0.9–1.1)

## 2021-07-03 NOTE — ADDENDUM NOTE
Addendum Note by Elizabeth Heredia RN at 4/4/2017  3:11 PM     Author: Elizabeth Heredia RN Service: -- Author Type: Registered Nurse    Filed: 4/4/2017  3:11 PM Encounter Date: 3/17/2017 Status: Signed    : Elizabeth Heredia RN (Registered Nurse)    Addended by: ELIZABETH HEREDIA on: 4/4/2017 03:11 PM        Modules accepted: Orders

## 2021-07-03 NOTE — ADDENDUM NOTE
Addendum Note by Roger Muñoz PBT at 1/17/2017  9:14 AM     Author: Roger Muñoz PBT Service: -- Author Type:     Filed: 1/17/2017  9:14 AM Encounter Date: 1/17/2017 Status: Signed    : Roger Muñoz PBT ()    Addended by: ROGER MUÑOZ on: 1/17/2017 09:14 AM        Modules accepted: Orders

## 2021-07-03 NOTE — ADDENDUM NOTE
Addendum Note by Elizabeth Heredia RN at 2/1/2018  2:51 PM     Author: Elizabeth Heredia RN Service: -- Author Type: Registered Nurse    Filed: 2/1/2018  2:51 PM Encounter Date: 2/1/2018 Status: Signed    : Elizabeth Heredia RN (Registered Nurse)    Addended by: ELIZABETH HEREDIA on: 2/1/2018 02:51 PM        Modules accepted: Orders

## 2021-07-03 NOTE — ADDENDUM NOTE
Addendum Note by Elizabeth Heredia RN at 12/18/2020  9:09 AM     Author: Elizabeth Heredia RN Service: -- Author Type: Registered Nurse    Filed: 12/18/2020  9:09 AM Encounter Date: 12/17/2020 Status: Signed    : Elizabeth Heredia RN (Registered Nurse)    Addended by: ELIZABETH HEREDIA on: 12/18/2020 09:09 AM        Modules accepted: Orders

## 2021-07-04 NOTE — ADDENDUM NOTE
Addendum Note by Dana Roblero MD at 4/30/2021  1:00 PM     Author: Dana Roblero MD Service: -- Author Type: Physician    Filed: 4/30/2021  1:26 PM Encounter Date: 4/30/2021 Status: Signed    : Dana Roblero MD (Physician)    Addended by: DANA ROBLERO on: 4/30/2021 01:26 PM        Modules accepted: Orders

## 2021-07-04 NOTE — TELEPHONE ENCOUNTER
Telephone Encounter by Yamileth Flores RN at 5/28/2021  9:34 AM     Author: Yamileth Flores RN Service: -- Author Type: Registered Nurse    Filed: 5/28/2021  9:55 AM Encounter Date: 5/28/2021 Status: Signed    : Yamileth Flores RN (Registered Nurse)       ANTICOAGULATION  MANAGEMENT    Assessment     Today's INR result of 3.20 is Supratherapeutic (goal INR of 2.0-3.0)        Warfarin taken as previously instructed    No new diet changes affecting INR    - not much appetite, but he tries his best.  Maintening his WT at 146 lbs.    No new medication/supplements affecting INR    Continues to tolerate warfarin with no reported s/s of bleeding or thromboembolism     Previous INR was Therapeutic at 2.60 on 5/21/21.    Plan:     Spoke on phone with Jonatan regarding INR result and instructed:      Warfarin Dosing Instructions:  (evenings. 1mg / 2.5mg tabs)   - Change warfarin dose to 1.25 mg daily on Sun/Tues/Thurs; and 1.25 mg daily rest of week  (3.1 % change)    Instructed patient to follow up no later than:  One wk.   - INR scheduled on 6/4/21 @ [facility]    Education provided: importance of consistent vitamin K intake and target INR goal and significance of current INR result    Jonatan verbalizes understanding and agrees to warfarin dosing plan.    Instructed to call the [facility] for any changes, questions or concerns. (#[phone])   ?   Yamileth Flores RN    Subjective/Objective:      Trenton Nova, a 81 y.o. male is on warfarin. Trenton Chowdhury reports:     Home warfarin dose: verbally confirmed home dose with Jonatan and updated on anticoagulation calendar     Missed doses: No     Medication changes:  No     S/S of bleeding or thromboembolism:  No     New Injury or illness:  No     Changes in diet or alcohol consumption:  No     Upcoming surgery, procedure or cardioversion:  No    Anticoagulation Episode Summary     Current INR goal:  2.0-3.0   TTR:  65.7 % (1 y)   Next INR check:  6/4/2021   INR  from last check:  3.20 (5/28/2021)   Weekly max warfarin dose:     Target end date:     INR check location:     Preferred lab:     Send INR reminders to:  MALINDA MARIA    Indications    Atrial Fibrillation [I48.91]  Long term (current) use of anticoagulants [Z79.01]           Comments:           Anticoagulation Care Providers     Provider Role Specialty Phone number    Sarwat Mayorga MD Referring Internal Medicine [phone]

## 2021-07-04 NOTE — ADDENDUM NOTE
Addendum Note by Dana Roblero MD at 2/22/2021  4:20 PM     Author: Dana Roblero MD Service: -- Author Type: Physician    Filed: 2/23/2021  8:16 AM Encounter Date: 2/22/2021 Status: Signed    : Dana Roblero MD (Physician)    Addended by: DANA ROBLERO on: 2/23/2021 08:16 AM        Modules accepted: Orders

## 2021-07-04 NOTE — TELEPHONE ENCOUNTER
Telephone Encounter by Ileana Hui RN at 7/1/2021 12:41 PM     Author: Ileana Hui RN Service: -- Author Type: Registered Nurse    Filed: 7/1/2021 12:45 PM Encounter Date: 7/1/2021 Status: Signed    : Ileana Hui RN (Registered Nurse)       ANTICOAGULATION MANAGEMENT     Trenton Nova 81 y.o., male is on warfarin with Therapeutic INR result (goal range 2.0-3.0)    Recent labs: (last 7 days)     07/01/21  0830   INR 2.70*       ASSESSMENT     Source: Patient/Caregiver Call and Template      Warfarin dosing taken: Warfarin taken as instructed    Diet: No new diet changes affecting INR    Illness, Injury or hospitalization: No    Medication changes: None    Signs or symptoms of bleeding or clotting: No    Previous INR: therapeutic last visit; previously outside of goal range    Additional findings: None     PLAN     Recommended plan for no diet, medication or health factor changes affecting INR:     Dosing instructions: Continue your current warfarin dose 1 mg daily on Tue/Sat; and 1.25 mg daily rest of week (0% change)    Follow up no later than: 3 weeks     Telephone call with Trenton who verbalizes understanding and agrees to plan    Patient elected to schedule next visit 7/22/21    Education provided: target INR goal and significance of current INR result and importance of following up for INR monitoring at instructed interval    Plan made per [facility] anticoagulation protocol    Ileana Hui  Anticoagulation Clinic   [phone]    Anticoagulation Episode Summary     Current INR goal:  2.0-3.0   TTR:  63.2 % (1 y)   Next INR check:  7/22/2021   INR from last check:  2.70 (7/1/2021)   Weekly max warfarin dose:     Target end date:     INR check location:     Preferred lab:     Send INR reminders to:  MALINDA MARIA    Indications    Atrial Fibrillation [I48.91]  Long term (current) use of anticoagulants [Z79.01]           Comments:           Anticoagulation Care Providers     Provider Role  Specialty Phone number    Sarwat Mayorga MD Referring Internal Medicine [phone]

## 2021-07-04 NOTE — TELEPHONE ENCOUNTER
Telephone Encounter by Wendy Amaya RN at 6/17/2021 11:38 AM     Author: Wendy Amaya RN Service: -- Author Type: Registered Nurse    Filed: 6/17/2021 11:46 AM Encounter Date: 6/17/2021 Status: Signed    : Wendy Amaya RN (Registered Nurse)       ANTICOAGULATION  MANAGEMENT    Assessment     Today's INR result of 1.8 is Subtherapeutic (goal INR of 2.0-3.0)        Warfarin taken as previously instructed    No new diet changes affecting INR-decreased appetite, does not eat much    No new medication/supplements affecting INR    Continues to tolerate warfarin with no reported s/s of bleeding or thromboembolism     Previous INR was Therapeutic    Plan:     Spoke on phone with Trenton regarding INR result and instructed:      Warfarin Dosing Instructions:  Change warfarin dose to 1 mg daily on Tuesday/Saturday; and 1.25 mg daily rest of week  (6.5 % change)    Instructed patient to follow up no later than: 2 weeks    Education provided: importance of therapeutic range, target INR goal and significance of current INR result, importance of following up for INR monitoring at instructed interval and importance of taking warfarin as instructed    Jonatan verbalizes understanding and agrees to warfarin dosing plan.    Instructed to call the [facility] for any changes, questions or concerns. (#[phone])   ?   Wendy Amaya RN    Subjective/Objective:      Trenton Nova, a 81 y.o. male is on warfarin. Trenton Chowdhury reports:     Home warfarin dose: verbally confirmed home dose with Jonatan and updated on anticoagulation calendar     Missed doses: No     Medication changes:  No     S/S of bleeding or thromboembolism:  No     New Injury or illness:  No     Changes in diet or alcohol consumption:  Yes: decreased appetite     Upcoming surgery, procedure or cardioversion:  No    Anticoagulation Episode Summary     Current INR goal:  2.0-3.0   TTR:  64.0 % (1 y)   Next INR check:  6/18/2021    INR from last check:  1.80 (6/17/2021)   Weekly max warfarin dose:     Target end date:     INR check location:     Preferred lab:     Send INR reminders to:  MALINDA MARIA    Indications    Atrial Fibrillation [I48.91]  Long term (current) use of anticoagulants [Z79.01]           Comments:           Anticoagulation Care Providers     Provider Role Specialty Phone number    Sarwat Mayorga MD Referring Internal Medicine [phone]

## 2021-07-06 VITALS
HEART RATE: 60 BPM | HEIGHT: 67 IN | WEIGHT: 149 LBS | OXYGEN SATURATION: 98 % | SYSTOLIC BLOOD PRESSURE: 120 MMHG | DIASTOLIC BLOOD PRESSURE: 72 MMHG | BODY MASS INDEX: 23.39 KG/M2

## 2021-07-14 DIAGNOSIS — I48.91 ATRIAL FIBRILLATION WITH RVR (H): Primary | ICD-10-CM

## 2021-07-14 NOTE — PROGRESS NOTES
Standing POCT INR order placed; transferred order(s) discontinued.     Prabha Herman RN  [facility]  [phone]

## 2021-07-15 ENCOUNTER — OFFICE VISIT (OUTPATIENT)
Dept: FAMILY MEDICINE | Facility: CLINIC | Age: 81
End: 2021-07-15
Payer: MEDICARE

## 2021-07-15 VITALS
BODY MASS INDEX: 23.21 KG/M2 | DIASTOLIC BLOOD PRESSURE: 76 MMHG | HEIGHT: 67 IN | SYSTOLIC BLOOD PRESSURE: 126 MMHG | WEIGHT: 147.9 LBS

## 2021-07-15 DIAGNOSIS — S41.111A SKIN TEAR OF RIGHT UPPER ARM WITHOUT COMPLICATION, INITIAL ENCOUNTER: ICD-10-CM

## 2021-07-15 DIAGNOSIS — E03.9 HYPOTHYROIDISM, UNSPECIFIED TYPE: ICD-10-CM

## 2021-07-15 DIAGNOSIS — Z23 NEED FOR TD VACCINE: ICD-10-CM

## 2021-07-15 DIAGNOSIS — R43.2 TASTE IMPAIRMENT: Primary | ICD-10-CM

## 2021-07-15 PROCEDURE — 90714 TD VACC NO PRESV 7 YRS+ IM: CPT | Performed by: FAMILY MEDICINE

## 2021-07-15 PROCEDURE — 90471 IMMUNIZATION ADMIN: CPT | Performed by: FAMILY MEDICINE

## 2021-07-15 PROCEDURE — 99215 OFFICE O/P EST HI 40 MIN: CPT | Mod: 25 | Performed by: FAMILY MEDICINE

## 2021-07-15 RX ORDER — ACETAMINOPHEN 500 MG
1000 TABLET ORAL
COMMUNITY
Start: 2020-03-24 | End: 2022-01-20 | Stop reason: DRUGHIGH

## 2021-07-15 RX ORDER — ERYTHROMYCIN 5 MG/G
OINTMENT OPHTHALMIC
COMMUNITY
Start: 2020-09-25 | End: 2023-02-10

## 2021-07-15 RX ORDER — AZITHROMYCIN 250 MG/1
TABLET, FILM COATED ORAL
COMMUNITY
End: 2021-09-09

## 2021-07-15 RX ORDER — HYDROCORTISONE 25 MG/G
OINTMENT TOPICAL
COMMUNITY
Start: 2020-10-19

## 2021-07-15 RX ORDER — LEVOTHYROXINE SODIUM 112 UG/1
112 TABLET ORAL DAILY
Qty: 90 TABLET | Refills: 0 | Status: SHIPPED | OUTPATIENT
Start: 2021-07-15 | End: 2021-08-16

## 2021-07-15 ASSESSMENT — MIFFLIN-ST. JEOR: SCORE: 1330.53

## 2021-07-15 NOTE — PROGRESS NOTES
Assessment / Plan    1. Taste impairment     - Otolaryngology Referral; Future    2. Hypothyroidism, unspecified type     - levothyroxine (SYNTHROID/LEVOTHROID) 112 MCG tablet; Take 1 tablet (112 mcg) by mouth daily  Dispense: 90 tablet; Refill: 0  - TSH with free T4 reflex; Future    3. Need for Td vaccine     - TD PRESERV FREE, IM (7+ YRS)    4. Skin tear of right upper arm without complication, initial encounter     Patient Instructions   It was great to meet you today.      I would recommend an increase in the dose of levothyroxine that you are taking from 100mcg to 112 mcg daily.  I sent in a new prescription to your pharmacy for this higher dose.    Please schedule follow-up lab-only visit in 6 weeks to recheck your thyroid levels.      I wasn't able to bring the torn skin on your right upper arm back together again.  I would recommend gentle washing the area daily (without scrubbing) and blotting it dry.  Afterwards, please apply a thin layer of over-the-counter antibiotic ointment.  I would then place a non-adherent bandage over the open area and wrap around this bandage with the Coban elastic wrap.  I would expect that the area will take a couple of weeks to heal.  Please let us know if you see any increased surrounding redness, any pus-like drainage, have fevers, or increased pain at the area.      I placed a referral for you to see one of the Ear, Nose and Throat specialists at the [facility] regarding your poor appetite and loss of taste.  Their schedulers should be calling you in the next 2 business days to assist in scheduling a visit.      Return in about 6 weeks (around 8/26/2021) for follow up lab only visit to recheck the thyroid levels. .     42 minutes spent on the date of the encounter doing chart review, history and exam, documentation and further activities per the note.     Subjective   Trenton Nova is a 81 year old year old male who presents with the following concerns:     Follow-up  hypothyroidism - currently treated with levothyroxine 100mcg daily. Had TSH checked last week at cardiology visit and it was noted to be 14.92 uIU/mL.  Free T4 was normal at 1.02 ng/dL.  He was advised to follow-up with his primary care provider to consider adjustment of his levothyroxine dose.  Patient denies any excessive fatigue, bowel habit changes, mood changes recently.    History of atrial flutter s/p ablation x2, atrial fibrillation s/p PVI ablation 8/25/2020, tachycardia-bradycardia syndrome s/p dual-chamber pacemaker, NSVT, hyperlipidemia, nonobstructive CAD, mildly decreased LVEF, BPH, CKD stage III  - patient had visited with cardiology on 7.9.21.  Continues with amiodarone, metoprolol, warfarin, furosemide. They had recommended consideration of watchman procedure, which patient will review further with their watchman coordinator.  Follow-up recommended in 6 months.      History of malignant neoplasm of ampulla of Vater, s/p pancreatectomy (Whipple) in 3/2020  At [facility].  Path report with negative margins and no chemo or radiation was recommended.  Patient had significant weight loss following that surgery, and has had a very difficult time with regaining much of that weight.  He attributes this largely to a lack of appetite and to significant impairment of his ability to taste foods.  He notes that his neighbor had talked about a specialist at the [facility] who subspecialized in tongue disorders.  Patient is interested in visiting with specialist to see if there would be anything additional that can be done to help improve his sense of taste which has been somewhat poor since the Whipple was completed.    Laceration of the right upper arm - patient reports that he had cut the upper right arm on an interior door yesterday.  He has had a fair amount of bleeding from it since it occurred  He is chronically treated with warfarin. Tdap last administered in Feb 2014.       Patient Active  Problem List   Diagnosis     TMJ Pain     Neck Pain     Hyperlipidemia     Benign Monoclonal Hypergammaglobulinemia     Atrial Fibrillation     BPH (benign prostatic hyperplasia)     Primary Osteoarthritis Of The Cervical Vertebrae     Reactions To Sulfa Drugs     Multiple myeloma (H)     Colon polyp     Long term (current) use of anticoagulants     Shoulder pain     Primary adenocarcinoma of ampulla of Vater (H)     Encounter for immunization     Malignant neoplasm of pancreatic duct (H)     Physical debility     Pneumonia due to infectious organism     Stage 3a chronic kidney disease     Atrial fibrillation with RVR (H)     Carcinoma of ampulla of Vater (H)     Anemia in chronic kidney disease     Coronary arteriosclerosis     Essential hypertension     Neoplasm of unspecified behavior of digestive system     Prostatism     Past Surgical History:   Procedure Laterality Date     KNEE SURGERY       PANCREATICODUODENECTOMY  03/19/2020    For adenocarcinoma of the ampulla of Vater     SHOULDER SURGERY         Social History     Tobacco Use     Smoking status: Former Smoker     Smokeless tobacco: Never Used     Tobacco comment: quit 8/8/1988   Substance Use Topics     Alcohol use: No     No family history on file.      Current Outpatient Medications   Medication Sig Dispense Refill     acetaminophen (TYLENOL) 500 MG tablet Take 1,000 mg by mouth       amiodarone (PACERONE) 200 MG tablet [AMIODARONE (PACERONE) 200 MG TABLET] 200 mg daily.        azithromycin (ZITHROMAX) 250 MG tablet Zithromax 250 mg tablet   TAKE 2 TABLETS (500 MG) BY ORAL ROUTE ONCE DAILY FOR 1 DAY THEN 1 TABLET (250 MG) BY ORAL ROUTE ONCE DAILY FOR 4 DAYS       brimonidine-timoloL (COMBIGAN) 0.2-0.5 % ophthalmic solution [BRIMONIDINE-TIMOLOL (COMBIGAN) 0.2-0.5 % OPHTHALMIC SOLUTION] 1 drop.       cyanocobalamin (VITAMIN B-12) 500 MCG tablet [CYANOCOBALAMIN (VITAMIN B-12) 500 MCG TABLET] Take 500 mcg by mouth daily.       erythromycin (ROMYCIN) 5  MG/GM ophthalmic ointment APPLY 0.25INCH TO LIDS QPM       famotidine (PEPCID) 40 MG tablet [FAMOTIDINE (PEPCID) 40 MG TABLET] Take 1 tablet (40 mg total) by mouth every evening. 30 tablet 11     furosemide (LASIX) 40 MG tablet [FUROSEMIDE (LASIX) 40 MG TABLET] Take 1 tablet (40 mg total) by mouth daily. 30 tablet 3     hydrocortisone 2.5 % ointment APPLY TOPICALLY TO THE ITCHY AREAS ON THE UPPER EYELIDS BID FOR 1 WK AT A TIME       levothyroxine (SYNTHROID/LEVOTHROID) 112 MCG tablet Take 1 tablet (112 mcg) by mouth daily 90 tablet 0     metoprolol succinate (TOPROL-XL) 25 MG [METOPROLOL SUCCINATE (TOPROL-XL) 25 MG] Take 25 mg by mouth daily.       multivitamin therapeutic (THERAGRAN) tablet [MULTIVITAMIN THERAPEUTIC (THERAGRAN) TABLET] Take 1 tablet by mouth daily.       omeprazole (PRILOSEC) 20 MG capsule [OMEPRAZOLE (PRILOSEC) 20 MG CAPSULE] Take 20 mg by mouth 2 (two) times a day before meals.        rosuvastatin (CRESTOR) 10 MG tablet [ROSUVASTATIN (CRESTOR) 10 MG TABLET]        tamsulosin (FLOMAX) 0.4 mg cap [TAMSULOSIN (FLOMAX) 0.4 MG CAP] Take 0.4 mg by mouth.       terazosin (HYTRIN) 5 MG capsule [TERAZOSIN (HYTRIN) 5 MG CAPSULE] Take 5 mg by mouth.       triamcinolone (KENALOG) 0.1 % cream [TRIAMCINOLONE (KENALOG) 0.1 % CREAM] Apply thin layer to affected areas (arms & hips) twice daily X 1 month 60 g 3     vit C/E/Zn/coppr/lutein/zeaxan (PRESERVISION AREDS-2 ORAL) [VIT C/E/ZN/COPPR/LUTEIN/ZEAXAN (PRESERVISION AREDS-2 ORAL)] Take by mouth 2 (two) times a day.       VITRON-C 65 mg iron- 125 mg TbEC [VITRON-C 65 MG IRON- 125 MG TBEC] TK 1 T PO QD. DO NOT CRUSH OR CHEW       warfarin ANTICOAGULANT (COUMADIN/JANTOVEN) 1 MG tablet [WARFARIN ANTICOAGULANT (COUMADIN/JANTOVEN) 1 MG TABLET] Take 1 tablet (1mg) by mouth daily, as directed.  Adjust dose based on INR results. 90 tablet 1     warfarin ANTICOAGULANT (COUMADIN/JANTOVEN) 2.5 MG tablet [WARFARIN ANTICOAGULANT (COUMADIN/JANTOVEN) 2.5 MG TABLET] Take 1&1/2  "tabs (3.75mg) on Thursdays and take 1 tab (2.5mg) all other days by mouth daily, as directed.  Adjust dose based on INR results. 115 tablet 1     Allergies   Allergen Reactions     Ambien [Zolpidem] Unknown     confusion     Sulfa (Sulfonamide Antibiotics) [Sulfa Drugs] Unknown       ROS:   Negative except as noted above in HPI.     Objective  /76 (BP Location: Left arm, Patient Position: Sitting, Cuff Size: Adult Regular)   Ht 1.695 m (5' 6.75\")   Wt 67.1 kg (147 lb 14.4 oz)   BMI 23.34 kg/m       General: Alert, no acute distress.   Affect: pleasant, cooperative.  Neck: supple without adenopathy or thyromegaly.  Lungs: Clear to auscultation without wheezes, rales or rhonci.   Heart: regular rhythm, S1 and S2   Abdomen: soft and nontender. + bowel sounds  Neuro: alert, interactive. moving all extremities.   Skin: large skin avulsion with flap noted at left upper arm. No active bleeding.  I'm unable to position the flap to entirely cover the avulsed open area.          Raghav Medellin MD   Family medicine physician  [facility]     "

## 2021-07-15 NOTE — PATIENT INSTRUCTIONS
It was great to meet you today.      I would recommend an increase in the dose of levothyroxine that you are taking from 100mcg to 112 mcg daily.  I sent in a new prescription to your pharmacy for this higher dose.    Please schedule follow-up lab-only visit in 6 weeks to recheck your thyroid levels.      I wasn't able to bring the torn skin on your right upper arm back together again.  I would recommend gentle washing the area daily (without scrubbing) and blotting it dry.  Afterwards, please apply a thin layer of over-the-counter antibiotic ointment.  I would then place a non-adherent bandage over the open area and wrap around this bandage with the Coban elastic wrap.  I would expect that the area will take a couple of weeks to heal.  Please let us know if you see any increased surrounding redness, any pus-like drainage, have fevers, or increased pain at the area.      I placed a referral for you to see one of the Ear, Nose and Throat specialists at the [facility] regarding your poor appetite and loss of taste.  Their schedulers should be calling you in the next 2 business days to assist in scheduling a visit.     [id number]

## 2021-07-16 ENCOUNTER — TELEPHONE (OUTPATIENT)
Dept: OTOLARYNGOLOGY | Facility: CLINIC | Age: 81
End: 2021-07-16

## 2021-07-16 NOTE — TELEPHONE ENCOUNTER
Spoke with patient regarding scheduling with  in the ENT Clinic. Scheduled patient accordingly and sent appointment reminder letter with map to confirmed address. Patient asked to be added to the wait-list.-Per Patient

## 2021-07-18 DIAGNOSIS — I48.91 ATRIAL FIBRILLATION (H): ICD-10-CM

## 2021-07-18 DIAGNOSIS — Z79.01 LONG TERM CURRENT USE OF ANTICOAGULANT THERAPY: ICD-10-CM

## 2021-07-19 NOTE — TELEPHONE ENCOUNTER
FUTURE VISIT INFORMATION      FUTURE VISIT INFORMATION:    Date: 8/24/2021    Time: 9:15AM    Location: [facility] [place]  REFERRAL INFORMATION:    Referring provider:  Katherine Mora MD    Referring providers clinic:  [facility]    Reason for visit/diagnosis  Taste dysfunction after having whipple procedure-Referral in [facility] from KATHERINE MORA -Appt Per PT    RECORDS REQUESTED FROM:       Clinic name Comments Records Status Imaging Status   [facility] 7/15/2021 note and referral from Katherine Mora MD [facility]  3/19/2020 PANCREATECTOMY WHIPPLE STANDARD - PYLORUS RESECTING. OMENTAL PEDICLE GRAFT Care everywhere     [facility]  7/25/2020 CT Head Brain  Care everywhere  req 7/19/21 - PACS                       7/19/21 10:11AM sent a fax to yamini for images - Amay   7/26/2021 10:52AM image received in PACS - Amay

## 2021-07-21 NOTE — TELEPHONE ENCOUNTER
"Routing refill request to provider for review/approval because:  Labs out of range:  INR    Last Written Prescription Date:  12/27/19  Last Fill Quantity: 115,  # refills: 1   Last office visit provider:  5/21/21     Requested Prescriptions   Pending Prescriptions Disp Refills     warfarin ANTICOAGULANT (COUMADIN) 2.5 MG tablet [Pharmacy Med Name: WARFARIN SOD 2.5MG TABLETS (GREEN)] 110 tablet      Sig: TAKE 1- 1 AND 1/2 TABLET BY MOUTH DAILY AS DIRECTED. ADJUST DOSE BASED ON INR RESULTS.       Vitamin K Antagonists Failed - 7/18/2021 12:07 PM        Failed - INR is within goal in the past 6 weeks     Confirm INR is within goal in the past 6 weeks.     Recent Labs   Lab Test 07/01/21  0830   INR 2.70*                       Passed - Recent (12 mo) or future (30 days) visit within the authorizing provider's specialty     Patient has had an office visit with the authorizing provider or a provider within the authorizing providers department within the previous 12 mos or has a future within next 30 days. See \"Patient Info\" tab in inbasket, or \"Choose Columns\" in Meds & Orders section of the refill encounter.              Passed - Medication is active on med list        Passed - Patient is 18 years of age or older             Nancie Lopez RN 07/21/21 1:04 PM  "

## 2021-07-22 ENCOUNTER — LAB (OUTPATIENT)
Dept: LAB | Facility: CLINIC | Age: 81
End: 2021-07-22
Payer: COMMERCIAL

## 2021-07-22 ENCOUNTER — ANTICOAGULATION THERAPY VISIT (OUTPATIENT)
Dept: ANTICOAGULATION | Facility: CLINIC | Age: 81
End: 2021-07-22

## 2021-07-22 DIAGNOSIS — I48.19 PERSISTENT ATRIAL FIBRILLATION (H): Primary | ICD-10-CM

## 2021-07-22 DIAGNOSIS — I48.91 ATRIAL FIBRILLATION (H): Primary | ICD-10-CM

## 2021-07-22 DIAGNOSIS — Z79.01 LONG TERM (CURRENT) USE OF ANTICOAGULANTS: ICD-10-CM

## 2021-07-22 LAB — INR BLD: 2.6 (ref 0.9–1.1)

## 2021-07-22 PROCEDURE — 85610 PROTHROMBIN TIME: CPT

## 2021-07-22 PROCEDURE — 36416 COLLJ CAPILLARY BLOOD SPEC: CPT

## 2021-07-22 RX ORDER — WARFARIN SODIUM 2.5 MG/1
TABLET ORAL
Qty: 50 TABLET | Refills: 1 | Status: SHIPPED | OUTPATIENT
Start: 2021-07-22 | End: 2021-12-13

## 2021-07-22 NOTE — PROGRESS NOTES
ANTICOAGULATION MANAGEMENT     Trenton Nova 81 year old male is on warfarin with therapeutic INR result. (Goal INR 2.0-3.0)    Recent labs: (last 7 days)     07/22/21  0914   INR 2.6*       ASSESSMENT     Source(s): Patient/Caregiver Call       Warfarin doses taken: Warfarin taken as instructed    Diet: No new diet changes identified    New illness, injury, or hospitalization: No    Medication/supplement changes: None noted    Signs or symptoms of bleeding or clotting: No    Previous INR: Therapeutic last visit; previously outside of goal range    Additional findings: None     PLAN     Recommended plan for no diet, medication or health factor changes affecting INR     Dosing Instructions: Continue your current warfarin dose with next INR in 4 weeks       Summary  As of 7/22/2021    Full warfarin instructions:  1 mg every Tue, Sat; 1.25 mg all other days   Next INR check:  8/19/2021             Telephone call with Trenton who verbalizes understanding and agrees to plan    Check at provider office visit 8/26/21    Education provided: Target INR goal and significance of current INR result    Plan made per [facility] anticoagulation protocol    Ileana Hui RN  Anticoagulation Clinic  7/22/2021    _______________________________________________________________________     Anticoagulation Episode Summary     Current INR goal:  2.0-3.0   TTR:  63.7 % (1 y)   Target end date:     Send INR reminders to:  [facility]    Indications    Atrial Fibrillation [I48.91]  Long term (current) use of anticoagulants [Z79.01]           Comments:           Anticoagulation Care Providers     Provider Role Specialty Phone number    Sarwat Mayorga MD Referring Internal Medicine [phone]

## 2021-07-22 NOTE — TELEPHONE ENCOUNTER
Pt is up to date on INR checks. Last OV with Dr. Mayorga 5/21/21. Warfarin refilled per protocol.

## 2021-07-29 ENCOUNTER — TELEPHONE (OUTPATIENT)
Dept: ANTICOAGULATION | Facility: CLINIC | Age: 81
End: 2021-07-29

## 2021-07-29 DIAGNOSIS — I48.91 ATRIAL FIBRILLATION (H): Primary | ICD-10-CM

## 2021-07-29 DIAGNOSIS — Z79.01 LONG TERM (CURRENT) USE OF ANTICOAGULANTS: ICD-10-CM

## 2021-07-29 NOTE — TELEPHONE ENCOUNTER
Reason for Call:  Other call back    Detailed comments: is having eye surgery on 13th of august and 7 days prior he was told to hold on his warfin and he is thinking that is a long time, he is wondering what you suggest     Phone Number Patient can be reached at: Cell number on file:    Telephone Information:   Mobile [phone]       Best Time: anytime     Can we leave a detailed message on this number? YES    Call taken on 7/29/2021 at 10:40 AM by Alison Cardenas

## 2021-08-02 ENCOUNTER — TRANSFERRED RECORDS (OUTPATIENT)
Dept: HEALTH INFORMATION MANAGEMENT | Facility: CLINIC | Age: 81
End: 2021-08-02

## 2021-08-02 NOTE — TELEPHONE ENCOUNTER
4:46 PM    Call received from [facility] asking if patient will need to hold 7 days for an eye procedure. INR needs to be 1.5 or less. What is your recommendation?     Moody Fernandez RN, BSN, PHN  Anticoagulation Clinic   [place] # [id number]  [phone]

## 2021-08-03 NOTE — TELEPHONE ENCOUNTER
Spoke with Trenton and instructed on 7 day warfarin hold and post-procedure instructions. Pt verbalized understanding. Also discussed decrease in amiodarone from 200 mg daily to 100 mg daily and possible interaction with warfarin. Next INR scheduled for 8/19/21.

## 2021-08-03 NOTE — TELEPHONE ENCOUNTER
"[facility]-PROCEDURAL ANTICOAGULATION  MANAGEMENT    Assessment     Warfarin interruption plan for left eye surgery, ptosis correction   on 8/13/21.    SHARON. Fib      Risk stratification for thromboembolism: low (2017 ACC periprocedure pathway for NVAF Expert Consensus)      [id number] = 3 (Age >= 75, vascular)      Dr. Ureña, [facility], Instructed to hold Warfarin x 7 days for goal INR 1.5 or less      > 5 day standard hold may be appropriate with total weekly dose < 10 mg /week. (current dose 8.5 mg/week).     HX reversed to 1.4 after 5 day hold Feb 2020, but was on 22.5 mg/week at that time. Last INR prior to hold was 3 weeks prior at 1.8    NVAF: 2017 ACC periprocedure pathway for NVAF advises NO bridge for low risk stratification ([id number] score <=4 or and no prior hx of stroke, TIA or systemic embolism)    Plan       Pre-Procedure:    Reasonable to hold 7 days prior to eye procedure due to low weekly warfarin requirement (slow to metabolize/eliminate)      No bridge       Post-Procedure:    Resume warfarin dose if okay with provider doing procedure on night of procedure, 8/13 PM: 2.5 mg x 1 day then 1.25 mg x 1 day then resume 1 mg Tue/Sat; 1.25 mg rest of week.    Recheck INR ~7 days after resuming warfarin   ?   Mirta Peacock, [facility]    Subjective/Objective:      Trenton Nova, a 81 year old male    Goal INR Range: 2.0-3.0     Patient bridged in past: No    Wt Readings from Last 3 Encounters:   07/15/21 67.1 kg (147 lb 14.4 oz)   05/21/21 67.6 kg (149 lb)   04/30/21 66.7 kg (147 lb)      Ideal body weight: 65.5 kg (144 lb 7.3 oz)  Adjusted ideal body weight: 66.1 kg (145 lb 13.3 oz)     Estimated body mass index is 23.34 kg/m  as calculated from the following:    Height as of 7/15/21: 1.695 m (5' 6.75\").    Weight as of 7/15/21: 67.1 kg (147 lb 14.4 oz).    Lab Results   Component Value Date    INR 2.6 (H) 07/22/2021    INR 2.70 (H) 07/01/2021    INR 1.80 (H) 06/17/2021     Lab Results   Component Value " Date    HGB 12.1 04/30/2021    HCT 37.8 04/30/2021     04/30/2021     Lab Results   Component Value Date    CR 2.21 (H) 04/30/2021    CR 1.39 (H) 02/08/2021    CR 1.35 (H) 01/22/2021

## 2021-08-05 ENCOUNTER — TRANSFERRED RECORDS (OUTPATIENT)
Dept: HEALTH INFORMATION MANAGEMENT | Facility: CLINIC | Age: 81
End: 2021-08-05

## 2021-08-05 LAB — EJECTION FRACTION: 45 %

## 2021-08-13 ENCOUNTER — OFFICE VISIT (OUTPATIENT)
Dept: INTERNAL MEDICINE | Facility: CLINIC | Age: 81
End: 2021-08-13
Payer: MEDICARE

## 2021-08-13 ENCOUNTER — ANTICOAGULATION THERAPY VISIT (OUTPATIENT)
Dept: ANTICOAGULATION | Facility: CLINIC | Age: 81
End: 2021-08-13

## 2021-08-13 VITALS
BODY MASS INDEX: 23.25 KG/M2 | SYSTOLIC BLOOD PRESSURE: 120 MMHG | HEART RATE: 68 BPM | OXYGEN SATURATION: 92 % | WEIGHT: 148.1 LBS | DIASTOLIC BLOOD PRESSURE: 58 MMHG | HEIGHT: 67 IN

## 2021-08-13 DIAGNOSIS — E06.9 THYROIDITIS, UNSPECIFIED: ICD-10-CM

## 2021-08-13 DIAGNOSIS — I48.19 PERSISTENT ATRIAL FIBRILLATION (H): ICD-10-CM

## 2021-08-13 DIAGNOSIS — Z79.01 LONG TERM (CURRENT) USE OF ANTICOAGULANTS: ICD-10-CM

## 2021-08-13 DIAGNOSIS — R79.89 ELEVATED TSH: ICD-10-CM

## 2021-08-13 DIAGNOSIS — E03.9 HYPOTHYROIDISM, UNSPECIFIED TYPE: ICD-10-CM

## 2021-08-13 DIAGNOSIS — I48.91 ATRIAL FIBRILLATION (H): Primary | ICD-10-CM

## 2021-08-13 LAB
INR BLD: 2.9 (ref 0.9–1.1)
TSH SERPL DL<=0.005 MIU/L-ACNC: 12.6 UIU/ML (ref 0.3–5)

## 2021-08-13 PROCEDURE — 36415 COLL VENOUS BLD VENIPUNCTURE: CPT | Performed by: NURSE PRACTITIONER

## 2021-08-13 PROCEDURE — 84481 FREE ASSAY (FT-3): CPT | Performed by: NURSE PRACTITIONER

## 2021-08-13 PROCEDURE — 86376 MICROSOMAL ANTIBODY EACH: CPT | Performed by: NURSE PRACTITIONER

## 2021-08-13 PROCEDURE — 84439 ASSAY OF FREE THYROXINE: CPT | Performed by: NURSE PRACTITIONER

## 2021-08-13 PROCEDURE — 84443 ASSAY THYROID STIM HORMONE: CPT | Performed by: NURSE PRACTITIONER

## 2021-08-13 PROCEDURE — 99214 OFFICE O/P EST MOD 30 MIN: CPT | Performed by: NURSE PRACTITIONER

## 2021-08-13 PROCEDURE — 85610 PROTHROMBIN TIME: CPT | Performed by: NURSE PRACTITIONER

## 2021-08-13 ASSESSMENT — MIFFLIN-ST. JEOR: SCORE: 1331.44

## 2021-08-13 NOTE — PATIENT INSTRUCTIONS
Your labs are processing. We will update you with results, likely on Monday.    Patient Education     The Role of the Thyroid Gland   The thyroid is a gland in the neck, just below the voicebox. It is an endocrine gland. Endocrine glands make hormones. These are chemicals that carry messages through the bloodstream to other parts of the body. The thyroid gland makes thyroid hormones. The thyroid gland is managed by the pituitary gland, which sits at the base of the brain.     Keeping the body working right  Thyroid hormones help keep all the cells in the body working right. It does this by controlling the metabolism. This is the rate at which every part of the body functions. The right amount of thyroid hormones keep the metabolism at a healthy pace. This helps the brain, heart, muscles, and other organs work well. A balanced metabolism also helps ensure a healthy temperature, heart rate, energy level, and growth rate. Thyroid hormones also play a vital role in children's growth.   The thyroid-pituitary cycle  The thyroid hormone needs to be kept at a healthy level. A complex cycle maintains this level. The cycle starts with the pituitary gland. This gland checks the thyroid hormone level in the blood.   Depending on the level, the pituitary sends thyroid stimulating hormone (TSH) through the blood to the thyroid gland. TSH tells the thyroid gland how much thyroid hormone to make. In response to TSH, the thyroid makes thyroid hormone. Then thyroid hormone is sent into the blood to the rest of the body. The pituitary senses the hormone level, adjusts the TSH level, and the cycle continues.   Vladislav last reviewed this educational content on 12/1/2019 2000-2021 [employer]. All rights reserved. This information is not intended as a substitute for professional medical care. Always follow your healthcare professional's instructions.

## 2021-08-13 NOTE — PROGRESS NOTES
ANTICOAGULATION MANAGEMENT     Trenton Nova 81 year old male is on warfarin with therapeutic INR result. (Goal INR 2.0-3.0)    Recent labs: (last 7 days)     08/13/21  1632   INR 2.9*       ASSESSMENT     Source(s): Chart Review, Patient/Caregiver Call and Template       Warfarin doses taken: Warfarin taken as instructed. No warfarin hold completed as planned. Ptosis eye procedure was postponed d/t cardiology wanting pt to continue warfarin while prepping for watchman procedure. Pt will reschedule in future.     Diet: No new diet changes identified    New illness, injury, or hospitalization: No    Medication/supplement changes: None noted    Signs or symptoms of bleeding or clotting: No    Previous INR: Therapeutic last 2(+) visits    Additional findings: None     PLAN     Recommended plan for no diet, medication or health factor changes affecting INR     Dosing Instructions: Continue your current warfarin dose with next INR in 4 weeks       Summary  As of 8/13/2021    Full warfarin instructions:  1 mg every Tue, Sat; 1.25 mg all other days   Next INR check:  9/10/2021             Telephone call with Trenton who verbalizes understanding and agrees to plan    Check at provider office visit. Pt already has labs scheduled 8/26 and has elected to have INR done at that time.     Education provided: Target INR goal and significance of current INR result    Plan made per ACC anticoagulation protocol    Ileana Hui RN  Anticoagulation Clinic  8/13/2021    _______________________________________________________________________     Anticoagulation Episode Summary     Current INR goal:  2.0-3.0   TTR:  64.9 % (1 y)   Target end date:     Send INR reminders to:  [facility]    Indications    Atrial Fibrillation [I48.91]  Long term (current) use of anticoagulants [Z79.01]           Comments:           Anticoagulation Care Providers     Provider Role Specialty Phone number    Sarwat Mayorga MD Referring Internal  Medicine [phone]

## 2021-08-14 LAB
T3FREE SERPL-MCNC: 1.8 PG/ML (ref 1.9–3.9)
T4 FREE SERPL-MCNC: 1.09 NG/DL (ref 0.7–1.8)

## 2021-08-16 DIAGNOSIS — R79.89 ELEVATED TSH: ICD-10-CM

## 2021-08-16 RX ORDER — LEVOTHYROXINE SODIUM 125 UG/1
125 TABLET ORAL DAILY
Qty: 60 TABLET | Refills: 0 | Status: SHIPPED | OUTPATIENT
Start: 2021-08-16 | End: 2021-10-05

## 2021-08-16 NOTE — PROGRESS NOTES
Clinic Note    Assessment:     Assessment and Plan:  1. Elevated TSH/Hypothyroidism, unspecified type/Thyroiditis, unspecified: TSH remains elevated. Will increase levothyroxine to 125mcg daily. Follow up in 8 weeks for a recheck of TSH.   - TSH with free T4 reflex; Future  - T3, Free; Future  - Thyroid peroxidase antibody; Future  - T3, Free  - Thyroid peroxidase antibody  - levothyroxine (SYNTHROID/LEVOTHROID) 125 MCG tablet; Take 1 tablet (125 mcg) by mouth daily  Dispense: 60 tablet; Refill: 0    2. Persistent atrial fibrillation (H): Completed today.   - INR point of care, Interfaced Result       Patient Instructions   Your labs are processing. We will update you with results, likely on Monday.    Patient Education     The Role of the Thyroid Gland   The thyroid is a gland in the neck, just below the voicebox. It is an endocrine gland. Endocrine glands make hormones. These are chemicals that carry messages through the bloodstream to other parts of the body. The thyroid gland makes thyroid hormones. The thyroid gland is managed by the pituitary gland, which sits at the base of the brain.     Keeping the body working right  Thyroid hormones help keep all the cells in the body working right. It does this by controlling the metabolism. This is the rate at which every part of the body functions. The right amount of thyroid hormones keep the metabolism at a healthy pace. This helps the brain, heart, muscles, and other organs work well. A balanced metabolism also helps ensure a healthy temperature, heart rate, energy level, and growth rate. Thyroid hormones also play a vital role in children's growth.   The thyroid-pituitary cycle  The thyroid hormone needs to be kept at a healthy level. A complex cycle maintains this level. The cycle starts with the pituitary gland. This gland checks the thyroid hormone level in the blood.   Depending on the level, the pituitary sends thyroid stimulating hormone (TSH) through the  "blood to the thyroid gland. TSH tells the thyroid gland how much thyroid hormone to make. In response to TSH, the thyroid makes thyroid hormone. Then thyroid hormone is sent into the blood to the rest of the body. The pituitary senses the hormone level, adjusts the TSH level, and the cycle continues.   Vladislav last reviewed this educational content on 12/1/2019 2000-2021 [employer]. All rights reserved. This information is not intended as a substitute for professional medical care. Always follow your healthcare professional's instructions.             Return if symptoms worsen or fail to improve.         Subjective:      Trenton Nova is a 81 year old male who presents today for follow up of his thyroid.    He was at his cardiologist, and they checked his TSH and it was elevated.    He was placed on Levothyroxine 112mcg daily. We will recheck his blood work today, and likely increase his levothyroxine dose.    He denies any new concerns today.     The following portions of the patient's history were reviewed and updated as appropriate.    Review of Systems:    Review is otherwise negative except for what is mentioned above.     Social Hx:    History   Smoking Status     Former Smoker   Smokeless Tobacco     Never Used     Comment: quit 8/8/1988         Objective:     Vitals:    08/13/21 1603   BP: 120/58   BP Location: Right arm   Patient Position: Sitting   Cuff Size: Adult Regular   Pulse: 68   SpO2: 92%   Weight: 67.2 kg (148 lb 1.6 oz)   Height: 1.695 m (5' 6.75\")       Exam:  General: No apparent distress. Calm. Alert and Oriented X3. Pt behavior is appropriate.  Head:Atraumatic. Normocephalic.  Chest/Lungs: Clear to auscultation, normal respiratory effort and rate.   Heart/Pulses: Regular rate and rhythm, strong and equal radial pulses, no murmurs, gallops, or rubs. Capillary refill <2 seconds. No edema.   Musculoskeletal: Walks without difficulty.   Neurologic: Interactive, alert, no focal " findings.  Skin: Warm, dry.      Patient Active Problem List   Diagnosis     TMJ Pain     Neck Pain     Hyperlipidemia     Benign Monoclonal Hypergammaglobulinemia     Atrial Fibrillation     BPH (benign prostatic hyperplasia)     Primary Osteoarthritis Of The Cervical Vertebrae     Reactions To Sulfa Drugs     Multiple myeloma (H)     Colon polyp     Long term (current) use of anticoagulants     Shoulder pain     Primary adenocarcinoma of ampulla of Vater (H)     Encounter for immunization     Malignant neoplasm of pancreatic duct (H)     Physical debility     Pneumonia due to infectious organism     Stage 3a chronic kidney disease     Atrial fibrillation with RVR (H)     Carcinoma of ampulla of Vater (H)     Anemia in chronic kidney disease     Coronary arteriosclerosis     Essential hypertension     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        azithromycin (ZITHROMAX) 250 MG tablet Zithromax 250 mg tablet   TAKE 2 TABLETS (500 MG) BY ORAL ROUTE ONCE DAILY FOR 1 DAY THEN 1 TABLET (250 MG) BY ORAL ROUTE ONCE DAILY FOR 4 DAYS       brimonidine-timoloL (COMBIGAN) 0.2-0.5 % ophthalmic solution [BRIMONIDINE-TIMOLOL (COMBIGAN) 0.2-0.5 % OPHTHALMIC SOLUTION] 1 drop.       cyanocobalamin (VITAMIN B-12) 500 MCG tablet [CYANOCOBALAMIN (VITAMIN B-12) 500 MCG TABLET] Take 500 mcg by mouth daily.       erythromycin (ROMYCIN) 5 MG/GM ophthalmic ointment APPLY 0.25INCH TO LIDS QPM       famotidine (PEPCID) 40 MG tablet [FAMOTIDINE (PEPCID) 40 MG TABLET] Take 1 tablet (40 mg total) by mouth every evening. 30 tablet 11     furosemide (LASIX) 40 MG tablet [FUROSEMIDE (LASIX) 40 MG TABLET] Take 1 tablet (40 mg total) by mouth daily. 30 tablet 3     hydrocortisone 2.5 % ointment  APPLY TOPICALLY TO THE ITCHY AREAS ON THE UPPER EYELIDS BID FOR 1 WK AT A TIME       levothyroxine (SYNTHROID/LEVOTHROID) 125 MCG tablet Take 1 tablet (125 mcg) by mouth daily 60 tablet 0     metoprolol succinate (TOPROL-XL) 25 MG [METOPROLOL SUCCINATE (TOPROL-XL) 25 MG] Take 25 mg by mouth daily.       multivitamin therapeutic (THERAGRAN) tablet [MULTIVITAMIN THERAPEUTIC (THERAGRAN) TABLET] Take 1 tablet by mouth daily.       omeprazole (PRILOSEC) 20 MG capsule Take 40 mg by mouth        rosuvastatin (CRESTOR) 10 MG tablet [ROSUVASTATIN (CRESTOR) 10 MG TABLET]        tamsulosin (FLOMAX) 0.4 mg cap [TAMSULOSIN (FLOMAX) 0.4 MG CAP] Take 0.4 mg by mouth.       terazosin (HYTRIN) 5 MG capsule [TERAZOSIN (HYTRIN) 5 MG CAPSULE] Take 5 mg by mouth.       triamcinolone (KENALOG) 0.1 % cream [TRIAMCINOLONE (KENALOG) 0.1 % CREAM] Apply thin layer to affected areas (arms & hips) twice daily X 1 month 60 g 3     vit C/E/Zn/coppr/lutein/zeaxan (PRESERVISION AREDS-2 ORAL) [VIT C/E/ZN/COPPR/LUTEIN/ZEAXAN (PRESERVISION AREDS-2 ORAL)] Take by mouth 2 (two) times a day.       VITRON-C 65 mg iron- 125 mg TbEC [VITRON-C 65 MG IRON- 125 MG TBEC] TK 1 T PO QD. DO NOT CRUSH OR CHEW       warfarin ANTICOAGULANT (COUMADIN) 2.5 MG tablet Take 1/2 tablet (1.25 mg) daily as ordered. Adjust dose based on INR results as directed. 50 tablet 1     warfarin ANTICOAGULANT (COUMADIN/JANTOVEN) 1 MG tablet [WARFARIN ANTICOAGULANT (COUMADIN/JANTOVEN) 1 MG TABLET] Take 1 tablet (1mg) by mouth daily, as directed.  Adjust dose based on INR results. 90 tablet 1       Wendy Dorsey, Adult-Geriatric Nurse Practitioner  [facility] - Internal Medicine Team     8/13/2021

## 2021-08-17 LAB — THYROPEROXIDASE AB SERPL-ACNC: <3 IU/ML (ref 0–6)

## 2021-08-17 NOTE — TELEPHONE ENCOUNTER
Patient called and stated he was seen in the office with Wendy Dorsey on 08/13/2021 and she was suppose to send a Rx for him for Levothyroxine. The following Rx was sent to the pharmacy on 08/16/2021 but patient states pharmacy does not have it.     [facility] [address] @ [facility] & [address] [phone] fax [phone].    levothyroxine (SYNTHROID/LEVOTHROID) 125 MCG tablet 60 tablet 0 8/16/2021  --   Sig - Route: Take 1 tablet (125 mcg) by mouth daily - Oral   Sent to pharmacy as: Levothyroxine Sodium 125 MCG Oral Tablet (SYNTHROID/LEVOTHROID)       Karen Braden

## 2021-08-18 RX ORDER — LEVOTHYROXINE SODIUM 125 UG/1
TABLET ORAL
Qty: 90 TABLET | OUTPATIENT
Start: 2021-08-18

## 2021-08-19 ENCOUNTER — TRANSFERRED RECORDS (OUTPATIENT)
Dept: HEALTH INFORMATION MANAGEMENT | Facility: CLINIC | Age: 81
End: 2021-08-19

## 2021-08-23 NOTE — PROGRESS NOTES
Otolaryngology Clinic      Name: Trenton Nova  MRN: [id number]  Age: 81 year old  : 1940  Referring provider: Raghav Medellin  (2021)     Chief Complaint:  Consultation    History of Present Illness:   Trenton Nova is a 81 year old male who presents for consultation regarding taste dysfunction s/p whipple procedure (2020) done at [facility]. The patient presented to his Dr. Medellin, his PCP, about one month ago for significant weight loss since his whipple procedure. He attributed this to lack of appetite and a significantly decreased ability to taste foods.    Today, he reports that within the last 3 months, when he bites into something, he feels like is 'chewing on cardboard.' The patient endorses a decreased appetite due to this. He denies a dry mouth. He denies any smell changes.     He reports not taking fish oil due to being on Coumadin, but he does take vitamin B complexes daily. He reports that his is getting a watchman placed for his atrial fibrillation, and he will switch from Coumadin to taking a baby aspirin. The patient reports that he is in remission of pancreatic cancer, and is not currently on any treatments.      Active Medications:     Current Outpatient Medications:      acetaminophen (TYLENOL) 500 MG tablet, Take 1,000 mg by mouth, Disp: , Rfl:      amiodarone (PACERONE) 200 MG tablet, [AMIODARONE (PACERONE) 200 MG TABLET] 200 mg daily. , Disp: , Rfl:      azithromycin (ZITHROMAX) 250 MG tablet, Zithromax 250 mg tablet  TAKE 2 TABLETS (500 MG) BY ORAL ROUTE ONCE DAILY FOR 1 DAY THEN 1 TABLET (250 MG) BY ORAL ROUTE ONCE DAILY FOR 4 DAYS, Disp: , Rfl:      brimonidine-timoloL (COMBIGAN) 0.2-0.5 % ophthalmic solution, [BRIMONIDINE-TIMOLOL (COMBIGAN) 0.2-0.5 % OPHTHALMIC SOLUTION] 1 drop., Disp: , Rfl:      cyanocobalamin (VITAMIN B-12) 500 MCG tablet, [CYANOCOBALAMIN (VITAMIN B-12) 500 MCG TABLET] Take 500 mcg by mouth daily., Disp: , Rfl:      erythromycin (ROMYCIN) 5 MG/GM  ophthalmic ointment, APPLY 0.25INCH TO LIDS QPM, Disp: , Rfl:      famotidine (PEPCID) 40 MG tablet, [FAMOTIDINE (PEPCID) 40 MG TABLET] Take 1 tablet (40 mg total) by mouth every evening., Disp: 30 tablet, Rfl: 11     furosemide (LASIX) 40 MG tablet, [FUROSEMIDE (LASIX) 40 MG TABLET] Take 1 tablet (40 mg total) by mouth daily., Disp: 30 tablet, Rfl: 3     hydrocortisone 2.5 % ointment, APPLY TOPICALLY TO THE ITCHY AREAS ON THE UPPER EYELIDS BID FOR 1 WK AT A TIME, Disp: , Rfl:      levothyroxine (SYNTHROID/LEVOTHROID) 125 MCG tablet, Take 1 tablet (125 mcg) by mouth daily, Disp: 60 tablet, Rfl: 0     metoprolol succinate (TOPROL-XL) 25 MG, [METOPROLOL SUCCINATE (TOPROL-XL) 25 MG] Take 25 mg by mouth daily., Disp: , Rfl:      multivitamin therapeutic (THERAGRAN) tablet, [MULTIVITAMIN THERAPEUTIC (THERAGRAN) TABLET] Take 1 tablet by mouth daily., Disp: , Rfl:      omeprazole (PRILOSEC) 20 MG capsule, Take 40 mg by mouth , Disp: , Rfl:      rosuvastatin (CRESTOR) 10 MG tablet, [ROSUVASTATIN (CRESTOR) 10 MG TABLET] , Disp: , Rfl:      tamsulosin (FLOMAX) 0.4 mg cap, [TAMSULOSIN (FLOMAX) 0.4 MG CAP] Take 0.4 mg by mouth., Disp: , Rfl:      terazosin (HYTRIN) 5 MG capsule, [TERAZOSIN (HYTRIN) 5 MG CAPSULE] Take 5 mg by mouth., Disp: , Rfl:      triamcinolone (KENALOG) 0.1 % cream, [TRIAMCINOLONE (KENALOG) 0.1 % CREAM] Apply thin layer to affected areas (arms & hips) twice daily X 1 month, Disp: 60 g, Rfl: 3     vit C/E/Zn/coppr/lutein/zeaxan (PRESERVISION AREDS-2 ORAL), [VIT C/E/ZN/COPPR/LUTEIN/ZEAXAN (PRESERVISION AREDS-2 ORAL)] Take by mouth 2 (two) times a day., Disp: , Rfl:      VITRON-C 65 mg iron- 125 mg TbEC, [VITRON-C 65 MG IRON- 125 MG TBEC] TK 1 T PO QD. DO NOT CRUSH OR CHEW, Disp: , Rfl:      warfarin ANTICOAGULANT (COUMADIN) 2.5 MG tablet, Take 1/2 tablet (1.25 mg) daily as ordered. Adjust dose based on INR results as directed., Disp: 50 tablet, Rfl: 1     warfarin ANTICOAGULANT (COUMADIN/JANTOVEN) 1 MG  "tablet, [WARFARIN ANTICOAGULANT (COUMADIN/JANTOVEN) 1 MG TABLET] Take 1 tablet (1mg) by mouth daily, as directed.  Adjust dose based on INR results., Disp: 90 tablet, Rfl: 1      Allergies:   Ambien [zolpidem] and Sulfa (sulfonamide antibiotics) [sulfa drugs]      Past Medical History:  No past medical history on file.  Patient Active Problem List   Diagnosis     TMJ Pain     Neck Pain     Hyperlipidemia     Benign Monoclonal Hypergammaglobulinemia     Atrial Fibrillation     BPH (benign prostatic hyperplasia)     Primary Osteoarthritis Of The Cervical Vertebrae     Reactions To Sulfa Drugs     Multiple myeloma (H)     Colon polyp     Long term (current) use of anticoagulants     Shoulder pain     Primary adenocarcinoma of ampulla of Vater (H)     Encounter for immunization     Malignant neoplasm of pancreatic duct (H)     Physical debility     Pneumonia due to infectious organism     Stage 3a chronic kidney disease     Atrial fibrillation with RVR (H)     Carcinoma of ampulla of Vater (H)     Anemia in chronic kidney disease     Coronary arteriosclerosis     Essential hypertension     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      Social History:   Social History     Tobacco Use     Smoking status: Former Smoker     Smokeless tobacco: Never Used     Tobacco comment: quit 8/8/1988   Substance Use Topics     Alcohol use: No     Drug use: No     Review of Systems:   Pertinent items are noted in HPI or as in patient entered ROS below, remainder of complete ROS is negative.   No flowsheet data found.      Physical Exam:   Pulse 71   Temp 98.6  F (37  C) (Temporal)   Ht 1.702 m (5' 7\")   Wt 67.6 kg (149 lb)   SpO2 100%   BMI 23.34 kg/m   "     Constitutional:  The patient was unaccompanied, well-groomed, and in no acute distress.    Skin:  Warm and pink.    Neurologic:  Alert and oriented x 3.  CN's III-XII within normal limits.  Voice normal.   Psychiatric:  The patient's affect was calm, cooperative, and appropriate.    Respiratory:  Breathing comfortably without stridor or exertion of accessory muscles.    Eyes: Extraocular movement intact.    Head:  Normocephalic and atraumatic.  No lesions or scars.    OC/OP:  Normal tongue, floor of mouth, buccal mucosa, and palate.  No lesions or masses on inspection or palpation.  No abnormal lymph tissue in the oropharynx.  The pterygoid region is non-tender.      Assessment and Plan:    ICD-10-CM    1. Taste impairment  R43.2 Otolaryngology Referral      Tretnon Nova is a 81 year old male who presents for consultation regarding taste dysfunction s/p whipple procedure (03/19/2020) done at [facility]. I explained to the patient that we do not have any specific tests to evaluate for taste loss, but I can recommend products to assist with his mouth, in hopes to help with taste eventually. I recommended Biotene mouth products to help with mouth moisture. I recommended olive oil in a mist sprayer that can assist in mouth lubrication, which may improve taste. Lastly,I recommended DHA to help with mouth moisture as well. I encouraged him to continue taking the vitamin B complexes. I'll A smell test was performed after our visit, and I will review these results when they come back. He can follow up with me virtually in about 3 months, either while in [place] or in-person when he gets back.     Follow-up: Return in about 3 months (around 11/24/2021).      Scribe Disclosure:  I, Katey Robert, am serving as a scribe to document services personally performed by Andrew Blanco MD at this visit, based upon the provider's statements to me. All documentation has been reviewed by the aforementioned provider prior to  being entered into the official medical record.

## 2021-08-24 ENCOUNTER — PRE VISIT (OUTPATIENT)
Dept: OTOLARYNGOLOGY | Facility: CLINIC | Age: 81
End: 2021-08-24

## 2021-08-24 ENCOUNTER — OFFICE VISIT (OUTPATIENT)
Dept: OTOLARYNGOLOGY | Facility: CLINIC | Age: 81
End: 2021-08-24
Attending: FAMILY MEDICINE
Payer: MEDICARE

## 2021-08-24 ENCOUNTER — NURSE TRIAGE (OUTPATIENT)
Dept: NURSING | Facility: CLINIC | Age: 81
End: 2021-08-24

## 2021-08-24 VITALS
WEIGHT: 149 LBS | HEIGHT: 67 IN | OXYGEN SATURATION: 100 % | TEMPERATURE: 98.6 F | HEART RATE: 71 BPM | BODY MASS INDEX: 23.39 KG/M2

## 2021-08-24 DIAGNOSIS — R43.2 TASTE IMPAIRMENT: ICD-10-CM

## 2021-08-24 PROCEDURE — 99204 OFFICE O/P NEW MOD 45 MIN: CPT | Performed by: OTOLARYNGOLOGY

## 2021-08-24 ASSESSMENT — MIFFLIN-ST. JEOR: SCORE: 1339.49

## 2021-08-24 ASSESSMENT — PAIN SCALES - GENERAL: PAINLEVEL: NO PAIN (0)

## 2021-08-24 NOTE — TELEPHONE ENCOUNTER
Should not be a big problem okay for fish oil.  Watch INR a bit closer but not to worry think should work out just fine.  With the fish oil.

## 2021-08-24 NOTE — TELEPHONE ENCOUNTER
Contacted patient and relayed message.  Advised he do one of two options-    1. Start fish oil one week before next scheduled INR (9/13)    2. Start fish oil now and recheck INR in 1 week.      Patient would like to start fish oil now, he will call his [facility] clinic to get scheduled.

## 2021-08-24 NOTE — NURSING NOTE
"Chief Complaint   Patient presents with     Consult     P new       Pulse 71, temperature 98.6  F (37  C), temperature source Temporal, height 1.702 m (5' 7\"), weight 67.6 kg (149 lb), SpO2 100 %.    Robert Miller, EMT  "

## 2021-08-24 NOTE — PATIENT INSTRUCTIONS
Dr. Blanco has recommended:    1) Biotene mouth products for moisturizing.   2) Olive or salad oil atomizied through a sprayer.   3) Docosa hexanoic acid when ok to take fish oils, do for 6 months.     This things can be purchased over the counter.    Please call ENT clinic with any questions or concerns. [phone].

## 2021-08-24 NOTE — LETTER
2021       RE: Trenton Nova  [address] [address]     Dear Colleague,    Thank you for referring your patient, Trenton Nova, to the [facility] [place] at [facility]. Please see a copy of my visit note below.      Otolaryngology Clinic      Name: Trenton Nova  MRN: [id number]  Age: 81 year old  : 1940  Referring provider: Raghav Medellin  (2021)     Chief Complaint:  Consultation    History of Present Illness:   Trenton Nova is a 81 year old male who presents for consultation regarding taste dysfunction s/p whipple procedure (2020) done at [facility]. The patient presented to his Dr. Medellin, his PCP, about one month ago for significant weight loss since his whipple procedure. He attributed this to lack of appetite and a significantly decreased ability to taste foods.    Today, he reports that within the last 3 months, when he bites into something, he feels like is 'chewing on cardboard.' The patient endorses a decreased appetite due to this. He denies a dry mouth. He denies any smell changes.     He reports not taking fish oil due to being on Coumadin, but he does take vitamin B complexes daily. He reports that his is getting a watchman placed for his atrial fibrillation, and he will switch from Coumadin to taking a baby aspirin. The patient reports that he is in remission of pancreatic cancer, and is not currently on any treatments.      Active Medications:     Current Outpatient Medications:      acetaminophen (TYLENOL) 500 MG tablet, Take 1,000 mg by mouth, Disp: , Rfl:      amiodarone (PACERONE) 200 MG tablet, [AMIODARONE (PACERONE) 200 MG TABLET] 200 mg daily. , Disp: , Rfl:      azithromycin (ZITHROMAX) 250 MG tablet, Zithromax 250 mg tablet  TAKE 2 TABLETS (500 MG) BY ORAL ROUTE ONCE DAILY FOR 1 DAY THEN 1 TABLET (250 MG) BY ORAL ROUTE ONCE DAILY FOR 4 DAYS, Disp: ,  Rfl:      brimonidine-timoloL (COMBIGAN) 0.2-0.5 % ophthalmic solution, [BRIMONIDINE-TIMOLOL (COMBIGAN) 0.2-0.5 % OPHTHALMIC SOLUTION] 1 drop., Disp: , Rfl:      cyanocobalamin (VITAMIN B-12) 500 MCG tablet, [CYANOCOBALAMIN (VITAMIN B-12) 500 MCG TABLET] Take 500 mcg by mouth daily., Disp: , Rfl:      erythromycin (ROMYCIN) 5 MG/GM ophthalmic ointment, APPLY 0.25INCH TO LIDS QPM, Disp: , Rfl:      famotidine (PEPCID) 40 MG tablet, [FAMOTIDINE (PEPCID) 40 MG TABLET] Take 1 tablet (40 mg total) by mouth every evening., Disp: 30 tablet, Rfl: 11     furosemide (LASIX) 40 MG tablet, [FUROSEMIDE (LASIX) 40 MG TABLET] Take 1 tablet (40 mg total) by mouth daily., Disp: 30 tablet, Rfl: 3     hydrocortisone 2.5 % ointment, APPLY TOPICALLY TO THE ITCHY AREAS ON THE UPPER EYELIDS BID FOR 1 WK AT A TIME, Disp: , Rfl:      levothyroxine (SYNTHROID/LEVOTHROID) 125 MCG tablet, Take 1 tablet (125 mcg) by mouth daily, Disp: 60 tablet, Rfl: 0     metoprolol succinate (TOPROL-XL) 25 MG, [METOPROLOL SUCCINATE (TOPROL-XL) 25 MG] Take 25 mg by mouth daily., Disp: , Rfl:      multivitamin therapeutic (THERAGRAN) tablet, [MULTIVITAMIN THERAPEUTIC (THERAGRAN) TABLET] Take 1 tablet by mouth daily., Disp: , Rfl:      omeprazole (PRILOSEC) 20 MG capsule, Take 40 mg by mouth , Disp: , Rfl:      rosuvastatin (CRESTOR) 10 MG tablet, [ROSUVASTATIN (CRESTOR) 10 MG TABLET] , Disp: , Rfl:      tamsulosin (FLOMAX) 0.4 mg cap, [TAMSULOSIN (FLOMAX) 0.4 MG CAP] Take 0.4 mg by mouth., Disp: , Rfl:      terazosin (HYTRIN) 5 MG capsule, [TERAZOSIN (HYTRIN) 5 MG CAPSULE] Take 5 mg by mouth., Disp: , Rfl:      triamcinolone (KENALOG) 0.1 % cream, [TRIAMCINOLONE (KENALOG) 0.1 % CREAM] Apply thin layer to affected areas (arms & hips) twice daily X 1 month, Disp: 60 g, Rfl: 3     vit C/E/Zn/coppr/lutein/zeaxan (PRESERVISION AREDS-2 ORAL), [VIT C/E/ZN/COPPR/LUTEIN/ZEAXAN (PRESERVISION AREDS-2 ORAL)] Take by mouth 2 (two) times a day., Disp: , Rfl:      VITRON-C 65  mg iron- 125 mg TbEC, [VITRON-C 65 MG IRON- 125 MG TBEC] TK 1 T PO QD. DO NOT CRUSH OR CHEW, Disp: , Rfl:      warfarin ANTICOAGULANT (COUMADIN) 2.5 MG tablet, Take 1/2 tablet (1.25 mg) daily as ordered. Adjust dose based on INR results as directed., Disp: 50 tablet, Rfl: 1     warfarin ANTICOAGULANT (COUMADIN/JANTOVEN) 1 MG tablet, [WARFARIN ANTICOAGULANT (COUMADIN/JANTOVEN) 1 MG TABLET] Take 1 tablet (1mg) by mouth daily, as directed.  Adjust dose based on INR results., Disp: 90 tablet, Rfl: 1      Allergies:   Ambien [zolpidem] and Sulfa (sulfonamide antibiotics) [sulfa drugs]      Past Medical History:  No past medical history on file.  Patient Active Problem List   Diagnosis     TMJ Pain     Neck Pain     Hyperlipidemia     Benign Monoclonal Hypergammaglobulinemia     Atrial Fibrillation     BPH (benign prostatic hyperplasia)     Primary Osteoarthritis Of The Cervical Vertebrae     Reactions To Sulfa Drugs     Multiple myeloma (H)     Colon polyp     Long term (current) use of anticoagulants     Shoulder pain     Primary adenocarcinoma of ampulla of Vater (H)     Encounter for immunization     Malignant neoplasm of pancreatic duct (H)     Physical debility     Pneumonia due to infectious organism     Stage 3a chronic kidney disease     Atrial fibrillation with RVR (H)     Carcinoma of ampulla of Vater (H)     Anemia in chronic kidney disease     Coronary arteriosclerosis     Essential hypertension     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      Social History:   Social History     Tobacco Use     Smoking status: Former Smoker     Smokeless tobacco: Never Used     Tobacco comment: quit 8/8/1988   Substance Use Topics     Alcohol  "use: No     Drug use: No     Review of Systems:   Pertinent items are noted in HPI or as in patient entered ROS below, remainder of complete ROS is negative.   No flowsheet data found.      Physical Exam:   Pulse 71   Temp 98.6  F (37  C) (Temporal)   Ht 1.702 m (5' 7\")   Wt 67.6 kg (149 lb)   SpO2 100%   BMI 23.34 kg/m       Constitutional:  The patient was unaccompanied, well-groomed, and in no acute distress.    Skin:  Warm and pink.    Neurologic:  Alert and oriented x 3.  CN's III-XII within normal limits.  Voice normal.   Psychiatric:  The patient's affect was calm, cooperative, and appropriate.    Respiratory:  Breathing comfortably without stridor or exertion of accessory muscles.    Eyes: Extraocular movement intact.    Head:  Normocephalic and atraumatic.  No lesions or scars.    OC/OP:  Normal tongue, floor of mouth, buccal mucosa, and palate.  No lesions or masses on inspection or palpation.  No abnormal lymph tissue in the oropharynx.  The pterygoid region is non-tender.      Assessment and Plan:    ICD-10-CM    1. Taste impairment  R43.2 Otolaryngology Referral      Trenton Nova is a 81 year old male who presents for consultation regarding taste dysfunction s/p whipple procedure (03/19/2020) done at [facility]. I explained to the patient that we do not have any specific tests to evaluate for taste loss, but I can recommend products to assist with his mouth, in hopes to help with taste eventually. I recommended Biotene mouth products to help with mouth moisture. I recommended olive oil in a mist sprayer that can assist in mouth lubrication, which may improve taste. Lastly,I recommended DHA to help with mouth moisture as well. I encouraged him to continue taking the vitamin B complexes. I'll A smell test was performed after our visit, and I will review these results when they come back. He can follow up with me virtually in about 3 months, either while in [place] or in-person when he gets back. "     Follow-up: Return in about 3 months (around 11/24/2021).      Scribe Disclosure:  I, Katey Robert, am serving as a scribe to document services personally performed by Andrew Blanco MD at this visit, based upon the provider's statements to me. All documentation has been reviewed by the aforementioned provider prior to being entered into the official medical record.           Again, thank you for allowing me to participate in the care of your patient.      Sincerely,    Andrew Blanco MD

## 2021-08-24 NOTE — TELEPHONE ENCOUNTER
"Pt calls re ENT provider advice today to take \"fish oil.\"  This was advised specifically to take for six months due to taste dysfunction ever since whipple procedure.    Pt now recalls fish oil may conflict with Coumadin.  Requests further provider advice and thoughts on the fish oil recommendation ....    Best phone # for pt -> [phone]     [facility] Nurse Advisor     Reason for Disposition    Caller has NON-URGENT medication question about med that PCP prescribed and triager unable to answer question    Protocols used: MEDICATION QUESTION CALL-A-OH      "

## 2021-08-26 ENCOUNTER — LAB (OUTPATIENT)
Dept: LAB | Facility: CLINIC | Age: 81
End: 2021-08-26
Payer: MEDICARE

## 2021-08-26 ENCOUNTER — ANTICOAGULATION THERAPY VISIT (OUTPATIENT)
Dept: INTERNAL MEDICINE | Facility: CLINIC | Age: 81
End: 2021-08-26

## 2021-08-26 DIAGNOSIS — I48.91 ATRIAL FIBRILLATION (H): Primary | ICD-10-CM

## 2021-08-26 DIAGNOSIS — E03.9 HYPOTHYROIDISM, UNSPECIFIED TYPE: ICD-10-CM

## 2021-08-26 DIAGNOSIS — Z79.01 LONG TERM (CURRENT) USE OF ANTICOAGULANTS: ICD-10-CM

## 2021-08-26 DIAGNOSIS — I48.19 PERSISTENT ATRIAL FIBRILLATION (H): ICD-10-CM

## 2021-08-26 LAB
INR BLD: 2.2 (ref 0.9–1.1)
T4 FREE SERPL-MCNC: 1.01 NG/DL (ref 0.7–1.8)
TSH SERPL DL<=0.005 MIU/L-ACNC: 12.74 UIU/ML (ref 0.3–5)

## 2021-08-26 PROCEDURE — 84439 ASSAY OF FREE THYROXINE: CPT

## 2021-08-26 PROCEDURE — 85610 PROTHROMBIN TIME: CPT

## 2021-08-26 PROCEDURE — 84443 ASSAY THYROID STIM HORMONE: CPT

## 2021-08-26 PROCEDURE — 36415 COLL VENOUS BLD VENIPUNCTURE: CPT

## 2021-08-26 PROCEDURE — 36416 COLLJ CAPILLARY BLOOD SPEC: CPT

## 2021-08-26 NOTE — PROGRESS NOTES
ANTICOAGULATION MANAGEMENT     Trenton Nova 81 year old male is on warfarin with therapeutic INR result. (Goal INR 2.0-3.0)    Recent labs: (last 7 days)     08/26/21  0929   INR 2.2*       ASSESSMENT     Source(s): Patient/Caregiver Call and Template       Warfarin doses taken: Warfarin taken as instructed    Diet: No new diet changes identified    New illness, injury, or hospitalization: No    Medication/supplement changes: fish oil started on yesterday may increase risk of bleeding, but not expected to affect INR    Signs or symptoms of bleeding or clotting: No    Previous INR: Therapeutic last 2(+) visits    Additional findings: None     PLAN     Recommended plan for ongoing change(s) affecting INR     Dosing Instructions: Continue your current warfarin dose with next INR in 2 weeks       Summary  As of 8/26/2021    Full warfarin instructions:  1 mg every Tue, Sat; 1.25 mg all other days   Next INR check:  9/9/2021             Telephone call with Trenton who verbalizes understanding and agrees to plan    Lab visit scheduled    Education provided: Goal range and significance of current result, Potential interaction between warfarin and fish oil and Monitoring for bleeding signs and symptoms    Plan made per [facility] anticoagulation protocol    Lubna Kamara RN  Anticoagulation Clinic  8/26/2021    _______________________________________________________________________     Anticoagulation Episode Summary     Current INR goal:  2.0-3.0   TTR:  66.5 % (1 y)   Target end date:     Send INR reminders to:  [facility]    Indications    Atrial Fibrillation [I48.91]  Long term (current) use of anticoagulants [Z79.01]           Comments:           Anticoagulation Care Providers     Provider Role Specialty Phone number    Sarwat Mayorga MD Referring Internal Medicine [phone]

## 2021-09-08 ENCOUNTER — TELEPHONE (OUTPATIENT)
Dept: INTERNAL MEDICINE | Facility: CLINIC | Age: 81
End: 2021-09-08

## 2021-09-08 DIAGNOSIS — I48.91 ATRIAL FIBRILLATION, UNSPECIFIED TYPE (H): Primary | ICD-10-CM

## 2021-09-08 NOTE — TELEPHONE ENCOUNTER
Received VM from THEODORE Horton at [facility] stating that Trenton will have the Watchman procedure on 09/15. His INR needs to be as close to 2.0 as possible for the procedure. Please return call to Sol to confirm that [facility] has received this message and that warfarin will be adjusted accordingly. Routing to [facility].

## 2021-09-09 ENCOUNTER — LAB (OUTPATIENT)
Dept: LAB | Facility: CLINIC | Age: 81
End: 2021-09-09
Payer: MEDICARE

## 2021-09-09 ENCOUNTER — OFFICE VISIT (OUTPATIENT)
Dept: FAMILY MEDICINE | Facility: CLINIC | Age: 81
End: 2021-09-09
Payer: MEDICARE

## 2021-09-09 ENCOUNTER — ANTICOAGULATION THERAPY VISIT (OUTPATIENT)
Dept: ANTICOAGULATION | Facility: CLINIC | Age: 81
End: 2021-09-09

## 2021-09-09 VITALS
OXYGEN SATURATION: 99 % | SYSTOLIC BLOOD PRESSURE: 110 MMHG | HEART RATE: 57 BPM | WEIGHT: 148.1 LBS | BODY MASS INDEX: 23.2 KG/M2 | DIASTOLIC BLOOD PRESSURE: 60 MMHG

## 2021-09-09 DIAGNOSIS — Z79.01 LONG TERM (CURRENT) USE OF ANTICOAGULANTS: ICD-10-CM

## 2021-09-09 DIAGNOSIS — R35.0 BENIGN PROSTATIC HYPERPLASIA WITH URINARY FREQUENCY: ICD-10-CM

## 2021-09-09 DIAGNOSIS — I25.10 CORONARY ARTERIOSCLEROSIS: ICD-10-CM

## 2021-09-09 DIAGNOSIS — E03.9 HYPOTHYROIDISM, UNSPECIFIED TYPE: ICD-10-CM

## 2021-09-09 DIAGNOSIS — I48.19 PERSISTENT ATRIAL FIBRILLATION (H): ICD-10-CM

## 2021-09-09 DIAGNOSIS — I48.91 ATRIAL FIBRILLATION (H): Primary | ICD-10-CM

## 2021-09-09 DIAGNOSIS — Z01.818 PRE-OP EXAM: Primary | ICD-10-CM

## 2021-09-09 DIAGNOSIS — I48.91 ATRIAL FIBRILLATION WITH RVR (H): ICD-10-CM

## 2021-09-09 DIAGNOSIS — C25.3 MALIGNANT NEOPLASM OF PANCREATIC DUCT (H): ICD-10-CM

## 2021-09-09 DIAGNOSIS — N18.31 STAGE 3A CHRONIC KIDNEY DISEASE (H): ICD-10-CM

## 2021-09-09 DIAGNOSIS — I48.91 ATRIAL FIBRILLATION, UNSPECIFIED TYPE (H): ICD-10-CM

## 2021-09-09 DIAGNOSIS — N40.1 BENIGN PROSTATIC HYPERPLASIA WITH URINARY FREQUENCY: ICD-10-CM

## 2021-09-09 DIAGNOSIS — I10 ESSENTIAL HYPERTENSION: ICD-10-CM

## 2021-09-09 PROBLEM — J18.9 PNEUMONIA DUE TO INFECTIOUS ORGANISM: Status: RESOLVED | Noted: 2021-01-15 | Resolved: 2021-09-09

## 2021-09-09 LAB
ABO/RH(D): NORMAL
ALBUMIN SERPL-MCNC: 3.2 G/DL (ref 3.5–5)
ANION GAP SERPL CALCULATED.3IONS-SCNC: 12 MMOL/L (ref 5–18)
ANTIBODY SCREEN: NEGATIVE
BUN SERPL-MCNC: 41 MG/DL (ref 8–28)
CALCIUM SERPL-MCNC: 8.7 MG/DL (ref 8.5–10.5)
CHLORIDE BLD-SCNC: 105 MMOL/L (ref 98–107)
CO2 SERPL-SCNC: 21 MMOL/L (ref 22–31)
CREAT SERPL-MCNC: 1.96 MG/DL (ref 0.7–1.3)
ERYTHROCYTE [DISTWIDTH] IN BLOOD BY AUTOMATED COUNT: 13.3 % (ref 10–15)
GFR SERPL CREATININE-BSD FRML MDRD: 31 ML/MIN/1.73M2
GLUCOSE BLD-MCNC: 133 MG/DL (ref 70–125)
HCT VFR BLD AUTO: 32.8 % (ref 40–53)
HGB BLD-MCNC: 10.9 G/DL (ref 13.3–17.7)
INR BLD: 1.8 (ref 0.9–1.1)
MAGNESIUM SERPL-MCNC: 2 MG/DL (ref 1.8–2.6)
MCH RBC QN AUTO: 34.7 PG (ref 26.5–33)
MCHC RBC AUTO-ENTMCNC: 33.2 G/DL (ref 31.5–36.5)
MCV RBC AUTO: 105 FL (ref 78–100)
PLATELET # BLD AUTO: 129 10E3/UL (ref 150–450)
POTASSIUM BLD-SCNC: 4.8 MMOL/L (ref 3.5–5)
RBC # BLD AUTO: 3.14 10E6/UL (ref 4.4–5.9)
SODIUM SERPL-SCNC: 138 MMOL/L (ref 136–145)
SPECIMEN EXPIRATION DATE: NORMAL
WBC # BLD AUTO: 6.8 10E3/UL (ref 4–11)

## 2021-09-09 PROCEDURE — 99214 OFFICE O/P EST MOD 30 MIN: CPT | Performed by: NURSE PRACTITIONER

## 2021-09-09 PROCEDURE — 80048 BASIC METABOLIC PNL TOTAL CA: CPT | Performed by: FAMILY MEDICINE

## 2021-09-09 PROCEDURE — 85610 PROTHROMBIN TIME: CPT | Performed by: FAMILY MEDICINE

## 2021-09-09 PROCEDURE — 86850 RBC ANTIBODY SCREEN: CPT | Performed by: FAMILY MEDICINE

## 2021-09-09 PROCEDURE — 36415 COLL VENOUS BLD VENIPUNCTURE: CPT | Performed by: FAMILY MEDICINE

## 2021-09-09 PROCEDURE — 82040 ASSAY OF SERUM ALBUMIN: CPT | Performed by: FAMILY MEDICINE

## 2021-09-09 PROCEDURE — 85027 COMPLETE CBC AUTOMATED: CPT | Performed by: FAMILY MEDICINE

## 2021-09-09 PROCEDURE — 83735 ASSAY OF MAGNESIUM: CPT | Performed by: FAMILY MEDICINE

## 2021-09-09 PROCEDURE — 86900 BLOOD TYPING SEROLOGIC ABO: CPT | Performed by: FAMILY MEDICINE

## 2021-09-09 PROCEDURE — 86901 BLOOD TYPING SEROLOGIC RH(D): CPT | Performed by: FAMILY MEDICINE

## 2021-09-09 NOTE — PROGRESS NOTES
ANTICOAGULATION MANAGEMENT     Trenton Nova 81 year old male is on warfarin with subtherapeutic INR result. (Goal INR 2.0-3.0)    Recent labs: (last 7 days)     09/09/21  1113   INR 1.8*       ASSESSMENT     Source(s): Chart Review, Patient/Caregiver Call and Template       Warfarin doses taken: Warfarin taken as instructed    Diet: No new diet changes identified    New illness, injury, or hospitalization: No    Medication/supplement changes: None noted    Signs or symptoms of bleeding or clotting: No    Previous INR: Therapeutic last 2(+) visits    Additional findings: Upcoming surgery/procedure Watchman procedure 9/15. [facility] would like INR as close to 2.0 as possible. INR to be rechecked Monday 9/13.     PLAN     Recommended plan for no diet, medication or health factor changes affecting INR     Dosing Instructions: Continue your current warfarin dose with next INR in 5 days       Summary  As of 9/9/2021    Full warfarin instructions:  1 mg every Tue, Sat; 1.25 mg all other days   Next INR check:               Detailed voice message left for Trenton with dosing instructions and follow up date.     Lab visit scheduled, already scheduled for 9/13/21    Education provided: Please call back if any changes to your diet, medications or how you've been taking warfarin    Plan made with [facility] Pharmacist Mirta Hui, RN  Anticoagulation Clinic  9/9/2021    _______________________________________________________________________     Anticoagulation Episode Summary     Current INR goal:  2.0-3.0   TTR:  65.0 % (1 y)   Target end date:     Send INR reminders to:  [facility]    Indications    Atrial Fibrillation [I48.91]  Long term (current) use of anticoagulants [Z79.01]           Comments:           Anticoagulation Care Providers     Provider Role Specialty Phone number    Sarwat Mayorga MD Referring Internal Medicine [phone]

## 2021-09-09 NOTE — TELEPHONE ENCOUNTER
Spoke with Sol and confirmed message was received regarding trying to get INR as close to 2.0 as possible for watchman procedure 9/15. Pt is having INR checked today, and Sol requested next check be done Monday. Pt will be seen at [facility] Monday and she would prefer to have INR drawn before that appointment if possible. Will discuss with Trenton when INR results received today.     Ileana Hui RN

## 2021-09-09 NOTE — PROGRESS NOTES
[facility]  [address]  [facility] [address]  Phone: [phone]  Fax: [phone]  Primary Provider: Sarwat Mayorga      {Provider  Link to PREOP SmartSet  Use this to apply standard patient instructions to AVS; includes medication directions, common orders, guidelines for anemia, warfarin, additional testing   :[id number]}  PREOPERATIVE EVALUATION:  Today's date: 9/9/2021    Trenton Nova is a 81 year old male who presents for a preoperative evaluation.    Surgical Information:  Surgery/Procedure: watchman   Surgery Location: [facility]   Surgeon: Dr. Arreaga   Surgery Date: 9/15/21  Time of Surgery: 12:30 pm   Where patient plans to recover: At home with family  Fax number for surgical facility:     Type of Anesthesia Anticipated: to be determined    Assessment & Plan     The proposed surgical procedure is considered INTERMEDIATE risk.    Pre-op exam  Preop exam performed.  There are no contraindications to surgery.  Labs were ordered by PCP and are pending.  Covid test ordered.  - Asymptomatic COVID-19 Virus (Coronavirus) by PCR    Atrial fibrillation with RVR (H)  We will consult with cardiologist regarding warfarin management for surgery.    Coronary arteriosclerosis  He is asymptomatic.    Benign prostatic hyperplasia with urinary frequency  This is controlled with tamsulosin and Terazosin.    Essential hypertension  He will hold furosemide the morning of surgery.  He continues metoprolol and amiodarone.    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    Stage 3a chronic kidney disease  This is stable.    Hypothyroidism, unspecified type  He continues levothyroxine.       Implanted Device:   - Type of device: pacemaker Patient advised to bring device information on day of surgery.        RECOMMENDATION:  APPROVAL GIVEN to proceed with proposed procedure, without further diagnostic evaluation.            Subjective     HPI related to  upcoming procedure: Patient has a history of atrial fibrillation.  He has had approximately 6 cardioversions and is scheduled to obtain the watchman.  He denies palpitations, shortness of breath with exertion, chest pain, chest tightness, orthopnea, syncope.  Patient takes warfarin for anticoagulation.  He is prescribed metoprolol and amiodarone.  He takes furosemide as needed for lower extremity edema.    Patient has a history of BPH and is prescribed tamsulosin and Terazosin.  He is taking rosuvastatin for hyperlipidemia.  GERD symptoms are controlled with omeprazole and Pepcid.  He has hypothyroidism and takes levothyroxine.  Has a history of pancreatic cancer and is in remission.        Preop Questions 9/9/2021   1. Have you ever had a heart attack or stroke? No   2. Have you ever had surgery on your heart or blood vessels, such as a stent placement, a coronary artery bypass, or surgery on an artery in your head, neck, heart, or legs? YES - pacemaker and cardioversion   3. Do you have chest pain with activity? No   4. Do you have a history of  heart failure? YES    5. Do you currently have a cold, bronchitis or symptoms of other infection? No   6. Do you have a cough, shortness of breath, or wheezing? No   7. Do you or anyone in your family have previous history of blood clots? No   8. Do you or does anyone in your family have a serious bleeding problem such as prolonged bleeding following surgeries or cuts? No   9. Have you ever had problems with anemia or been told to take iron pills? NO   10. Have you had any abnormal blood loss such as black, tarry or bloody stools? YES - h/o 50 years ago with gastric ulcers   11. Have you ever had a blood transfusion? No   12. Are you willing to have a blood transfusion if it is medically needed before, during, or after your surgery? Yes   13. Have you or any of your relatives ever had problems with anesthesia? No   14. Do you have sleep apnea, excessive snoring or daytime  drowsiness? No   15. Do you have any artifical heart valves or other implanted medical devices like a pacemaker, defibrillator, or continuous glucose monitor? YES - pacemaker    15a. What type of device do you have? Pacemaker   15b. Name of the clinic that manages your device:  [facility]   16. Do you have artificial joints? No   17. Are you allergic to latex? No       Health Care Directive:  Patient does not have a Health Care Directive or Living Will: Patient states has Advance Directive and will bring in a copy to clinic.    Preoperative Review of :   reviewed - no record of controlled substances prescribed.      Status of Chronic Conditions:  See problem list for active medical problems.  Problems all longstanding and stable, except as noted/documented.  See ROS for pertinent symptoms related to these conditions.      Review of Systems  Constitutional, neuro, ENT, endocrine, pulmonary, cardiac, gastrointestinal, genitourinary, musculoskeletal, integument and psychiatric systems are negative, except as otherwise noted.    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  Mild hemoglobin drop since last seen, borderline iron stores. Likely   related to possible liver/pancreas mass under evaluation at present.         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  ERCP on 2/3/2020 at [facility]  Last Assessment & Plan:   Formatting of this note might be different from the original.  Patient was undergoing evaluation due to profound fatigue, found to have   dilated common bile duct, ERCP done, stent placed on common bile duct,   pathology results pending at present.         Prostatism 02/06/2020     Priority: Medium     Formatting of this note might be different from the original.  Adequate control.  Continue Flomax.         Malignant neoplasm of pancreatic duct (H) 01/01/2020     Priority: Medium     Jan 22, 2021 Entered By: ARAM AGOSTO Comment: H/O adenocarcinoma   ampulla of VaterJan 22, 2021 Entered By: ARAM AGOSTO Comment: S/P   Whipple procedure ([place] 3/2020)         Essential hypertension 04/23/2018     Priority: Medium     Last Assessment & Plan:   Formatting of this note might be different from the original.  Adequate control. Continue current medications.         TMJ Pain      Priority: Medium     Created by Conversion  Replacement Utility updated for latest IMO load         Shoulder pain 01/18/2016     Priority: Medium     Stage 3a chronic kidney disease 05/13/2015     Priority: Medium     Last Assessment & Plan:   Renal function stable. No proteinuria. Normal urinalysis.   Serum creatinine stable.  Renal ultrasound in 2011 was normal.   Kidney biopsy in 2011 shows some arterial sclerosis, no glomerular   disease. Mostly changes associated to hypertension and aging. No   glomerular disease present.   Continue current medications.  Last Assessment & Plan:   Formatting of this note might be different from the original.  Renal function stable. No proteinuria. Normal urinalysis.   Serum creatinine stable.  Renal ultrasound in 2011 was normal.   Kidney biopsy in 2011 shows some arterial sclerosis, no glomerular   disease. Mostly changes associated to hypertension and aging. No   glomerular disease present.    Continue current medications.         Coronary arteriosclerosis 02/26/2015     Priority: Medium     Last Assessment & Plan:   Formatting of this note might be different from the original.  Asymptomatic. No angina, No shortness of breath. No heart failure present.         Long term (current) use of anticoagulants 01/30/2015     Priority: Medium     Atrial Fibrillation 01/16/2015     Priority: Medium     Created by Conversion  Last Assessment & Plan:   Formatting of this note might be different from the original.  CHRONIC ATRIAL FIBRILLATION. S/P PACER  On Coumadin, adequate ventricular rate. No heart failure.  He underwent cardioversion on 12/19/2019, currently not on atrial   fibrillation by auscultation  Continue follow up with cardiologist.         Multiple myeloma (H) 12/16/2014     Priority: Medium     Colon polyp 12/16/2014     Priority: Medium     Neck Pain      Priority: Medium     Created by Conversion         Hyperlipidemia      Priority: Medium     Created by Conversion         Benign Monoclonal Hypergammaglobulinemia      Priority: Medium     Created by Conversion         Primary Osteoarthritis Of The Cervical Vertebrae      Priority: Medium     Created by Conversion         Reactions To Sulfa Drugs      Priority: Medium     Created by Conversion          No past medical history on file.  Past Surgical History:   Procedure Laterality Date     KNEE SURGERY       PANCREATICODUODENECTOMY  03/19/2020    For adenocarcinoma of the ampulla of Vater     SHOULDER SURGERY       Current Outpatient Medications   Medication Sig Dispense Refill     acetaminophen (TYLENOL) 500 MG tablet Take 1,000 mg by mouth       amiodarone (PACERONE) 200 MG tablet [AMIODARONE (PACERONE) 200 MG TABLET] 200 mg daily.        brimonidine-timoloL (COMBIGAN) 0.2-0.5 % ophthalmic solution [BRIMONIDINE-TIMOLOL (COMBIGAN) 0.2-0.5 % OPHTHALMIC SOLUTION] 1 drop.       cyanocobalamin (VITAMIN B-12) 500 MCG tablet [CYANOCOBALAMIN (VITAMIN B-12)  500 MCG TABLET] Take 500 mcg by mouth daily.       erythromycin (ROMYCIN) 5 MG/GM ophthalmic ointment APPLY 0.25INCH TO LIDS QPM       famotidine (PEPCID) 40 MG tablet [FAMOTIDINE (PEPCID) 40 MG TABLET] Take 1 tablet (40 mg total) by mouth every evening. 30 tablet 11     furosemide (LASIX) 40 MG tablet [FUROSEMIDE (LASIX) 40 MG TABLET] Take 1 tablet (40 mg total) by mouth daily. 30 tablet 3     hydrocortisone 2.5 % ointment APPLY TOPICALLY TO THE ITCHY AREAS ON THE UPPER EYELIDS BID FOR 1 WK AT A TIME       levothyroxine (SYNTHROID/LEVOTHROID) 125 MCG tablet Take 1 tablet (125 mcg) by mouth daily 60 tablet 0     metoprolol succinate (TOPROL-XL) 25 MG [METOPROLOL SUCCINATE (TOPROL-XL) 25 MG] Take 25 mg by mouth daily.       multivitamin therapeutic (THERAGRAN) tablet [MULTIVITAMIN THERAPEUTIC (THERAGRAN) TABLET] Take 1 tablet by mouth daily.       omeprazole (PRILOSEC) 20 MG capsule Take 40 mg by mouth        rosuvastatin (CRESTOR) 10 MG tablet [ROSUVASTATIN (CRESTOR) 10 MG TABLET]        tamsulosin (FLOMAX) 0.4 mg cap [TAMSULOSIN (FLOMAX) 0.4 MG CAP] Take 0.4 mg by mouth.       terazosin (HYTRIN) 5 MG capsule [TERAZOSIN (HYTRIN) 5 MG CAPSULE] Take 5 mg by mouth.       triamcinolone (KENALOG) 0.1 % cream [TRIAMCINOLONE (KENALOG) 0.1 % CREAM] Apply thin layer to affected areas (arms & hips) twice daily X 1 month 60 g 3     vit C/E/Zn/coppr/lutein/zeaxan (PRESERVISION AREDS-2 ORAL) [VIT C/E/ZN/COPPR/LUTEIN/ZEAXAN (PRESERVISION AREDS-2 ORAL)] Take by mouth 2 (two) times a day.       VITRON-C 65 mg iron- 125 mg TbEC [VITRON-C 65 MG IRON- 125 MG TBEC] TK 1 T PO QD. DO NOT CRUSH OR CHEW       warfarin ANTICOAGULANT (COUMADIN) 2.5 MG tablet Take 1/2 tablet (1.25 mg) daily as ordered. Adjust dose based on INR results as directed. 50 tablet 1     warfarin ANTICOAGULANT (COUMADIN/JANTOVEN) 1 MG tablet [WARFARIN ANTICOAGULANT (COUMADIN/JANTOVEN) 1 MG TABLET] Take 1 tablet (1mg) by mouth daily, as directed.  Adjust dose based on  INR results. 90 tablet 1       Allergies   Allergen Reactions     Ambien [Zolpidem] Unknown     confusion     Sulfa (Sulfonamide Antibiotics) [Sulfa Drugs] Unknown        Social History     Tobacco Use     Smoking status: Former Smoker     Smokeless tobacco: Never Used     Tobacco comment: quit 8/8/1988   Substance Use Topics     Alcohol use: No     No family history on file.  History   Drug Use No         Objective     /60 (BP Location: Right arm, Patient Position: Sitting, Cuff Size: Adult Regular)   Pulse 57   Wt 67.2 kg (148 lb 1.6 oz)   SpO2 99%   BMI 23.20 kg/m      Physical Exam    GENERAL APPEARANCE: healthy, alert and no distress     EYES: EOMI,  PERRL     HENT: ear canals and TM's normal and nose and mouth without ulcers or lesions     NECK: 1 1/2 cm left anterior cervical lymph node palpated     RESP: lungs clear to auscultation - no rales, rhonchi or wheezes     CV: regular rates and rhythm, normal S1 S2, no S3 or S4 and no murmur, click or rub     ABDOMEN:  soft, nontender, no HSM or masses and bowel sounds normal     MS: extremities normal- no gross deformities noted, no evidence of inflammation in joints, FROM in all extremities.     SKIN: no suspicious lesions or rashes     NEURO: Normal strength and tone, sensory exam grossly normal, mentation intact and speech normal     PSYCH: mentation appears normal. and affect normal/bright     LYMPHATICS: No cervical adenopathy    Recent Labs   Lab Test 08/26/21  0929 08/13/21  1632 05/03/21  0921 04/30/21  1335 02/08/21  1116   HGB  --   --   --  12.1* 12.6*   PLT  --   --   --  207 322   INR 2.2* 2.9*   < >  --   --    NA  --   --   --  135* 137   POTASSIUM  --   --   --  5.1* 4.8   CR  --   --   --  2.21* 1.39*    < > = values in this interval not displayed.        Diagnostics:  Labs pending at this time.  Results will be reviewed when available.   EKG on 7/30/21 showed artrial paced rhythm with prolonged AV conduction    Revised Cardiac Risk  Index (RCRI):  The patient has the following serious cardiovascular risks for perioperative complications:   - Coronary Artery Disease (MI, positive stress test, angina, Qs on EKG) = 1 point   - Congestive Heart Failure (pulmonary edema, PND, s3 anthony, CXR with pulmonary congestion, basilar rales) = 1 point     RCRI Interpretation: 2 points: Class III (moderate risk - 6.6% complication rate)     Estimated Functional Capacity: Performs 4 METS exercise without symptoms (e.g., light housework, stairs, 4 mph walk, 7 mph bike, slow step dance)           Signed Electronically by: VANESSA Tang CNP  Copy of this evaluation report is provided to requesting physician.

## 2021-09-11 ENCOUNTER — LAB (OUTPATIENT)
Dept: FAMILY MEDICINE | Facility: CLINIC | Age: 81
End: 2021-09-11
Attending: NURSE PRACTITIONER
Payer: MEDICARE

## 2021-09-11 DIAGNOSIS — Z01.818 PRE-OP EXAM: ICD-10-CM

## 2021-09-11 PROCEDURE — U0005 INFEC AGEN DETEC AMPLI PROBE: HCPCS | Performed by: FAMILY MEDICINE

## 2021-09-11 PROCEDURE — U0003 INFECTIOUS AGENT DETECTION BY NUCLEIC ACID (DNA OR RNA); SEVERE ACUTE RESPIRATORY SYNDROME CORONAVIRUS 2 (SARS-COV-2) (CORONAVIRUS DISEASE [COVID-19]), AMPLIFIED PROBE TECHNIQUE, MAKING USE OF HIGH THROUGHPUT TECHNOLOGIES AS DESCRIBED BY CMS-2020-01-R: HCPCS | Performed by: FAMILY MEDICINE

## 2021-09-11 NOTE — LETTER
September 12, 2021      Trenton Nova  [address] [address]        Dear ,    We are writing to inform you of your test results.    Your covid test is normal.     Resulted Orders   Asymptomatic COVID-19 Virus (Coronavirus) by PCR Nasopharyngeal   Result Value Ref Range    SARS CoV2 PCR Negative Negative      Comment:      NEGATIVE: SARS-CoV-2 (COVID-19) RNA not detected, presumed negative.    Narrative    Testing was performed using the [employer] SARS-CoV-2 Assay on the  [employer] Instrument System. Additional information about this  Emergency Use Authorization (EUA) assay can be found via the Lab  Guide. This test should be ordered for the detection of SARS-CoV-2 in  individuals who meet SARS-CoV-2 clinical and/or epidemiological  criteria. Test performance is unknown in asymptomatic patients. This  test is for in vitro diagnostic use under the FDA EUA for  laboratories certified under CLIA to perform high complexity testing.  This test has not been FDA cleared or approved. A negative result  does not rule out the presence of PCR inhibitors in the specimen or  target RNA in concentration below the limit of detection for the  assay. The possibility of a false negative should be considered if  the patient's recent exposure or clinical presentation suggests  COVID-19. This test was validated by the [facility]. This laboratory is certified under  the Clinical Laboratory Improvement Amendments of 1988 (CLIA-88) as  qualified to perform high complexity laboratory testing.       If you have any questions or concerns, please call the clinic at the number listed above.       Sincerely,      VANESSA Tang CNP

## 2021-09-12 LAB — SARS-COV-2 RNA RESP QL NAA+PROBE: NEGATIVE

## 2021-09-13 ENCOUNTER — ANTICOAGULATION THERAPY VISIT (OUTPATIENT)
Dept: ANTICOAGULATION | Facility: CLINIC | Age: 81
End: 2021-09-13

## 2021-09-13 ENCOUNTER — LAB (OUTPATIENT)
Dept: LAB | Facility: CLINIC | Age: 81
End: 2021-09-13
Payer: MEDICARE

## 2021-09-13 DIAGNOSIS — I48.91 ATRIAL FIBRILLATION (H): Primary | ICD-10-CM

## 2021-09-13 DIAGNOSIS — Z79.01 LONG TERM (CURRENT) USE OF ANTICOAGULANTS: ICD-10-CM

## 2021-09-13 DIAGNOSIS — I48.19 PERSISTENT ATRIAL FIBRILLATION (H): ICD-10-CM

## 2021-09-13 LAB — INR BLD: 2.1 (ref 0.9–1.1)

## 2021-09-13 PROCEDURE — 36416 COLLJ CAPILLARY BLOOD SPEC: CPT

## 2021-09-13 PROCEDURE — 85610 PROTHROMBIN TIME: CPT

## 2021-09-13 NOTE — PROGRESS NOTES
ANTICOAGULATION MANAGEMENT     Trenton Nova 81 year old male is on warfarin with therapeutic INR result. (Goal INR 2.0-3.0)    Recent labs: (last 7 days)     09/13/21  0847   INR 2.1*       ASSESSMENT     Source(s): Chart Review and Template       Warfarin doses taken: Warfarin taken as instructed    Diet: No new diet changes identified    New illness, injury, or hospitalization: No    Medication/supplement changes: None noted    Signs or symptoms of bleeding or clotting: No    Previous INR: Subtherapeutic    Additional findings: Upcoming surgery/procedure watchman procedure 9/15/21     PLAN     Recommended plan for no diet, medication or health factor changes affecting INR     Dosing Instructions: Continue your current warfarin dose with next INR in 3 weeks       Summary  As of 9/13/2021    Full warfarin instructions:  1 mg every Tue, Sat; 1.25 mg all other days   Next INR check:  10/4/2021             Detailed voice message left for Trenton with dosing instructions and follow up date.     Contact [phone] to schedule and with any changes, questions or concerns.     Education provided: Please call back if any changes to your diet, medications or how you've been taking warfarin    Plan made per [facility] anticoagulation protocol    Ileana Hui RN  Anticoagulation Clinic  9/13/2021    _______________________________________________________________________     Anticoagulation Episode Summary     Current INR goal:  2.0-3.0   TTR:  64.9 % (1 y)   Target end date:     [facility]:  [facility]    Indications    Atrial Fibrillation [I48.91]  Long term (current) use of anticoagulants [Z79.01]           Comments:           Anticoagulation Care Providers     Provider Role Specialty Phone number    Sarwat Mayorga MD Referring Internal Medicine [phone]

## 2021-09-15 ENCOUNTER — TRANSFERRED RECORDS (OUTPATIENT)
Dept: HEALTH INFORMATION MANAGEMENT | Facility: CLINIC | Age: 81
End: 2021-09-15

## 2021-09-15 LAB — EJECTION FRACTION: NORMAL %

## 2021-09-16 ENCOUNTER — TRANSFERRED RECORDS (OUTPATIENT)
Dept: HEALTH INFORMATION MANAGEMENT | Facility: CLINIC | Age: 81
End: 2021-09-16

## 2021-09-16 LAB
CREATININE (EXTERNAL): 1.66 MG/DL (ref 0.72–1.25)
GFR ESTIMATED (EXTERNAL): 40 ML/MIN/1.73M2
GFR ESTIMATED (IF AFRICAN AMERICAN) (EXTERNAL): 48 ML/MIN/1.73M2
GLUCOSE (EXTERNAL): 124 MG/DL (ref 65–100)
POTASSIUM (EXTERNAL): 4.8 MMOL/L (ref 3.5–5)

## 2021-09-20 ENCOUNTER — OFFICE VISIT (OUTPATIENT)
Dept: INTERNAL MEDICINE | Facility: CLINIC | Age: 81
End: 2021-09-20
Payer: MEDICARE

## 2021-09-20 VITALS
OXYGEN SATURATION: 98 % | DIASTOLIC BLOOD PRESSURE: 60 MMHG | BODY MASS INDEX: 23.07 KG/M2 | WEIGHT: 147 LBS | HEART RATE: 67 BPM | SYSTOLIC BLOOD PRESSURE: 112 MMHG | HEIGHT: 67 IN

## 2021-09-20 DIAGNOSIS — I48.91 ATRIAL FIBRILLATION, UNSPECIFIED TYPE (H): Primary | ICD-10-CM

## 2021-09-20 PROCEDURE — 99213 OFFICE O/P EST LOW 20 MIN: CPT | Performed by: INTERNAL MEDICINE

## 2021-09-20 RX ORDER — AMOXICILLIN 500 MG
1200 CAPSULE ORAL DAILY
COMMUNITY
End: 2022-02-09

## 2021-09-20 RX ORDER — ASPIRIN 81 MG/1
81 TABLET ORAL DAILY
COMMUNITY

## 2021-09-20 ASSESSMENT — MIFFLIN-ST. JEOR: SCORE: 1330.42

## 2021-09-20 NOTE — PROGRESS NOTES
"Assessment/Plan:    Atrial fibrillation with recent watchman procedure and groin hematoma resolving patient reassured.    Chronic warfarin use stop aspirin okay for fish oil as per recommendation of ear nose and throat specialist.    Multiple myeloma by history with myeloma kidney stable.    Status post Whipple procedure primary adenocarcinoma of the ampulla of Vater.  Status post Whipple procedure [facility] no clinical evidence of recurrence.  Initial presentation was dark urine.    25 minutes spent on the date of the encounter doing chart review, patient visit and documentation     Subjective:  Trenton Nova is a 81 year old male presents for the following health issues as above    ROS:  No blood in stool or urine med list reviewed.    Objective:  /60 (BP Location: Right arm, Patient Position: Sitting)   Pulse 67   Ht 1.702 m (5' 7\")   Wt 66.7 kg (147 lb)   SpO2 98%   BMI 23.02 kg/m    Chest clear to auscultation and percussion.  Heart tones regular rhythm without murmur rub or gallop.  Abdomen soft nontender no organomegaly.  No peritoneal signs.  Extremities free of edema cyanosis or clubbing.  Neck veins nondistended no thyromegaly or scleral icterus noted, carotids full.  Skin warm and dry easily conversant good spirited.  Normal intelligence.  Neurologically intact no gross localizing findings.  Anicteric easily conversant good spirited.  Hematoma noted right groin and left face from effective anesthesia.  Allergy Ambien and sulfa.  "

## 2021-09-21 ENCOUNTER — ANTICOAGULATION THERAPY VISIT (OUTPATIENT)
Dept: ANTICOAGULATION | Facility: CLINIC | Age: 81
End: 2021-09-21

## 2021-09-21 ENCOUNTER — LAB (OUTPATIENT)
Dept: LAB | Facility: CLINIC | Age: 81
End: 2021-09-21
Payer: MEDICARE

## 2021-09-21 DIAGNOSIS — I48.19 PERSISTENT ATRIAL FIBRILLATION (H): ICD-10-CM

## 2021-09-21 DIAGNOSIS — I48.91 ATRIAL FIBRILLATION (H): Primary | ICD-10-CM

## 2021-09-21 DIAGNOSIS — Z79.01 LONG TERM (CURRENT) USE OF ANTICOAGULANTS: ICD-10-CM

## 2021-09-21 LAB — INR BLD: 2.3 (ref 0.9–1.1)

## 2021-09-21 PROCEDURE — 85610 PROTHROMBIN TIME: CPT

## 2021-09-21 NOTE — PROGRESS NOTES
ANTICOAGULATION MANAGEMENT     Trenton Nova 81 year old male is on warfarin with therapeutic INR result. (Goal INR 2.0-3.0)    Recent labs: (last 7 days)     09/21/21  0929   INR 2.3*       ASSESSMENT     Source(s): Chart Review, Patient/Caregiver Call and Template       Warfarin doses taken: Warfarin taken as instructed    Diet: No new diet changes identified    New illness, injury, or hospitalization: No    Medication/supplement changes: None noted    Signs or symptoms of bleeding or clotting: No    Previous INR: Therapeutic last visit; previously outside of goal range    Additional findings: None     PLAN     Recommended plan for no diet, medication or health factor changes affecting INR     Dosing Instructions: Continue your current warfarin dose with next INR in 2 weeks       Summary  As of 9/21/2021    Full warfarin instructions:  1 mg every Tue, Sat; 1.25 mg all other days   Next INR check:               Telephone call with Trenton who verbalizes understanding and agrees to plan    Check at provider office visit    Education provided: Goal range and significance of current result    Plan made per [facility] anticoagulation protocol    Ileana Hui RN  Anticoagulation Clinic  9/21/2021    _______________________________________________________________________     Anticoagulation Episode Summary     Current INR goal:  2.0-3.0   TTR:  66.3 % (1 y)   Target end date:     Send INR reminders to:  [facility]    Indications    Atrial Fibrillation [I48.91]  Long term (current) use of anticoagulants [Z79.01]           Comments:           Anticoagulation Care Providers     Provider Role Specialty Phone number    Sarwat Mayorga MD Referring Internal Medicine [phone]

## 2021-10-04 DIAGNOSIS — R79.89 ELEVATED TSH: ICD-10-CM

## 2021-10-05 RX ORDER — LEVOTHYROXINE SODIUM 125 UG/1
TABLET ORAL
Qty: 60 TABLET | Refills: 0 | Status: SHIPPED | OUTPATIENT
Start: 2021-10-05 | End: 2021-10-11 | Stop reason: DRUGHIGH

## 2021-10-05 NOTE — TELEPHONE ENCOUNTER
"Routing refill request to provider for review/approval because:  Labs out of range:  TSH    Last Written Prescription Date:  8/16/21  Last Fill Quantity: 60,  # refills: 0   Last office visit provider:  9/20/21     Requested Prescriptions   Pending Prescriptions Disp Refills     levothyroxine (SYNTHROID/LEVOTHROID) 125 MCG tablet [Pharmacy Med Name: LEVOTHYROXINE 0.125MG (125MCG) TAB] 60 tablet 0     Sig: TAKE 1 TABLET(125 MCG) BY MOUTH DAILY       Thyroid Protocol Failed - 10/4/2021  7:18 PM        Failed - Normal TSH on file in past 12 months     Recent Labs   Lab Test 08/26/21  0929   TSH 12.74*              Passed - Patient is 12 years or older        Passed - Recent (12 mo) or future (30 days) visit within the authorizing provider's specialty     Patient has had an office visit with the authorizing provider or a provider within the authorizing providers department within the previous 12 mos or has a future within next 30 days. See \"Patient Info\" tab in inbasket, or \"Choose Columns\" in Meds & Orders section of the refill encounter.              Passed - Medication is active on med list             See Price RN 10/05/21 11:45 AM  "

## 2021-10-08 ENCOUNTER — OFFICE VISIT (OUTPATIENT)
Dept: INTERNAL MEDICINE | Facility: CLINIC | Age: 81
End: 2021-10-08
Payer: MEDICARE

## 2021-10-08 VITALS
BODY MASS INDEX: 23.23 KG/M2 | HEART RATE: 65 BPM | WEIGHT: 148 LBS | SYSTOLIC BLOOD PRESSURE: 128 MMHG | HEIGHT: 67 IN | OXYGEN SATURATION: 99 % | DIASTOLIC BLOOD PRESSURE: 64 MMHG

## 2021-10-08 DIAGNOSIS — I48.91 ATRIAL FIBRILLATION WITH RVR (H): Primary | ICD-10-CM

## 2021-10-08 LAB — TSH SERPL DL<=0.005 MIU/L-ACNC: 7.86 UIU/ML (ref 0.3–5)

## 2021-10-08 PROCEDURE — 36415 COLL VENOUS BLD VENIPUNCTURE: CPT | Performed by: INTERNAL MEDICINE

## 2021-10-08 PROCEDURE — 99213 OFFICE O/P EST LOW 20 MIN: CPT | Performed by: INTERNAL MEDICINE

## 2021-10-08 PROCEDURE — 84443 ASSAY THYROID STIM HORMONE: CPT | Performed by: INTERNAL MEDICINE

## 2021-10-08 ASSESSMENT — MIFFLIN-ST. JEOR: SCORE: 1334.95

## 2021-10-08 NOTE — PROGRESS NOTES
"Assessment/Plan:    Atrial fibrillation with rapid ventricular response check TSH level today status post watchman procedure hematoma right groin resolved.    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  Stable weight stable appetite remains good nonicteric.  No weight loss.    Hypothyroidism latest TSH 12.7 TSH level pending on thyroid supplement 125 mcg daily levothyroxine.  Continue same until TSH back RTC 6 to 8 weeks time.    25 minutes spent on the date of the encounter doing chart review, interpretation of tests, patient visit and documentation     Subjective:  Trenton Nova is a 81 year old male presents for the following health issues hypothyroidism on thyroid supplement.  Latest TSH 12.74.  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  No clinical evidence of recurrence.  Whipple procedure at [facility].    Hematoma right groin S status post watchman procedure for atrial fibrillation.  Hematoma has resolved.    ROS:  No blood in stool or urine denies chest pain shortness of breath medication list reviewed reconciled.  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  Non-smoker.  Allergies include Ambien sulfa.    Objective:  /64 (BP Location: Right arm, Patient Position: Sitting)   Pulse 65   Ht 1.702 m (5' 7\")   Wt 67.1 kg (148 lb)   SpO2 99%   BMI 23.18 kg/m    Chest clear to auscultation and percussion.  Heart tones regular rhythm without murmur rub or gallop.  Abdomen soft nontender no organomegaly.  No peritoneal signs.  Extremities free of edema cyanosis or clubbing.  Neck veins nondistended no thyromegaly or scleral icterus noted, carotids full.  Skin warm and dry easily conversant good spirited.  Normal intelligence.  Neurologically intact no gross localizing findings.  Moist rales heard bilaterally both chest posteriorly no rhonchi " no wheezing.  O2 sats 99%.  Pulse 65 regular.  BMI 23 weight stable 148 pounds.  Nonicteric no scleral icterus or jaundice.  Denies cola colored urine or dena colored stools.

## 2021-10-08 NOTE — LETTER
October 11, 2021      Trenton MCKEON Avtar  [address] [facility] [address]        Dear ,    We are writing to inform you of your test results.    Please increase the levothyroxine to 150 mcg daily dispense number 90  tablets take 1 tablet daily of 150 mcg.      Resulted Orders   TSH   Result Value Ref Range    TSH 7.86 (H) 0.30 - 5.00 uIU/mL       If you have any questions or concerns, please call the clinic at the number listed above.       Sincerely,      Sarwat Mayorga MD

## 2021-10-11 ENCOUNTER — HOSPITAL ENCOUNTER (OUTPATIENT)
Dept: CT IMAGING | Facility: CLINIC | Age: 81
Discharge: HOME | End: 2021-10-11
Attending: OTOLARYNGOLOGY | Admitting: OTOLARYNGOLOGY
Payer: MEDICARE

## 2021-10-11 DIAGNOSIS — I10 ESSENTIAL HYPERTENSION: Primary | ICD-10-CM

## 2021-10-11 DIAGNOSIS — R43.2 TASTE IMPAIRMENT: ICD-10-CM

## 2021-10-11 PROCEDURE — 70486 CT MAXILLOFACIAL W/O DYE: CPT

## 2021-10-11 RX ORDER — LEVOTHYROXINE SODIUM 150 UG/1
150 TABLET ORAL DAILY
Qty: 90 TABLET | Refills: 3 | Status: SHIPPED | OUTPATIENT
Start: 2021-10-11 | End: 2021-12-22

## 2021-10-15 LAB — INR (EXTERNAL): 2.2 (ref 0.9–1.1)

## 2021-10-19 ENCOUNTER — OFFICE VISIT (OUTPATIENT)
Dept: OTOLARYNGOLOGY | Facility: CLINIC | Age: 81
End: 2021-10-19
Payer: MEDICARE

## 2021-10-19 ENCOUNTER — ANTICOAGULATION THERAPY VISIT (OUTPATIENT)
Dept: ANTICOAGULATION | Facility: CLINIC | Age: 81
End: 2021-10-19

## 2021-10-19 VITALS — BODY MASS INDEX: 23.07 KG/M2 | WEIGHT: 147 LBS | HEIGHT: 67 IN

## 2021-10-19 DIAGNOSIS — Z79.01 LONG TERM (CURRENT) USE OF ANTICOAGULANTS: ICD-10-CM

## 2021-10-19 DIAGNOSIS — M54.2 CERVICALGIA: Primary | ICD-10-CM

## 2021-10-19 DIAGNOSIS — I48.91 ATRIAL FIBRILLATION (H): Primary | ICD-10-CM

## 2021-10-19 PROCEDURE — 99213 OFFICE O/P EST LOW 20 MIN: CPT | Performed by: OTOLARYNGOLOGY

## 2021-10-19 ASSESSMENT — PAIN SCALES - GENERAL: PAINLEVEL: NO PAIN (0)

## 2021-10-19 ASSESSMENT — MIFFLIN-ST. JEOR: SCORE: 1330.42

## 2021-10-19 NOTE — PROGRESS NOTES
ANTICOAGULATION MANAGEMENT     Trenton Nova 81 year old male is on warfarin with therapeutic INR result. (Goal INR 2.0-3.0)    Recent labs: (last 7 days)     10/15/21  0000   INR 2.2*       ASSESSMENT     Source(s): Chart Review and Patient/Caregiver Call       Warfarin doses taken: Warfarin taken as instructed    Diet: No new diet changes identified    New illness, injury, or hospitalization: Yes: Pt will be having CHANTELLE tomorrow post watchman.     Medication/supplement changes: None noted    Signs or symptoms of bleeding or clotting: No    Previous INR: Therapeutic last 2(+) visits    Additional findings: Pt will be leaving for [place] and will be back in December. Per pt, he has an established clinic and will have results faxed to [facility]. confirmed fax number with pt and advised to call if any issues arise.      PLAN     Recommended plan for temporary change(s) affecting INR     Dosing Instructions: Continue your current warfarin dose with next INR in 4 weeks       Summary  As of 10/19/2021    Full warfarin instructions:  1 mg every Tue, Sat; 1.25 mg all other days   Next INR check:  11/16/2021             Telephone call with Trenton who verbalizes understanding and agrees to plan    Patient using outside facility for labs    Education provided: Goal range and significance of current result    Plan made per [facility] anticoagulation protocol    Ileana Hui RN  Anticoagulation Clinic  10/19/2021    _______________________________________________________________________     Anticoagulation Episode Summary     Current INR goal:  2.0-3.0   TTR:  67.9 % (1 y)   Target end date:     Send INR reminders to:  [facility]    Indications    Atrial Fibrillation [I48.91]  Long term (current) use of anticoagulants [Z79.01]           Comments:           Anticoagulation Care Providers     Provider Role Specialty Phone number    Sarwat Mayorga MD Referring Internal Medicine [phone]

## 2021-10-19 NOTE — PROGRESS NOTES
"  Otolaryngology Clinic    Name: Trenton Nova  MRN: [id number]  Age: 81 year old  : 1940  10/19/2021      Chief Complaint:   Follow up     History of Present Illness:   Trenton Nova is a 81 year old male with a history of taste dysfunction s/p whipple procedure (2021) who presents for follow up. The last time I saw the patient on 2021, the patient reported that within the last 3 months, when he eats he felt as though he was \"chewing on cardboard\" due to his taste dysfunction. Along with this, he reported decreased appetite, but had no dry mouth or smell changes. I recommended Biotene for mouth moisture and olive oil in a mist sprayer for mouth lubrication. Additionally, I encouraged him to take B complexes. Finally, I recommended tasting testing.     Of note, the patient had a Watchman FLX placed in the [facility] on 09/15/2021.     Today, the patient reports that he is still unable to taste anything. He has been compliant with using fish oil. He explained that his taste is perhaps a little bit better, but not greatly improved. Additionally, he explained that often, he can taste some things like peaches. He reports that he failed the smell test.      Review of Systems:   Pertinent items are noted in HPI or as in patient entered ROS below, remainder of complete ROS is negative.   No flowsheet data found.     Physical Exam:   Ht 1.702 m (5' 7\")   Wt 66.7 kg (147 lb)   BMI 23.02 kg/m       PHYSICAL EXAMINATION:    Constitutional:  The patient was unaccompanied, well-groomed, and in no acute distress.    Skin:  Warm and pink.    Neurologic:  Alert and oriented x 3.  CN's III-XII within normal limits.  Voice normal.   Psychiatric:  The patient's affect was calm, cooperative, and appropriate.    Respiratory:  Breathing comfortably without stridor or exertion of accessory muscles.    Eyes: Extraocular movement intact.    Head:  Normocephalic and atraumatic.  No lesions or scars.    Neck:  Supple with " normal laryngeal and tracheal landmarks.    Imagining:   CT SINUS W/O CONTRAST  IMPRESSION:    1.  Bilateral antrostomies and subtotal ethmoidectomies.  2.  Mild mucosal thickening in the residual ethmoid air cells and in  the maxillary sinuses. No air-fluid level.  3.  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    I independently reviewed the patient's imaging records.      Assessment and Plan:  No diagnosis found.  Trenton Nova is a 81 year old male with a history of taste dysfunction s/p whipple procedure (03/19/2021) who presents for follow up. I have recommended B complex vitamins as well as krill oil, or continue the fish oil if it contains DHA (docosahexaenoic acid).     Follow-up: Return if symptoms worsen or fail to improve.      Scribe Disclosure:  I, Ashly Herring, am serving as a scribe to document services personally performed by Andrew Blanco MD at this visit, based upon the provider's statements to me. All documentation has been reviewed by the aforementioned provider prior to being entered into the official medical record.

## 2021-10-19 NOTE — PATIENT INSTRUCTIONS
1. Please follow-up in clinic as needed.   2. Please call the ENT clinic with any questions,concerns, new or worsening symptoms.    -Clinic number is [phone]   - [facility]'s direct line (Dr. Blanco's nurse) [phone]                b complex vitamins    crill oil pills     Docosahexaenoic acid (dha)

## 2021-10-19 NOTE — LETTER
"10/19/2021       RE: Trenton Nvoa  [address] [address]     Dear Colleague,    Thank you for referring your patient, Trenton Nova, to the [facility] [place] [facility]. Please see a copy of my visit note below.      Otolaryngology Clinic    Name: Trenton Nova  MRN: [id number]  Age: 81 year old  : 1940  10/19/2021      Chief Complaint:   Follow up     History of Present Illness:   Trenton Nova is a 81 year old male with a history of taste dysfunction s/p whipple procedure (2021) who presents for follow up. The last time I saw the patient on 2021, the patient reported that within the last 3 months, when he eats he felt as though he was \"chewing on cardboard\" due to his taste dysfunction. Along with this, he reported decreased appetite, but had no dry mouth or smell changes. I recommended Biotene for mouth moisture and olive oil in a mist sprayer for mouth lubrication. Additionally, I encouraged him to take B complexes. Finally, I recommended tasting testing.     Of note, the patient had a Watchman FLX placed in the [facility] on 09/15/2021.     Today, the patient reports that he is still unable to taste anything. He has been compliant with using fish oil. He explained that his taste is perhaps a little bit better, but not greatly improved. Additionally, he explained that often, he can taste some things like peaches. He reports that he failed the smell test.      Review of Systems:   Pertinent items are noted in HPI or as in patient entered ROS below, remainder of complete ROS is negative.   No flowsheet data found.     Physical Exam:   Ht 1.702 m (5' 7\")   Wt 66.7 kg (147 lb)   BMI 23.02 kg/m       PHYSICAL EXAMINATION:    Constitutional:  The patient was unaccompanied, well-groomed, and in no acute distress.    Skin:  Warm and pink.    Neurologic:  Alert and oriented x 3.  CN's " III-XII within normal limits.  Voice normal.   Psychiatric:  The patient's affect was calm, cooperative, and appropriate.    Respiratory:  Breathing comfortably without stridor or exertion of accessory muscles.    Eyes: Extraocular movement intact.    Head:  Normocephalic and atraumatic.  No lesions or scars.    Neck:  Supple with normal laryngeal and tracheal landmarks.    Imagining:   CT SINUS W/O CONTRAST  IMPRESSION:    1.  Bilateral antrostomies and subtotal ethmoidectomies.  2.  Mild mucosal thickening in the residual ethmoid air cells and in  the maxillary sinuses. No air-fluid level.  3.  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    I independently reviewed the patient's imaging records.      Assessment and Plan:  No diagnosis found.  Trenton Nova is a 81 year old male with a history of taste dysfunction s/p whipple procedure (03/19/2021) who presents for follow up. I have recommended B complex vitamins as well as krill oil, or continue the fish oil if it contains DHA (docosahexaenoic acid).     Follow-up: Return if symptoms worsen or fail to improve.      Scribe Disclosure:  I, Ashly Herring, am serving as a scribe to document services personally performed by Andrew Blanco MD at this visit, based upon the provider's statements to me. All documentation has been reviewed by the aforementioned provider prior to being entered into the official medical record.    Again, thank you for allowing me to participate in the care of your patient.      Sincerely,    Andrew Blanco MD

## 2021-11-16 ENCOUNTER — DOCUMENTATION ONLY (OUTPATIENT)
Dept: ANTICOAGULATION | Facility: CLINIC | Age: 81
End: 2021-11-16
Payer: MEDICARE

## 2021-11-16 ENCOUNTER — TRANSFERRED RECORDS (OUTPATIENT)
Dept: HEALTH INFORMATION MANAGEMENT | Facility: CLINIC | Age: 81
End: 2021-11-16
Payer: MEDICARE

## 2021-11-16 DIAGNOSIS — Z79.01 LONG TERM (CURRENT) USE OF ANTICOAGULANTS: ICD-10-CM

## 2021-11-16 DIAGNOSIS — I48.91 ATRIAL FIBRILLATION (H): Primary | ICD-10-CM

## 2021-11-16 NOTE — PROGRESS NOTES
ANTICOAGULATION  MANAGEMENT    Trenton Nova is being discharged from the [facility] (ACC).    Reason for discharge: warfarin therapy completed     Per 10/21/21 cardiology OV, warfarin discontinued. Pt to continue aspirin and start plavix.     Anticoagulation episode resolved, ACC referral closed and INR Standing order discontinued    If patient needs warfarin management in the future, please send a new referral    Ileana Hui RN

## 2021-11-24 ENCOUNTER — DOCUMENTATION ONLY (OUTPATIENT)
Dept: OTHER | Facility: CLINIC | Age: 81
End: 2021-11-24
Payer: MEDICARE

## 2021-12-13 ENCOUNTER — OFFICE VISIT (OUTPATIENT)
Dept: INTERNAL MEDICINE | Facility: CLINIC | Age: 81
End: 2021-12-13
Payer: MEDICARE

## 2021-12-13 VITALS
DIASTOLIC BLOOD PRESSURE: 68 MMHG | BODY MASS INDEX: 23.23 KG/M2 | HEIGHT: 67 IN | OXYGEN SATURATION: 97 % | SYSTOLIC BLOOD PRESSURE: 122 MMHG | HEART RATE: 63 BPM | WEIGHT: 148 LBS

## 2021-12-13 DIAGNOSIS — I10 ESSENTIAL HYPERTENSION: Primary | ICD-10-CM

## 2021-12-13 LAB
ALBUMIN SERPL-MCNC: 3.4 G/DL (ref 3.5–5)
ALP SERPL-CCNC: 177 U/L (ref 45–120)
ALT SERPL W P-5'-P-CCNC: 39 U/L (ref 0–45)
ANION GAP SERPL CALCULATED.3IONS-SCNC: 12 MMOL/L (ref 5–18)
AST SERPL W P-5'-P-CCNC: 46 U/L (ref 0–40)
BILIRUB DIRECT SERPL-MCNC: 0.2 MG/DL
BILIRUB SERPL-MCNC: 0.5 MG/DL (ref 0–1)
BUN SERPL-MCNC: 36 MG/DL (ref 8–28)
CALCIUM SERPL-MCNC: 8.8 MG/DL (ref 8.5–10.5)
CHLORIDE BLD-SCNC: 106 MMOL/L (ref 98–107)
CO2 SERPL-SCNC: 22 MMOL/L (ref 22–31)
CREAT SERPL-MCNC: 1.71 MG/DL (ref 0.7–1.3)
GFR SERPL CREATININE-BSD FRML MDRD: 37 ML/MIN/1.73M2
GLUCOSE BLD-MCNC: 90 MG/DL (ref 70–125)
POTASSIUM BLD-SCNC: 4.8 MMOL/L (ref 3.5–5)
PROT SERPL-MCNC: 7 G/DL (ref 6–8)
SODIUM SERPL-SCNC: 140 MMOL/L (ref 136–145)
TSH SERPL DL<=0.005 MIU/L-ACNC: 4.85 UIU/ML (ref 0.3–5)

## 2021-12-13 PROCEDURE — 99214 OFFICE O/P EST MOD 30 MIN: CPT | Performed by: INTERNAL MEDICINE

## 2021-12-13 PROCEDURE — 36415 COLL VENOUS BLD VENIPUNCTURE: CPT | Performed by: INTERNAL MEDICINE

## 2021-12-13 PROCEDURE — 84443 ASSAY THYROID STIM HORMONE: CPT | Performed by: INTERNAL MEDICINE

## 2021-12-13 PROCEDURE — 99000 SPECIMEN HANDLING OFFICE-LAB: CPT | Performed by: INTERNAL MEDICINE

## 2021-12-13 PROCEDURE — 82248 BILIRUBIN DIRECT: CPT | Performed by: INTERNAL MEDICINE

## 2021-12-13 PROCEDURE — 80151 DRUG ASSAY AMIODARONE: CPT | Mod: 90 | Performed by: INTERNAL MEDICINE

## 2021-12-13 PROCEDURE — 80053 COMPREHEN METABOLIC PANEL: CPT | Performed by: INTERNAL MEDICINE

## 2021-12-13 ASSESSMENT — MIFFLIN-ST. JEOR: SCORE: 1334.95

## 2021-12-13 NOTE — LETTER
December 14, 2021      Trenton Nova  [address] [facility] [address]        Dear ,    We are writing to inform you of your test results.    Stable and improved chronic kidney disease all other labs are quite good no evidence for metabolic acidosis or hyperkalemia.  Past health history of myeloma kidney and chronic kidney disease.  The best way to preserve kidney function is to keep the blood pressure normal and stay well-hydrated and avoid nonsteroidal anti-inflammatory drugs like ibuprofen and Naprosyn.  Tylenol preferred       Resulted Orders   Basic metabolic panel   Result Value Ref Range    Sodium 140 136 - 145 mmol/L    Potassium 4.8 3.5 - 5.0 mmol/L    Chloride 106 98 - 107 mmol/L    Carbon Dioxide (CO2) 22 22 - 31 mmol/L    Anion Gap 12 5 - 18 mmol/L    Urea Nitrogen 36 (H) 8 - 28 mg/dL    Creatinine 1.71 (H) 0.70 - 1.30 mg/dL    Calcium 8.8 8.5 - 10.5 mg/dL    Glucose 90 70 - 125 mg/dL    GFR Estimate 37 (L) >60 mL/min/1.73m2      Comment:      As of July 11, 2021, eGFR is calculated by the CKD-EPI creatinine equation, without race adjustment. eGFR can be influenced by muscle mass, exercise, and diet. The reported eGFR is an estimation only and is only applicable if the renal function is stable.   Hepatic function panel   Result Value Ref Range    Bilirubin Total 0.5 0.0 - 1.0 mg/dL    Bilirubin Direct 0.2 <=0.5 mg/dL    Protein Total 7.0 6.0 - 8.0 g/dL    Albumin 3.4 (L) 3.5 - 5.0 g/dL    Alkaline Phosphatase 177 (H) 45 - 120 U/L    AST 46 (H) 0 - 40 U/L    ALT 39 0 - 45 U/L   TSH   Result Value Ref Range    TSH 4.85 0.30 - 5.00 uIU/mL       If you have any questions or concerns, please call the clinic at the number listed above.       Sincerely,      Sarwat Mayorga MD

## 2021-12-13 NOTE — LETTER
December 20, 2021      Trenton Nova  [address] [facility] [address]        Dear ,    We are writing to inform you of your test results.    Stable and improved chronic kidney disease all other labs are quite good no evidence for metabolic acidosis or hyperkalemia.  Past health history of myeloma kidney and chronic kidney disease.  The best way to preserve kidney function is to keep the blood pressure normal and stay well-hydrated and avoid nonsteroidal anti-inflammatory drugs like Ibuprofen and Naprosyn.  Tylenol preferred    Resulted Orders   Basic metabolic panel   Result Value Ref Range    Sodium 140 136 - 145 mmol/L    Potassium 4.8 3.5 - 5.0 mmol/L    Chloride 106 98 - 107 mmol/L    Carbon Dioxide (CO2) 22 22 - 31 mmol/L    Anion Gap 12 5 - 18 mmol/L    Urea Nitrogen 36 (H) 8 - 28 mg/dL    Creatinine 1.71 (H) 0.70 - 1.30 mg/dL    Calcium 8.8 8.5 - 10.5 mg/dL    Glucose 90 70 - 125 mg/dL    GFR Estimate 37 (L) >60 mL/min/1.73m2      Comment:      As of July 11, 2021, eGFR is calculated by the CKD-EPI creatinine equation, without race adjustment. eGFR can be influenced by muscle mass, exercise, and diet. The reported eGFR is an estimation only and is only applicable if the renal function is stable.   Hepatic function panel   Result Value Ref Range    Bilirubin Total 0.5 0.0 - 1.0 mg/dL    Bilirubin Direct 0.2 <=0.5 mg/dL    Protein Total 7.0 6.0 - 8.0 g/dL    Albumin 3.4 (L) 3.5 - 5.0 g/dL    Alkaline Phosphatase 177 (H) 45 - 120 U/L    AST 46 (H) 0 - 40 U/L    ALT 39 0 - 45 U/L   TSH   Result Value Ref Range    TSH 4.85 0.30 - 5.00 uIU/mL   Amiodarone level   Result Value Ref Range    Amiodarone Level 290 (L) 1000 - 2500 ng/mL    Desethylamiodarone Level 215 ng/mL      Comment:      Note:  To convert from ng/ml to ug/ml, divide the result by         1000. Reference range (amiodarone): 1.00-2.50 ug/mL.    This test was developed and its performance characteristics  determined by [employer].  It has not been cleared or approved  by the Food and Drug Administration.    Narrative    Performed at:  01 - [facility]  [address] [place] [address]  [id number]  : Jessica Pino [facility], Phone:  [phone]       If you have any questions or concerns, please call the clinic at the number listed above.       Sincerely,      Sarwat Mayorga MD

## 2021-12-13 NOTE — PROGRESS NOTES
"Assessment/Plan:    Hypertension controlled 122/68 today check TSH basic metabolic profile para profile and amiodarone level.    History of atrial fibrillation followed by Dr. Barrera around from [facility] Department of cardiac electrophysiology.    Cancer of the ampulla of Vater status post Whipple procedure [facility] no clinical evidence of recurrence discussed in detail with the patient.    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    Bruise left hip.  Greater trochanteric area observe reassured.    Allergies Ambien and sulfa.    25 minutes spent on the date of the encounter doing chart review, patient visit and documentation     Subjective:  Trenton Nova is a 81 year old male presents for the following health issues offers no new complaints does have a bruise or bump on the left greater trochanteric area of the left hip this seems to be resolving the skin is not broken there is no sign of secondary infection.  Patient reassured.    He is status post Whipple procedure at the [facility] for cancer of the ampulla of Vater.  No clinical evidence of recurrence of previous CA 19-9's have been normal.    ROS:  No blood in stool or urine denies chest pain shortness of breath.  Recently returned home from [place] where he spends most of the winter months 2 sons 1 wife.  Divorce once first wife  of a brain tumor years ago.    Objective:  /68 (BP Location: Right arm, Patient Position: Sitting)   Pulse 63   Ht 1.702 m (5' 7\")   Wt 67.1 kg (148 lb)   SpO2 97%   BMI 23.18 kg/m    Nonicteric chest clear neck veins nondistended no carotid bruits heart tones regular rhythm without murmur rub or gallop abdomen was benign there is a bruise over his left greater trochanteric area of the hip to slowly resolving extremities are free of edema cyanosis or clubbing.  "

## 2021-12-14 NOTE — TELEPHONE ENCOUNTER
Nephrology  Patient: Jakub Candelario Date:2021   : 1975 Attending: Sanjay Gorman MD   46 year old male        Chief complaint: Acute renal failure    HPI from initial consult on 21:   This is a 46 year old with a past medical history significant for HTN,  Chronic systolic and diastolic Heart failure ,EF 40-45 % in 21, H/o chronic alcohol abuse with alcoholic cardiomyopathy, recurrent V tach since 2017  who presents with being   found on the floor in home by EMS when parents from [place] could not get a hold of their son.    Patient was very obtunded on presentation to ER, and was unable to provide a history.  Apparently had been crawling on floor for at least a day or two, perhaps more, and had open sores and bruises on body.  Patient has long history of alcohol abuse with multiple complications and apparently drinks up to 1.75 L alcohol daily at times    In ER, patient was hypoglycemic with blood glucose of 17, hypothermic with temperature of 89.7 rectally, tachycardic, tachypnic, hypoxic, and hypotensive. Heme+ dark loose stool was also noted. Labs showed severe AGMA with serum bicarb of 12, Cr 1.30   Was followed by nephrology service at [facility] prior to transfer to [facility]. Was started on RRT on 12/10/21. Underwent one session of HD on 12/10.  Due to persistent oliguria and inadequate response to diuretic , nephrologist  at [facility] recommended CRRT and was transferred to [facility]. He is currently on dobutamine, amiodarone and Bumex drips. Is non-oliguric, on diuretic. Creatinine improved from 1.91 yesterday down to 1.59 on a.m labs today.     Interval History:   21: No overnight events. Awake, laying in bed, moaning, in pain. On dobutamine. Remains on Bumex drip with good response- total urine output of 7110 ml yesterday.   21: On BiPAP overnight for acute respiratory acidosis. Awake, laying in bed. \"Can you get me a coke? How about some water?\" On Lasix  Received a faxed INR result for Trenton Nova  From [facility], [place]  INR result dated 3/26/2021 is 2.9   drip. Total urine output of 4120 ml yesterday. Remains on dobutamine.    Past Medical History:   Diagnosis Date   • Alcoholic cardiomyopathy (CMS/HCC)    • Anxiety    • Congestive cardiac failure (CMS/HCC)    • Essential (primary) hypertension    • Gastroesophageal reflux disease    • Panic disorder        History reviewed. No pertinent surgical history.    Social History     Socioeconomic History   • Marital status: Single     Spouse name: Not on file   • Number of children: Not on file   • Years of education: Not on file   • Highest education level: Not on file   Occupational History   • Not on file   Tobacco Use   • Smoking status: Former Smoker     Packs/day: 1.00     Start date: 1/1/1995     Quit date: 8/1/2018     Years since quitting: 3.3   • Smokeless tobacco: Never Used   Vaping Use   • Vaping Use: Unknown   Substance and Sexual Activity   • Alcohol use: Yes     Alcohol/week: 40.0 standard drinks     Types: 40 Shots of liquor per week     Comment: daily 1 liter of vodka per stated by ed and converstaon with cousin   • Drug use: Never   • Sexual activity: Not on file   Other Topics Concern   • Not on file   Social History Narrative   • Not on file     Social Determinants of Health     Financial Resource Strain:    • Social Determinants: Financial Resource Strain: Not on file   Food Insecurity:    • Social Determinants: Food Insecurity: Not on file   Transportation Needs:    • Lack of Transportation (Medical): Not on file   • Lack of Transportation (Non-Medical): Not on file   Physical Activity:    • Days of Exercise per Week: Not on file   • Minutes of Exercise per Session: Not on file   Stress:    • Social Determinants: Stress: Not on file   Social Connections:    • Social Determinants: Social Connections: Not on file   Intimate Partner Violence: Not At Risk   • Social Determinants: Intimate Partner Violence Past Fear: No   • Social Determinants: Intimate Partner Violence Current Fear: No       Family  History   Problem Relation Age of Onset   • Heart disease Father         s/p heart and renal transplant   • Kidney disease Father        ALLERGIES:   Allergen Reactions   • Citalopram Other (See Comments)     Jittery, hot, nervous         Review of Systems:  Difficult to obtain- pt poor historian.    Vital Last Value 24 Hour Range   Temperature 97 °F (36.1 °C) Temp  Min: 96.4 °F (35.8 °C)  Max: 97.7 °F (36.5 °C)   Pulse 82 Pulse  Min: 74  Max: 97   Respiratory 19 Resp  Min: 14  Max: 23   Blood Pressure 91/58 No data recorded   Art /53 Arterial Line BP  Min: 99/43  Max: 124/63   Pulse Oximetry 100 % SpO2  Min: 99 %  Max: 100 %     Vital Today Admitted   Weight 98.2 kg (216 lb 7.9 oz) Weight: 110.1 kg (242 lb 11.6 oz)   Height N/A Height: 5' 9\" (175.3 cm)   BMI N/A BMI (Calculated): 35.84     Weight over the past 48 Hours:  Patient Vitals for the past 48 hrs:   Weight   12/13/21 0435 100.4 kg (221 lb 5.5 oz)   12/14/21 0600 98.2 kg (216 lb 7.9 oz)        Hemodynamics:      Last Value 24 Hour Range   CVP (!) 20 mmHg CVP (mmHg)  Min: 12 mmHg  Max: 30 mmHg   PAS/PAD   No data recorded   PCWP   No data recorded   CO 6.6 l/min Cardiac Output (l/min)  Min: 5 l/min  Max: 10 l/min   CI 3 l/min/m2 Cardiac Index (l/min/m2)  Min: 2.3 l/min/m2  Max: 4.5 l/min/m2   SVR   No data recorded   SV02   No data recorded     Intake/Output:    Last Stool Occurrence: 1 (12/14/21 0600)    I/O this shift:  In: -   Out: 600 [Urine:600]    I/O last 3 completed shifts:  In: 1289 [I.V.:1289]  Out: 4120 [Urine:4120]      Intake/Output Summary (Last 24 hours) at 12/14/2021 0914  Last data filed at 12/14/2021 0900  Gross per 24 hour   Intake 1289 ml   Output 4180 ml   Net -2891 ml       Medications/Infusions:  Medications Prior to Admission   Medication Sig Dispense Refill   • lisinopril (ZESTRIL) 2.5 MG tablet Take 1 tablet by mouth nightly.     • metoPROLOL succinate (TOPROL-XL) 25 MG 24 hr tablet Take 25 mg by mouth daily.     • torsemide  (DEMADEX) 20 MG tablet Take 20 mg by mouth 2 times daily.     • Multiple Vitamins-Minerals (vitamin - therapeutic multivitamins w/minerals) tablet Take 1 tablet by mouth daily.     • ALPRAZolam (XANAX) 1 MG tablet Take 1 tablet by mouth daily as needed for Anxiety. Indications: Feeling Anxious, Panic Disorder 30 tablet 0   • folic acid (FOLATE) 1 MG tablet Take 1,000 mcg by mouth daily.  3   • sertraline (ZOLOFT) 100 MG tablet Take 1 tablet by mouth daily. 30 tablet 6     • fentaNYL  1 patch Transdermal Q3 Days   • Magnesium Standard Replacement Protocol   Does not apply See Admin Instructions   • lactobacillus acidophilus  2 tablet Per NG tube BID   • zinc sulfate  220 mg Per NG tube Daily with breakfast   • melatonin  3 mg Per NG tube Nightly   • metOLazone  2.5 mg Per NG tube Daily   • gabapentin  200 mg Per NG tube TID   • rifAXIMin  550 mg Per NG tube 2 times per day   • B complex-vitamin C-folic acid  1 tablet Per NG tube Daily   • thiamine  100 mg Per NG tube Daily   • pantoprazole  40 mg Per NG tube 2 times per day   • lactulose  10 g Per NG tube BID   • sulfamethoxazole-trimethoprim  20 mL Per NG tube 2 times per day   • sodium chloride (PF)  2 mL Intracatheter 2 times per day   • Potassium Standard Replacement Protocol   Does not apply See Admin Instructions     • furosemide (LASIX INJECT) 250 mg/125 mL in sodium chloride 0.9 % infusion 20 mg/hr (12/14/21 0800)   • sodium chloride 0.9% infusion 10 mL/hr at 12/14/21 0800   • sodium chloride 0.9% infusion     • sodium chloride 0.9% infusion     • vasopressin (PITRESSIN) 20 unit/100 mL in sodium chloride 0.9 % infusion Stopped (12/13/21 0900)   • DOBUTamine (DOBUTREX) 500 mg/250 mL in dextrose 5 % infusion 2 mcg/kg/min (12/14/21 0800)       Physical Exam:  General: Alert, laying in bed, no acute distress  HEENT: Head atraumatic, normocephalic.  Moist mucus membranes.  Sclera non-icteric.   Neck: Supple, no JVD.  Trachea midline.  Chest/Lungs: Non-labored, on  BiPAP. Symmetrical expansion.  Clear to auscultation.  No wheezes, rales or rhonchi.  CVS: Regular rate and rhythm. S1,S2 well heard. Has no pericardial rub heard.  Abdomen: BS well heard. Soft, non tender, non distended.  No hepatosplenomegaly.  Neuro: No focal neurological deficit.  Alert.    Skin: Warm, dry.  No rashes.  Extremities:  No clubbing or cyanosis. + BLE edema.      Laboratory Results:  Recent Labs   Lab 12/14/21  0404 12/13/21  1616 12/13/21  0907 12/13/21  0351 12/13/21  0351 12/12/21  2240 12/12/21  1544 12/12/21  0756 12/12/21  0407 12/11/21  0602 12/11/21  0602 12/10/21  0436 12/10/21  0431 12/09/21  0424 12/09/21  0424 12/08/21  0419 12/08/21  0419   SODIUM 144 142  --   --  140  --  140   < > 141   < > 138   < > 138   < > 141   < > 139   POTASSIUM 3.7 3.9 3.9   < > 3.8   < > 3.7   < > 3.7   < > 3.6   < > 3.5   < > 3.6   < > 3.1*   CHLORIDE 109* 110*  --   --  107  --  109*   < > 109*   < > 105   < > 107   < > 109*   < > 107   CO2 29 28  --   --  27  --  25   < > 24   < > 22   < > 19*   < > 21   < > 20*   ANIONGAP 10 8*  --   --  10  --  10   < > 12   < > 15   < > 16   < > 15   < > 15   BUN 33* 32*  --   --  31*  --  30*   < > 32*   < > 36*   < > 66*   < > 58*   < > 55*   CREATININE 1.87* 1.85*  --   --  1.72*  --  1.82*   < > 1.59*   < > 1.91*   < > 2.81*   < > 2.55*   < > 2.23*   GLUCOSE 90 97  --   --  105*  --  114*   < > 122*   < > 80   < > 105*   < > 86   < > 93   CALCIUM 8.9 8.9  --   --  8.8  --  8.5   < > 8.5   < > 8.5   < > 9.0   < > 8.8   < > 8.7   ALBUMIN  --   --   --   --  3.8  --   --   --  3.0*  --  3.0*   < > 2.8*   < > 2.9*   < > 2.9*   PHOS 3.1  --  3.2  --  3.1  --   --    < >  --   --  2.9   < > 4.3   < > 4.5   < > 3.7   MG 1.7  --   --   --  2.0  --  2.0  --   --   --   --    < >  --   --  2.3   < > 2.3   AST  --   --   --   --  9  --   --   --  9  --  12   < > 16   < > 12   < > 13   GPT  --   --   --   --  15  --   --   --  14  --  13   < > 20   < > 25   < > 28   ALKPT  --    --   --   --  122*  --   --   --  117  --  117   < > 140*   < > 131*   < > 148*   BILIRUBIN  --   --   --   --  1.2*  --   --   --  1.2*  --  1.3*   < > 1.0   < > 0.9   < > 0.8   CPK  --   --   --   --   --   --   --   --   --   --   --   --  27*  --  19*  --  30*    < > = values in this interval not displayed.       Recent Labs   Lab 12/14/21  0404 12/13/21  0351 12/12/21  0407 12/10/21  0429 12/09/21  0424 12/08/21  0420   WBC 10.4  --  9.5 9.6   < >  --    HGB 9.4*  --  9.4* 10.3*   < >  --    HCT 31.8*  --  30.8* 33.5*   < >  --    PLT 60*  --  64* 64*   < >  --    PST  --  27  --   --   --  13*   FERR  --   --   --   --   --  235    < > = values in this interval not displayed.       Recent Labs   Lab 12/14/21  0621 12/14/21  0415 12/13/21  2255   APH 7.30* 7.27* 7.24*   APO2 110* 127* 116*   AHCO3 28 28 28   ASAT 99 100* 99       No results found    Urine Panel  No results found    Imaging/Procedures:    CT Abdomen pelvis wo contrast (12/3/21):  IMPRESSION:     Pleural effusions bilateral and diffuse subcutaneous edema  consistent with anasarca. Assessment for abscess limited without  IV contrast but there is no subcutaneous gas collection noted..   Minimal ascites.   Other incidental findings.     Echo (12/6/21):  Difficult study with poor visualization of endocardioum even with use of definity contrast making wall motin and LVEF assessment difficult. Global LV systolic function appears severely impaired with global hypokinesis and inferobasilar akinesis. Septal flattening suggesting RV overload. Indeterminate diastolic function.  LVEF 35%  The right ventricle is dilated and hypokinetic.  Biatrial enlargement.  Mild MR and TR.  Dilated IVC.  A left pleural effusion is present.  No prior study available for comparison.    US Renal and Duplex Bilateral (12/13/21):  FINDINGS:  The reference aortic peak systolic velocity measured 85 cm/s.     RIGHT GRAYSCALE FINDINGS: Right kidney length measures 12 centimeters.  No hydronephrosis, nephrolithiasis, or mass. Normal echogenicity.     RIGHT RENAL DOPPLER: The right renal artery ratio was 1.5. The renal artery spectral Doppler waveform demonstrated normal morphology. The peak intrarenal resistive index measured 0.76. Right renal venous flow was present.  Peak systolic velocities in the right renal artery measured (in cm/s):     Proximal: 112  Mid: 127  Distal: 129     LEFT GRAYSCALE FINDINGS: Left kidney length measures 11.5 centimeters. No hydronephrosis, nephrolithiasis, or mass. Normal echogenicity.     LEFT RENAL DOPPLER: The left renal artery ratio was 1.1. The renal artery spectral Doppler waveform demonstrated normal morphology. The peak intrarenal resistive index measured 0.73. Left renal venous flow was present.  Peak systolic velocities in the left renal artery measured (in cm/s):     Proximal: 85  Mid: 88  Distal: 94      IMPRESSION:    1.  No evidence of renal artery stenosis.    US Liver w duplex complete (12/13/21):  IMPRESSION:   1.  The liver appears somewhat nodular in some areas concerning for underlying cirrhosis. There is mild volume ascites and splenomegaly.  2.  Pleural effusion is also noted on the right.  3.  Normal direction and flow of the liver vasculature.    Impression, Plan & Recommendations:  · Acute renal failure  · Most likely secondary to cardiorenal syndrome  · Renal ultrasound 12/13/21 unremarkable- full results above  · Underwent one session of HD on 12/10/21 at outside hospital.   · No current indication for RRT: creatinine has trended down and is now stable around 1.9 mg/dL, electrolytes acceptable, no acidosis and is responding well to diuretic.   · Dialysis access: left tunneled Permcath place on 12/10/21 at outside hospital  · Acute on chronic systolic heart failure, Cardiogenic shock, Fluid overload  · Echo reviewed  · On dobutamine  · Is off vasopressin  · Continue diuretic- changed from Bumex drip to Lasix drip on 12/13/21. Continue  at 20 mg/hr  · Acute alcoholic hepatitis with possible cirrhosis  · Hepatology following  · Ultrasound 12/13 shows nodular appearance in some areas concerning for underlying cirrhosis  · Respiratory acidosis  · On BiPAP overnight    Patient seen in collaboration with Dr. Kimble.   Jailyn Oakley PA-C   I have personally seen and examined the patient, formulated the assessment and plan, reviewed above and agree with the above note.    Delroy Kimble M.D.

## 2021-12-19 LAB
AMIODARONE SERPL-MCNC: 290 NG/ML
DESETHYLAMIODARONE SERPL-MCNC: 215 NG/ML

## 2021-12-22 ENCOUNTER — TELEPHONE (OUTPATIENT)
Dept: INTERNAL MEDICINE | Facility: CLINIC | Age: 81
End: 2021-12-22
Payer: MEDICARE

## 2021-12-22 ENCOUNTER — NURSE TRIAGE (OUTPATIENT)
Dept: NURSING | Facility: CLINIC | Age: 81
End: 2021-12-22
Payer: MEDICARE

## 2021-12-22 DIAGNOSIS — E03.9 HYPOTHYROIDISM, UNSPECIFIED TYPE: Primary | ICD-10-CM

## 2021-12-22 DIAGNOSIS — I10 ESSENTIAL HYPERTENSION: ICD-10-CM

## 2021-12-22 RX ORDER — LEVOTHYROXINE SODIUM 150 UG/1
150 TABLET ORAL DAILY
Qty: 90 TABLET | Refills: 3 | Status: SHIPPED | OUTPATIENT
Start: 2021-12-22 | End: 2022-11-30

## 2021-12-22 NOTE — TELEPHONE ENCOUNTER
Reason for Call:  Medication or medication refill:    Do you use a [facility]?  Name of the pharmacy and phone number for the current request:      Patient would like to receive paper copy of Rx [facility]    Name of the medication requested:     Levothyroxine 150 mcg tablet    Other request: N/A    Can we leave a detailed message on this number? YES    Phone number patient can be reached at: Home number on file [phone] (home)    Best Time: Any time    Call taken on 12/22/2021 at 11:04 AM by Kenneth Wilson

## 2021-12-22 NOTE — TELEPHONE ENCOUNTER
Left VM for pt to let them know that the rx is printed and ready for pickup if he would like. It will be in an envelope ready for him at the .

## 2021-12-22 NOTE — TELEPHONE ENCOUNTER
Relayed clinic's message that prescription for levothyroxine is printed and ready for Trenton to  at the .  He plans to pick it up tomorrow; 12/23/21.    Nieves Tamez RN 12/22/21 5:46 PM  [facility] Nurse Advisor

## 2022-01-15 ENCOUNTER — OFFICE VISIT (OUTPATIENT)
Dept: FAMILY MEDICINE | Facility: CLINIC | Age: 82
End: 2022-01-15
Payer: MEDICARE

## 2022-01-15 VITALS
TEMPERATURE: 97.7 F | BODY MASS INDEX: 23.18 KG/M2 | DIASTOLIC BLOOD PRESSURE: 70 MMHG | RESPIRATION RATE: 14 BRPM | WEIGHT: 148 LBS | HEART RATE: 67 BPM | OXYGEN SATURATION: 98 % | SYSTOLIC BLOOD PRESSURE: 153 MMHG

## 2022-01-15 DIAGNOSIS — S51.811A SKIN TEAR OF RIGHT FOREARM WITHOUT COMPLICATION, INITIAL ENCOUNTER: Primary | ICD-10-CM

## 2022-01-15 PROCEDURE — 99213 OFFICE O/P EST LOW 20 MIN: CPT | Performed by: FAMILY MEDICINE

## 2022-01-15 NOTE — PROGRESS NOTES
Assessment:       Skin tear of right forearm without complication, initial encounter     Plan:     Patient with small skin tear of the right forearm without active bleeding currently.  It actually appears to be healing quite well.  Wound was cleansed and Steri-Strips applied to tack down the skin tear and covered with Telfa dressing and Coban.  No further follow-up needed unless return of bleeding occurs or developing signs of infection.  Patient is agreeable with this plan.    Subjective:       81 year old male presents for evaluation of a small skin tear that he sustained on his right forearm/elbow 2 days ago when he caught his arm on the edge of a screen door while in [place].  He has been covering it with a bandage but still continues to ooze blood a bit.  He is on 81 mg of aspirin daily.  No other anticoagulants.  Tetanus is up-to-date.  He is here today primarily because of continued light bleeding.    Patient Active Problem List   Diagnosis     TMJ Pain     Neck Pain     Hyperlipidemia     Benign Monoclonal Hypergammaglobulinemia     Atrial Fibrillation     BPH (benign prostatic hyperplasia)     Primary Osteoarthritis Of The Cervical Vertebrae     Reactions To Sulfa Drugs     Multiple myeloma (H)     Colon polyp     Long term (current) use of anticoagulants     Shoulder pain     Primary adenocarcinoma of ampulla of Vater (H)     Encounter for immunization     Malignant neoplasm of pancreatic duct (H)     Physical debility     Stage 3a chronic kidney disease (H)     Atrial fibrillation with RVR (H)     Carcinoma of ampulla of Vater (H)     Anemia in chronic kidney disease     Coronary arteriosclerosis     Essential hypertension     Neoplasm of unspecified behavior of digestive system     Prostatism       No past medical history on file.    Past Surgical History:   Procedure Laterality Date     KNEE SURGERY       PANCREATICODUODENECTOMY  03/19/2020    For adenocarcinoma of the ampulla of Vater     SHOULDER  SURGERY         Current Outpatient Medications   Medication     acetaminophen (TYLENOL) 500 MG tablet     amiodarone (PACERONE) 200 MG tablet     aspirin 81 MG EC tablet     brimonidine-timoloL (COMBIGAN) 0.2-0.5 % ophthalmic solution     cyanocobalamin (VITAMIN B-12) 500 MCG tablet     erythromycin (ROMYCIN) 5 MG/GM ophthalmic ointment     famotidine (PEPCID) 40 MG tablet     furosemide (LASIX) 40 MG tablet     hydrocortisone 2.5 % ointment     levothyroxine (SYNTHROID/LEVOTHROID) 150 MCG tablet     metoprolol succinate (TOPROL-XL) 25 MG     multivitamin therapeutic (THERAGRAN) tablet     Omega-3 Fatty Acids (FISH OIL) 1200 MG capsule     omeprazole (PRILOSEC) 20 MG capsule     rosuvastatin (CRESTOR) 10 MG tablet     tamsulosin (FLOMAX) 0.4 mg cap     terazosin (HYTRIN) 5 MG capsule     triamcinolone (KENALOG) 0.1 % cream     vit C/E/Zn/coppr/lutein/zeaxan (PRESERVISION AREDS-2 ORAL)     VITRON-C 65 mg iron- 125 mg TbEC     No current facility-administered medications for this visit.       Allergies   Allergen Reactions     Ambien [Zolpidem] Unknown     confusion     Sulfa (Sulfonamide Antibiotics) [Sulfa Drugs] Unknown       No family history on file.    Social History     Socioeconomic History     Marital status:      Spouse name: Not on file     Number of children: Not on file     Years of education: Not on file     Highest education level: Not on file   Occupational History     Not on file   Tobacco Use     Smoking status: Former Smoker     Smokeless tobacco: Never Used     Tobacco comment: quit 8/8/1988   Substance and Sexual Activity     Alcohol use: No     Drug use: No     Sexual activity: Not on file   Other Topics Concern     Not on file   Social History Narrative     Not on file     Social Determinants of Health     Financial Resource Strain: Not on file   Food Insecurity: Not on file   Transportation Needs: Not on file   Physical Activity: Not on file   Stress: Not on file   Social Connections:  Not on file   Intimate Partner Violence: Not on file   Housing Stability: Not on file         Review of Systems  Pertinent items are noted in HPI.      Objective:     BP (!) 153/70   Pulse 67   Temp 97.7  F (36.5  C)   Resp 14   Wt 67.1 kg (148 lb)   SpO2 98%   BMI 23.18 kg/m       General appearance: alert, appears stated age and cooperative   Skin: Patient has a small flap skin tear on his right forearm that appears to be tacked down and healing fairly well except for 1 small area that is oozing blood just slightly.  Edges of the skin tear appear healthy.  Wound was cleansed and Steri-Strips applied covered with Telfa dressing and Coban.        This note has been dictated using voice recognition software. Any grammatical or context distortions are unintentional and inherent to the software

## 2022-01-18 ENCOUNTER — TELEPHONE (OUTPATIENT)
Dept: INTERNAL MEDICINE | Facility: CLINIC | Age: 82
End: 2022-01-18
Payer: MEDICARE

## 2022-01-18 NOTE — TELEPHONE ENCOUNTER
Patient reports that he was at [facility] on Saturday 01/15/22.  He was advised to follow up with his PCP this week.    Pt is requesting appt Thursday or Friday with PCP.  Pt declined phone visit - feels he must be seen in person.  He shared he was ERD due to arthritis.    Pt would like call back.

## 2022-01-20 ENCOUNTER — OFFICE VISIT (OUTPATIENT)
Dept: INTERNAL MEDICINE | Facility: CLINIC | Age: 82
End: 2022-01-20
Payer: MEDICARE

## 2022-01-20 VITALS
DIASTOLIC BLOOD PRESSURE: 70 MMHG | HEIGHT: 67 IN | WEIGHT: 148 LBS | OXYGEN SATURATION: 95 % | SYSTOLIC BLOOD PRESSURE: 122 MMHG | BODY MASS INDEX: 23.23 KG/M2 | HEART RATE: 77 BPM

## 2022-01-20 DIAGNOSIS — I10 ESSENTIAL HYPERTENSION: Primary | ICD-10-CM

## 2022-01-20 PROCEDURE — 99214 OFFICE O/P EST MOD 30 MIN: CPT | Performed by: INTERNAL MEDICINE

## 2022-01-20 RX ORDER — SENNOSIDES 8.6 MG
650 CAPSULE ORAL EVERY 8 HOURS PRN
COMMUNITY
End: 2022-02-09

## 2022-01-20 ASSESSMENT — MIFFLIN-ST. JEOR: SCORE: 1334.95

## 2022-01-20 NOTE — PROGRESS NOTES
"Assessment/Plan:    Hypertension controlled 122/70.  No target organ damage related to same.  Past health history of atrial fibrillation.  Dr. RENAE [facility] Department of cardiac electrophysiology.    Smoldering multiple myeloma stable.  With chronic kidney disease.  Previously followed by nephrology.    Cancer of the ampulla of Vater status post Whipple procedure [facility] no clinical evidence of recurrence thankfully.    Acute arthritis inflammatory hands improved with arthritis strength acetaminophen 2 tablets 3 times a day 3900 mg for a total dose per day plus Voltaren topical gel consideration for low-dose prednisone and work-up to include rheumatoid factor sedimentation rate may be in order.  Patient wants to go with continuation of same and avoid prednisone for now.  Refill Voltaren topical gel 1% 2 g 4 times a day topically plus arthritis strength acetaminophen as outlined above.    25 minutes spent on the date of the encounter doing chart review, patient visit and documentation     Subjective:  Ternton Nova is a 81 year old male presents for the following health issues emergency room follow-up January 15 2022 [facility].  Bilateral hand swelling painful comes and goes also on his knees.  See above for rest of history better with Voltaren topical gel 1% plus arthritis strength acetaminophen.    ROS:  No blood in stool or urine medication list reviewed reconciled in the chart    Objective:  /70 (BP Location: Right arm, Patient Position: Sitting)   Pulse 77   Ht 1.702 m (5' 7\")   Wt 67.1 kg (148 lb)   SpO2 95%   BMI 23.18 kg/m    Chest clear to auscultation and percussion.  Heart tones regular rhythm without murmur rub or gallop.  Abdomen soft nontender no organomegaly.  No peritoneal signs.  Extremities free of edema cyanosis or clubbing.  Neck veins nondistended no thyromegaly or scleral icterus noted, carotids full.  Skin warm and dry easily conversant good spirited.  Normal " intelligence.  Neurologically intact no gross localizing findings.  Inflammatory component to the joints of the right hand with mild swelling stiffness less so left hand.  No rashes.  Not toxic or acutely ill multiple small joints affected

## 2022-01-31 ENCOUNTER — OFFICE VISIT (OUTPATIENT)
Dept: FAMILY MEDICINE | Facility: CLINIC | Age: 82
End: 2022-01-31
Payer: MEDICARE

## 2022-01-31 ENCOUNTER — HOSPITAL ENCOUNTER (OUTPATIENT)
Dept: CT IMAGING | Facility: CLINIC | Age: 82
Discharge: HOME | End: 2022-01-31
Attending: PHYSICIAN ASSISTANT | Admitting: PHYSICIAN ASSISTANT
Payer: MEDICARE

## 2022-01-31 VITALS
WEIGHT: 146 LBS | DIASTOLIC BLOOD PRESSURE: 77 MMHG | TEMPERATURE: 97.6 F | SYSTOLIC BLOOD PRESSURE: 152 MMHG | RESPIRATION RATE: 16 BRPM | HEART RATE: 69 BPM | BODY MASS INDEX: 22.87 KG/M2 | OXYGEN SATURATION: 99 %

## 2022-01-31 DIAGNOSIS — Z79.01 LONG TERM (CURRENT) USE OF ANTICOAGULANTS: ICD-10-CM

## 2022-01-31 DIAGNOSIS — J84.9 ILD (INTERSTITIAL LUNG DISEASE) (H): ICD-10-CM

## 2022-01-31 DIAGNOSIS — R07.1 CHEST PAIN ON BREATHING: Primary | ICD-10-CM

## 2022-01-31 DIAGNOSIS — R07.1 CHEST PAIN ON BREATHING: ICD-10-CM

## 2022-01-31 DIAGNOSIS — S81.811A NONINFECTED SKIN TEAR OF RIGHT LOWER EXTREMITY, INITIAL ENCOUNTER: ICD-10-CM

## 2022-01-31 DIAGNOSIS — K83.8 PNEUMOBILIA: ICD-10-CM

## 2022-01-31 LAB
ATRIAL RATE - MUSE: 52 BPM
CREAT BLD-MCNC: 1.8 MG/DL (ref 0.7–1.3)
DIASTOLIC BLOOD PRESSURE - MUSE: NORMAL MMHG
GFR SERPL CREATININE-BSD FRML MDRD: 37 ML/MIN/1.73M2
INTERPRETATION ECG - MUSE: NORMAL
P AXIS - MUSE: NORMAL DEGREES
PR INTERVAL - MUSE: NORMAL MS
QRS DURATION - MUSE: 82 MS
QT - MUSE: 468 MS
QTC - MUSE: 435 MS
R AXIS - MUSE: -23 DEGREES
SYSTOLIC BLOOD PRESSURE - MUSE: NORMAL MMHG
T AXIS - MUSE: 42 DEGREES
VENTRICULAR RATE- MUSE: 52 BPM

## 2022-01-31 PROCEDURE — 71275 CT ANGIOGRAPHY CHEST: CPT

## 2022-01-31 PROCEDURE — 93005 ELECTROCARDIOGRAM TRACING: CPT | Performed by: PHYSICIAN ASSISTANT

## 2022-01-31 PROCEDURE — 250N000011 HC RX IP 250 OP 636: Performed by: PHYSICIAN ASSISTANT

## 2022-01-31 PROCEDURE — 99214 OFFICE O/P EST MOD 30 MIN: CPT | Performed by: PHYSICIAN ASSISTANT

## 2022-01-31 PROCEDURE — 93010 ELECTROCARDIOGRAM REPORT: CPT | Mod: OFF | Performed by: INTERNAL MEDICINE

## 2022-01-31 PROCEDURE — 82565 ASSAY OF CREATININE: CPT

## 2022-01-31 RX ORDER — CLOPIDOGREL BISULFATE 75 MG/1
75 TABLET ORAL
COMMUNITY
Start: 2021-10-21 | End: 2022-05-17

## 2022-01-31 RX ORDER — IOPAMIDOL 755 MG/ML
100 INJECTION, SOLUTION INTRAVASCULAR ONCE
Status: COMPLETED | OUTPATIENT
Start: 2022-01-31 | End: 2022-01-31

## 2022-01-31 RX ORDER — PREDNISOLONE ACETATE 10 MG/ML
SUSPENSION/ DROPS OPHTHALMIC
COMMUNITY
Start: 2022-01-24 | End: 2023-02-10

## 2022-01-31 RX ADMIN — IOPAMIDOL 40 ML: 755 INJECTION, SOLUTION INTRAVENOUS at 15:31

## 2022-01-31 ASSESSMENT — ENCOUNTER SYMPTOMS
COUGH: 0
PALPITATIONS: 0
WOUND: 1
SHORTNESS OF BREATH: 0

## 2022-01-31 NOTE — PATIENT INSTRUCTIONS
1.  Keep this dressing on for the next 48 hours.  2.  After 48 hours you can remove the brown wrap, but try not to disturb the white foam.  If layered pieces of foam fall off on their own that is okay.  If there is large parts of foam that do not seem to be adhered to the skin you can trim it down to size.  3.  I recommend Saran wrapping your leg while you are in the shower following that 48 hours.  4.  If after a week there is still foam you can shower without Saran wrap and it should slowly dissolve.  After that continue to protect the cut from dirt and grime.  5.  Follow-up if you develop any concerns for infection such as increased swelling, growing redness, increased pain, fevers, chills, foul odor, or pus production.

## 2022-01-31 NOTE — PROGRESS NOTES
Patient presents with:  Laceration: cut back of right leg x1 week, will not stop bleeding  Chest Pain: hurts to inhale      Clinical Decision Making:  Patient here for posterior leg laceration that continues to bleed.  There is no overlying skin from the skin tear.  Gelatinous blood was removed.  Wound was cleansed and redressed using Surgifoam to achieve hemostasis.  Pressure dressing placed over the Surgifoam. No current signs of infection.     Patient experiencing pleuritic chest pain.  EKG appears normal with no signs of arrhythmias.  Patient is vitally stable and in no apparent respiratory distress.  Lungs are clear to auscultation.  He is on a blood thinner.  Due to his age I am unable to rule out PE using PERC criteria.  Joint decision-making was utilized to decide on next step being chest CT rather than D-dimer with risk of needing to get a chest CT anyway.  Chest CT was neg for PE. It did show ILD mildly progressed since last image. Patient referred to pulm for further care.  Suspect this is musculoskeletal pain given the patient reports recent exercise.       ICD-10-CM    1. Chest pain on breathing  R07.1 EKG 12-lead, tracing only     CANCELED: CTA Chest with Contrast   2. ILD (interstitial lung disease) (H)  J84.9 Adult Pulmonary Medicine Referral   3. Noninfected skin tear of right lower extremity, initial encounter  S81.140C    4. Long term (current) use of anticoagulants  Z79.01    5. Pneumobilia  K83.8        Patient Instructions   1.  Keep this dressing on for the next 48 hours.  2.  After 48 hours you can remove the brown wrap, but try not to disturb the white foam.  If layered pieces of foam fall off on their own that is okay.  If there is large parts of foam that do not seem to be adhered to the skin you can trim it down to size.  3.  I recommend Saran wrapping your leg while you are in the shower following that 48 hours.  4.  If after a week there is still foam you can shower without Saran wrap  and it should slowly dissolve.  After that continue to protect the cut from dirt and grime.  5.  Follow-up if you develop any concerns for infection such as increased swelling, growing redness, increased pain, fevers, chills, foul odor, or pus production.      HPI:  Trenton Nova is a 81 year old male who presents today complaining of laceration that occurred on the back of the right leg that occurred 1 week ago. The patient states that it has continued to bleed. This morning when he woke up there was a puddle of blood in bed. He denies much pain. He takes Eliquis    Patient also experiencing left-sided pleuritic chest pain.  It does not worsen with pressing or with movements.  He rates the pain 5/10.  He states it is little bit better than yesterday.  He originally attributed to exercising.  He has past medical history of cardiac issues, but has not been in A. fib for greater than 1 year.  He denies any shortness of breath.    History obtained from the patient.    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    Social  History     Tobacco Use     Smoking status: Former Smoker     Smokeless tobacco: Never Used     Tobacco comment: quit 8/8/1988   Substance Use Topics     Alcohol use: No       Review of Systems   Respiratory: Negative for cough and shortness of breath.    Cardiovascular: Positive for chest pain. Negative for palpitations and leg swelling.   Skin: Positive for wound.       Vitals:    01/31/22 1310   BP: (!) 152/77   BP Location: Left arm   Patient Position: Sitting   Cuff Size: Adult Regular   Pulse: 69   Resp: 16   Temp: 97.6  F (36.4  C)   TempSrc: Oral   SpO2: 99%   Weight: 66.2 kg (146 lb)       Physical Exam  Vitals and nursing note reviewed.   Constitutional:       General: He is not in acute distress.     Appearance: He is not toxic-appearing or diaphoretic.   HENT:      Head: Normocephalic and atraumatic.      Right Ear: External ear normal.      Left Ear: External ear normal.   Eyes:      Conjunctiva/sclera: Conjunctivae normal.   Cardiovascular:      Rate and Rhythm: Normal rate and regular rhythm.      Heart sounds: No murmur heard.      Pulmonary:      Effort: Pulmonary effort is normal. No respiratory distress.      Breath sounds: No stridor. No wheezing, rhonchi or rales.   Skin:     Comments: Skin tear present on the posterior aspect of the right leg.  There is congealed gelatinous blood covering the wound.  This was removed to see the borders of the wound.  Blood is oozing from the site.   Neurological:      Mental Status: He is alert.   Psychiatric:         Mood and Affect: Mood normal.         Behavior: Behavior normal.         Thought Content: Thought content normal.         Judgment: Judgment normal.       Wound was cleansed with skin cleansing spray.  4 layers of Surgifoam were placed over the wound to achieve hemostasis.  A pressure dressing was placed over the Surgifoam.  20 minutes later there had not been any bleeding through.      Results:  Results for orders placed or performed during the  hospital encounter of 01/31/22   CT Chest Pulmonary Embolism w Contrast     Status: None    Narrative    EXAM: CT CHEST PULMONARY EMBOLISM W CONTRAST  LOCATION: [facility]  DATE/TIME: 1/31/2022 2:55 PM    INDICATION:  Chest pain on breathing  COMPARISON: CT abdomen and pelvis 12/17/2021, CT chest 02/22/2021  TECHNIQUE: CT chest pulmonary angiogram during arterial phase injection of IV contrast. Multiplanar reformats and MIP reconstructions were performed. Dose reduction techniques were used.   CONTRAST: 40ml Isovue 370    FINDINGS:  ANGIOGRAM CHEST: Pulmonary arteries are normal caliber and negative for pulmonary emboli. The exam is nondiagnostic for a thoracic aortic dissection due to timing of the contrast bolus. Upper normal caliber ascending thoracic aorta measuring a stable 3.9   cm in diameter. Moderate thoracic aortic atherosclerosis. No CT evidence of right heart strain.    LUNGS AND PLEURA: The central airways are clear. Diffuse multilobar reticulation, septal thickening and architectural distortion with some mild traction bronchiectasis. No significant honeycombing. Mosaic lung attenuation. These findings are overall   mildly progressed since the comparison CT. Lower lobar predominant bronchial wall thickening. No focal consolidation or pleural effusion.    MEDIASTINUM/AXILLAE: Left-sided cardiac pacemaker/ICD device. Interval placement of a left atrial appendage occlusion device. Mild chest wall edema. Mild mediastinal and hilar adenopathy with a dominant precarinal node measuring a stable 12 mm short axis   dimension. Stable hilar node measuring 10 mm short axis dimension. This adenopathy is likely reactive. Mild cardiomegaly. No pericardial effusion.    CORONARY ARTERY CALCIFICATION: Moderate to severe left coronary artery calcifications with questionable stents.    UPPER ABDOMEN: Increased diffuse pneumobilia.    MUSCULOSKELETAL: Osseous demineralization. Spinal degenerative  changes.      Impression    IMPRESSION:  1.  No pulmonary artery embolism.  2.  Diffuse pulmonary abnormalities as described above, likely reflecting interstitial lung disease with some features of UIP pattern ILD. These findings are mildly progressed since 02/22/2021. No definite superimposed acute airspace disease. Follow-up   is recommended.  3.  Diffuse pneumobilia which is progressed since the prior exam and may reflect sequelae of interval biliary intervention.  4.  Moderate to severe left coronary artery calcifications.   Creatinine POCT     Status: Abnormal   Result Value Ref Range    Creatinine POCT 1.8 (H) 0.7 - 1.3 mg/dL    GFR, ESTIMATED POCT 37 (L) >60 mL/min/1.73m2   Results for orders placed or performed in visit on 01/31/22   EKG 12-lead, tracing only     Status: None   Result Value Ref Range    Systolic Blood Pressure  mmHg    Diastolic Blood Pressure  mmHg    Ventricular Rate 52 BPM    Atrial Rate 52 BPM    ND Interval  ms    QRS Duration 82 ms     ms    QTc 435 ms    P Axis  degrees    R AXIS -23 degrees    T Axis 42 degrees    Interpretation ECG       Atrial-paced rhythm with prolonged AV conduction  Abnormal ECG  When compared with ECG of 17-APR-2020 11:35,  Premature atrial complexes are no longer Present  Vent. rate has decreased BY  30 BPM  Nonspecific T wave abnormality no longer evident in Lateral leads  Confirmed by EFREN WU MD LOC:[facility]N ([id number]) on 1/31/2022 2:36:33 PM           At the end of the encounter, I discussed results, diagnosis, medications. Discussed red flags for immediate return to clinic/ER, as well as indications for follow up if no improvement. Patient understood and agreed to plan. Patient was stable for discharge.

## 2022-02-02 DIAGNOSIS — J84.9 ILD (INTERSTITIAL LUNG DISEASE) (H): Primary | ICD-10-CM

## 2022-02-09 ENCOUNTER — OFFICE VISIT (OUTPATIENT)
Dept: FAMILY MEDICINE | Facility: CLINIC | Age: 82
End: 2022-02-09
Payer: MEDICARE

## 2022-02-09 VITALS
HEIGHT: 67 IN | TEMPERATURE: 97.9 F | BODY MASS INDEX: 23.23 KG/M2 | OXYGEN SATURATION: 99 % | HEART RATE: 70 BPM | SYSTOLIC BLOOD PRESSURE: 115 MMHG | DIASTOLIC BLOOD PRESSURE: 72 MMHG | WEIGHT: 148 LBS

## 2022-02-09 DIAGNOSIS — Z01.818 PREOP GENERAL PHYSICAL EXAM: Primary | ICD-10-CM

## 2022-02-09 DIAGNOSIS — H25.9 SENILE CATARACT OF LEFT EYE, UNSPECIFIED AGE-RELATED CATARACT TYPE: ICD-10-CM

## 2022-02-09 PROCEDURE — 99214 OFFICE O/P EST MOD 30 MIN: CPT | Performed by: FAMILY MEDICINE

## 2022-02-09 RX ORDER — FINASTERIDE 5 MG/1
TABLET, FILM COATED ORAL
COMMUNITY
Start: 2022-02-08

## 2022-02-09 RX ORDER — TACROLIMUS 1 MG/G
OINTMENT TOPICAL
COMMUNITY
Start: 2022-02-08

## 2022-02-09 ASSESSMENT — MIFFLIN-ST. JEOR: SCORE: 1334.95

## 2022-02-09 NOTE — PROGRESS NOTES
[facility]  [address]  [place] [address]  Phone: [phone]  Fax: [phone]  Primary Provider: Sarwat Mayorga  Pre-op Performing Provider: LUISA QUEZADA      PREOPERATIVE EVALUATION:  Today's date: 2/9/2022    Trenton Nova is a 81 year old male who presents for a preoperative evaluation.    Surgical Information:  Surgery/Procedure: left eye cataract   Surgery Location: [facility]   Surgeon: Dr Horta   Surgery Date: 2/10   Time of Surgery: 1:00   Where patient plans to recover: At home with family  Fax number for surgical facility: [phone]    Type of Anesthesia Anticipated: Local    Assessment & Plan     The proposed surgical procedure is considered LOW risk.    (Z01.818) Preop general physical exam  (primary encounter diagnosis)  Comment: pt will have left eye cataract removal  Plan: cleared for the planned surgery    (H25.9) Senile cataract of left eye, unspecified age-related cataract type  Comment: left eye cataract  Plan: will have cataract removal      Implanted Device:   - Type of device: watchman Patient advised to bring device information on day of surgery.      Risks and Recommendations:  The patient has the following additional risks and recommendations for perioperative complications:   - No identified additional risk factors other than previously addressed    Medication Instructions:  Patient is to take all scheduled medications on the day of surgery    RECOMMENDATION:  APPROVAL GIVEN to proceed with proposed procedure, without further diagnostic evaluation.    Subjective     HPI related to upcoming procedure: left eye cataract removal   Pt has a fib. He has  watchman and not on coumadin.   ckd stage 3. Stable.   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     Preop Questions 2/9/2022   1. Have you ever had a heart attack or stroke? No   2. Have  you ever had surgery on your heart or blood vessels, such as a stent placement, a coronary artery bypass, or surgery on an artery in your head, neck, heart, or legs? No   3. Do you have chest pain with activity? No   4. Do you have a history of  heart failure? No   5. Do you currently have a cold, bronchitis or symptoms of other infection? No   6. Do you have a cough, shortness of breath, or wheezing? No   7. Do you or anyone in your family have previous history of blood clots? No   8. Do you or does anyone in your family have a serious bleeding problem such as prolonged bleeding following surgeries or cuts? No   9. Have you ever had problems with anemia or been told to take iron pills? YES     10. Have you had any abnormal blood loss such as black, tarry or bloody stools? No   11. Have you ever had a blood transfusion? No   12. Are you willing to have a blood transfusion if it is medically needed before, during, or after your surgery? Yes   13. Have you or any of your relatives ever had problems with anesthesia? No   14. Do you have sleep apnea, excessive snoring or daytime drowsiness? No   15. Do you have any artifical heart valves or other implanted medical devices like a pacemaker, defibrillator, or continuous glucose monitor? YES     15a. What type of device do you have? watchman   15b. Name of the clinic that manages your device:  [facility]   16. Do you have artificial joints? No   17. Are you allergic to latex? No       Health Care Directive:  Patient has a Health Care Directive on file      Preoperative Review of :   reviewed - no record of controlled substances prescribed.          Review of Systems  Constitutional, neuro, ENT, endocrine, pulmonary, cardiac, gastrointestinal, genitourinary, musculoskeletal, integument and psychiatric systems are negative, except as otherwise noted.    Patient Active Problem List    Diagnosis Date Noted     Encounter for immunization 03/23/2021     Priority: Medium      BPH (benign prostatic hyperplasia)      Priority: Medium     Created by Conversion         Physical debility 01/15/2021     Priority: Medium     Atrial fibrillation with RVR (H) 01/15/2021     Priority: Medium     Carcinoma of ampulla of Vater (H) 11/23/2020     Priority: Medium     Primary adenocarcinoma of ampulla of Vater (H) 10/06/2020     Priority: Medium     Anemia in chronic kidney disease 02/06/2020     Priority: Medium     Last Assessment & Plan:   Formatting of this note might be different from the original.  Mild hemoglobin drop since last seen, borderline iron stores. Likely   related to possible liver/pancreas mass under evaluation at present.         Neoplasm of unspecified behavior of digestive system 02/06/2020     Priority: Medium     Formatting of this note might be different from the original.  ERCP on 2/3/2020 at [facility]  Last Assessment & Plan:   Formatting of this note might be different from the original.  Patient was undergoing evaluation due to profound fatigue, found to have   dilated common bile duct, ERCP done, stent placed on common bile duct,   pathology results pending at present.         Prostatism 02/06/2020     Priority: Medium     Formatting of this note might be different from the original.  Adequate control.  Continue Flomax.         Malignant neoplasm of pancreatic duct (H) 01/01/2020     Priority: Medium     Jan 22, 2021 Entered By: ARAM AGOSTO Comment: H/O adenocarcinoma   ampulla of VaterJan 22, 2021 Entered By: ARAM AGOSTO Comment: S/P   Whipple procedure ([place] 3/2020)         Essential hypertension 04/23/2018     Priority: Medium     Last Assessment & Plan:   Formatting of this note might be different from the original.  Adequate control. Continue current medications.         TMJ Pain      Priority: Medium     Created by Conversion  Replacement Utility updated for latest IMO load         Shoulder pain 01/18/2016     Priority: Medium     Stage 3a chronic  kidney disease (H) 05/13/2015     Priority: Medium     Last Assessment & Plan:   Renal function stable. No proteinuria. Normal urinalysis.   Serum creatinine stable.  Renal ultrasound in 2011 was normal.   Kidney biopsy in 2011 shows some arterial sclerosis, no glomerular   disease. Mostly changes associated to hypertension and aging. No   glomerular disease present.   Continue current medications.  Last Assessment & Plan:   Formatting of this note might be different from the original.  Renal function stable. No proteinuria. Normal urinalysis.   Serum creatinine stable.  Renal ultrasound in 2011 was normal.   Kidney biopsy in 2011 shows some arterial sclerosis, no glomerular   disease. Mostly changes associated to hypertension and aging. No   glomerular disease present.   Continue current medications.         Coronary arteriosclerosis 02/26/2015     Priority: Medium     Last Assessment & Plan:   Formatting of this note might be different from the original.  Asymptomatic. No angina, No shortness of breath. No heart failure present.         Long term (current) use of anticoagulants 01/30/2015     Priority: Medium     Atrial Fibrillation 01/16/2015     Priority: Medium     Created by Conversion  Last Assessment & Plan:   Formatting of this note might be different from the original.  CHRONIC ATRIAL FIBRILLATION. S/P PACER  On Coumadin, adequate ventricular rate. No heart failure.  He underwent cardioversion on 12/19/2019, currently not on atrial   fibrillation by auscultation  Continue follow up with cardiologist.         Multiple myeloma (H) 12/16/2014     Priority: Medium     Colon polyp 12/16/2014     Priority: Medium     Neck Pain      Priority: Medium     Created by Conversion         Hyperlipidemia      Priority: Medium     Created by Conversion         Benign Monoclonal Hypergammaglobulinemia      Priority: Medium     Created by Conversion         Primary Osteoarthritis Of The Cervical Vertebrae      Priority:  Medium     Created by Conversion         Reactions To Sulfa Drugs      Priority: Medium     Created by Conversion          No past medical history on file.  Past Surgical History:   Procedure Laterality Date     KNEE SURGERY       PANCREATICODUODENECTOMY  03/19/2020    For adenocarcinoma of the ampulla of Vater     SHOULDER SURGERY       Current Outpatient Medications   Medication Sig Dispense Refill     amiodarone (PACERONE) 200 MG tablet [AMIODARONE (PACERONE) 200 MG TABLET] 200 mg daily.        aspirin 81 MG EC tablet Take 81 mg by mouth daily       brimonidine-timoloL (COMBIGAN) 0.2-0.5 % ophthalmic solution [BRIMONIDINE-TIMOLOL (COMBIGAN) 0.2-0.5 % OPHTHALMIC SOLUTION] 1 drop.       Cholecalciferol 100 MCG (4000 UT) TABS Take 1 tablet by mouth daily       clopidogrel (PLAVIX) 75 MG tablet Take 75 mg by mouth       cyanocobalamin (VITAMIN B-12) 500 MCG tablet [CYANOCOBALAMIN (VITAMIN B-12) 500 MCG TABLET] Take 500 mcg by mouth daily.       diclofenac (VOLTAREN) 1 % topical gel Apply 2 g topically 4 times daily 350 g 11     erythromycin (ROMYCIN) 5 MG/GM ophthalmic ointment APPLY 0.25INCH TO LIDS QPM       famotidine (PEPCID) 40 MG tablet [FAMOTIDINE (PEPCID) 40 MG TABLET] Take 1 tablet (40 mg total) by mouth every evening. 30 tablet 11     finasteride (PROSCAR) 5 MG tablet TAKE ONE TABLET BY MOUTH EVERY DAY FOR PROSTATE       furosemide (LASIX) 40 MG tablet [FUROSEMIDE (LASIX) 40 MG TABLET] Take 1 tablet (40 mg total) by mouth daily. 30 tablet 3     hydrocortisone 2.5 % ointment APPLY TOPICALLY TO THE ITCHY AREAS ON THE UPPER EYELIDS BID FOR 1 WK AT A TIME       levothyroxine (SYNTHROID/LEVOTHROID) 150 MCG tablet Take 1 tablet (150 mcg) by mouth daily 90 tablet 3     metoprolol succinate (TOPROL-XL) 25 MG [METOPROLOL SUCCINATE (TOPROL-XL) 25 MG] Take 25 mg by mouth daily.       multivitamin therapeutic (THERAGRAN) tablet [MULTIVITAMIN THERAPEUTIC (THERAGRAN) TABLET] Take 1 tablet by mouth daily.       omeprazole  "(PRILOSEC) 20 MG capsule Take 40 mg by mouth        prednisoLONE acetate (PRED FORTE) 1 % ophthalmic suspension        rosuvastatin (CRESTOR) 10 MG tablet [ROSUVASTATIN (CRESTOR) 10 MG TABLET]        tacrolimus (PROTOPIC) 0.1 % external ointment APPLY SMALL AMOUNT TOPICALLY EVERY DAY AS NEEDED FOR EYELID RASH       tamsulosin (FLOMAX) 0.4 mg cap [TAMSULOSIN (FLOMAX) 0.4 MG CAP] Take 0.4 mg by mouth.       triamcinolone (KENALOG) 0.1 % cream [TRIAMCINOLONE (KENALOG) 0.1 % CREAM] Apply thin layer to affected areas (arms & hips) twice daily X 1 month 60 g 3     vit C/E/Zn/coppr/lutein/zeaxan (PRESERVISION AREDS-2 ORAL) [VIT C/E/ZN/COPPR/LUTEIN/ZEAXAN (PRESERVISION AREDS-2 ORAL)] Take by mouth 2 (two) times a day.       VITRON-C 65 mg iron- 125 mg TbEC [VITRON-C 65 MG IRON- 125 MG TBEC] TK 1 T PO QD. DO NOT CRUSH OR CHEW       acetaminophen (TYLENOL 8 HOUR ARTHRITIS PAIN) 650 MG CR tablet Take 650 mg by mouth every 8 hours as needed for mild pain or fever (Patient not taking: Reported on 2/9/2022)         Allergies   Allergen Reactions     Ambien [Zolpidem] Unknown     confusion     Sulfa Drugs Unknown and Swelling        Social History     Tobacco Use     Smoking status: Former Smoker     Smokeless tobacco: Never Used     Tobacco comment: quit 8/8/1988   Substance Use Topics     Alcohol use: No     History reviewed. No pertinent family history.  History   Drug Use No         Objective     /72   Pulse 70   Temp 97.9  F (36.6  C)   Ht 1.702 m (5' 7\")   Wt 67.1 kg (148 lb)   SpO2 99%   BMI 23.18 kg/m      Physical Exam    GENERAL APPEARANCE: healthy, alert and no distress     EYES: EOMI,  PERRL     HENT: ear canals and TM's normal and nose and mouth without ulcers or lesions     NECK: no adenopathy, no asymmetry, masses, or scars and thyroid normal to palpation     RESP: lungs clear to auscultation - no rales, rhonchi or wheezes     CV: regular rates and rhythm, normal S1 S2, no S3 or S4 and no murmur, click " or rub     ABDOMEN:  soft, nontender, no HSM or masses and bowel sounds normal     MS: extremities normal- no gross deformities noted, no evidence of inflammation in joints, FROM in all extremities.     SKIN: no suspicious lesions or rashes     NEURO: Normal strength and tone, sensory exam grossly normal, mentation intact and speech normal     PSYCH: mentation appears normal. and affect normal/bright     LYMPHATICS: No cervical adenopathy    Recent Labs   Lab Test 01/31/22  1519 12/13/21  1614 10/15/21  0000 09/21/21  0929 09/13/21  0847 09/09/21  1113 05/03/21  0921 04/30/21  1335   HGB  --   --   --   --   --  10.9*  --  12.1*   PLT  --   --   --   --   --  129*  --  207   INR  --   --  2.2* 2.3*   < > 1.8*   < >  --    NA  --  140  --   --   --  138  --  135*   POTASSIUM  --  4.8  --   --   --  4.8  --  5.1*   CR 1.8* 1.71*  --   --   --  1.96*  --  2.21*    < > = values in this interval not displayed.        Diagnostics:  No labs were ordered during this visit.   No EKG required for low risk surgery (cataract, skin procedure, breast biopsy, etc).    Revised Cardiac Risk Index (RCRI):  The patient has the following serious cardiovascular risks for perioperative complications:   - Congestive Heart Failure (pulmonary edema, PND, s3 anthony, CXR with pulmonary congestion, basilar rales) = 1 point     RCRI Interpretation: 1 point: Class II (low risk - 0.9% complication rate)           Signed Electronically by: Hope Chawla MD  Copy of this evaluation report is provided to requesting physician.

## 2022-02-09 NOTE — PATIENT INSTRUCTIONS
Preparing for Your Surgery  Getting started  A nurse will call you to review your health history and instructions. They will give you an arrival time based on your scheduled surgery time. Please be ready to share:    Your doctor's clinic name and phone number    Your medical, surgical and anesthesia history    A list of allergies and sensitivities    A list of medicines, including herbal treatments and over-the-counter drugs    Whether the patient has a legal guardian (ask how to send us the papers in advance)  Please tell us if you're pregnant--or if there's any chance you might be pregnant. Some surgeries may injure a fetus (unborn baby), so they require a pregnancy test. Surgeries that are safe for a fetus don't always need a test, and you can choose whether to have one.   If you have a child who's having surgery, please ask for a copy of Preparing for Your Child's Surgery.    Preparing for surgery    Within 30 days of surgery: Have a pre-op exam (sometimes called an H&P, or History and Physical). This can be done at a clinic or pre-operative center.  ? If you're having a , you may not need this exam. Talk to your care team.    At your pre-op exam, talk to your care team about all medicines you take. If you need to stop any medicines before surgery, ask when to start taking them again.  ? We do this for your safety. Many medicines can make you bleed too much during surgery. Some change how well surgery (anesthesia) drugs work.    Call your insurance company to let them know you're having surgery. (If you don't have insurance, call [phone].)    Call your clinic if there's any change in your health. This includes signs of a cold or flu (sore throat, runny nose, cough, rash, fever). It also includes a scrape or scratch near the surgery site.    If you have questions on the day of surgery, call your hospital or surgery center.  COVID testing  You may need to be tested for COVID-19 before having  surgery. If so, your surgical team will give you instructions for scheduling this test, separate from your preoperative history and physical.  Eating and drinking guidelines  For your safety: Unless your surgeon tells you otherwise, follow the guidelines below.    Eat and drink as usual until 8 hours before surgery. After that, no food or milk.    Drink clear liquids until 2 hours before surgery. These are liquids you can see through, like water, Gatorade and Propel Water. You may also have black coffee and tea (no cream or milk).    Nothing by mouth within 2 hours of surgery. This includes gum, candy and breath mints.    If you drink alcohol: Stop drinking it the night before surgery.    If your care team tells you to take medicine on the morning of surgery, it's okay to take it with a sip of water.  Preventing infection    Shower or bathe the night before and morning of your surgery. Follow the instructions your clinic gave you. (If no instructions, use regular soap.)    Don't shave or clip hair near your surgery site. We'll remove the hair if needed.    Don't smoke or vape the morning of surgery. You may chew nicotine gum up to 2 hours before surgery. A nicotine patch is okay.  ? Note: Some surgeries require you to completely quit smoking and nicotine. Check with your surgeon.    Your care team will make every effort to keep you safe from infection. We will:  ? Clean our hands often with soap and water (or an alcohol-based hand rub).  ? Clean the skin at your surgery site with a special soap that kills germs.  ? Give you a special gown to keep you warm. (Cold raises the risk of infection.)  ? Wear special hair covers, masks, gowns and gloves during surgery.  ? Give antibiotic medicine, if prescribed. Not all surgeries need antibiotics.  What to bring on the day of surgery    Photo ID and insurance card    Copy of your health care directive, if you have one    Glasses and hearing aides (bring cases)  ? You can't  wear contacts during surgery    Inhaler and eye drops, if you use them (tell us about these when you arrive)    CPAP machine or breathing device, if you use them    A few personal items, if spending the night    If you have . . .  ? A pacemaker, ICD (cardiac defibrillator) or other implant: Bring the ID card.  ? An implanted stimulator: Bring the remote control.  ? A legal guardian: Bring a copy of the certified (court-stamped) guardianship papers.  Please remove any jewelry, including body piercings. Leave jewelry and other valuables at home.  If you're going home the day of surgery    You must have a responsible adult drive you home. They should stay with you overnight as well.    If you don't have someone to stay with you, and you aren't safe to go home alone, we may keep you overnight. Insurance often won't pay for this.  After surgery  If it's hard to control your pain or you need more pain medicine, please call your surgeon's office.  Questions?   If you have any questions for your care team, list them here: _________________________________________________________________________________________________________________________________________________________________________ ____________________________________ ____________________________________ ____________________________________  For informational purposes only. Not to replace the advice of your health care provider. Copyright   2003, 2019 [facility]. All rights reserved. Clinically reviewed by Carmen Rasmussen MD. [employer] [id number] - REV 07/21.

## 2022-02-15 ENCOUNTER — HOSPITAL ENCOUNTER (OUTPATIENT)
Dept: RESPIRATORY THERAPY | Facility: CLINIC | Age: 82
Discharge: HOME | End: 2022-02-15
Admitting: INTERNAL MEDICINE
Payer: MEDICARE

## 2022-02-15 DIAGNOSIS — J84.9 ILD (INTERSTITIAL LUNG DISEASE) (H): ICD-10-CM

## 2022-02-15 LAB
DLCOCOR-%PRED-PRE: 42 %
DLCOCOR-PRE: 10.08 ML/MIN/MMHG
DLCOUNC-%PRED-PRE: 37 %
DLCOUNC-PRE: 8.92 ML/MIN/MMHG
DLCOUNC-PRED: 23.52 ML/MIN/MMHG
ERV-%PRED-PRE: 51 %
ERV-PRE: 0.63 L
ERV-PRED: 1.22 L
EXPTIME-PRE: 5.85 SEC
FEF2575-%PRED-POST: 193 %
FEF2575-%PRED-PRE: 208 %
FEF2575-POST: 3.57 L/SEC
FEF2575-PRE: 3.86 L/SEC
FEF2575-PRED: 1.85 L/SEC
FEFMAX-%PRED-PRE: 125 %
FEFMAX-PRE: 8.63 L/SEC
FEFMAX-PRED: 6.88 L/SEC
FEV1-%PRED-PRE: 102 %
FEV1-PRE: 2.64 L
FEV1FEV6-PRE: 85 %
FEV1FEV6-PRED: 76 %
FEV1FVC-PRE: 85 %
FEV1FVC-PRED: 75 %
FEV1SVC-PRE: 106 %
FEV1SVC-PRED: 59 %
FIFMAX-PRE: 3.99 L/SEC
FRCPLETH-%PRED-PRE: 77 %
FRCPLETH-PRE: 2.9 L
FRCPLETH-PRED: 3.74 L
FVC-%PRED-PRE: 90 %
FVC-PRE: 3.11 L
FVC-PRED: 3.43 L
HGB BLD-MCNC: 11.1 G/DL (ref 13.3–17.7)
IC-%PRED-PRE: 60 %
IC-PRE: 1.87 L
IC-PRED: 3.11 L
RVPLETH-%PRED-PRE: 79 %
RVPLETH-PRE: 2.28 L
RVPLETH-PRED: 2.85 L
TLCPLETH-%PRED-PRE: 68 %
TLCPLETH-PRE: 4.77 L
TLCPLETH-PRED: 6.92 L
VA-%PRED-PRE: 84 %
VA-PRE: 5.13 L
VC-%PRED-PRE: 57 %
VC-PRE: 2.5 L
VC-PRED: 4.33 L

## 2022-02-15 PROCEDURE — 94640 AIRWAY INHALATION TREATMENT: CPT

## 2022-02-15 PROCEDURE — 94726 PLETHYSMOGRAPHY LUNG VOLUMES: CPT

## 2022-02-15 PROCEDURE — 94060 EVALUATION OF WHEEZING: CPT

## 2022-02-15 PROCEDURE — 94060 EVALUATION OF WHEEZING: CPT | Mod: 26 | Performed by: INTERNAL MEDICINE

## 2022-02-15 PROCEDURE — 94726 PLETHYSMOGRAPHY LUNG VOLUMES: CPT | Mod: 26 | Performed by: INTERNAL MEDICINE

## 2022-02-15 PROCEDURE — 36415 COLL VENOUS BLD VENIPUNCTURE: CPT | Performed by: INTERNAL MEDICINE

## 2022-02-15 PROCEDURE — 999N000157 HC STATISTIC RCP TIME EA 10 MIN

## 2022-02-15 PROCEDURE — 94729 DIFFUSING CAPACITY: CPT

## 2022-02-15 PROCEDURE — 250N000009 HC RX 250: Performed by: INTERNAL MEDICINE

## 2022-02-15 PROCEDURE — 94729 DIFFUSING CAPACITY: CPT | Mod: 26 | Performed by: INTERNAL MEDICINE

## 2022-02-15 PROCEDURE — 85018 HEMOGLOBIN: CPT | Performed by: INTERNAL MEDICINE

## 2022-02-15 RX ORDER — ALBUTEROL SULFATE 0.83 MG/ML
2.5 SOLUTION RESPIRATORY (INHALATION) ONCE
Status: COMPLETED | OUTPATIENT
Start: 2022-02-15 | End: 2022-02-15

## 2022-02-15 RX ADMIN — ALBUTEROL SULFATE 2.5 MG: 2.5 SOLUTION RESPIRATORY (INHALATION) at 09:26

## 2022-02-15 NOTE — PROGRESS NOTES
RESPIRATORY CARE NOTE     Patient Name: Trenton Nova  Today's Date: 2/15/2022     Complete PFT done. Pt performed tests with good effort. Test results meet ATS criteria except for Pre FVC.Pre FVC had error on each breath of back extrapolated volume, but results are repeatable Albuterol neb given.Results scanned into epic. Pt left in no distress.       Aurelia Samson, RT

## 2022-03-24 NOTE — TELEPHONE ENCOUNTER
"RN cancelled duplicate request    Requested Prescriptions   Refused Prescriptions Disp Refills     levothyroxine (SYNTHROID/LEVOTHROID) 125 MCG tablet [Pharmacy Med Name: LEVOTHYROXINE 0.125MG (125MCG) TAB] 90 tablet      Sig: TAKE 1 TABLET(125 MCG) BY MOUTH DAILY       Thyroid Protocol Failed - 8/17/2021  3:17 PM        Failed - Normal TSH on file in past 12 months     Recent Labs   Lab Test 08/13/21  1632   TSH 12.60*              Passed - Patient is 12 years or older        Passed - Recent (12 mo) or future (30 days) visit within the authorizing provider's specialty     Patient has had an office visit with the authorizing provider or a provider within the authorizing providers department within the previous 12 mos or has a future within next 30 days. See \"Patient Info\" tab in inbasket, or \"Choose Columns\" in Meds & Orders section of the refill encounter.              Passed - Medication is active on med list             Lucero Krishna RN 08/18/21 2:31 PM  " [FreeTextEntry1] : pt is slowly improving

## 2022-05-17 ENCOUNTER — OFFICE VISIT (OUTPATIENT)
Dept: INTERNAL MEDICINE | Facility: CLINIC | Age: 82
End: 2022-05-17
Payer: MEDICARE

## 2022-05-17 ENCOUNTER — HOSPITAL ENCOUNTER (OUTPATIENT)
Dept: GENERAL RADIOLOGY | Facility: HOSPITAL | Age: 82
Discharge: HOME | End: 2022-05-17
Attending: INTERNAL MEDICINE | Admitting: INTERNAL MEDICINE
Payer: MEDICARE

## 2022-05-17 VITALS
HEIGHT: 67 IN | DIASTOLIC BLOOD PRESSURE: 58 MMHG | SYSTOLIC BLOOD PRESSURE: 110 MMHG | TEMPERATURE: 98.3 F | BODY MASS INDEX: 23.07 KG/M2 | OXYGEN SATURATION: 99 % | RESPIRATION RATE: 17 BRPM | HEART RATE: 72 BPM | WEIGHT: 147 LBS

## 2022-05-17 DIAGNOSIS — R07.9 CHEST PAIN, UNSPECIFIED TYPE: Primary | ICD-10-CM

## 2022-05-17 DIAGNOSIS — R07.9 CHEST PAIN, UNSPECIFIED TYPE: ICD-10-CM

## 2022-05-17 PROCEDURE — 71046 X-RAY EXAM CHEST 2 VIEWS: CPT | Mod: FY

## 2022-05-17 PROCEDURE — 99213 OFFICE O/P EST LOW 20 MIN: CPT | Performed by: INTERNAL MEDICINE

## 2022-05-17 ASSESSMENT — PAIN SCALES - GENERAL: PAINLEVEL: MODERATE PAIN (4)

## 2022-05-17 NOTE — PROGRESS NOTES
"Assessment/Plan:    Chest pain left side sounds musculoskeletal intercostal muscle strain versus rib fracture.  After twisting injury in [place] retrieving golf cart near fall no head trauma.    COVID illness positive test for COVID April 30, 2022.  Chest congestion and bronchitis on amoxicillin clavulanate with benzoin 8.    History of smoldering multiple myeloma.    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    15 minutes spent on the date of the encounter doing chart review, patient visit and documentation     Subjective:  Trenton Nova is a 82 year old male presents for the following health issues Saturday a.m. twisted left knee left rib cage and left hip area coming from [place] to [place] getting his golf cart out of his garage for final cleaning.  Near fall no head trauma tenderness noted left rib area without bruising ecchymosis.  Left lower rib intercostal muscle area rule out fracture.  Chest x-ray pending    ROS:  No blood in stool urine sputum medication list reviewed reconciled.    Objective:  /58 (BP Location: Right arm, Patient Position: Sitting)   Pulse 72   Temp 98.3  F (36.8  C)   Resp 17   Ht 1.702 m (5' 7\")   Wt 66.7 kg (147 lb)   SpO2 99%   BMI 23.02 kg/m    Chest is clear anteriorly posteriorly heart tones regular rhythm tenderness noted left rib cage area.    Sarwat Mayorga MD  Internal Medicine    "

## 2022-05-23 ENCOUNTER — OFFICE VISIT (OUTPATIENT)
Dept: INTERNAL MEDICINE | Facility: CLINIC | Age: 82
End: 2022-05-23
Payer: MEDICARE

## 2022-05-23 VITALS
OXYGEN SATURATION: 97 % | WEIGHT: 146 LBS | SYSTOLIC BLOOD PRESSURE: 110 MMHG | HEART RATE: 61 BPM | TEMPERATURE: 98.1 F | HEIGHT: 67 IN | DIASTOLIC BLOOD PRESSURE: 60 MMHG | RESPIRATION RATE: 18 BRPM | BODY MASS INDEX: 22.91 KG/M2

## 2022-05-23 DIAGNOSIS — Z12.5 SCREENING FOR PROSTATE CANCER: ICD-10-CM

## 2022-05-23 DIAGNOSIS — Z00.00 ROUTINE GENERAL MEDICAL EXAMINATION AT A HEALTH CARE FACILITY: ICD-10-CM

## 2022-05-23 DIAGNOSIS — Z13.220 SCREENING FOR HYPERLIPIDEMIA: Primary | ICD-10-CM

## 2022-05-23 LAB
ALBUMIN SERPL-MCNC: 2.8 G/DL (ref 3.5–5)
ALBUMIN UR-MCNC: >=300 MG/DL
ALP SERPL-CCNC: 278 U/L (ref 45–120)
ALT SERPL W P-5'-P-CCNC: 63 U/L (ref 0–45)
ANION GAP SERPL CALCULATED.3IONS-SCNC: 8 MMOL/L (ref 5–18)
APPEARANCE UR: CLEAR
AST SERPL W P-5'-P-CCNC: 57 U/L (ref 0–40)
BACTERIA #/AREA URNS HPF: ABNORMAL /HPF
BILIRUB SERPL-MCNC: 0.3 MG/DL (ref 0–1)
BILIRUB UR QL STRIP: NEGATIVE
BUN SERPL-MCNC: 36 MG/DL (ref 8–28)
CALCIUM SERPL-MCNC: 8.4 MG/DL (ref 8.5–10.5)
CHLORIDE BLD-SCNC: 104 MMOL/L (ref 98–107)
CHOLEST SERPL-MCNC: 110 MG/DL
CO2 SERPL-SCNC: 27 MMOL/L (ref 22–31)
COLOR UR AUTO: YELLOW
CREAT SERPL-MCNC: 1.53 MG/DL (ref 0.7–1.3)
ERYTHROCYTE [DISTWIDTH] IN BLOOD BY AUTOMATED COUNT: 13.5 % (ref 10–15)
FASTING STATUS PATIENT QL REPORTED: NO
GFR SERPL CREATININE-BSD FRML MDRD: 45 ML/MIN/1.73M2
GLUCOSE BLD-MCNC: 95 MG/DL (ref 70–125)
GLUCOSE UR STRIP-MCNC: NEGATIVE MG/DL
HCT VFR BLD AUTO: 33.6 % (ref 40–53)
HDLC SERPL-MCNC: 57 MG/DL
HGB BLD-MCNC: 10.5 G/DL (ref 13.3–17.7)
HGB UR QL STRIP: NEGATIVE
KETONES UR STRIP-MCNC: NEGATIVE MG/DL
LDLC SERPL CALC-MCNC: 33 MG/DL
LEUKOCYTE ESTERASE UR QL STRIP: NEGATIVE
MCH RBC QN AUTO: 32.8 PG (ref 26.5–33)
MCHC RBC AUTO-ENTMCNC: 31.3 G/DL (ref 31.5–36.5)
MCV RBC AUTO: 105 FL (ref 78–100)
NITRATE UR QL: NEGATIVE
PH UR STRIP: 5.5 [PH] (ref 5–8)
PLATELET # BLD AUTO: 203 10E3/UL (ref 150–450)
POTASSIUM BLD-SCNC: 4.9 MMOL/L (ref 3.5–5)
PROT SERPL-MCNC: 6.7 G/DL (ref 6–8)
PSA SERPL-MCNC: 1.15 UG/L (ref 0–6.5)
RBC # BLD AUTO: 3.2 10E6/UL (ref 4.4–5.9)
RBC #/AREA URNS AUTO: ABNORMAL /HPF
SODIUM SERPL-SCNC: 139 MMOL/L (ref 136–145)
SP GR UR STRIP: 1.02 (ref 1–1.03)
SQUAMOUS #/AREA URNS AUTO: ABNORMAL /LPF
TRIGL SERPL-MCNC: 99 MG/DL
TSH SERPL DL<=0.005 MIU/L-ACNC: 1.06 UIU/ML (ref 0.3–5)
UROBILINOGEN UR STRIP-ACNC: 0.2 E.U./DL
WBC # BLD AUTO: 6.8 10E3/UL (ref 4–11)
WBC #/AREA URNS AUTO: ABNORMAL /HPF

## 2022-05-23 PROCEDURE — 80053 COMPREHEN METABOLIC PANEL: CPT | Performed by: INTERNAL MEDICINE

## 2022-05-23 PROCEDURE — G0103 PSA SCREENING: HCPCS | Performed by: INTERNAL MEDICINE

## 2022-05-23 PROCEDURE — 85027 COMPLETE CBC AUTOMATED: CPT | Performed by: INTERNAL MEDICINE

## 2022-05-23 PROCEDURE — 80061 LIPID PANEL: CPT | Performed by: INTERNAL MEDICINE

## 2022-05-23 PROCEDURE — 84443 ASSAY THYROID STIM HORMONE: CPT | Performed by: INTERNAL MEDICINE

## 2022-05-23 PROCEDURE — 81001 URINALYSIS AUTO W/SCOPE: CPT | Performed by: INTERNAL MEDICINE

## 2022-05-23 PROCEDURE — 36415 COLL VENOUS BLD VENIPUNCTURE: CPT | Performed by: INTERNAL MEDICINE

## 2022-05-23 PROCEDURE — 99207 PR ANNUAL WELLNESS VISIT, PPS, SUBSEQUENT STAT: CPT | Performed by: INTERNAL MEDICINE

## 2022-05-23 RX ORDER — FUROSEMIDE 20 MG
TABLET ORAL
COMMUNITY
Start: 2022-02-23 | End: 2023-02-10

## 2022-05-23 ASSESSMENT — PAIN SCALES - GENERAL: PAINLEVEL: NO PAIN (0)

## 2022-05-23 ASSESSMENT — ACTIVITIES OF DAILY LIVING (ADL): CURRENT_FUNCTION: NO ASSISTANCE NEEDED

## 2022-05-23 NOTE — PROGRESS NOTES
"SUBJECTIVE:   Trenton Nova is a 82 year old male who presents for Preventive Visit.    {Split Bill scripting  The purpose of this visit is to discuss your medical history and prevent health problems before you are sick. You may be responsible for a co-pay, coinsurance, or deductible if your visit today includes services such as checking on a sore throat, having an x-ray or lab test, or treating and evaluating a new or existing condition :221067}  Patient has been advised of split billing requirements and indicates understanding: Yes  Are you in the first 12 months of your Medicare coverage?  No    Healthy Habits:     In general, how would you rate your overall health?  Good    Frequency of exercise:  2-3 days/week    Duration of exercise:  15-30 minutes    Do you usually eat at least 4 servings of fruit and vegetables a day, include whole grains    & fiber and avoid regularly eating high fat or \"junk\" foods?  No    Taking medications regularly:  Yes    Medication side effects:  None    Ability to successfully perform activities of daily living:  No assistance needed    Home Safety:  No safety concerns identified    Hearing Impairment:  Difficulty following a conversation in a noisy restaurant or crowded room, feel that people are mumbling or not speaking clearly, difficulty following dialogue in the theater, difficult to understand a speaker at a public meeting or [employer] service, find that men's voices are easier to understand than woman's and difficulty understanding soft or whispered speech    In the past 6 months, have you been bothered by leaking of urine?  No    In general, how would you rate your overall mental or emotional health?  Excellent      PHQ-2 Total Score: 0    Additional concerns today:  No    Do you feel safe in your environment? Yes    Have you ever done Advance Care Planning? (For example, a Health Directive, POLST, or a discussion with a medical provider or your loved ones about your " "wishes): Yes, advance care planning is on file.    {Hearing Test Done (Optional):860426}   Fall risk  Fallen 2 or more times in the past year?: No  Any fall with injury in the past year?: No  {If any of the above assessments are answered yes, consider ordering appropriate referrals (Optional):540929::\"click delete button to remove this line now\"}  Cognitive Screening   1) Repeat 3 items (Leader, Season, Table)    2) Clock draw: NORMAL  3) 3 item recall: {Say: \"What were the three words I asked you to remember?\"}Recalls 3 objects  Results: {MINICOG RESULTS:149623}    Mini-CogTM Copyright TEVIN Gibson. Licensed by the author for use in [facility]; reprinted with permission (jeanne@[facility]). All rights reserved.      Do you have sleep apnea, excessive snoring or daytime drowsiness?: no    Reviewed and updated as needed this visit by clinical staff   Tobacco  Allergies  Meds   Med Hx  Surg Hx  Fam Hx  Soc Hx          Reviewed and updated as needed this visit by Provider                   Social History     Tobacco Use     Smoking status: Former Smoker     Smokeless tobacco: Never Used     Tobacco comment: quit 8/8/1988   Substance Use Topics     Alcohol use: No     {Rooming Staff- Complete this question if Prescreen response is not shown below for today's visit. If you drink alcohol do you typically have >3 drinks per day or >7 drinks per week? (Optional):708634}    Alcohol Use 5/23/2022   Prescreen: >3 drinks/day or >7 drinks/week? No   {add AUDIT responses (Optional) (A score of 7 for adult men is an indication of hazardous drinking; a score of 8 or more is an indication of an alcohol use disorder.  A score of 7 or more for adult women is an indication of hazardous drinking or an alchohol use disorder):836304}    {Outside tests to abstract? :851665}    {additional problems to add (Optional):152104}    Current providers sharing in care for this patient include: {Rooming staff:  Please update Care Team in " "Rooming Activity, refresh this note and then delete this statement}  Patient Care Team:  Sarwat Mayorga MD as PCP - General  Sarwat Mayorga MD as Assigned PCP  Andrew Blanco MD as Assigned Surgical Provider    The following health maintenance items are reviewed in Epic and correct as of today:  Health Maintenance Due   Topic Date Due     MICROALBUMIN  Never done     Pneumococcal Vaccine: 65+ Years (3 - PPSV23 or PCV20) 2017     MEDICARE ANNUAL WELLNESS VISIT  2022     LIPID  2022     {Chronicprobdata (optional):956842}  {Decision Support (Optional):583410}        Review of Systems  {ROS COMP (Optional):469000}    OBJECTIVE:   /60   Pulse 61   Temp 98.1  F (36.7  C)   Resp 18   Ht 1.702 m (5' 7\")   Wt 66.2 kg (146 lb)   SpO2 97%   BMI 22.87 kg/m   Estimated body mass index is 22.87 kg/m  as calculated from the following:    Height as of this encounter: 1.702 m (5' 7\").    Weight as of this encounter: 66.2 kg (146 lb).  Physical Exam  {Exam (Optional) :101816}    {Diagnostic Test Results (Optional):398377::\"Diagnostic Test Results:\",\"Labs reviewed in Epic\"}    ASSESSMENT / PLAN:   {Diag Picklist:967451}    {Patient advised of split billing (Optional):829677}    COUNSELING:  {Medicare Counselin}    Estimated body mass index is 22.87 kg/m  as calculated from the following:    Height as of this encounter: 1.702 m (5' 7\").    Weight as of this encounter: 66.2 kg (146 lb).    {Weight Management Plan (ACO) Complete if BMI is abnormal-  Ages 18-64  BMI >24.9.  Age 65+ with BMI <23 or >30 (Optional):339615}    He reports that he has quit smoking. He has never used smokeless tobacco.      Appropriate preventive services were discussed with this patient, including applicable screening as appropriate for cardiovascular disease, diabetes, osteopenia/osteoporosis, and glaucoma.  As appropriate for age/gender, discussed screening for colorectal cancer, prostate cancer, " breast cancer, and cervical cancer. Checklist reviewing preventive services available has been given to the patient.    Reviewed patients plan of care and provided an AVS. The {CarePlan:818650} for Trenton meets the Care Plan requirement. This Care Plan has been established and reviewed with the {PATIENT, FAMILY MEMBER, CAREGIVER:085112}.    Counseling Resources:  ATP IV Guidelines  Pooled Cohorts Equation Calculator  Breast Cancer Risk Calculator  Breast Cancer: Medication to Reduce Risk  FRAX Risk Assessment  ICSI Preventive Guidelines  Dietary Guidelines for Americans, 2010  USDA's MyPlate  ASA Prophylaxis  Lung CA Screening    Sarwat Mayorga MD  [facility]    Identified Health Risks:

## 2022-05-23 NOTE — PROGRESS NOTES
Annual Wellness Visit:  Trenton Nova  is a 82 year old male  who presents for an annual wellness visit.  Annual wellness visit and physical examination screen for prostate cancer.  Today check hemogram comprehensive metabolic profile urinalysis lipid panel PSA TSH.    Assessment/Plan:  Annual wellness visit and physical examination and screen for prostate cancer with PSA only.  Digital rectal exam and genital rectal check to be done by upcoming [place] [facility] consult not repeated by this examiner.    Subjective:   Medical History:    Non-smoker no alcohol for 34 years.  Allergies sulfa Ambien.    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  Hernia repair hemorrhoidectomy history of catheter ablation for atrial fibrillation Dr. Sarwat Arreaga presiding.  Permanent pacemaker.    Watchman has been placed.    Cervical spine surgery previously in the remote past.  Cataract retinal surgery and history of smoldering multiple myeloma followed by Dr. Greg Lemus from renal medicine.  No progression to end-stage renal disease no need for hemodialysis.    Past health history includes out of colonoscopy February 21, 2017 tubular adenomatous colon polyps.    Whipple procedure done March 19, 2020 for cancer ampulla Vater.  CA 19-9's have been unremarkable.    Appendectomy also with prior history of arthroscopic shoulder and knee surgeries.  Hyperlipidemia and history of ED.    No anesthetic complications with any prior surgery.  History reviewed. No pertinent past medical history.  Current Outpatient Medications   Medication     aspirin 81 MG EC tablet     brimonidine-timoloL (COMBIGAN) 0.2-0.5 % ophthalmic solution     Cholecalciferol 100 MCG (4000 UT) TABS     cyanocobalamin (VITAMIN B-12) 500 MCG tablet     furosemide (LASIX) 40 MG tablet     levothyroxine (SYNTHROID/LEVOTHROID) 150 MCG tablet     metoprolol succinate (TOPROL-XL) 25 MG     multivitamin  therapeutic (THERAGRAN) tablet     omeprazole (PRILOSEC) 20 MG capsule     rosuvastatin (CRESTOR) 10 MG tablet     tacrolimus (PROTOPIC) 0.1 % external ointment     triamcinolone (KENALOG) 0.1 % cream     vit C/E/Zn/coppr/lutein/zeaxan (PRESERVISION AREDS-2 ORAL)     VITRON-C 65 mg iron- 125 mg TbEC     diclofenac (VOLTAREN) 1 % topical gel     erythromycin (ROMYCIN) 5 MG/GM ophthalmic ointment     famotidine (PEPCID) 40 MG tablet     finasteride (PROSCAR) 5 MG tablet     furosemide (LASIX) 20 MG tablet     hydrocortisone 2.5 % ointment     prednisoLONE acetate (PRED FORTE) 1 % ophthalmic suspension     No current facility-administered medications for this visit.     Immunization History   Administered Date(s) Administered     COVID-19,PF,Moderna 04/05/2021     COVID-19,PF,Pfizer (12+ Yrs) 01/31/2021, 02/21/2021, 09/17/2021     DT (PEDS <7y) 11/15/2004     FLUAD(HD)65+ QUAD 10/15/2021     Flu, Unspecified 12/01/2006, 10/29/2007, 10/27/2008, 09/30/2009, 10/27/2009, 12/19/2011, 12/01/2012, 10/04/2013, 10/01/2014, 10/16/2014, 11/02/2017, 10/01/2018, 10/01/2019, 12/20/2020, 10/06/2021     IG-IM 04/18/2000     Influenza (H1N1) 02/09/2010     Influenza (High Dose) 3 valent vaccine 11/01/2010, 10/01/2015, 10/21/2015, 09/14/2016, 10/01/2016, 11/02/2017, 01/01/2018, 10/23/2018, 10/29/2019, 09/30/2020     Influenza (IIV3) PF 09/30/2009, 10/04/2013, 10/01/2014, 10/16/2014, 10/01/2018, 10/01/2019     Influenza Vaccine, 6+MO IM (QUADRIVALENT W/PRESERVATIVES) 10/04/2013, 10/16/2014, 10/02/2021     Influenza, Quad, High Dose, Pf, 65yr+ (Fluzone HD) 09/30/2020     Pneumo Conj 13-V (2010&after) 10/01/2016, 12/20/2016     Pneumococcal 23 valent 11/08/2005     Pneumococcal, Unspecified 06/01/2006     TD (ADULT, 7+) 07/15/2021     Td (Adult), Adsorbed 11/15/2004     Td,adult,historic,unspecified 11/15/2004, 01/01/2005     Tdap (Adacel,Boostrix) 01/01/2013, 02/18/2014     Tdap (Adult) Unspecified Formulation 11/15/2004, 01/01/2005      "Zoster vaccine recombinant adjuvanted (SHINGRIX) 2019, 10/01/2019, 2019     Zoster vaccine, live 2007       Surgical History:  Past Surgical History:   Procedure Laterality Date     KNEE SURGERY       PANCREATICODUODENECTOMY  2020    For adenocarcinoma of the ampulla of Vater     SHOULDER SURGERY          Family History:  Father's whereabouts and health history unknown he left the family at age 2.  Mother  88 old age.  2 sons well 2 grandchildren well and second wife has had a history of melanoma in the anus resected a cancer survivor.  First wife  in her mid 60s of cancer of the brain.  Patient has been  twice and his first wife  of cancer of the brain.    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  Enjoys playing [employer]    Health Maintenances:  Immunizations are up-to-date and reviewed.  Pneumovax to be given at local pharmacy.    Objective:  /60   Pulse 61   Temp 98.1  F (36.7  C)   Resp 18   Ht 1.702 m (5' 7\")   Wt 66.2 kg (146 lb)   SpO2 97%   BMI 22.87 kg/m  Nonicteric.acute distress not toxic or acutely or chronically ill-appearing he is thin but not cachectic.  No wasting.  Full head a hair grain easily conversant good spirited not in acute distress not toxic.  Intelligent never graduated from high school.  Chest clear heart tones normal abdomen benign neck veins nondistended no thyromegaly no carotid bruits head eyes ears nose throat negative wears hearing aids bilaterally back straight no severe spine tenderness good pulse noted in all 4 extremities genital rectal examination to be done by [place] [facility] not repeated by this examiner today.    Sarwat Mayorga MD    Internal Medicine  Answers for HPI/ROS submitted by the patient on 2022  In general, how would you rate your overall physical health?: good  Frequency of exercise:: 2-3 days/week  Do you usually eat at least 4 " "servings of fruit and vegetables a day, include whole grains & fiber, and avoid regularly eating high fat or \"junk\" foods? : No  Taking medications regularly:: Yes  Medication side effects:: None  Activities of Daily Living: no assistance needed  Home safety: no safety concerns identified  Hearing Impairment:: difficulty following a conversation in a noisy restaurant or crowded room, feel that people are mumbling or not speaking clearly, difficulty following dialogue in the theater, difficult to understand a speaker at a public meeting or Anabaptism service, find that men's voices are easier to understand than woman's, difficulty understanding soft or whispered speech  In the past 6 months, have you been bothered by leaking of urine?: No  In general, how would you rate your overall mental or emotional health?: excellent  Additional concerns today:: No  Duration of exercise:: 15-30 minutes      "

## 2022-05-23 NOTE — LETTER
May 23, 2022      Trenton Nova  [address] [facility] [address]        Dear ,    We are writing to inform you of your test results.    Improved chronic kidney disease with decrease in serum creatinine to 1.53.  The best way to preserve kidney function is to keep blood pressure normal and to stay well-hydrated and to avoid over-the-counter agents like nonsteroidal anti-inflammatory drugs ibuprofen and naproxen sodium --all other labs are quite good.     Resulted Orders   CBC with platelets   Result Value Ref Range    WBC Count 6.8 4.0 - 11.0 10e3/uL    RBC Count 3.20 (L) 4.40 - 5.90 10e6/uL    Hemoglobin 10.5 (L) 13.3 - 17.7 g/dL    Hematocrit 33.6 (L) 40.0 - 53.0 %     (H) 78 - 100 fL    MCH 32.8 26.5 - 33.0 pg    MCHC 31.3 (L) 31.5 - 36.5 g/dL    RDW 13.5 10.0 - 15.0 %    Platelet Count 203 150 - 450 10e3/uL   Comprehensive metabolic panel   Result Value Ref Range    Sodium 139 136 - 145 mmol/L    Potassium 4.9 3.5 - 5.0 mmol/L    Chloride 104 98 - 107 mmol/L    Carbon Dioxide (CO2) 27 22 - 31 mmol/L    Anion Gap 8 5 - 18 mmol/L    Urea Nitrogen 36 (H) 8 - 28 mg/dL    Creatinine 1.53 (H) 0.70 - 1.30 mg/dL    Calcium 8.4 (L) 8.5 - 10.5 mg/dL    Glucose 95 70 - 125 mg/dL    Alkaline Phosphatase 278 (H) 45 - 120 U/L    AST 57 (H) 0 - 40 U/L    ALT 63 (H) 0 - 45 U/L    Protein Total 6.7 6.0 - 8.0 g/dL    Albumin 2.8 (L) 3.5 - 5.0 g/dL    Bilirubin Total 0.3 0.0 - 1.0 mg/dL    GFR Estimate 45 (L) >60 mL/min/1.73m2      Comment:      Effective December 21, 2021 eGFRcr in adults is calculated using the 2021 CKD-EPI creatinine equation which includes age and gender (Ligai koenig al., NEJM, DOI: 10.1056/EGTJuz8861241)   UA reflex to Microscopic and Culture   Result Value Ref Range    Color Urine Yellow Colorless, Straw, Light Yellow, Yellow    Appearance Urine Clear Clear    Glucose Urine Negative Negative mg/dL    Bilirubin Urine Negative Negative    Ketones Urine Negative Negative mg/dL     Specific Gravity Urine 1.025 1.005 - 1.030    Blood Urine Negative Negative    pH Urine 5.5 5.0 - 8.0    Protein Albumin Urine >=300 (A) Negative mg/dL    Urobilinogen Urine 0.2 0.2, 1.0 E.U./dL    Nitrite Urine Negative Negative    Leukocyte Esterase Urine Negative Negative   Lipid panel reflex to direct LDL Fasting   Result Value Ref Range    Cholesterol 110 <=199 mg/dL    Triglycerides 99 <=149 mg/dL    Direct Measure HDL 57 >=40 mg/dL      Comment:      HDL Cholesterol Reference Range:     0-2 years:   No reference ranges established for patients under 2 years old  at [facility] for lipid analytes.    2-8 years:  Greater than 45 mg/dL     18 years and older:   Female: Greater than or equal to 50 mg/dL   Male:   Greater than or equal to 40 mg/dL    LDL Cholesterol Calculated 33 <=129 mg/dL    Patient Fasting > 8hrs? No    Urine Microscopic   Result Value Ref Range    Bacteria Urine None Seen None Seen /HPF    RBC Urine 0-2 0-2 /HPF /HPF    WBC Urine 0-5 0-5 /HPF /HPF    Squamous Epithelials Urine Few (A) None Seen /LPF    Narrative    Urine Culture not indicated       If you have any questions or concerns, please call the clinic at the number listed above.       Sincerely,      Sarwat Mayorga MD

## 2022-06-06 ENCOUNTER — DOCUMENTATION ONLY (OUTPATIENT)
Dept: CARDIOLOGY | Facility: CLINIC | Age: 82
End: 2022-06-06
Payer: MEDICARE

## 2022-06-06 NOTE — PROGRESS NOTES
Is the implanted device safe for MRI Exam?  Yes  Is this device 3T compatible? Yes  Device Type: Pacemaker      Device Information:  Make: Medtronic   Model: [id number] Triston CALDERON MRI     Cardiology Orders for Device Programming     -- Yes -- The patient has a MRI conditional pulse generator and leads from the same     -- Yes -- The pulse generator and leads have been implanted for at least 6 weeks    -- Yes-- The device is implanted in the right or left pectoral region    -- Yes -- There are not any additional active cardiac devices, abandoned leads, lead extenders or adapters (CXR per protocol to verify)     -- Yes -- The device lead impedance measurements are within the normal range. (Manufacture recommendations: Medtronic Advisa and Revo 200-1,500 ohms; Medtronic ICD and CRT's 200-3000 ohms and defibrillation lead impedance   ohms)    -- N/A -- If the patient is pacemaker dependent the thresholds are less than or equal to 2.0V @ 0.4ms.     Date of last in-office/remote Device check: 2/23/2022 (remote used/ auto-cap on)   Results of last in-office/Remote Device check:  1.   Right atrium impedance: 399 Ohms   2.   Right ventricle impedance: 361 Ohms   3.   Left ventricle impedance: n/a      4.   Right atrium threshold: 0.625V @ 0.4 ms   5.   Right ventricle threshold: 0.5V @ 0.4 ms   6.   Left ventricle threshold: n/a      Device programming during the scan guidelines   Pacing Mode (check one): DOO  Pacing Rate: 70  bpm or > intrinsic     Liyah Barnes RN

## 2022-06-14 ENCOUNTER — TRANSFERRED RECORDS (OUTPATIENT)
Dept: HEALTH INFORMATION MANAGEMENT | Facility: CLINIC | Age: 82
End: 2022-06-14
Payer: MEDICARE

## 2022-06-21 ENCOUNTER — HOSPITAL ENCOUNTER (OUTPATIENT)
Dept: RADIOLOGY | Facility: CLINIC | Age: 82
Discharge: HOME | End: 2022-06-21
Attending: ORTHOPAEDIC SURGERY
Payer: MEDICARE

## 2022-06-21 ENCOUNTER — HOSPITAL ENCOUNTER (OUTPATIENT)
Dept: MRI IMAGING | Facility: CLINIC | Age: 82
Discharge: HOME | End: 2022-06-21
Attending: ORTHOPAEDIC SURGERY
Payer: MEDICARE

## 2022-06-21 VITALS — OXYGEN SATURATION: 93 % | HEART RATE: 70 BPM

## 2022-06-21 DIAGNOSIS — M79.605 PAIN OF LEFT LOWER EXTREMITY: ICD-10-CM

## 2022-06-21 DIAGNOSIS — Z95.0 PACEMAKER: ICD-10-CM

## 2022-06-21 DIAGNOSIS — M54.50 LOW BACK PAIN: ICD-10-CM

## 2022-06-21 PROCEDURE — A9585 GADOBUTROL INJECTION: HCPCS | Performed by: ORTHOPAEDIC SURGERY

## 2022-06-21 PROCEDURE — 72158 MRI LUMBAR SPINE W/O & W/DYE: CPT

## 2022-06-21 PROCEDURE — 255N000002 HC RX 255 OP 636: Performed by: ORTHOPAEDIC SURGERY

## 2022-06-21 PROCEDURE — 71046 X-RAY EXAM CHEST 2 VIEWS: CPT

## 2022-06-21 RX ORDER — GADOBUTROL 604.72 MG/ML
6.5 INJECTION INTRAVENOUS ONCE
Status: COMPLETED | OUTPATIENT
Start: 2022-06-21 | End: 2022-06-21

## 2022-06-21 RX ADMIN — GADOBUTROL 6.5 ML: 604.72 INJECTION INTRAVENOUS at 08:25

## 2022-06-21 NOTE — PROGRESS NOTES
Patient's device placed into MRI Surescan mode prior to scan.  Patient monitored by RN via EKG and pulseoximetry throughout procedure.  Patient stable throughout.  Device placed back into previous mode at completion of MRI.

## 2022-07-19 ENCOUNTER — TRANSFERRED RECORDS (OUTPATIENT)
Dept: HEALTH INFORMATION MANAGEMENT | Facility: CLINIC | Age: 82
End: 2022-07-19

## 2022-07-30 ENCOUNTER — TELEPHONE ENCOUNTER (OUTPATIENT)
Age: 82
End: 2022-07-30

## 2022-07-31 ENCOUNTER — TELEPHONE ENCOUNTER (OUTPATIENT)
Age: 82
End: 2022-07-31

## 2022-09-12 ENCOUNTER — TELEPHONE (OUTPATIENT)
Dept: INTERNAL MEDICINE | Facility: CLINIC | Age: 82
End: 2022-09-12

## 2022-09-12 DIAGNOSIS — E16.2 HYPOGLYCEMIA: Primary | ICD-10-CM

## 2022-09-12 DIAGNOSIS — E11.8 TYPE 2 DIABETES MELLITUS WITH COMPLICATION, WITHOUT LONG-TERM CURRENT USE OF INSULIN (H): ICD-10-CM

## 2022-09-12 NOTE — TELEPHONE ENCOUNTER
Reason for Call: Request for an order or referral:    Order or referral being requested: diabetes     Date needed: as soon as possible    Has the patient been seen by the PCP for this problem? NO    Additional comments: Patient called to say that his cancer doctor believes he may be diabetic. He was told his blood sugar was down to 34.      Phone number Patient can be reached at:  Home number on file [phone] (home)    Best Time:  Any time    Can we leave a detailed message on this number?  Not Applicable    Call taken on 9/12/2022 at 3:51 PM by Allie Thomas

## 2022-09-13 NOTE — TELEPHONE ENCOUNTER
I will place lab orders now.  Please call patient and ask him to schedule a lab appointment with our [facility].  These labs should be fasting.

## 2022-09-13 NOTE — TELEPHONE ENCOUNTER
"Called patient to gain more information in regards to call placed yesterday.     Patient states his most recent lab work with [facility] showed his blood sugar was \"34.\"  Patient denies any symptoms.     Patient states he is getting labs rechecked with [facility] on Monday 9/19/22.  Patient states he spoke with Dr. Mayorga yesterday and was given recommendations.     Patient asked if he needed labs.     Writer informed this would be sent to PCP for review and further recommendations.   "

## 2022-09-19 ENCOUNTER — LAB (OUTPATIENT)
Dept: LAB | Facility: CLINIC | Age: 82
End: 2022-09-19
Payer: MEDICARE

## 2022-09-19 DIAGNOSIS — E11.8 TYPE 2 DIABETES MELLITUS WITH COMPLICATION, WITHOUT LONG-TERM CURRENT USE OF INSULIN (H): ICD-10-CM

## 2022-09-19 DIAGNOSIS — E16.2 HYPOGLYCEMIA: ICD-10-CM

## 2022-09-19 LAB
ALBUMIN SERPL BCG-MCNC: 3.5 G/DL (ref 3.5–5.2)
ALP SERPL-CCNC: 312 U/L (ref 40–129)
ALT SERPL W P-5'-P-CCNC: 58 U/L (ref 10–50)
ANION GAP SERPL CALCULATED.3IONS-SCNC: 9 MMOL/L (ref 7–15)
AST SERPL W P-5'-P-CCNC: 66 U/L (ref 10–50)
BILIRUB SERPL-MCNC: 0.4 MG/DL
BUN SERPL-MCNC: 40 MG/DL (ref 8–23)
CALCIUM SERPL-MCNC: 8.9 MG/DL (ref 8.8–10.2)
CHLORIDE SERPL-SCNC: 106 MMOL/L (ref 98–107)
CHOLEST SERPL-MCNC: 91 MG/DL
CREAT SERPL-MCNC: 1.73 MG/DL (ref 0.67–1.17)
CREAT UR-MCNC: 69.5 MG/DL
DEPRECATED HCO3 PLAS-SCNC: 24 MMOL/L (ref 22–29)
GFR SERPL CREATININE-BSD FRML MDRD: 39 ML/MIN/1.73M2
GLUCOSE SERPL-MCNC: 102 MG/DL (ref 70–99)
HBA1C MFR BLD: 5.7 % (ref 0–5.6)
HDLC SERPL-MCNC: 60 MG/DL
LDLC SERPL CALC-MCNC: 15 MG/DL
MICROALBUMIN UR-MCNC: 1443 MG/L
MICROALBUMIN/CREAT UR: 2076.26 MG/G CR (ref 0–17)
NONHDLC SERPL-MCNC: 31 MG/DL
POTASSIUM SERPL-SCNC: 4.7 MMOL/L (ref 3.4–5.3)
PROT SERPL-MCNC: 7.1 G/DL (ref 6.4–8.3)
SODIUM SERPL-SCNC: 139 MMOL/L (ref 136–145)
TRIGL SERPL-MCNC: 80 MG/DL
TSH SERPL DL<=0.005 MIU/L-ACNC: 0.65 UIU/ML (ref 0.3–4.2)

## 2022-09-19 PROCEDURE — 36415 COLL VENOUS BLD VENIPUNCTURE: CPT

## 2022-09-19 PROCEDURE — 82043 UR ALBUMIN QUANTITATIVE: CPT

## 2022-09-19 PROCEDURE — 80053 COMPREHEN METABOLIC PANEL: CPT

## 2022-09-19 PROCEDURE — 83036 HEMOGLOBIN GLYCOSYLATED A1C: CPT

## 2022-09-19 PROCEDURE — 80061 LIPID PANEL: CPT

## 2022-09-19 PROCEDURE — 84443 ASSAY THYROID STIM HORMONE: CPT

## 2022-09-30 ENCOUNTER — NURSE TRIAGE (OUTPATIENT)
Dept: NURSING | Facility: CLINIC | Age: 82
End: 2022-09-30

## 2022-09-30 ENCOUNTER — OFFICE VISIT (OUTPATIENT)
Dept: FAMILY MEDICINE | Facility: CLINIC | Age: 82
End: 2022-09-30
Payer: MEDICARE

## 2022-09-30 VITALS
RESPIRATION RATE: 16 BRPM | HEART RATE: 71 BPM | TEMPERATURE: 98.1 F | BODY MASS INDEX: 22.55 KG/M2 | OXYGEN SATURATION: 98 % | DIASTOLIC BLOOD PRESSURE: 75 MMHG | SYSTOLIC BLOOD PRESSURE: 151 MMHG | WEIGHT: 144 LBS

## 2022-09-30 DIAGNOSIS — J06.9 VIRAL URI: Primary | ICD-10-CM

## 2022-09-30 LAB — SARS-COV-2 RNA RESP QL NAA+PROBE: NEGATIVE

## 2022-09-30 PROCEDURE — 99213 OFFICE O/P EST LOW 20 MIN: CPT | Mod: CS | Performed by: FAMILY MEDICINE

## 2022-09-30 PROCEDURE — U0003 INFECTIOUS AGENT DETECTION BY NUCLEIC ACID (DNA OR RNA); SEVERE ACUTE RESPIRATORY SYNDROME CORONAVIRUS 2 (SARS-COV-2) (CORONAVIRUS DISEASE [COVID-19]), AMPLIFIED PROBE TECHNIQUE, MAKING USE OF HIGH THROUGHPUT TECHNOLOGIES AS DESCRIBED BY CMS-2020-01-R: HCPCS | Performed by: FAMILY MEDICINE

## 2022-09-30 PROCEDURE — U0005 INFEC AGEN DETEC AMPLI PROBE: HCPCS | Performed by: FAMILY MEDICINE

## 2022-09-30 RX ORDER — BENZONATATE 200 MG/1
200 CAPSULE ORAL 3 TIMES DAILY PRN
Qty: 21 CAPSULE | Refills: 1 | Status: SHIPPED | OUTPATIENT
Start: 2022-09-30 | End: 2022-12-26

## 2022-09-30 NOTE — TELEPHONE ENCOUNTER
"Pt's incoming call resulted in triager not able to hear patient.  Therefore outbound call made back to pt per phone # on [employer] caller ID ...    Reached pt who states he \"was able to hear the nurse and appreciates the callback.\"  Had walk-in-clinic eval.  Covid test still pending.  Asks what advice was given by provider for cough.  Conveyed care advice from after-visit summary:  Afrin nasal spray for up to 5 days.   Over the counter cough syrup or drops.   Benzonatate as needed for cough.    Pt verbalizes understanding.  Will obtain above items from pharmacy and await final covid result.    Coronavirus (COVID-19) Notification     Reason for call  Patient requesting results     Lab Result    Lab test 2019-nCoV rRt-PCR in process        RN Recommendations/Instructions per [facility]  Continue to quarantine and follow the instructions given at your testing visit until you receive the results.     Please Contact your PCP clinic or return to the Emergency department if your:    Symptoms worsen or other concerning symptom's.     Patient informed that if test for COVID19 is POSITIVE,  you will receive a call typically within 48 hours from the test date (date lab collected).  If NEGATIVE result, you will receive a letter in the mail or MyChart.      Tanesha Smith RN      Reason for Disposition    Caller requesting lab results  (Exception: Routine or non-urgent lab result.)    Protocols used: PCP CALL - NO TRIAGE-A-      "

## 2022-09-30 NOTE — PROGRESS NOTES
OUTPATIENT VISIT NOTE                                                   Date of Visit: 9/30/2022     Chief Complaint   Patient presents with:  Cough: Cough congestion             History of Present Illness   Trenton Nova is a 82 year old male c/o cough and congestion, runny nose.  No fever. Cough. is productive  No shortness of breath.  No chest pain.  No covid test done.  No one else at home is sick.    Cough syrup last night.    Has had covid shots.         MEDICATIONS   Current Outpatient Medications   Medication     aspirin 81 MG EC tablet     benzonatate (TESSALON) 200 MG capsule     brimonidine-timoloL (COMBIGAN) 0.2-0.5 % ophthalmic solution     Cholecalciferol 100 MCG (4000 UT) TABS     cyanocobalamin (VITAMIN B-12) 500 MCG tablet     furosemide (LASIX) 40 MG tablet     levothyroxine (SYNTHROID/LEVOTHROID) 150 MCG tablet     metoprolol succinate (TOPROL-XL) 25 MG     multivitamin therapeutic (THERAGRAN) tablet     omeprazole (PRILOSEC) 20 MG capsule     rosuvastatin (CRESTOR) 10 MG tablet     tacrolimus (PROTOPIC) 0.1 % external ointment     triamcinolone (KENALOG) 0.1 % cream     vit C/E/Zn/coppr/lutein/zeaxan (PRESERVISION AREDS-2 ORAL)     VITRON-C 65 mg iron- 125 mg TbEC     diclofenac (VOLTAREN) 1 % topical gel     erythromycin (ROMYCIN) 5 MG/GM ophthalmic ointment     famotidine (PEPCID) 40 MG tablet     finasteride (PROSCAR) 5 MG tablet     furosemide (LASIX) 20 MG tablet     hydrocortisone 2.5 % ointment     prednisoLONE acetate (PRED FORTE) 1 % ophthalmic suspension     No current facility-administered medications for this visit.         SOCIAL HISTORY   Social History     Tobacco Use     Smoking status: Former Smoker     Smokeless tobacco: Never Used     Tobacco comment: quit 8/8/1988   Substance Use Topics     Alcohol use: No           Physical Exam   Vitals:    09/30/22 1050   BP: (!) 151/75   Pulse: 71   Resp: 16   Temp: 98.1  F (36.7  C)   TempSrc: Oral   SpO2: 98%   Weight: 65.3 kg (144 lb)         GENERAL:   Alert. Oriented.  EYES: Clear  HENT:  Ears: R TM pearly gray. Normal landmarks. L TM pearly gray.  Normal landmarks  Nose: Clear.  Sinuses: Nontender.  Oropharynx:  No erythema. No exudate.  NECK: Supple. No adenopathy.  LUNGS: Clear to ascultation.  No crackles.  No wheezing  HEART: RRR  SKIN:  No rash.          Assessment and Plan     Viral URI  Will check for covid.  Afrin for nasal congestion  OTC cough syrup.  benzanatate for cough.    - Symptomatic; Yes; 9/27/2022 COVID-19 Virus (Coronavirus) by PCR  - benzonatate (TESSALON) 200 MG capsule  Dispense: 21 capsule; Refill: 1                   Discussed signs / symptoms that warrant urgent / emergent medical attention.     Recheck if worsening or not improving.       Idania Gomez MD          Pertinent History     The following portions of the patient's history were reviewed and updated as appropriate: allergies, current medications, past family history, past medical history, past social history, past surgical history and problem list.

## 2022-09-30 NOTE — PATIENT INSTRUCTIONS
Afrin nasal spray for up to 5 days.    Over the counter cough syrup or drops.    Benzonatate as needed for cough.    Recheck if worsening or not improving.

## 2022-10-04 ENCOUNTER — OFFICE VISIT (OUTPATIENT)
Dept: FAMILY MEDICINE | Facility: CLINIC | Age: 82
End: 2022-10-04
Payer: MEDICARE

## 2022-10-04 VITALS
TEMPERATURE: 97.9 F | HEART RATE: 79 BPM | DIASTOLIC BLOOD PRESSURE: 61 MMHG | SYSTOLIC BLOOD PRESSURE: 106 MMHG | WEIGHT: 147.6 LBS | OXYGEN SATURATION: 98 % | RESPIRATION RATE: 20 BRPM | BODY MASS INDEX: 23.12 KG/M2

## 2022-10-04 DIAGNOSIS — J01.10 ACUTE NON-RECURRENT FRONTAL SINUSITIS: Primary | ICD-10-CM

## 2022-10-04 DIAGNOSIS — J84.9 ILD (INTERSTITIAL LUNG DISEASE) (H): ICD-10-CM

## 2022-10-04 DIAGNOSIS — Z23 NEED FOR PROPHYLACTIC VACCINATION AND INOCULATION AGAINST INFLUENZA: ICD-10-CM

## 2022-10-04 PROCEDURE — 90662 IIV NO PRSV INCREASED AG IM: CPT | Performed by: FAMILY MEDICINE

## 2022-10-04 PROCEDURE — G0008 ADMIN INFLUENZA VIRUS VAC: HCPCS | Performed by: FAMILY MEDICINE

## 2022-10-04 PROCEDURE — 99213 OFFICE O/P EST LOW 20 MIN: CPT | Mod: 25 | Performed by: FAMILY MEDICINE

## 2022-10-04 RX ORDER — ALFUZOSIN HYDROCHLORIDE 10 MG/1
1 TABLET, EXTENDED RELEASE ORAL DAILY
COMMUNITY
Start: 2022-08-22 | End: 2023-10-11

## 2022-10-04 RX ORDER — AZITHROMYCIN 250 MG/1
TABLET, FILM COATED ORAL
Qty: 6 TABLET | Refills: 0 | Status: SHIPPED | OUTPATIENT
Start: 2022-10-04 | End: 2022-10-09

## 2022-10-04 RX ORDER — PREDNISONE 20 MG/1
20 TABLET ORAL DAILY
Qty: 5 TABLET | Refills: 0 | Status: SHIPPED | OUTPATIENT
Start: 2022-10-04 | End: 2023-10-11

## 2022-10-04 ASSESSMENT — ENCOUNTER SYMPTOMS: COUGH: 1

## 2022-10-04 ASSESSMENT — PAIN SCALES - GENERAL: PAINLEVEL: NO PAIN (0)

## 2022-10-04 NOTE — PROGRESS NOTES
Assessment & Plan     Acute non-recurrent frontal sinusitis  Supportive care  - azithromycin (ZITHROMAX) 250 MG tablet; Take 2 tablets (500 mg) by mouth daily for 1 day, THEN 1 tablet (250 mg) daily for 4 days.  - predniSONE (DELTASONE) 20 MG tablet; Take 1 tablet (20 mg) by mouth daily    ILD (interstitial lung disease) (H)  Continue with Benzonatate for cough as needed  - azithromycin (ZITHROMAX) 250 MG tablet; Take 2 tablets (500 mg) by mouth daily for 1 day, THEN 1 tablet (250 mg) daily for 4 days.  - predniSONE (DELTASONE) 20 MG tablet; Take 1 tablet (20 mg) by mouth daily    Need for prophylactic vaccination and inoculation against influenza  given  - INFLUENZA, QUAD, HIGH DOSE, PF, 65YR + (FLUZONE HD)    Negative COVID test.    Agapito Mc MD  [facility]    Flores Chowdhury is a 82 year old presenting for the following health issues:  Cough  Cough, chest congestion, nasal congestion for the past 2 weeks.   Patient does have history of interstitial lung disease.  Has no fever, no chiills, no short of breath, denies wheezing.  No lower extremity edema.  Has no sore throat, no sick contact.    Today, feeling better.    Had a negative COVID test    History of Present Illness       Reason for visit:  Cough  Symptom onset:  3-7 days ago  Symptoms include:  Greenish yellow mucus  Symptom intensity:  Moderate  Symptom progression:  Improving  Had these symptoms before:  Yes  Has tried/received treatment for these symptoms:  Yes  Previous treatment was successful:  Yes  Prior treatment description:  Saline solution to flush  What makes it worse:  Nothing  What makes it better:  Flushing it out    He eats 0-1 servings of fruits and vegetables daily.He consumes 1 sweetened beverage(s) daily.He exercises with enough effort to increase his heart rate 60 or more minutes per day.  He exercises with enough effort to increase his heart rate 5 days per week.   He is taking medications  regularly.         Review of Systems   Constitutional, HEENT, cardiovascular, pulmonary, GI, , musculoskeletal, neuro, skin, endocrine and psych systems are negative, except as otherwise noted.      Objective    /61 (BP Location: Right arm, Patient Position: Chair, Cuff Size: Adult Regular)   Pulse 79   Temp 97.9  F (36.6  C) (Oral)   Resp 20   Wt 67 kg (147 lb 9.6 oz)   SpO2 98%   BMI 23.12 kg/m    Body mass index is 23.12 kg/m .  Physical Exam   GENERAL: healthy, alert and no distress  HENT: ear canals and TM's normal, nose and mouth without ulcers or lesions  NECK: no adenopathy, no asymmetry, masses, or scars and thyroid normal to palpation  RESP: rales R upper posterior and R lower posterior and rhonchi R lower posterior  CV: regular rate and rhythm, normal S1 S2, no S3 or S4, no murmur, click or rub, no peripheral edema and peripheral pulses strong  ABDOMEN: soft, nontender, no hepatosplenomegaly, no masses and bowel sounds normal  MS: no gross musculoskeletal defects noted, no edema    Orders Placed This Encounter   Procedures     INFLUENZA, QUAD, HIGH DOSE, PF, 65YR + (FLUZONE HD)       Agapito Mc MD

## 2022-10-10 ENCOUNTER — VIRTUAL VISIT (OUTPATIENT)
Dept: INTERNAL MEDICINE | Facility: CLINIC | Age: 82
End: 2022-10-10
Payer: MEDICARE

## 2022-10-10 DIAGNOSIS — J32.9 SINUSITIS, UNSPECIFIED CHRONICITY, UNSPECIFIED LOCATION: Primary | ICD-10-CM

## 2022-10-10 PROCEDURE — 99441 PR PHYSICIAN TELEPHONE EVALUATION 5-10 MIN: CPT | Mod: 95 | Performed by: INTERNAL MEDICINE

## 2022-10-10 NOTE — PROGRESS NOTES
Trenton Nova is a 82 year oldwho is being evaluated via a billable telephone visit.       What phone number would you like to be contacted at? [phone]      Assessment & Plan  Sinus infection with bronchitis and cough.  Thought he had pneumonia but seen at walk-in care urgency room and told no infiltrate no pneumonia.  Feels better now after course of azithromycin in the form of Z-Dakota.  No new symptoms or complaints.  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     11 minutes spent on the date of the encounter doing chart review, patient visit and documentation  and I spoke with the patient directly on the phone 5 minutes.       Subjective   History as above feeling better now after course of azithromycin in the form of Z-Dakota for sinobronchial infection.  Symptoms have all but disappeared.  Some residual fatigue discussed reassured.  Other comorbidities may be playing a role.     Review of Systems   No blood in sputum stool or urine.    Medication list reviewed reconciled in the chart.       Sarwat Mayorga MD  Answers for HPI/ROS submitted by the patient on 10/4/2022  How many servings of fruits and vegetables do you eat daily?: 0-1  On average, how many sweetened beverages do you drink each day (Examples: soda, juice, sweet tea, etc.  Do NOT count diet or artificially sweetened beverages)?: 1  How many minutes a day do you exercise enough to make your heart beat faster?: 60 or more  How many days a week do you exercise enough to make your heart beat faster?: 5  How many days per week do you miss taking your medication?: 0  What is the reason for your visit today?: Cough  When did your symptoms begin?: 3-7 days ago  What are your symptoms?: Greenish yellow mucus  How would you describe these symptoms?: Moderate  Are your symptoms:: Improving  Have you  had these symptoms before?: Yes  Have you tried or received treatment for these symptoms before?: Yes  Did that treatment work? : Yes  Please describe the treatment you had:: Saline solution to flush  Is there anything that makes you feel worse?: nothing  Is there anything that makes you feel better?: flushing it out

## 2022-11-29 DIAGNOSIS — E03.9 HYPOTHYROIDISM, UNSPECIFIED TYPE: ICD-10-CM

## 2022-11-30 RX ORDER — LEVOTHYROXINE SODIUM 150 UG/1
TABLET ORAL
Qty: 90 TABLET | Refills: 2 | Status: SHIPPED | OUTPATIENT
Start: 2022-11-30

## 2022-11-30 RX ORDER — LEVOTHYROXINE SODIUM 150 UG/1
TABLET ORAL
Qty: 90 TABLET | Refills: 3 | OUTPATIENT
Start: 2022-11-30

## 2022-11-30 NOTE — TELEPHONE ENCOUNTER
"Last Written Prescription Date:  12/22/21  Last Fill Quantity: 90,  # refills: 3   Last office visit provider:  10/10/22     Requested Prescriptions   Pending Prescriptions Disp Refills     levothyroxine (SYNTHROID/LEVOTHROID) 150 MCG tablet [Pharmacy Med Name: LEVOTHYROXINE 0.150MG (150MCG) TAB] 90 tablet 3     Sig: TAKE 1 TABLET(150 MCG) BY MOUTH DAILY       Thyroid Protocol Passed - 11/29/2022  2:47 PM        Passed - Patient is 12 years or older        Passed - Recent (12 mo) or future (30 days) visit within the authorizing provider's specialty     Patient has had an office visit with the authorizing provider or a provider within the authorizing providers department within the previous 12 mos or has a future within next 30 days. See \"Patient Info\" tab in inbasket, or \"Choose Columns\" in Meds & Orders section of the refill encounter.              Passed - Medication is active on med list        Passed - Normal TSH on file in past 12 months     Recent Labs   Lab Test 09/19/22  0757   TSH 0.65                   Marianne Girard, RN 11/30/22 2:42 PM  "

## 2022-12-26 ENCOUNTER — ANCILLARY PROCEDURE (OUTPATIENT)
Dept: GENERAL RADIOLOGY | Facility: CLINIC | Age: 82
End: 2022-12-26
Attending: PHYSICIAN ASSISTANT
Payer: MEDICARE

## 2022-12-26 ENCOUNTER — OFFICE VISIT (OUTPATIENT)
Dept: FAMILY MEDICINE | Facility: CLINIC | Age: 82
End: 2022-12-26
Payer: MEDICARE

## 2022-12-26 VITALS
SYSTOLIC BLOOD PRESSURE: 126 MMHG | OXYGEN SATURATION: 96 % | RESPIRATION RATE: 16 BRPM | TEMPERATURE: 97.8 F | DIASTOLIC BLOOD PRESSURE: 73 MMHG | HEART RATE: 75 BPM

## 2022-12-26 DIAGNOSIS — J06.9 UPPER RESPIRATORY TRACT INFECTION, UNSPECIFIED TYPE: ICD-10-CM

## 2022-12-26 DIAGNOSIS — J06.9 UPPER RESPIRATORY TRACT INFECTION, UNSPECIFIED TYPE: Primary | ICD-10-CM

## 2022-12-26 PROCEDURE — 71046 X-RAY EXAM CHEST 2 VIEWS: CPT | Mod: TC | Performed by: RADIOLOGY

## 2022-12-26 PROCEDURE — 99213 OFFICE O/P EST LOW 20 MIN: CPT | Performed by: PHYSICIAN ASSISTANT

## 2022-12-26 NOTE — PROGRESS NOTES
SUBJECTIVE:   Trenton Nova is a 82 year old male who complains of nasal congestion and productive cough for 2 days. He denies a history of chills, wheezing, vomiting, chest pain and fever and denies a history of asthma. Patient denies smoke cigarettes.     OBJECTIVE:    /73   Pulse 75   Temp 97.8  F (36.6  C) (Oral)   Resp 16   SpO2 96%     He appears well, vital signs are as noted by the nurse. Ears normal.  Throat and pharynx normal.  Neck supple. No adenopathy in the neck. Nose is congested. Sinuses non tender. The chest is clear, without wheezes or rales.    Narrative & Impression  EXAM: XR CHEST 2 VIEWS  LOCATION: [facility]  DATE/TIME: 12/26/2022 1:04 PM     INDICATION:  Upper respiratory tract infection, unspecified type.  COMPARISON: 06/21/2022 radiograph. Chest CT 10/11/2022.                                                                      IMPRESSION: No significant change of basilar predominant interstitial reticulation compatible with previously seen fibrotic change. No acute opacities. No pleural effusion or pneumothorax. Stable cardiomediastinal silhouette. Dual-chamber pacer. Left   atrial appendage occluder device.       ASSESSMENT:    Diagnosis Comments   1. Upper respiratory tract infection, unspecified type  XR Chest 2 Views             PLAN:  No need for antibiotics  Can use a cough suppressant PRN  Symptomatic therapy suggested: push fluids and rest. Call or return to clinic prn if these symptoms worsen or fail to improve as anticipated.

## 2023-02-10 ENCOUNTER — OFFICE VISIT (OUTPATIENT)
Dept: INTERNAL MEDICINE | Facility: CLINIC | Age: 83
End: 2023-02-10
Payer: MEDICARE

## 2023-02-10 VITALS
HEART RATE: 61 BPM | DIASTOLIC BLOOD PRESSURE: 63 MMHG | WEIGHT: 151.7 LBS | TEMPERATURE: 97.6 F | HEIGHT: 67 IN | SYSTOLIC BLOOD PRESSURE: 106 MMHG | OXYGEN SATURATION: 99 % | BODY MASS INDEX: 23.81 KG/M2

## 2023-02-10 DIAGNOSIS — M67.449 DIGITAL MUCINOUS CYST OF FINGER: ICD-10-CM

## 2023-02-10 DIAGNOSIS — N18.30 STAGE 3 CHRONIC KIDNEY DISEASE, UNSPECIFIED WHETHER STAGE 3A OR 3B CKD (H): ICD-10-CM

## 2023-02-10 DIAGNOSIS — C24.0 BILE DUCT CANCER (H): ICD-10-CM

## 2023-02-10 DIAGNOSIS — E11.8 TYPE 2 DIABETES MELLITUS WITH COMPLICATION, WITHOUT LONG-TERM CURRENT USE OF INSULIN (H): ICD-10-CM

## 2023-02-10 DIAGNOSIS — R39.14 BENIGN PROSTATIC HYPERPLASIA WITH INCOMPLETE BLADDER EMPTYING: ICD-10-CM

## 2023-02-10 DIAGNOSIS — N40.1 BENIGN PROSTATIC HYPERPLASIA WITH INCOMPLETE BLADDER EMPTYING: ICD-10-CM

## 2023-02-10 DIAGNOSIS — C24.1 PRIMARY ADENOCARCINOMA OF AMPULLA OF VATER (H): ICD-10-CM

## 2023-02-10 DIAGNOSIS — I48.91 ATRIAL FIBRILLATION, UNSPECIFIED TYPE (H): ICD-10-CM

## 2023-02-10 DIAGNOSIS — I10 ESSENTIAL HYPERTENSION: ICD-10-CM

## 2023-02-10 DIAGNOSIS — D63.1 ANEMIA IN STAGE 3 CHRONIC KIDNEY DISEASE, UNSPECIFIED WHETHER STAGE 3A OR 3B CKD (H): ICD-10-CM

## 2023-02-10 DIAGNOSIS — E06.9 THYROIDITIS, UNSPECIFIED: ICD-10-CM

## 2023-02-10 DIAGNOSIS — I50.22 CHRONIC SYSTOLIC HEART FAILURE (H): ICD-10-CM

## 2023-02-10 DIAGNOSIS — E03.9 HYPOTHYROIDISM, UNSPECIFIED TYPE: ICD-10-CM

## 2023-02-10 DIAGNOSIS — Z01.818 PRE-OPERATIVE EXAMINATION: Primary | ICD-10-CM

## 2023-02-10 DIAGNOSIS — N18.30 ANEMIA IN STAGE 3 CHRONIC KIDNEY DISEASE, UNSPECIFIED WHETHER STAGE 3A OR 3B CKD (H): ICD-10-CM

## 2023-02-10 DIAGNOSIS — C25.1 MALIGNANT NEOPLASM OF BODY OF PANCREAS (H): ICD-10-CM

## 2023-02-10 LAB
ALBUMIN SERPL BCG-MCNC: 3.3 G/DL (ref 3.5–5.2)
ALP SERPL-CCNC: 249 U/L (ref 40–129)
ALT SERPL W P-5'-P-CCNC: 41 U/L (ref 10–50)
ANION GAP SERPL CALCULATED.3IONS-SCNC: 12 MMOL/L (ref 7–15)
AST SERPL W P-5'-P-CCNC: 50 U/L (ref 10–50)
ATRIAL RATE - MUSE: 64 BPM
BASOPHILS # BLD AUTO: 0.1 10E3/UL (ref 0–0.2)
BASOPHILS NFR BLD AUTO: 1 %
BILIRUB SERPL-MCNC: 0.4 MG/DL
BUN SERPL-MCNC: 40.2 MG/DL (ref 8–23)
CALCIUM SERPL-MCNC: 9.1 MG/DL (ref 8.8–10.2)
CHLORIDE SERPL-SCNC: 103 MMOL/L (ref 98–107)
CREAT SERPL-MCNC: 1.83 MG/DL (ref 0.67–1.17)
DEPRECATED HCO3 PLAS-SCNC: 22 MMOL/L (ref 22–29)
DIASTOLIC BLOOD PRESSURE - MUSE: NORMAL MMHG
EOSINOPHIL # BLD AUTO: 0.3 10E3/UL (ref 0–0.7)
EOSINOPHIL NFR BLD AUTO: 3 %
ERYTHROCYTE [DISTWIDTH] IN BLOOD BY AUTOMATED COUNT: 14.2 % (ref 10–15)
GFR SERPL CREATININE-BSD FRML MDRD: 36 ML/MIN/1.73M2
GLUCOSE SERPL-MCNC: 86 MG/DL (ref 70–99)
HBA1C MFR BLD: 5.8 % (ref 0–5.6)
HCT VFR BLD AUTO: 35.8 % (ref 40–53)
HGB BLD-MCNC: 11.3 G/DL (ref 13.3–17.7)
IMM GRANULOCYTES # BLD: 0 10E3/UL
IMM GRANULOCYTES NFR BLD: 0 %
INTERPRETATION ECG - MUSE: NORMAL
LYMPHOCYTES # BLD AUTO: 1 10E3/UL (ref 0.8–5.3)
LYMPHOCYTES NFR BLD AUTO: 12 %
MCH RBC QN AUTO: 32.5 PG (ref 26.5–33)
MCHC RBC AUTO-ENTMCNC: 31.6 G/DL (ref 31.5–36.5)
MCV RBC AUTO: 103 FL (ref 78–100)
MONOCYTES # BLD AUTO: 0.8 10E3/UL (ref 0–1.3)
MONOCYTES NFR BLD AUTO: 9 %
NEUTROPHILS # BLD AUTO: 6.7 10E3/UL (ref 1.6–8.3)
NEUTROPHILS NFR BLD AUTO: 76 %
P AXIS - MUSE: 75 DEGREES
PLATELET # BLD AUTO: 171 10E3/UL (ref 150–450)
POTASSIUM SERPL-SCNC: 4.3 MMOL/L (ref 3.4–5.3)
PR INTERVAL - MUSE: 160 MS
PROT SERPL-MCNC: 7.1 G/DL (ref 6.4–8.3)
QRS DURATION - MUSE: 76 MS
QT - MUSE: 416 MS
QTC - MUSE: 429 MS
R AXIS - MUSE: -31 DEGREES
RBC # BLD AUTO: 3.48 10E6/UL (ref 4.4–5.9)
SODIUM SERPL-SCNC: 137 MMOL/L (ref 136–145)
SYSTOLIC BLOOD PRESSURE - MUSE: NORMAL MMHG
T AXIS - MUSE: 46 DEGREES
VENTRICULAR RATE- MUSE: 64 BPM
WBC # BLD AUTO: 8.8 10E3/UL (ref 4–11)

## 2023-02-10 PROCEDURE — 80053 COMPREHEN METABOLIC PANEL: CPT | Performed by: NURSE PRACTITIONER

## 2023-02-10 PROCEDURE — 93005 ELECTROCARDIOGRAM TRACING: CPT | Performed by: NURSE PRACTITIONER

## 2023-02-10 PROCEDURE — 85025 COMPLETE CBC W/AUTO DIFF WBC: CPT | Performed by: NURSE PRACTITIONER

## 2023-02-10 PROCEDURE — 83036 HEMOGLOBIN GLYCOSYLATED A1C: CPT | Performed by: NURSE PRACTITIONER

## 2023-02-10 PROCEDURE — 93010 ELECTROCARDIOGRAM REPORT: CPT | Mod: OFF | Performed by: INTERNAL MEDICINE

## 2023-02-10 PROCEDURE — 36415 COLL VENOUS BLD VENIPUNCTURE: CPT | Performed by: NURSE PRACTITIONER

## 2023-02-10 PROCEDURE — 99214 OFFICE O/P EST MOD 30 MIN: CPT | Performed by: NURSE PRACTITIONER

## 2023-02-10 ASSESSMENT — PAIN SCALES - GENERAL: PAINLEVEL: NO PAIN (0)

## 2023-02-10 NOTE — LETTER
February 13, 2023      Trenton Nova  [address] [address]        Dear ,    We are writing to inform you of your test results.    Your test results fall within the expected range(s) or remain unchanged from previous results.  Please continue with current treatment plan.    Resulted Orders   Hemoglobin A1c   Result Value Ref Range    Hemoglobin A1C 5.8 (H) 0.0 - 5.6 %      Comment:      Normal <5.7%   Prediabetes 5.7-6.4%    Diabetes 6.5% or higher     Note: Adopted from ADA consensus guidelines.   CBC with platelets and differential   Result Value Ref Range    WBC Count 8.8 4.0 - 11.0 10e3/uL    RBC Count 3.48 (L) 4.40 - 5.90 10e6/uL    Hemoglobin 11.3 (L) 13.3 - 17.7 g/dL    Hematocrit 35.8 (L) 40.0 - 53.0 %     (H) 78 - 100 fL    MCH 32.5 26.5 - 33.0 pg    MCHC 31.6 31.5 - 36.5 g/dL    RDW 14.2 10.0 - 15.0 %    Platelet Count 171 150 - 450 10e3/uL    % Neutrophils 76 %    % Lymphocytes 12 %    % Monocytes 9 %    % Eosinophils 3 %    % Basophils 1 %    % Immature Granulocytes 0 %    Absolute Neutrophils 6.7 1.6 - 8.3 10e3/uL    Absolute Lymphocytes 1.0 0.8 - 5.3 10e3/uL    Absolute Monocytes 0.8 0.0 - 1.3 10e3/uL    Absolute Eosinophils 0.3 0.0 - 0.7 10e3/uL    Absolute Basophils 0.1 0.0 - 0.2 10e3/uL    Absolute Immature Granulocytes 0.0 <=0.4 10e3/uL       If you have any questions or concerns, please call the clinic at the number listed above.       Sincerely,      Dee Dee Gore NP

## 2023-02-10 NOTE — LETTER
February 13, 2023      Trenton Nova  [address] [address]        Dear ,    We are writing to inform you of your test results.    Your test results fall within the expected range(s) or remain unchanged from previous results.  Please continue with current treatment plan.    Resulted Orders   Comprehensive metabolic panel   Result Value Ref Range    Sodium 137 136 - 145 mmol/L    Potassium 4.3 3.4 - 5.3 mmol/L    Chloride 103 98 - 107 mmol/L    Carbon Dioxide (CO2) 22 22 - 29 mmol/L    Anion Gap 12 7 - 15 mmol/L    Urea Nitrogen 40.2 (H) 8.0 - 23.0 mg/dL    Creatinine 1.83 (H) 0.67 - 1.17 mg/dL    Calcium 9.1 8.8 - 10.2 mg/dL    Glucose 86 70 - 99 mg/dL    Alkaline Phosphatase 249 (H) 40 - 129 U/L    AST 50 10 - 50 U/L    ALT 41 10 - 50 U/L    Protein Total 7.1 6.4 - 8.3 g/dL    Albumin 3.3 (L) 3.5 - 5.2 g/dL    Bilirubin Total 0.4 <=1.2 mg/dL    GFR Estimate 36 (L) >60 mL/min/1.73m2      Comment:      eGFR calculated using 2021 CKD-EPI equation.   Hemoglobin A1c   Result Value Ref Range    Hemoglobin A1C 5.8 (H) 0.0 - 5.6 %      Comment:      Normal <5.7%   Prediabetes 5.7-6.4%    Diabetes 6.5% or higher     Note: Adopted from ADA consensus guidelines.   CBC with platelets and differential   Result Value Ref Range    WBC Count 8.8 4.0 - 11.0 10e3/uL    RBC Count 3.48 (L) 4.40 - 5.90 10e6/uL    Hemoglobin 11.3 (L) 13.3 - 17.7 g/dL    Hematocrit 35.8 (L) 40.0 - 53.0 %     (H) 78 - 100 fL    MCH 32.5 26.5 - 33.0 pg    MCHC 31.6 31.5 - 36.5 g/dL    RDW 14.2 10.0 - 15.0 %    Platelet Count 171 150 - 450 10e3/uL    % Neutrophils 76 %    % Lymphocytes 12 %    % Monocytes 9 %    % Eosinophils 3 %    % Basophils 1 %    % Immature Granulocytes 0 %    Absolute Neutrophils 6.7 1.6 - 8.3 10e3/uL    Absolute Lymphocytes 1.0 0.8 - 5.3 10e3/uL    Absolute Monocytes 0.8 0.0 - 1.3 10e3/uL    Absolute Eosinophils 0.3 0.0 - 0.7 10e3/uL    Absolute Basophils 0.1 0.0 - 0.2 10e3/uL    Absolute  Immature Granulocytes 0.0 <=0.4 10e3/uL       If you have any questions or concerns, please call the clinic at the number listed above.       Sincerely,      Dee Dee Gore NP

## 2023-02-10 NOTE — PROGRESS NOTES
[address] [address]  Phone: [phone]  Fax: [phone]  Primary Provider: Sarwat Mayorga  Pre-op Performing Provider: MARYBETH NUNEZ      PREOPERATIVE EVALUATION:  Today's date: 2/10/2023    Trenton Nova is a 82 year old male who presents for a preoperative evaluation.    Surgical Information:  Surgery/Procedure: Hand surgery right hand middle finger removal of cyst   Surgery Location: [facility]  Surgeon: Johan   Surgery Date: 2/14/23  Time of Surgery: TBD  Where patient plans to recover: At home with family  Fax number for surgical facility: [phone]    Type of Anesthesia Anticipated: to be determined    Assessment & Plan     The proposed surgical procedure is considered LOW risk.    Problem List Items Addressed This Visit    None  Visit Diagnoses     Pre-operative examination    -  Primary    Relevant Orders    EKG 12-lead, tracing only (Completed)    Comprehensive metabolic panel (Completed)    CBC with platelets and differential (Completed)    Type 2 diabetes mellitus with complication, without long-term current use of insulin (H)        Relevant Orders    Hemoglobin A1c (Completed)             Implanted Device:        Risks and Recommendations:  The patient has the following additional risks and recommendations for perioperative complications:   - No identified additional risk factors other than previously addressed    Medication Instructions:  Patient is to hold meds morning of procedure    RECOMMENDATION:  APPROVAL GIVEN to proceed with proposed procedure, without further diagnostic evaluation.            Subjective     HPI related to upcoming procedure: 4 month history of cyst of the middle finger right hand     Preop Questions 2/10/2023   1. Have you ever had a heart attack or stroke? No   2. Have you ever had surgery on your heart or blood vessels, such as a stent placement, a coronary artery  bypass, or surgery on an artery in your head, neck, heart, or legs? No   3. Do you have chest pain with activity? No   4. Do you have a history of  heart failure? No   5. Do you currently have a cold, bronchitis or symptoms of other infection? No   6. Do you have a cough, shortness of breath, or wheezing? No   7. Do you or anyone in your family have previous history of blood clots? No   8. Do you or does anyone in your family have a serious bleeding problem such as prolonged bleeding following surgeries or cuts? No   9. Have you ever had problems with anemia or been told to take iron pills? YES -    10. Have you had any abnormal blood loss such as black, tarry or bloody stools? No   11. Have you ever had a blood transfusion? No   12. Are you willing to have a blood transfusion if it is medically needed before, during, or after your surgery? Yes   13. Have you or any of your relatives ever had problems with anesthesia? No   14. Do you have sleep apnea, excessive snoring or daytime drowsiness? No   15. Do you have any artifical heart valves or other implanted medical devices like a pacemaker, defibrillator, or continuous glucose monitor? YES -    15a. What type of device do you have? pace maker   15b. Name of the clinic that manages your device:  [facility]   16. Do you have artificial joints? No   17. Are you allergic to latex? No       Health Care Directive:  Patient has a Health Care Directive on file      Preoperative Review of :   reviewed - no record of controlled substances prescribed.      Status of Chronic Conditions:  See problem list for active medical problems.  Problems all longstanding and stable, except as noted/documented.  See ROS for pertinent symptoms related to these conditions.      Review of Systems  Constitutional, neuro, ENT, endocrine, pulmonary, cardiac, gastrointestinal, genitourinary, musculoskeletal, integument and psychiatric systems are negative, except as otherwise  noted.    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  Mild hemoglobin drop since last seen, borderline iron stores. Likely   related to possible liver/pancreas mass under evaluation at present.         Neoplasm of unspecified behavior of digestive system 02/06/2020     Priority: Medium     Formatting of this note might be different from the original.  ERCP on 2/3/2020 at [facility]  Last Assessment & Plan:   Formatting of this note might be different from the original.  Patient was undergoing evaluation due to profound fatigue, found to have   dilated common bile duct, ERCP done, stent placed on common bile duct,   pathology results pending at present.         Prostatism 02/06/2020     Priority: Medium     Formatting of this note might be different from the original.  Adequate control.  Continue Flomax.         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  Adequate control. Continue current medications.         TMJ Pain      Priority: Medium     Created by  Conversion  Replacement Utility updated for latest IMO load         Shoulder pain 01/18/2016     Priority: Medium     Stage 3a chronic kidney disease (H) 05/13/2015     Priority: Medium     Last Assessment & Plan:   Renal function stable. No proteinuria. Normal urinalysis.   Serum creatinine stable.  Renal ultrasound in 2011 was normal.   Kidney biopsy in 2011 shows some arterial sclerosis, no glomerular   disease. Mostly changes associated to hypertension and aging. No   glomerular disease present.   Continue current medications.  Last Assessment & Plan:   Formatting of this note might be different from the original.  Renal function stable. No proteinuria. Normal urinalysis.   Serum creatinine stable.  Renal ultrasound in 2011 was normal.   Kidney biopsy in 2011 shows some arterial sclerosis, no glomerular   disease. Mostly changes associated to hypertension and aging. No   glomerular disease present.   Continue current medications.         Coronary arteriosclerosis 02/26/2015     Priority: Medium     Last Assessment & Plan:   Formatting of this note might be different from the original.  Asymptomatic. No angina, No shortness of breath. No heart failure present.         Long term (current) use of anticoagulants 01/30/2015     Priority: Medium     Atrial Fibrillation 01/16/2015     Priority: Medium     Created by Conversion  Last Assessment & Plan:   Formatting of this note might be different from the original.  CHRONIC ATRIAL FIBRILLATION. S/P PACER  On Coumadin, adequate ventricular rate. No heart failure.  He underwent cardioversion on 12/19/2019, currently not on atrial   fibrillation by auscultation  Continue follow up with cardiologist.         Multiple myeloma (H) 12/16/2014     Priority: Medium     Colon polyp 12/16/2014     Priority: Medium     Neck Pain      Priority: Medium     Created by Conversion         Hyperlipidemia      Priority: Medium     Created by Conversion         Benign Monoclonal  Hypergammaglobulinemia      Priority: Medium     Created by Conversion         Primary Osteoarthritis Of The Cervical Vertebrae      Priority: Medium     Created by Conversion         Reactions To Sulfa Drugs      Priority: Medium     Created by Conversion          No past medical history on file.  Past Surgical History:   Procedure Laterality Date     KNEE SURGERY       PANCREATICODUODENECTOMY  03/19/2020    For adenocarcinoma of the ampulla of Vater     SHOULDER SURGERY       Current Outpatient Medications   Medication Sig Dispense Refill     alfuzosin ER (UROXATRAL) 10 MG 24 hr tablet Take 1 tablet by mouth daily       aspirin 81 MG EC tablet Take 81 mg by mouth daily       brimonidine-timoloL (COMBIGAN) 0.2-0.5 % ophthalmic solution [BRIMONIDINE-TIMOLOL (COMBIGAN) 0.2-0.5 % OPHTHALMIC SOLUTION] 1 drop.       Cholecalciferol 100 MCG (4000 UT) TABS Take 1 tablet by mouth daily       cyanocobalamin (VITAMIN B-12) 500 MCG tablet [CYANOCOBALAMIN (VITAMIN B-12) 500 MCG TABLET] Take 500 mcg by mouth daily.       finasteride (PROSCAR) 5 MG tablet TAKE ONE TABLET BY MOUTH EVERY DAY FOR PROSTATE       levothyroxine (SYNTHROID/LEVOTHROID) 150 MCG tablet TAKE 1 TABLET(150 MCG) BY MOUTH DAILY 90 tablet 2     metoprolol succinate (TOPROL-XL) 25 MG [METOPROLOL SUCCINATE (TOPROL-XL) 25 MG] Take 25 mg by mouth daily.       multivitamin therapeutic (THERAGRAN) tablet [MULTIVITAMIN THERAPEUTIC (THERAGRAN) TABLET] Take 1 tablet by mouth daily.       omeprazole (PRILOSEC) 20 MG capsule Take 40 mg by mouth        predniSONE (DELTASONE) 20 MG tablet Take 1 tablet (20 mg) by mouth daily 5 tablet 0     tacrolimus (PROTOPIC) 0.1 % external ointment APPLY SMALL AMOUNT TOPICALLY EVERY DAY AS NEEDED FOR EYELID RASH       triamcinolone (KENALOG) 0.1 % cream [TRIAMCINOLONE (KENALOG) 0.1 % CREAM] Apply thin layer to affected areas (arms & hips) twice daily X 1 month 60 g 3     vit C/E/Zn/coppr/lutein/zeaxan (PRESERVISION AREDS-2 ORAL) [VIT  "C/E/ZN/COPPR/LUTEIN/ZEAXAN (PRESERVISION AREDS-2 ORAL)] Take by mouth 2 (two) times a day.       VITRON-C 65 mg iron- 125 mg TbEC [VITRON-C 65 MG IRON- 125 MG TBEC] TK 1 T PO QD. DO NOT CRUSH OR CHEW       erythromycin (ROMYCIN) 5 MG/GM ophthalmic ointment APPLY 0.25INCH TO LIDS QPM (Patient not taking: Reported on 5/17/2022)       furosemide (LASIX) 20 MG tablet  (Patient not taking: Reported on 5/23/2022)       hydrocortisone 2.5 % ointment APPLY TOPICALLY TO THE ITCHY AREAS ON THE UPPER EYELIDS BID FOR 1 WK AT A TIME (Patient not taking: Reported on 9/30/2022)       prednisoLONE acetate (PRED FORTE) 1 % ophthalmic suspension  (Patient not taking: Reported on 5/17/2022)       rosuvastatin (CRESTOR) 10 MG tablet [ROSUVASTATIN (CRESTOR) 10 MG TABLET]  (Patient not taking: Reported on 2/10/2023)         Allergies   Allergen Reactions     Sulfa Drugs Unknown and Swelling     Zolpidem Unknown     confusion        Social History     Tobacco Use     Smoking status: Former     Smokeless tobacco: Never     Tobacco comments:     quit 8/8/1988   Substance Use Topics     Alcohol use: No     Family History   Problem Relation Age of Onset     Cerebrovascular Disease Mother      Hypothyroidism Mother      Kidney failure Sister      History   Drug Use No         Objective     /63 (BP Location: Right arm, Patient Position: Sitting, Cuff Size: Adult Regular)   Pulse 61   Temp 97.6  F (36.4  C) (Oral)   Ht 1.702 m (5' 7\")   Wt 68.8 kg (151 lb 11.2 oz)   SpO2 99%   BMI 23.76 kg/m      Physical Exam  GENERAL APPEARANCE: healthy, alert and no distress  HENT: ear canals and TM's normal and nose and mouth without ulcers or lesions  RESP: lungs clear to auscultation - no rales, rhonchi or wheezes  CV: regular rate and rhythm, normal S1 S2  MS: extremities normal- no gross deformities noted patient noted to have a cyst on his right middle finger   NEURO:mentation intact and speech normal    Recent Labs   Lab Test 09/19/22  0757 " 05/23/22  1135 02/15/22  0902 12/13/21  1614 10/15/21  0000 09/21/21  0929 09/13/21  0847 09/09/21  1113   HGB  --  10.5* 11.1*  --   --   --   --  10.9*   PLT  --  203  --   --   --   --   --  129*   INR  --   --   --   --  2.2* 2.3*   < > 1.8*    139  --    < >  --   --   --  138   POTASSIUM 4.7 4.9  --    < >  --   --   --  4.8   CR 1.73* 1.53*  --    < >  --   --   --  1.96*   A1C 5.7*  --   --   --   --   --   --   --     < > = values in this interval not displayed.        Diagnostics:  Recent Results (from the past 168 hour(s))   EKG 12-lead, tracing only    Collection Time: 02/10/23  8:15 AM   Result Value Ref Range    Systolic Blood Pressure  mmHg    Diastolic Blood Pressure  mmHg    Ventricular Rate 64 BPM    Atrial Rate 64 BPM    RI Interval 160 ms    QRS Duration 76 ms     ms    QTc 429 ms    P Axis 75 degrees    R AXIS -31 degrees    T Axis 46 degrees    Interpretation ECG       Sinus rhythm with Premature atrial complexes with Aberrant conduction  Left axis deviation  Abnormal ECG  When compared with ECG of 31-JAN-2022 14:12,  Sinus rhythm has replaced Electronic atrial pacemaker     Comprehensive metabolic panel    Collection Time: 02/10/23  8:40 AM   Result Value Ref Range    Sodium 137 136 - 145 mmol/L    Potassium 4.3 3.4 - 5.3 mmol/L    Chloride 103 98 - 107 mmol/L    Carbon Dioxide (CO2) 22 22 - 29 mmol/L    Anion Gap 12 7 - 15 mmol/L    Urea Nitrogen 40.2 (H) 8.0 - 23.0 mg/dL    Creatinine 1.83 (H) 0.67 - 1.17 mg/dL    Calcium 9.1 8.8 - 10.2 mg/dL    Glucose 86 70 - 99 mg/dL    Alkaline Phosphatase 249 (H) 40 - 129 U/L    AST 50 10 - 50 U/L    ALT 41 10 - 50 U/L    Protein Total 7.1 6.4 - 8.3 g/dL    Albumin 3.3 (L) 3.5 - 5.2 g/dL    Bilirubin Total 0.4 <=1.2 mg/dL    GFR Estimate 36 (L) >60 mL/min/1.73m2   Hemoglobin A1c    Collection Time: 02/10/23  8:40 AM   Result Value Ref Range    Hemoglobin A1C 5.8 (H) 0.0 - 5.6 %   CBC with platelets and differential    Collection Time:  02/10/23  8:40 AM   Result Value Ref Range    WBC Count 8.8 4.0 - 11.0 10e3/uL    RBC Count 3.48 (L) 4.40 - 5.90 10e6/uL    Hemoglobin 11.3 (L) 13.3 - 17.7 g/dL    Hematocrit 35.8 (L) 40.0 - 53.0 %     (H) 78 - 100 fL    MCH 32.5 26.5 - 33.0 pg    MCHC 31.6 31.5 - 36.5 g/dL    RDW 14.2 10.0 - 15.0 %    Platelet Count 171 150 - 450 10e3/uL    % Neutrophils 76 %    % Lymphocytes 12 %    % Monocytes 9 %    % Eosinophils 3 %    % Basophils 1 %    % Immature Granulocytes 0 %    Absolute Neutrophils 6.7 1.6 - 8.3 10e3/uL    Absolute Lymphocytes 1.0 0.8 - 5.3 10e3/uL    Absolute Monocytes 0.8 0.0 - 1.3 10e3/uL    Absolute Eosinophils 0.3 0.0 - 0.7 10e3/uL    Absolute Basophils 0.1 0.0 - 0.2 10e3/uL    Absolute Immature Granulocytes 0.0 <=0.4 10e3/uL          Revised Cardiac Risk Index (RCRI):  The patient has the following serious cardiovascular risks for perioperative complications:   - No serious cardiac risks = 0 points     RCRI Interpretation: 1 point: Class II (low risk - 0.9% complication rate)           Signed Electronically by: Dee Dee Gore NP  Copy of this evaluation report is provided to requesting physician.

## 2023-04-13 ENCOUNTER — TRANSFERRED RECORDS (OUTPATIENT)
Dept: HEALTH INFORMATION MANAGEMENT | Facility: CLINIC | Age: 83
End: 2023-04-13

## 2023-04-13 LAB
ALT SERPL-CCNC: 27 U/L (ref 0–40)
AST SERPL-CCNC: 36 U/L (ref 0–40)
CREATININE (EXTERNAL): 1.6 MG/DL (ref 0.3–1.2)
GFR ESTIMATED (EXTERNAL): 39.26 ML/MIN (ref 60–200)
GLUCOSE (EXTERNAL): 93 MG/DL (ref 70–105)
POTASSIUM (EXTERNAL): 4.8 MEQ/L (ref 3.3–5.1)
TSH SERPL-ACNC: 1.06 UIU/ML (ref 0.55–3.89)

## 2023-05-02 ENCOUNTER — TRANSFERRED RECORDS (OUTPATIENT)
Dept: HEALTH INFORMATION MANAGEMENT | Facility: CLINIC | Age: 83
End: 2023-05-02
Payer: MEDICARE

## 2023-05-12 ENCOUNTER — TRANSFERRED RECORDS (OUTPATIENT)
Dept: HEALTH INFORMATION MANAGEMENT | Facility: CLINIC | Age: 83
End: 2023-05-12
Payer: MEDICARE

## 2023-05-25 ENCOUNTER — OFFICE VISIT (OUTPATIENT)
Dept: INTERNAL MEDICINE | Facility: CLINIC | Age: 83
End: 2023-05-25
Payer: MEDICARE

## 2023-05-25 VITALS
OXYGEN SATURATION: 99 % | DIASTOLIC BLOOD PRESSURE: 68 MMHG | HEART RATE: 74 BPM | BODY MASS INDEX: 24.04 KG/M2 | SYSTOLIC BLOOD PRESSURE: 122 MMHG | HEIGHT: 67 IN | RESPIRATION RATE: 22 BRPM | TEMPERATURE: 97.9 F | WEIGHT: 153.2 LBS

## 2023-05-25 DIAGNOSIS — Z12.5 SCREENING FOR PROSTATE CANCER: ICD-10-CM

## 2023-05-25 DIAGNOSIS — E11.8 TYPE 2 DIABETES MELLITUS WITH COMPLICATION, WITHOUT LONG-TERM CURRENT USE OF INSULIN (H): ICD-10-CM

## 2023-05-25 DIAGNOSIS — I50.9 CONGESTIVE HEART FAILURE, UNSPECIFIED HF CHRONICITY, UNSPECIFIED HEART FAILURE TYPE (H): ICD-10-CM

## 2023-05-25 DIAGNOSIS — Z98.890: ICD-10-CM

## 2023-05-25 DIAGNOSIS — D68.59 HYPERCOAGULABLE STATE (H): ICD-10-CM

## 2023-05-25 DIAGNOSIS — Z00.00 ROUTINE GENERAL MEDICAL EXAMINATION AT A HEALTH CARE FACILITY: Primary | ICD-10-CM

## 2023-05-25 DIAGNOSIS — Z94.4: ICD-10-CM

## 2023-05-25 DIAGNOSIS — J84.9 ILD (INTERSTITIAL LUNG DISEASE) (H): ICD-10-CM

## 2023-05-25 LAB
ALBUMIN SERPL BCG-MCNC: 3.3 G/DL (ref 3.5–5.2)
ALBUMIN UR-MCNC: >=300 MG/DL
ALP SERPL-CCNC: 238 U/L (ref 40–129)
ALT SERPL W P-5'-P-CCNC: 27 U/L (ref 10–50)
ANION GAP SERPL CALCULATED.3IONS-SCNC: 12 MMOL/L (ref 7–15)
APPEARANCE UR: CLEAR
AST SERPL W P-5'-P-CCNC: 42 U/L (ref 10–50)
BACTERIA #/AREA URNS HPF: ABNORMAL /HPF
BILIRUB SERPL-MCNC: 0.5 MG/DL
BILIRUB UR QL STRIP: NEGATIVE
BUN SERPL-MCNC: 54.8 MG/DL (ref 8–23)
CALCIUM SERPL-MCNC: 8.3 MG/DL (ref 8.8–10.2)
CHLORIDE SERPL-SCNC: 106 MMOL/L (ref 98–107)
CHOLEST SERPL-MCNC: 110 MG/DL
COLOR UR AUTO: YELLOW
CREAT SERPL-MCNC: 2.43 MG/DL (ref 0.67–1.17)
DEPRECATED HCO3 PLAS-SCNC: 21 MMOL/L (ref 22–29)
ERYTHROCYTE [DISTWIDTH] IN BLOOD BY AUTOMATED COUNT: 14.8 % (ref 10–15)
GFR SERPL CREATININE-BSD FRML MDRD: 26 ML/MIN/1.73M2
GLUCOSE SERPL-MCNC: 86 MG/DL (ref 70–99)
GLUCOSE UR STRIP-MCNC: NEGATIVE MG/DL
HBA1C MFR BLD: 5.6 % (ref 0–5.6)
HCT VFR BLD AUTO: 35 % (ref 40–53)
HDLC SERPL-MCNC: 67 MG/DL
HGB BLD-MCNC: 11.1 G/DL (ref 13.3–17.7)
HGB UR QL STRIP: NEGATIVE
HYALINE CASTS #/AREA URNS LPF: ABNORMAL /LPF
KETONES UR STRIP-MCNC: NEGATIVE MG/DL
LDLC SERPL CALC-MCNC: 31 MG/DL
LEUKOCYTE ESTERASE UR QL STRIP: NEGATIVE
MCH RBC QN AUTO: 32.3 PG (ref 26.5–33)
MCHC RBC AUTO-ENTMCNC: 31.7 G/DL (ref 31.5–36.5)
MCV RBC AUTO: 102 FL (ref 78–100)
NITRATE UR QL: NEGATIVE
NONHDLC SERPL-MCNC: 43 MG/DL
PH UR STRIP: 5 [PH] (ref 5–8)
PLATELET # BLD AUTO: 138 10E3/UL (ref 150–450)
POTASSIUM SERPL-SCNC: 4.5 MMOL/L (ref 3.4–5.3)
PROT SERPL-MCNC: 6.6 G/DL (ref 6.4–8.3)
PSA SERPL DL<=0.01 NG/ML-MCNC: 0.81 NG/ML
RBC # BLD AUTO: 3.44 10E6/UL (ref 4.4–5.9)
RBC #/AREA URNS AUTO: ABNORMAL /HPF
SODIUM SERPL-SCNC: 139 MMOL/L (ref 136–145)
SP GR UR STRIP: >=1.03 (ref 1–1.03)
SQUAMOUS #/AREA URNS AUTO: ABNORMAL /LPF
TRIGL SERPL-MCNC: 59 MG/DL
TSH SERPL DL<=0.005 MIU/L-ACNC: 0.65 UIU/ML (ref 0.3–4.2)
UROBILINOGEN UR STRIP-ACNC: 0.2 E.U./DL
WBC # BLD AUTO: 6.2 10E3/UL (ref 4–11)
WBC #/AREA URNS AUTO: ABNORMAL /HPF

## 2023-05-25 PROCEDURE — G0103 PSA SCREENING: HCPCS | Performed by: INTERNAL MEDICINE

## 2023-05-25 PROCEDURE — 84443 ASSAY THYROID STIM HORMONE: CPT | Performed by: INTERNAL MEDICINE

## 2023-05-25 PROCEDURE — 80053 COMPREHEN METABOLIC PANEL: CPT | Performed by: INTERNAL MEDICINE

## 2023-05-25 PROCEDURE — G0439 PPPS, SUBSEQ VISIT: HCPCS | Performed by: INTERNAL MEDICINE

## 2023-05-25 PROCEDURE — 36415 COLL VENOUS BLD VENIPUNCTURE: CPT | Performed by: INTERNAL MEDICINE

## 2023-05-25 PROCEDURE — 85027 COMPLETE CBC AUTOMATED: CPT | Performed by: INTERNAL MEDICINE

## 2023-05-25 PROCEDURE — 81001 URINALYSIS AUTO W/SCOPE: CPT | Performed by: INTERNAL MEDICINE

## 2023-05-25 PROCEDURE — 83036 HEMOGLOBIN GLYCOSYLATED A1C: CPT | Performed by: INTERNAL MEDICINE

## 2023-05-25 PROCEDURE — 80061 LIPID PANEL: CPT | Performed by: INTERNAL MEDICINE

## 2023-05-25 RX ORDER — ASCORBIC ACID 500 MG
1000 TABLET ORAL
COMMUNITY
Start: 2023-02-07

## 2023-05-25 RX ORDER — DRONEDARONE 400 MG/1
TABLET, FILM COATED ORAL
COMMUNITY
Start: 2023-05-19

## 2023-05-25 RX ORDER — FUROSEMIDE 40 MG
40 TABLET ORAL
COMMUNITY
Start: 2023-05-12 | End: 2023-10-11

## 2023-05-25 RX ORDER — B-COMPLEX WITH VITAMIN C
1 TABLET ORAL EVERY MORNING
COMMUNITY

## 2023-05-25 RX ORDER — APIXABAN 5 MG/1
TABLET, FILM COATED ORAL
COMMUNITY
Start: 2023-05-18 | End: 2023-10-11

## 2023-05-25 RX ORDER — BENZONATATE 200 MG/1
CAPSULE ORAL EVERY 8 HOURS
COMMUNITY
Start: 2022-12-29

## 2023-05-25 RX ORDER — TAMSULOSIN HYDROCHLORIDE 0.4 MG/1
CAPSULE ORAL EVERY 24 HOURS
COMMUNITY

## 2023-05-25 NOTE — LETTER
May 30, 2023      Trenton Nova  [address] [address]        Dear ,    We are writing to inform you of your test results.    Mildly progressive chronic kidney disease noted with mild elevation in serum creatinine at 2.53.  Along with urea nitrogen at 54.8.  Evidence for mild metabolic acidosis related to myeloma kidney disease.  Normal serum potassium.  All other labs are quite stable.  The best way to preserve kidney function is to stay well-hydrated and to keep the blood pressure normal and to avoid nonsteroidal anti-inflammatory drugs available over-the-counter such as ibuprofen and naproxen sodium.  Acetaminophen or Tylenol is safer.       Resulted Orders   UA Macroscopic with reflex to Microscopic and Culture   Result Value Ref Range    Color Urine Yellow Colorless, Straw, Light Yellow, Yellow    Appearance Urine Clear Clear    Glucose Urine Negative Negative mg/dL    Bilirubin Urine Negative Negative    Ketones Urine Negative Negative mg/dL    Specific Gravity Urine >=1.030 1.005 - 1.030    Blood Urine Negative Negative    pH Urine 5.0 5.0 - 8.0    Protein Albumin Urine >=300 (A) Negative mg/dL    Urobilinogen Urine 0.2 0.2, 1.0 E.U./dL    Nitrite Urine Negative Negative    Leukocyte Esterase Urine Negative Negative   TSH   Result Value Ref Range    TSH 0.65 0.30 - 4.20 uIU/mL   Prostate Specific Antigen Screen   Result Value Ref Range    Prostate Specific Antigen Screen 0.81 ng/mL      Comment:      No reference ranges have been established for patients over 80 years.    Narrative    This result is obtained using the Roche Elecsys total PSA method on the tracey e801 immunoassay analyzer. Results obtained with different assay methods or kits cannot be used interchangeably.   Lipid panel reflex to direct LDL Fasting   Result Value Ref Range    Cholesterol 110 <200 mg/dL    Triglycerides 59 <150 mg/dL    Direct Measure HDL 67 >=40 mg/dL    LDL Cholesterol Calculated 31 <=100  mg/dL    Non HDL Cholesterol 43 <130 mg/dL    Narrative    Cholesterol  Desirable:  <200 mg/dL    Triglycerides  Normal:  Less than 150 mg/dL  Borderline High:  150-199 mg/dL  High:  200-499 mg/dL  Very High:  Greater than or equal to 500 mg/dL    Direct Measure HDL  Female:  Greater than or equal to 50 mg/dL   Male:  Greater than or equal to 40 mg/dL    LDL Cholesterol  Desirable:  <100mg/dL  Above Desirable:  100-129 mg/dL   Borderline High:  130-159 mg/dL   High:  160-189 mg/dL   Very High:  >= 190 mg/dL    Non HDL Cholesterol  Desirable:  130 mg/dL  Above Desirable:  130-159 mg/dL  Borderline High:  160-189 mg/dL  High:  190-219 mg/dL  Very High:  Greater than or equal to 220 mg/dL   Hemoglobin A1c   Result Value Ref Range    Hemoglobin A1C 5.6 0.0 - 5.6 %      Comment:      Normal <5.7%   Prediabetes 5.7-6.4%    Diabetes 6.5% or higher     Note: Adopted from ADA consensus guidelines.   Comprehensive metabolic panel   Result Value Ref Range    Sodium 139 136 - 145 mmol/L    Potassium 4.5 3.4 - 5.3 mmol/L    Chloride 106 98 - 107 mmol/L    Carbon Dioxide (CO2) 21 (L) 22 - 29 mmol/L    Anion Gap 12 7 - 15 mmol/L    Urea Nitrogen 54.8 (H) 8.0 - 23.0 mg/dL    Creatinine 2.43 (H) 0.67 - 1.17 mg/dL    Calcium 8.3 (L) 8.8 - 10.2 mg/dL    Glucose 86 70 - 99 mg/dL    Alkaline Phosphatase 238 (H) 40 - 129 U/L    AST 42 10 - 50 U/L    ALT 27 10 - 50 U/L    Protein Total 6.6 6.4 - 8.3 g/dL    Albumin 3.3 (L) 3.5 - 5.2 g/dL    Bilirubin Total 0.5 <=1.2 mg/dL    GFR Estimate 26 (L) >60 mL/min/1.73m2      Comment:      eGFR calculated using 2021 CKD-EPI equation.   CBC with platelets   Result Value Ref Range    WBC Count 6.2 4.0 - 11.0 10e3/uL    RBC Count 3.44 (L) 4.40 - 5.90 10e6/uL    Hemoglobin 11.1 (L) 13.3 - 17.7 g/dL    Hematocrit 35.0 (L) 40.0 - 53.0 %     (H) 78 - 100 fL    MCH 32.3 26.5 - 33.0 pg    MCHC 31.7 31.5 - 36.5 g/dL    RDW 14.8 10.0 - 15.0 %    Platelet Count 138 (L) 150 - 450 10e3/uL   UA  Microscopic with Reflex to Culture   Result Value Ref Range    Bacteria Urine None Seen None Seen /HPF    RBC Urine None Seen 0-2 /HPF /HPF    WBC Urine 0-5 0-5 /HPF /HPF    Squamous Epithelials Urine Few (A) None Seen /LPF    Hyaline Casts Urine 2-5 (A) None Seen /LPF    Narrative    Urine Culture not indicated       If you have any questions or concerns, please call the clinic at the number listed above.       Sincerely,      Sarwat Mayorga MD

## 2023-05-25 NOTE — PROGRESS NOTES
"SUBJECTIVE:   Trenton is a 83 year old who presents for Preventive Visit.      2/10/2023     7:52 AM   Additional Questions   Roomed by Mckayla   {Split Bill scripting  The purpose of this visit is to discuss your medical history and prevent health problems before you are sick. You may be responsible for a co-pay, coinsurance, or deductible if your visit today includes services such as checking on a sore throat, having an x-ray or lab test, or treating and evaluating a new or existing condition :548452}  Patient has been advised of split billing requirements and indicates understanding: Yes  Are you in the first 12 months of your Medicare coverage?  No    HPI      Have you ever done Advance Care Planning? (For example, a Health Directive, POLST, or a discussion with a medical provider or your loved ones about your wishes): Yes, advance care planning is on file.    {Hearing Test Done (Optional):963467}   Fall risk     {If any of the above assessments are answered yes, consider ordering appropriate referrals (Optional):016243::\"click delete button to remove this line now\"}  Cognitive Screening   1) Repeat 3 items (Leader, Season, Table)  {Get patient's attention, then say, \"I am going to say three words that I want you to remember now and later.  The words are Leader, Season, and Table.  Please say them for me now.\"  If pt. unable after 3 tries, go to next item}  2) Clock draw: {Say the following phrases in order: \"Please draw a clock.  Start by drawing a large [employer] [place].  Put all the numbers in the [employer] [place] and set the hands to show 11:10 (10 past 11).\"  If pt cannot complete in 3 minutes, stop and ask for recall items.  \"NORMAL\" test = all twelve numbers in correct location, and clock hands correctly designating 11:10}NORMAL  3) 3 item recall: {Say: \"What were the three words I asked you to remember?\"}Recalls 2 objects   Results: NORMAL clock, 1-2 items recalled: COGNITIVE IMPAIRMENT LESS LIKELY    Mini-CogTM Copyright " "TEVIN Gibson. Licensed by the author for use in [facility]; reprinted with permission (jeanne@[facility]). All rights reserved.      {Do you have sleep apnea, excessive snoring or daytime drowsiness? (Optional):348530}    Reviewed and updated as needed this visit by clinical staff   Tobacco   Meds              Reviewed and updated as needed this visit by Provider                 Social History     Tobacco Use     Smoking status: Former     Smokeless tobacco: Never     Tobacco comments:     quit drinking 8/8/1988     Quit smoking in the 60s    Vaping Use     Vaping status: Never Used     Passive vaping exposure: Yes   Substance Use Topics     Alcohol use: No     Comment: quit 1988     {Rooming staff  Click this link to complete the Prescreen if response below is not for today's visit  Alcohol Use Prescreen >3 drinks/day or > 7 drinks/week.  If the prescreen question answer is YES, complete the full AUDIT  :924472}        5/23/2022    10:53 AM   Alcohol Use   Prescreen: >3 drinks/day or >7 drinks/week? No   {add AUDIT responses (Optional) (A score of 7 for adult men is an indication of hazardous drinking; a score of 8 or more is an indication of an alcohol use disorder.  A score of 7 or more for adult women is an indication of hazardous drinking or an alchohol use disorder):649768}  Do you have a current opioid prescription? { :082220}  Do you use any other controlled substances or medications that are not prescribed by a provider? {Substance Use :262301::\"None\"}  {Provider  If there are gaps in the social history shown above, please follow the link and refresh the note Link to Social and Substance History :077955}    {Outside tests to abstract? :623358}    Annual Wellness Visit  {Split Bill scripting  The purpose of this visit is to discuss your medical history and prevent health problems before you are sick. You may be responsible for a co-pay, coinsurance, or deductible if your visit today includes " "services such as checking on a sore throat, having an x-ray or lab test, or treating and evaluating a new or existing condition :954191}  Patient has been advised of split billing requirements and indicates understanding: Yes     Are you in the first 12 months of your Medicare Part B coverage?  No    Physical Health:    In general, how would you rate your overall physical health? good    Outside of work, how many days during the week do you exercise?4-5 days/week    Outside of work, approximately how many minutes a day do you exercise?greater than 60 minutes    If you drink alcohol do you typically have >3 drinks per day or >7 drinks per week? No    Do you usually eat at least 4 servings of fruit and vegetables a day, include whole grains & fiber and avoid regularly eating high fat or \"junk\" foods? Yes    Do you have any problems taking medications regularly? No    Do you have any side effects from medications? none    Needs assistance for the following daily activities: no assistance needed    Which of the following safety concerns are present in your home?  none identified     Hearing impairment: No    In the past 6 months, have you been bothered by leaking of urine? no    Mental Health:    In general, how would you rate your overall mental or emotional health? excellent  PHQ-2 Score:      Do you feel safe in your environment? Yes    Have you ever done Advance Care Planning? (For example, a Health Directive, POLST, or a discussion with a medical provider or your loved ones about your wishes)? Yes, advance care planning is on file.    Fall risk:     {If any of the above assessments are answered yes, consider ordering appropriate referrals (Optional):511373::\"click delete button to remove this line now\"}  Cognitive Screening: { :757506}    {Do you have sleep apnea, excessive snoring or daytime drowsiness? (Optional):685386}    Social History     Tobacco Use     Smoking status: Former     Smokeless tobacco: Never     " "Tobacco comments:     quit drinking 8/8/1988     Quit smoking in the 60s    Vaping Use     Vaping status: Never Used     Passive vaping exposure: Yes   Substance Use Topics     Alcohol use: No     Comment: quit 1988     {Rooming staff  Click this link to complete the Prescreen if response below is not for today's visit  Alcohol Use Prescreen >3 drinks/day or > 7 drinks/week.  If the prescreen question answer is YES, complete the full AUDIT  :129646}        5/23/2022    10:53 AM   Alcohol Use   Prescreen: >3 drinks/day or >7 drinks/week? No   {add AUDIT responses (Optional) (A score of 7 for adult men is an indication of hazardous drinking; a score of 8 or more is an indication of an alcohol use disorder.  A score of 7 or more for adult women is an indication of hazardous drinking or an alchohol use disorder):882188}  Do you have a current opioid prescription? { :184902}  Do you use any other controlled substances or medications that are not prescribed by a provider? {Substance Use :076629::\"None\"}  {Provider  If there are gaps in the social history shown above, please follow the link and refresh the note Link to Social and Substance History :493679}  Current providers sharing in care for this patient include: {Rooming staff:  Please update Care Team in Rooming Activity, refresh this note and then delete this statement}  Patient Care Team:  Sarwat Mayorga MD as PCP - General  Sarwat Mayorga MD as Assigned PCP    Patient has been advised of split billing requirements and indicates understanding: {YES / NO:631863::\"Yes\"}  {Medicare required documentation of substance and opioid use disorders screening :859420}    Current providers sharing in care for this patient include: {Rooming staff:  Please update Care Team from storyboard, refresh this note and then delete this statement}  Patient Care Team:  Sarwat Mayorga MD as PCP - General  Sarwat Mayorga MD as Assigned PCP    The following health " "maintenance items are reviewed in Epic and correct as of today:  Health Maintenance   Topic Date Due     DIABETIC FOOT EXAM  Never done     EYE EXAM  Never done     COVID-19 Vaccine (5 - Booster) 2021     A1C  05/10/2023     FALL RISK ASSESSMENT  2023     MEDICARE ANNUAL WELLNESS VISIT  2023     LIPID  2023     MICROALBUMIN  2023     TSH W/FREE T4 REFLEX  2023     BMP  02/10/2024     ANNUAL REVIEW OF HM ORDERS  02/10/2024     HEMOGLOBIN  02/10/2024     ADVANCE CARE PLANNING  2026     DTAP/TDAP/TD IMMUNIZATION (8 - Td or Tdap) 07/15/2031     PHQ-2 (once per calendar year)  Completed     INFLUENZA VACCINE  Completed     Pneumococcal Vaccine: 65+ Years  Completed     URINALYSIS  Completed     ZOSTER IMMUNIZATION  Completed     IPV IMMUNIZATION  Aged Out     MENINGITIS IMMUNIZATION  Aged Out     {Chronicprobdata (optional):190153}  {Decision Support (Optional):300469}        Review of Systems  {ROS COMP (Optional):056340}    OBJECTIVE:   /68 (BP Location: Right arm, Patient Position: Sitting, Cuff Size: Adult Regular)   Pulse 74   Temp 97.9  F (36.6  C) (Oral)   Resp 22   Ht 1.702 m (5' 7\")   Wt 69.5 kg (153 lb 3.2 oz)   SpO2 99%   BMI 23.99 kg/m   Estimated body mass index is 23.99 kg/m  as calculated from the following:    Height as of this encounter: 1.702 m (5' 7\").    Weight as of this encounter: 69.5 kg (153 lb 3.2 oz).  Physical Exam  {Exam (Optional) :830742}    {Diagnostic Test Results (Optional):641675::\"Diagnostic Test Results:\",\"Labs reviewed in Epic\"}    ASSESSMENT / PLAN:   {Diag Picklist:773473}    {Patient advised of split billing (Optional):123032}      COUNSELING:  {Medicare Counselin}        He reports that he has quit smoking. He has never used smokeless tobacco.      Appropriate preventive services were discussed with this patient, including applicable screening as appropriate for cardiovascular disease, diabetes, " osteopenia/osteoporosis, and glaucoma.  As appropriate for age/gender, discussed screening for colorectal cancer, prostate cancer, breast cancer, and cervical cancer. Checklist reviewing preventive services available has been given to the patient.    Reviewed patients plan of care and provided an AVS. The {CarePlan:789526} for Trenton meets the Care Plan requirement. This Care Plan has been established and reviewed with the {PATIENT, FAMILY MEMBER, CAREGIVER:143784}.    {Counseling Resources  [place] Preventive Services Task Force  Cholesterol Screening  Health diet/nutrition  Pooled Cohorts Equation Calculator  NCR's MyPlate  ASA Prophylaxis  Lung CA Screening  Osteoporosis prevention/bone health :925618}  {Prostate Cancer Screening  Consider for men 55-69 per guidance from USPSTF :752089}    Sarwat Mayorga MD  [facility]    Identified Health Risks:  {Medicare required documentation of substance and opioid use disorders screening :143712}

## 2023-05-25 NOTE — PROGRESS NOTES
Annual Wellness Visit:  Trenton Nova  is a 83 year old male  who presents for an annual wellness visit.  Annual wellness visit and physical exam and screen for prostate cancer.  Status post Whipple procedure A1c pending.  We had a good discussion.    Physician and patient sharing was accomplished.    Patient provider list of other providers and physician seeing the patient include Dr. Arreaga from cardiology and Dr. Orozco from cardiology [facility].  Dr. Arreaga locally.  No other providers seeing the patient.    I do not prescribe this patient any opioid medications.  The patient does not receive opioids from any other provider.    Today check hemogram comprehensive metabolic profile A1c lipid panel TSH urinalysis and PSA.    Advance care planning done.    Falls risk assessment also accomplished.    Cognitive assessment was completed and the current provider this examiner and patient sharing was also completed.  Assessment/Plan:  Annual wellness visit and physical examination.  Screen for prostate cancer.    Subjective:   Medical History:  No past medical history on file.  Current Outpatient Medications   Medication     alfuzosin ER (UROXATRAL) 10 MG 24 hr tablet     aspirin 81 MG EC tablet     benzonatate (TESSALON) 200 MG capsule     brimonidine-timoloL (COMBIGAN) 0.2-0.5 % ophthalmic solution     Calcium Carbonate-Vitamin D (OYSTER SHELL CALCIUM/D) 500-5 MG-MCG TABS     Cholecalciferol 100 MCG (4000 UT) TABS     cyanocobalamin (VITAMIN B-12) 500 MCG tablet     diclofenac (VOLTAREN) 1 % topical gel     ELIQUIS ANTICOAGULANT 5 MG tablet     finasteride (PROSCAR) 5 MG tablet     furosemide (LASIX) 40 MG tablet     hydrocortisone 2.5 % ointment     levothyroxine (SYNTHROID/LEVOTHROID) 150 MCG tablet     metoprolol succinate (TOPROL-XL) 25 MG     MULTAQ 400 MG TABS tablet     multivitamin therapeutic (THERAGRAN) tablet     omeprazole (PRILOSEC) 20 MG capsule     predniSONE (DELTASONE) 20 MG tablet      tacrolimus (PROTOPIC) 0.1 % external ointment     tamsulosin (FLOMAX) 0.4 MG capsule     triamcinolone (KENALOG) 0.1 % cream     vit C/E/Zn/coppr/lutein/zeaxan (PRESERVISION AREDS-2 ORAL)     vitamin C (ASCORBIC ACID) 500 MG tablet     VITRON-C 65 mg iron- 125 mg TbEC     No current facility-administered medications for this visit.     Immunization History   Administered Date(s) Administered     COVID-19 MONOVALENT 12+ (Pfizer) 01/31/2021, 02/21/2021, 09/17/2021     COVID-19 Monovalent 18+ (Moderna) 04/05/2021     DT (PEDS <7y) 11/15/2004     FLUAD(HD)65+ QUAD 10/15/2021     Flu, Unspecified 12/01/2006, 10/29/2007, 10/27/2008, 09/30/2009, 10/27/2009, 12/19/2011, 12/01/2012, 10/04/2013, 10/01/2014, 10/16/2014, 11/02/2017, 10/01/2018, 10/01/2019, 12/20/2020, 10/06/2021     IG-IM 04/18/2000     Influenza (H1N1) 02/09/2010     Influenza (High Dose) 3 valent vaccine 11/01/2010, 10/01/2015, 10/21/2015, 09/14/2016, 10/01/2016, 11/02/2017, 01/01/2018, 10/23/2018, 10/29/2019, 09/30/2020     Influenza (IIV3) PF 09/30/2009, 10/04/2013, 10/01/2014, 10/16/2014, 10/01/2018, 10/01/2019     Influenza Vaccine 65+ (Fluzone HD) 09/30/2020, 10/04/2022     Influenza Vaccine, 6+MO IM (QUADRIVALENT W/PRESERVATIVES) 10/04/2013, 10/16/2014, 10/02/2021     Pneumo Conj 13-V (2010&after) 10/01/2016, 12/20/2016     Pneumococcal 23 valent 11/08/2005     Pneumococcal, Unspecified 06/01/2006     TD,PF 7+ (Tenivac) 07/15/2021     TDAP (Adacel,Boostrix) 01/01/2013, 02/18/2014     Td (Adult), Adsorbed 11/15/2004     Td,adult,historic,unspecified 11/15/2004, 01/01/2005     Tdap (Adult) Unspecified Formulation 11/15/2004, 01/01/2005     Zoster recombinant adjuvanted (SHINGRIX) 05/13/2019, 10/01/2019, 12/02/2019     Zoster vaccine, live 01/01/2007       Surgical History:  Past Surgical History:   Procedure Laterality Date     CARDIOVERSION       CATARACT EXTRACTION       CATHETER, ABLATION       CERVICAL LAMINOPLASTY       ENDOSCOPIC RETROGRADE  "CHOLANGIOPANCREATOGRAPHY       hemorrhoidectomy       HERNIA REPAIR       IMPLANT PACEMAKER       KNEE SURGERY       PANCREATICODUODENECTOMY  2020    For adenocarcinoma of the ampulla of Vater     SHOULDER SURGERY       vascectomy       WHIPPLE PROCEDURE       ZZ CARDIOLOGY [facility] O[facility]LUDER REFERRAL     Non-smoker.    History of atrial fibrillation.  Transesophageal echocardiogram done.  Followed by Dr. Sarwat Arreaga from [facility] locally.  Sees Dr. Orozco cardiologist from [facility].    Non-smoker.    No alcohol.    Allergies sulfa Ambien.    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  Doing well no recurrence.  Also treated for hypothyroidism plus atrial fibrillation as noted above.  Status post cardioversion.  Transesophageal echo recently done.  Dr. Sarwat Arreaga cardiac electrophysiologist cardiologist supervising.  [facility].  [facility] cardiology department.    Family History:  2 children well grandchildren well.  One half brother 1 sister.  Mother  stroke 88 father lost to follow-up.  Left the family.  Wife is currently well.  She has had malignant melanoma in the anal area.  With recurrence.  But not distal just local.  Upcoming surgery 2023 for the patient's wife.    Social History:  Enjoys playing golf still working.  Owner of [employer].    Health Maintenances:  Immunizations reviewed and up-to-date much of his medical care and screening examinations are done at the [facility] in [facility].    Objective:  /68 (BP Location: Right arm, Patient Position: Sitting, Cuff Size: Adult Regular)   Pulse 74   Temp 97.9  F (36.6  C) (Oral)   Resp 22   Ht 1.702 m (5' 7\")   Wt 69.5 kg (153 lb 3.2 oz)   SpO2 99%   BMI 23.99 kg/m    Easily conversant good " spirited slightly pale in color not in acute distress back straight no severe spine tenderness chest clear few dry rales noted at the right base.  Prior history of interstitial lung process for disease.  Not defined.  IPF?.  Chest otherwise clear no rhonchi or wheezes heart tones regular rhythm without murmur rub or gallop neck veins nondistended no thyromegaly no carotid bruits abdomen soft benign not no masses no liver spleen tip palpable the belly is scaphoid there is no obesity there is no abnormal bowel sounds audible there is no masses extremities are free of edema cyanosis or clubbing he is not icteric genital examination was negative testicular exam scrotal exam negative no groin hernias rectal showed a small prostate without nodularity induration rest of rectal exam negative.    Sarwat Mayorga MD    Internal Medicine

## 2023-09-08 ENCOUNTER — NURSE TRIAGE (OUTPATIENT)
Dept: NURSING | Facility: CLINIC | Age: 83
End: 2023-09-08
Payer: MEDICARE

## 2023-09-08 ENCOUNTER — OFFICE VISIT (OUTPATIENT)
Dept: FAMILY MEDICINE | Facility: CLINIC | Age: 83
End: 2023-09-08
Payer: MEDICARE

## 2023-09-08 VITALS
HEART RATE: 59 BPM | OXYGEN SATURATION: 96 % | WEIGHT: 147 LBS | TEMPERATURE: 98 F | RESPIRATION RATE: 18 BRPM | SYSTOLIC BLOOD PRESSURE: 127 MMHG | BODY MASS INDEX: 23.02 KG/M2 | DIASTOLIC BLOOD PRESSURE: 74 MMHG

## 2023-09-08 DIAGNOSIS — K40.91 UNILATERAL RECURRENT INGUINAL HERNIA WITHOUT OBSTRUCTION OR GANGRENE: Primary | ICD-10-CM

## 2023-09-08 PROBLEM — D12.0 BENIGN NEOPLASM OF CECUM: Status: ACTIVE | Noted: 2017-02-23

## 2023-09-08 PROBLEM — K83.8 CHOLANGIECTASIS: Status: ACTIVE | Noted: 2023-09-08

## 2023-09-08 PROBLEM — Z86.0100 HISTORY OF COLONIC POLYPS: Status: ACTIVE | Noted: 2017-02-23

## 2023-09-08 PROBLEM — K83.1 OBSTRUCTION OF COMMON BILE DUCT (H): Status: ACTIVE | Noted: 2020-01-22

## 2023-09-08 PROBLEM — K83.1: Status: ACTIVE | Noted: 2023-09-08

## 2023-09-08 PROBLEM — D13.5 ADENOMA OF AMPULLA OF VATER: Status: ACTIVE | Noted: 2023-09-08

## 2023-09-08 PROBLEM — K83.9 DISORDER OF BILIARY TRACT: Status: ACTIVE | Noted: 2023-09-08

## 2023-09-08 PROBLEM — D12.5 BENIGN NEOPLASM OF SIGMOID COLON: Status: ACTIVE | Noted: 2017-02-23

## 2023-09-08 PROBLEM — R79.89 ABNORMAL LIVER FUNCTION TESTS: Status: ACTIVE | Noted: 2023-09-08

## 2023-09-08 PROCEDURE — 99214 OFFICE O/P EST MOD 30 MIN: CPT | Performed by: PHYSICIAN ASSISTANT

## 2023-09-08 NOTE — PROGRESS NOTES
Assessment & Plan:      Problem List Items Addressed This Visit    None  Visit Diagnoses       Unilateral recurrent inguinal hernia without obstruction or gangrene    -  Primary    Relevant Orders    Adult General Surg Referral          Medical Decision Making  Patient presents with a new lump in the right inguinal region over the last 2 to 3 days.  Symptoms are consistent with an inguinal hernia.  This does appear to be a reoccurrence from previous inguinal hernia repair 30 years ago.  Placed referral to general surgery.  Discussed signs of worsening symptoms and when to be seen immediately in the emergency room.  Discussed avoidance of aggravating activities.     Subjective:      Trenton Nova is a 83 year old male here for evaluation of a tender lump in the right suprapubic region.  Onset of symptoms was 2 days ago.  Patient had similar pains and symptoms about 30 years ago with a hernia repair at that time.  Patient otherwise denies fevers and is eating and drinking appropriately.  He is passing stools without difficulty.  He does note some moderate pains in that region that are worse with prolonged sitting.     The following portions of the patient's history were reviewed and updated as appropriate: allergies, current medications, and problem list.     Review of Systems  Pertinent items are noted in HPI.    Allergies  Allergies   Allergen Reactions    Sulfa Antibiotics Unknown and Swelling    Zolpidem Unknown     confusion       Family History   Problem Relation Age of Onset    Cerebrovascular Disease Mother     Hypothyroidism Mother     Kidney failure Sister        Social History     Tobacco Use    Smoking status: Former    Smokeless tobacco: Never    Tobacco comments:     quit drinking 8/8/1988     Quit smoking in the 60s    Substance Use Topics    Alcohol use: No     Comment: quit 1988        Objective:      /74   Pulse 59   Temp 98  F (36.7  C) (Oral)   Resp 18   Wt 66.7 kg (147 lb)   SpO2 96%    BMI 23.02 kg/m    General appearance - alert, well appearing, and in no distress and non-toxic  Skin - palpable firm mass in the right inguinal region that does not reduce, masses otherwise nontender with no erythema or increased warmth to touch    The use of Dragon/[employer] dictation services was used to construct the content of this note; any grammatical errors are non-intentional. Please contact the author directly if you are in need of any clarification.

## 2023-09-08 NOTE — TELEPHONE ENCOUNTER
"Nurse Triage SBAR    Is this a 2nd Level Triage? NO    Situation:   Patient calling reporting he has a hernia that is painful.    Background:   History of hernia/that has been repaired.    Assessment:     Patient reporting new onset of \"2 inch swelling\" in lower right groin.  Area is very tender\" painful to touch.  Afebrile.  Stating pain improves at rest.  Denies change in out put, patient denies any swelling of scrotum.  Denies injury.  Patient stating he has not tried to reduce hernia, as it is painful to touch.  Denies abd pain.    Protocol Recommended Disposition:   See in ED. Patient will go to [facility] ED.    Reason for Disposition   Hernia is painful or tender to touch    Additional Information   Negative: Passed out (i.e., fainted, collapsed and was not responding)   Negative: Shock suspected (e.g., cold/pale/clammy skin, too weak to stand, low BP, rapid pulse)   Negative: Sounds like a life-threatening emergency to the triager   Negative: Followed an abdomen (stomach) injury   Negative: Swelling of scrotum and has not previously been diagnosed with a hernia   Negative: SEVERE abdominal pain    Protocols used: Hernia-A-OH, Abdominal Pain - Male-A-OH    "

## 2023-09-10 NOTE — PROGRESS NOTES
History:  Trenton Nova is a 83 year old male who was referred to general surgery by Komal Mays PA-C for an inguinal hernia.  He presents today with complaints of a lump in the right inguinal region.  It has been present for about 1 week.  Yesterday it caused him a bit more discomfort compared to today.  He states that he can feel a pressure when he is sitting upright.  When he does other physical activities it tends not to bother him too much.  He is able to lay down and massage it and make it softer.  He does have a history of a right inguinal hernia.  It was repaired in an open fashion around 2001.  He recalls mesh was used.    Allergies:  Sulfa antibiotics and Zolpidem    Past Medical History:  MM  Adenocarcinoma ampulla of vater  BPH  CHP  A fib  CKD  Hypothyroidism    Past Surgical History:  Whipple  Pacemaker  Open right inguinal hernia repair with - 2001  Cataract extraction  Shoulder surgery  Knee surgery  Hemorrhoidectomy  Cervical spine surgery    Medications:    alfuzosin ER (UROXATRAL) 10 MG 24 hr tablet, Take 1 tablet by mouth daily, Disp: , Rfl:     aspirin 81 MG EC tablet, Take 81 mg by mouth daily, Disp: , Rfl:     benzonatate (TESSALON) 200 MG capsule, every 8 hours, Disp: , Rfl:     brimonidine-timoloL (COMBIGAN) 0.2-0.5 % ophthalmic solution, [BRIMONIDINE-TIMOLOL (COMBIGAN) 0.2-0.5 % OPHTHALMIC SOLUTION] 1 drop., Disp: , Rfl:     Calcium Carbonate-Vitamin D (OYSTER SHELL CALCIUM/D) 500-5 MG-MCG TABS, Take 1 tablet by mouth every morning, Disp: , Rfl:     Cholecalciferol 100 MCG (4000 UT) TABS, Take 1 tablet by mouth daily, Disp: , Rfl:     cyanocobalamin (VITAMIN B-12) 500 MCG tablet, [CYANOCOBALAMIN (VITAMIN B-12) 500 MCG TABLET] Take 500 mcg by mouth daily., Disp: , Rfl:     diclofenac (VOLTAREN) 1 % topical gel, diclofenac 1 % topical gel, Disp: , Rfl:     ELIQUIS ANTICOAGULANT 5 MG tablet, , Disp: , Rfl:     finasteride (PROSCAR) 5 MG tablet, TAKE ONE TABLET BY MOUTH EVERY DAY FOR  PROSTATE, Disp: , Rfl:     furosemide (LASIX) 40 MG tablet, Take 40 mg by mouth, Disp: , Rfl:     hydrocortisone 2.5 % ointment, , Disp: , Rfl:     levothyroxine (SYNTHROID/LEVOTHROID) 150 MCG tablet, TAKE 1 TABLET(150 MCG) BY MOUTH DAILY, Disp: 90 tablet, Rfl: 2    metoprolol succinate (TOPROL-XL) 25 MG, [METOPROLOL SUCCINATE (TOPROL-XL) 25 MG] Take 25 mg by mouth daily., Disp: , Rfl:     MULTAQ 400 MG TABS tablet, , Disp: , Rfl:     multivitamin therapeutic (THERAGRAN) tablet, [MULTIVITAMIN THERAPEUTIC (THERAGRAN) TABLET] Take 1 tablet by mouth daily., Disp: , Rfl:     omeprazole (PRILOSEC) 20 MG capsule, Take 40 mg by mouth , Disp: , Rfl:     predniSONE (DELTASONE) 20 MG tablet, Take 1 tablet (20 mg) by mouth daily, Disp: 5 tablet, Rfl: 0    tacrolimus (PROTOPIC) 0.1 % external ointment, APPLY SMALL AMOUNT TOPICALLY EVERY DAY AS NEEDED FOR EYELID RASH, Disp: , Rfl:     tamsulosin (FLOMAX) 0.4 MG capsule, every 24 hours, Disp: , Rfl:     triamcinolone (KENALOG) 0.1 % cream, [TRIAMCINOLONE (KENALOG) 0.1 % CREAM] Apply thin layer to affected areas (arms & hips) twice daily X 1 month, Disp: 60 g, Rfl: 3    vit C/E/Zn/coppr/lutein/zeaxan (PRESERVISION AREDS-2 ORAL), [VIT C/E/ZN/COPPR/LUTEIN/ZEAXAN (PRESERVISION AREDS-2 ORAL)] Take by mouth 2 (two) times a day., Disp: , Rfl:     vitamin C (ASCORBIC ACID) 500 MG tablet, 1,000 mg, Disp: , Rfl:     VITRON-C 65 mg iron- 125 mg TbEC, [VITRON-C 65 MG IRON- 125 MG TBEC] TK 1 T PO QD. DO NOT CRUSH OR CHEW, Disp: , Rfl:     Family History:  No known family history of anesthesia problems    Social History:  Denies alcohol, tobacco, illicit drug use.  He is having a mitral clip procedure next month.  He sanchez in [place].    Review of Systems:   General: No complaints or constitutional symptoms  Skin: No complaints or symptoms   Hematologic/Lymphatic: No symptoms or complaints  Psychiatric: No symptoms or complaints  Endocrine: No excessive fatigue, no hypermetabolic symptoms  "reported  Respiratory: No cough, shortness of breath, or wheezing  Cardiovascular: No chest pain or dyspnea on exertion  Gastrointestinal: As per HPI  Musculoskeletal: No recent injuries reported  Neurological: No focal neurologic defects reported.      Exam:  /69   Ht 1.702 m (5' 7\")   Wt 68.5 kg (151 lb)   BMI 23.65 kg/m    Body mass index is 23.65 kg/m .  General: Alert, cooperative, appears stated age   Skin: Skin color, texture, turgor normal, no rashes or lesions   Lymphatic: No obvious adenopathy, no swelling   Eyes: No scleral icterus, pupils equal  HENT: No traumatic injury to the head or face, no gross abnormalities  Lungs: Normal respiratory effort, breath sounds equal bilaterally  Heart: Regular rate and rhythm  Abdomen: Midline epigastric incision from Whipple.  Right oblique groin incision from open hernia repair.  There is a small protrusion in this region that is tender to palpation and only partially reducible.  No appreciable left inguinal hernia with Valsalva maneuver.  Musculoskeletal: No obvious swelling  Neurologic: Grossly intact      Assessment/Plan:   Trenton Nova is a 83 year old male with a recurrent right inguinal hernia.  I have discussed the pathophysiology of inguinal hernias at length as well as the surgical and non-operative management strategies.  In particular, the risks and benefits of repair utilizing mesh vs a primary repair.  Similarly, the risks and benefits to the surgical approach - laparoscopic vs robotic vs open inguinal hernia surgery.  We discussed the risks of hernia surgery itself which include, but are not limited to, bleeding, infection of the mesh, recurrence of the hernia, chronic pain, poor cosmesis, the need for reoperative intervention, and injury to vital structures.  Additionally, the risks of observation were also discussed in detail which include, but are not limited to, chronic pain, enlargement of the hernia, incarceration, and strangulation.  "     With his past surgical history, I recommend a laparoscopic or robotic approach for him.  Unfortunately I do not perform inguinal hernia repair in this manner.  He states that he is interested in pursuing his heart procedure first.  He is thinking that he would like to have hernia surgery this spring.  He will give our office a call so that he can schedule a consultation with one of my partners who performs laparoscopic or robotic hernia repair.  He would like to keep his appointment in the [place] location.      Sarah Sullivan DO  General Surgeon  [facility]  Surgery [address]  [place] [address]  Office: [phone]  Employed by - [facility]

## 2023-09-11 ENCOUNTER — OFFICE VISIT (OUTPATIENT)
Dept: SURGERY | Facility: CLINIC | Age: 83
End: 2023-09-11
Attending: PHYSICIAN ASSISTANT
Payer: MEDICARE

## 2023-09-11 VITALS
WEIGHT: 151 LBS | HEIGHT: 67 IN | BODY MASS INDEX: 23.7 KG/M2 | SYSTOLIC BLOOD PRESSURE: 130 MMHG | DIASTOLIC BLOOD PRESSURE: 69 MMHG

## 2023-09-11 DIAGNOSIS — K40.91 UNILATERAL RECURRENT INGUINAL HERNIA WITHOUT OBSTRUCTION OR GANGRENE: ICD-10-CM

## 2023-09-11 PROCEDURE — 99203 OFFICE O/P NEW LOW 30 MIN: CPT | Performed by: SURGERY

## 2023-09-11 NOTE — LETTER
9/11/2023         RE: Trenton Nova  [address] [address]        Dear Colleague,    Thank you for referring your patient, Trenton Nova, to the [facility] [place]. Please see a copy of my visit note below.    History:  Trenton Nova is a 83 year old male who was referred to general surgery by Komal Mays PA-C for an inguinal hernia.  He presents today with complaints of a lump in the right inguinal region.  It has been present for about 1 week.  Yesterday it caused him a bit more discomfort compared to today.  He states that he can feel a pressure when he is sitting upright.  When he does other physical activities it tends not to bother him too much.  He is able to lay down and massage it and make it softer.  He does have a history of a right inguinal hernia.  It was repaired in an open fashion around 2001.  He recalls mesh was used.    Allergies:  Sulfa antibiotics and Zolpidem    Past Medical History:  MM  Adenocarcinoma ampulla of vater  BPH  CHP  A fib  CKD  Hypothyroidism    Past Surgical History:  Whipple  Pacemaker  Open right inguinal hernia repair with - 2001  Cataract extraction  Shoulder surgery  Knee surgery  Hemorrhoidectomy  Cervical spine surgery    Medications:     alfuzosin ER (UROXATRAL) 10 MG 24 hr tablet, Take 1 tablet by mouth daily, Disp: , Rfl:      aspirin 81 MG EC tablet, Take 81 mg by mouth daily, Disp: , Rfl:      benzonatate (TESSALON) 200 MG capsule, every 8 hours, Disp: , Rfl:      brimonidine-timoloL (COMBIGAN) 0.2-0.5 % ophthalmic solution, [BRIMONIDINE-TIMOLOL (COMBIGAN) 0.2-0.5 % OPHTHALMIC SOLUTION] 1 drop., Disp: , Rfl:      Calcium Carbonate-Vitamin D (OYSTER SHELL CALCIUM/D) 500-5 MG-MCG TABS, Take 1 tablet by mouth every morning, Disp: , Rfl:      Cholecalciferol 100 MCG (4000 UT) TABS, Take 1 tablet by mouth daily, Disp: , Rfl:      cyanocobalamin (VITAMIN B-12) 500 MCG tablet, [CYANOCOBALAMIN  (VITAMIN B-12) 500 MCG TABLET] Take 500 mcg by mouth daily., Disp: , Rfl:      diclofenac (VOLTAREN) 1 % topical gel, diclofenac 1 % topical gel, Disp: , Rfl:      ELIQUIS ANTICOAGULANT 5 MG tablet, , Disp: , Rfl:      finasteride (PROSCAR) 5 MG tablet, TAKE ONE TABLET BY MOUTH EVERY DAY FOR PROSTATE, Disp: , Rfl:      furosemide (LASIX) 40 MG tablet, Take 40 mg by mouth, Disp: , Rfl:      hydrocortisone 2.5 % ointment, , Disp: , Rfl:      levothyroxine (SYNTHROID/LEVOTHROID) 150 MCG tablet, TAKE 1 TABLET(150 MCG) BY MOUTH DAILY, Disp: 90 tablet, Rfl: 2     metoprolol succinate (TOPROL-XL) 25 MG, [METOPROLOL SUCCINATE (TOPROL-XL) 25 MG] Take 25 mg by mouth daily., Disp: , Rfl:      MULTAQ 400 MG TABS tablet, , Disp: , Rfl:      multivitamin therapeutic (THERAGRAN) tablet, [MULTIVITAMIN THERAPEUTIC (THERAGRAN) TABLET] Take 1 tablet by mouth daily., Disp: , Rfl:      omeprazole (PRILOSEC) 20 MG capsule, Take 40 mg by mouth , Disp: , Rfl:      predniSONE (DELTASONE) 20 MG tablet, Take 1 tablet (20 mg) by mouth daily, Disp: 5 tablet, Rfl: 0     tacrolimus (PROTOPIC) 0.1 % external ointment, APPLY SMALL AMOUNT TOPICALLY EVERY DAY AS NEEDED FOR EYELID RASH, Disp: , Rfl:      tamsulosin (FLOMAX) 0.4 MG capsule, every 24 hours, Disp: , Rfl:      triamcinolone (KENALOG) 0.1 % cream, [TRIAMCINOLONE (KENALOG) 0.1 % CREAM] Apply thin layer to affected areas (arms & hips) twice daily X 1 month, Disp: 60 g, Rfl: 3     vit C/E/Zn/coppr/lutein/zeaxan (PRESERVISION AREDS-2 ORAL), [VIT C/E/ZN/COPPR/LUTEIN/ZEAXAN (PRESERVISION AREDS-2 ORAL)] Take by mouth 2 (two) times a day., Disp: , Rfl:      vitamin C (ASCORBIC ACID) 500 MG tablet, 1,000 mg, Disp: , Rfl:      VITRON-C 65 mg iron- 125 mg TbEC, [VITRON-C 65 MG IRON- 125 MG TBEC] TK 1 T PO QD. DO NOT CRUSH OR CHEW, Disp: , Rfl:     Family History:  No known family history of anesthesia problems    Social History:  Denies alcohol, tobacco, illicit drug use.  He is having a mitral clip  "procedure next month.  He sanchez in [place].    Review of Systems:   General: No complaints or constitutional symptoms  Skin: No complaints or symptoms   Hematologic/Lymphatic: No symptoms or complaints  Psychiatric: No symptoms or complaints  Endocrine: No excessive fatigue, no hypermetabolic symptoms reported  Respiratory: No cough, shortness of breath, or wheezing  Cardiovascular: No chest pain or dyspnea on exertion  Gastrointestinal: As per HPI  Musculoskeletal: No recent injuries reported  Neurological: No focal neurologic defects reported.      Exam:  /69   Ht 1.702 m (5' 7\")   Wt 68.5 kg (151 lb)   BMI 23.65 kg/m    Body mass index is 23.65 kg/m .  General: Alert, cooperative, appears stated age   Skin: Skin color, texture, turgor normal, no rashes or lesions   Lymphatic: No obvious adenopathy, no swelling   Eyes: No scleral icterus, pupils equal  HENT: No traumatic injury to the head or face, no gross abnormalities  Lungs: Normal respiratory effort, breath sounds equal bilaterally  Heart: Regular rate and rhythm  Abdomen: Midline epigastric incision from Whipple.  Right oblique groin incision from open hernia repair.  There is a small protrusion in this region that is tender to palpation and only partially reducible.  No appreciable left inguinal hernia with Valsalva maneuver.  Musculoskeletal: No obvious swelling  Neurologic: Grossly intact      Assessment/Plan:   Trenton Nova is a 83 year old male with a recurrent right inguinal hernia.  I have discussed the pathophysiology of inguinal hernias at length as well as the surgical and non-operative management strategies.  In particular, the risks and benefits of repair utilizing mesh vs a primary repair.  Similarly, the risks and benefits to the surgical approach - laparoscopic vs robotic vs open inguinal hernia surgery.  We discussed the risks of hernia surgery itself which include, but are not limited to, bleeding, infection of the mesh, " recurrence of the hernia, chronic pain, poor cosmesis, the need for reoperative intervention, and injury to vital structures.  Additionally, the risks of observation were also discussed in detail which include, but are not limited to, chronic pain, enlargement of the hernia, incarceration, and strangulation.      With his past surgical history, I recommend a laparoscopic or robotic approach for him.  Unfortunately I do not perform inguinal hernia repair in this manner.  He states that he is interested in pursuing his heart procedure first.  He is thinking that he would like to have hernia surgery this spring.  He will give our office a call so that he can schedule a consultation with one of my partners who performs laparoscopic or robotic hernia repair.  He would like to keep his appointment in the [place] location.      Sarah Sullivan DO  General Surgeon  [facility]  Surgery [address]  [place] [address]  Office: [phone]  Employed by - [facility]      Again, thank you for allowing me to participate in the care of your patient.        Sincerely,        Sarah Sullivan DO

## 2023-09-13 ENCOUNTER — TRANSFERRED RECORDS (OUTPATIENT)
Dept: HEALTH INFORMATION MANAGEMENT | Facility: CLINIC | Age: 83
End: 2023-09-13
Payer: MEDICARE

## 2023-10-08 ENCOUNTER — OFFICE VISIT (OUTPATIENT)
Dept: FAMILY MEDICINE | Facility: CLINIC | Age: 83
End: 2023-10-08
Payer: MEDICARE

## 2023-10-08 VITALS
RESPIRATION RATE: 18 BRPM | WEIGHT: 151.2 LBS | TEMPERATURE: 97.4 F | DIASTOLIC BLOOD PRESSURE: 72 MMHG | HEART RATE: 64 BPM | SYSTOLIC BLOOD PRESSURE: 131 MMHG | BODY MASS INDEX: 23.68 KG/M2 | OXYGEN SATURATION: 100 %

## 2023-10-08 DIAGNOSIS — R60.0 LOWER EXTREMITY EDEMA: Primary | ICD-10-CM

## 2023-10-08 PROCEDURE — 99213 OFFICE O/P EST LOW 20 MIN: CPT | Performed by: FAMILY MEDICINE

## 2023-10-08 RX ORDER — FAMOTIDINE 40 MG/1
1 TABLET, FILM COATED ORAL
COMMUNITY

## 2023-10-08 RX ORDER — FUROSEMIDE 20 MG
20 TABLET ORAL
COMMUNITY
End: 2023-10-11

## 2023-10-08 RX ORDER — CALCIUM CARBONATE 260MG(650)
TABLET,CHEWABLE ORAL
COMMUNITY
End: 2023-10-11

## 2023-10-08 RX ORDER — VITAMIN B COMPLEX
1 TABLET ORAL DAILY
COMMUNITY

## 2023-10-08 RX ORDER — LORAZEPAM 2 MG/1
2 TABLET ORAL
COMMUNITY
Start: 2023-09-18 | End: 2023-10-11

## 2023-10-08 RX ORDER — AMIODARONE HYDROCHLORIDE 200 MG/1
1 TABLET ORAL
COMMUNITY

## 2023-10-08 RX ORDER — TORSEMIDE 20 MG/1
1 TABLET ORAL
COMMUNITY

## 2023-10-08 RX ORDER — ROSUVASTATIN CALCIUM 10 MG/1
1 TABLET, COATED ORAL
COMMUNITY
End: 2023-10-11

## 2023-10-08 NOTE — PATIENT INSTRUCTIONS
Continue with the torsemide daily  Use gauze pads and ace wrap to the right leg  Change dressings twice daily  OK to shower and clean with mild soap/ warm water  Call if any redness / pain  Follow-up with Sarwat Mayorga this week

## 2023-10-08 NOTE — PROGRESS NOTES
Trenton was seen today for leg swelling.    Diagnoses and all orders for this visit:    Lower extremity edema    I think he may have sheared his skin pulling on his support hose so will switch to ace wrap for right lower leg.    He should also resume taking his torsemide for diuresis.  He can get a BMP checked when he sees Sarwat Mayorga.      Patient Instructions   Continue with the torsemide daily  Use gauze pads and ace wrap to the right leg  Change dressings twice daily  OK to shower and clean with mild soap/ warm water  Call if any redness / pain  Follow-up with Sarwat Mayorga this week       SUBJECTIVE: patient is 83 year old male with:  CC: weeping fluid from right lower leg    Trenton is s/p transcatheter mitral repair / ASD closure on 10/3/2023.  He was discharged on torsemide 20 mg daily but stopped taking the diuretic after a couple of days.  His lower legs have been swollen and he wears support hose.  This am he woke up and pants were soaked with clear fluid weeping from right lower leg.  He denies any pain from leg.  He does have visit with Sarwat Mayorga in a few days.    ROS: No shortness of breath / weight gain      OBJECTIVE: /72 (BP Location: Right arm, Patient Position: Sitting, Cuff Size: Adult Regular)   Pulse 64   Temp 97.4  F (36.3  C) (Oral)   Resp 18   Wt 68.6 kg (151 lb 3.2 oz)   SpO2 100%   BMI 23.68 kg/m   no distress  lower extremities : bilateral edema 3+.  He has two small open areas on right lower leg that are weeping clear fluid.  No erythema or purulent discharge.  Skin is very fragile.    Mery Snell MD

## 2023-10-09 ENCOUNTER — TRANSFERRED RECORDS (OUTPATIENT)
Dept: HEALTH INFORMATION MANAGEMENT | Facility: CLINIC | Age: 83
End: 2023-10-09
Payer: MEDICARE

## 2023-10-11 ENCOUNTER — OFFICE VISIT (OUTPATIENT)
Dept: INTERNAL MEDICINE | Facility: CLINIC | Age: 83
End: 2023-10-11
Payer: MEDICARE

## 2023-10-11 VITALS
BODY MASS INDEX: 23.13 KG/M2 | SYSTOLIC BLOOD PRESSURE: 108 MMHG | TEMPERATURE: 97 F | HEIGHT: 67 IN | RESPIRATION RATE: 16 BRPM | DIASTOLIC BLOOD PRESSURE: 46 MMHG | WEIGHT: 147.4 LBS | HEART RATE: 61 BPM | OXYGEN SATURATION: 100 %

## 2023-10-11 DIAGNOSIS — Z98.890 S/P MITRAL VALVE REPAIR: ICD-10-CM

## 2023-10-11 DIAGNOSIS — N18.31 STAGE 3A CHRONIC KIDNEY DISEASE (H): ICD-10-CM

## 2023-10-11 DIAGNOSIS — Z09 HOSPITAL DISCHARGE FOLLOW-UP: Primary | ICD-10-CM

## 2023-10-11 LAB
ANION GAP SERPL CALCULATED.3IONS-SCNC: 13 MMOL/L (ref 7–15)
BUN SERPL-MCNC: 74.3 MG/DL (ref 8–23)
CALCIUM SERPL-MCNC: 8.1 MG/DL (ref 8.8–10.2)
CHLORIDE SERPL-SCNC: 102 MMOL/L (ref 98–107)
CREAT SERPL-MCNC: 3.29 MG/DL (ref 0.67–1.17)
DEPRECATED HCO3 PLAS-SCNC: 21 MMOL/L (ref 22–29)
EGFRCR SERPLBLD CKD-EPI 2021: 18 ML/MIN/1.73M2
ERYTHROCYTE [DISTWIDTH] IN BLOOD BY AUTOMATED COUNT: 13.5 % (ref 10–15)
GLUCOSE SERPL-MCNC: 90 MG/DL (ref 70–99)
HBA1C MFR BLD: 5.5 % (ref 0–5.6)
HCT VFR BLD AUTO: 33.2 % (ref 40–53)
HGB BLD-MCNC: 10.8 G/DL (ref 13.3–17.7)
MCH RBC QN AUTO: 36.2 PG (ref 26.5–33)
MCHC RBC AUTO-ENTMCNC: 32.5 G/DL (ref 31.5–36.5)
MCV RBC AUTO: 111 FL (ref 78–100)
PLATELET # BLD AUTO: 166 10E3/UL (ref 150–450)
POTASSIUM SERPL-SCNC: 4.5 MMOL/L (ref 3.4–5.3)
RBC # BLD AUTO: 2.98 10E6/UL (ref 4.4–5.9)
SODIUM SERPL-SCNC: 136 MMOL/L (ref 135–145)
WBC # BLD AUTO: 6.4 10E3/UL (ref 4–11)

## 2023-10-11 PROCEDURE — 85027 COMPLETE CBC AUTOMATED: CPT | Performed by: NURSE PRACTITIONER

## 2023-10-11 PROCEDURE — 36415 COLL VENOUS BLD VENIPUNCTURE: CPT | Performed by: NURSE PRACTITIONER

## 2023-10-11 PROCEDURE — 99213 OFFICE O/P EST LOW 20 MIN: CPT | Performed by: NURSE PRACTITIONER

## 2023-10-11 PROCEDURE — 80048 BASIC METABOLIC PNL TOTAL CA: CPT | Performed by: NURSE PRACTITIONER

## 2023-10-11 PROCEDURE — 83036 HEMOGLOBIN GLYCOSYLATED A1C: CPT | Performed by: NURSE PRACTITIONER

## 2023-10-11 RX ORDER — AMOXICILLIN 500 MG/1
2000 CAPSULE ORAL
Qty: 4 CAPSULE | Refills: 1 | Status: SHIPPED | OUTPATIENT
Start: 2023-10-11

## 2023-10-11 NOTE — PROGRESS NOTES
Assessment & Plan   Problem List Items Addressed This Visit          Urinary    Stage 3a chronic kidney disease (H)     Other Visit Diagnoses       Hospital discharge follow-up    -  Primary    S/P mitral valve repair        Relevant Medications    amoxicillin (AMOXIL) 500 MG capsule    Other Relevant Orders    Basic metabolic panel    CBC with platelets (Completed)           - Reviewed inpatient records and labs  - Take torsemide today. Will check BMP. His weight is back to his baseline, however, he still has 1-2+ b/l LE edema. If creatinine is stable, will have him continue torsemide for an additional 5 days  - Amoxicillin 2gm 30-60 minutes before dental procedures  - Follow up with cardiology as scheduled       Post Medication Reconciliation Status:  Discharge medications reconciled and changed, see notes/orders      Miriam Spann NP  [facility]    Flores Chowdhury is a 83 year old, presenting for the following health issues:  Hospital F/U and Medication Request (Patient states they gave me a ticket and I forgot it I need and antibiotic for my teeth for the dentist)        10/11/2023     8:13 AM   Additional Questions   Roomed by Seble GREENBERG CMA   Accompanied by N/A       [facility]   Hospital Follow-up Visit:  Hospital/Nursing Home/IP Rehab Facility:  [facility]  Date of Admission: 10/03/2023  Date of Discharge: 10/04/2023  Reason(s) for Admission: mitral valve disease    Was your hospitalization related to COVID-19? No   Problems taking medications regularly:  None  Medication changes since discharge: None  Problems adhering to non-medication therapy:  None    He has a past medical history of chronic renal insufficiency, malignant neoplasm of ampulla vater status post pancreatomy Whipple resection atrial flutter status post ablation x2 and status post Watchman device, tachybradycardia syndrome status post DCPM implantation, nonobstructive coronary artery disease,  "hyperlipidemia, and dilated cardiomyopathy.  He has a history of severe symptomatic mitral and tricuspid valve regurgitation and was admitted for transcatheter mitral repair as ASC closure.  He will later follow-up for plans to repair the tricuspid valve. The right groin site location is no longer painful.     He was seen in walk-in care over the weekend because of b/l LE edema. He is taking torsemide 20 mg daily and weight is back to his baseline now. No dyspnea but he has not been as  physically active as usual since he is recovering from the procedure.         Objective    /46 (BP Location: Right arm, Patient Position: Sitting, Cuff Size: Adult Regular)   Pulse 61   Temp 97  F (36.1  C) (Oral)   Resp 16   Ht 1.702 m (5' 7\")   Wt 66.9 kg (147 lb 6.4 oz)   SpO2 100%   BMI 23.09 kg/m    Body mass index is 23.09 kg/m .    Wt Readings from Last 5 Encounters:   10/11/23 66.9 kg (147 lb 6.4 oz)   10/08/23 68.6 kg (151 lb 3.2 oz)   09/11/23 68.5 kg (151 lb)   09/08/23 66.7 kg (147 lb)   05/25/23 69.5 kg (153 lb 3.2 oz)       Physical Exam   GENERAL: healthy, alert and no distress  RESP: lungs clear to auscultation - no rales, rhonchi or wheezes  CV: regular rate and rhythm, normal S1 S2. Grade II/VI systolic murmur. 1-2+ b/l LE edema  Skin: right femoral/ groin access site with diffuse ecchymosis. No erythema, warmth.                       "

## 2023-10-12 ENCOUNTER — TELEPHONE (OUTPATIENT)
Dept: INTERNAL MEDICINE | Facility: CLINIC | Age: 83
End: 2023-10-12
Payer: MEDICARE

## 2023-10-12 NOTE — TELEPHONE ENCOUNTER
Spoke with pt and relayed below to pt. Pt verbalized understanding. Will reach out to clinic to report if he has weight gain.     Pt states he is being followed at [facility] as [facility]. They had recommended an MRI but due to his pacemaker, this is not yet finalized or scheduled.     Advised pt to reach back out with any new information/updates and if he has weight gain as mentioned below.     Pt thanked for the call.

## 2023-10-12 NOTE — TELEPHONE ENCOUNTER
----- Message from Miriam Spann NP sent at 10/11/2023  5:49 PM CDT -----  Call patient with lab results (he does not reliably use his [employer] account): his creatinine has increased to 3.29 (it was 2.53 when he left the hospital). I would like him to stop taking torsemide and continue to monitor his weight. He should let Dr. Mayorga know if he has more than 3 lb weight gain overnight or 5 lb weight gain in a week after stopping the torsemide.     His blood count is stable/improving.

## 2023-11-09 ENCOUNTER — VIRTUAL VISIT (OUTPATIENT)
Dept: INTERNAL MEDICINE | Facility: CLINIC | Age: 83
End: 2023-11-09
Payer: MEDICARE

## 2023-11-09 DIAGNOSIS — I38 VALVULAR HEART DISEASE: Primary | ICD-10-CM

## 2023-11-09 PROCEDURE — 99442 PR PHYSICIAN TELEPHONE EVALUATION 11-20 MIN: CPT | Mod: 95 | Performed by: INTERNAL MEDICINE

## 2023-11-09 NOTE — PROGRESS NOTES
Trenton Nova is a 83 year oldwho is being evaluated via a billable telephone visit.       What phone number would you like to be contacted at? [phone]      Assessment & Plan  Valvular heart disease.  Recent mitral valve clipping and now needs per cardiology recommendation tricuspid valve replacement.  Symptoms include shortness of breath postprandial satiety nausea abdominal distention and leg swelling.  Symptoms consistent with right heart failure [place] hypertension and tricuspid regurgitation.  Discussed at good advised the patient to have tricuspid valve replacement.  Perhaps the transcatheter approach.  And 11 minutes  spent on the date of the encounter doing chart review, patient visit and documentation I spoke with the patient directly 11 minutes on the phone.       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  Status post successful Whipple procedure multiple years without evidence of recurrence.  Feels weak after mitral valve replacement and reassurance given.  Cardiology team says he needs tricuspid valve replacement for reasons as outlined above.  I encouraged the patient to proceed ahead with tricuspid valve replacement hopefully through a transcatheter methodology.  We had a good discussion.     Review of Systems   No blood in stool or urine denies chest pain short of breath however.  No syncopal spells or palpitations.  Medication list reviewed and reconciled in the chart.       Sarwat Mayorga MD

## 2023-11-10 ENCOUNTER — TRANSFERRED RECORDS (OUTPATIENT)
Dept: HEALTH INFORMATION MANAGEMENT | Facility: CLINIC | Age: 83
End: 2023-11-10
Payer: MEDICARE

## 2023-12-13 ENCOUNTER — TRANSFERRED RECORDS (OUTPATIENT)
Dept: HEALTH INFORMATION MANAGEMENT | Facility: CLINIC | Age: 83
End: 2023-12-13

## 2024-01-27 ENCOUNTER — HEALTH MAINTENANCE LETTER (OUTPATIENT)
Age: 84
End: 2024-01-27

## 2024-04-24 ENCOUNTER — TRANSFERRED RECORDS (OUTPATIENT)
Dept: HEALTH INFORMATION MANAGEMENT | Facility: CLINIC | Age: 84
End: 2024-04-24
Payer: MEDICARE

## 2024-06-15 ENCOUNTER — HEALTH MAINTENANCE LETTER (OUTPATIENT)
Age: 84
End: 2024-06-15

## 2024-08-24 ENCOUNTER — HEALTH MAINTENANCE LETTER (OUTPATIENT)
Age: 84
End: 2024-08-24

## 2024-11-02 ENCOUNTER — HEALTH MAINTENANCE LETTER (OUTPATIENT)
Age: 84
End: 2024-11-02

## 2025-03-01 ENCOUNTER — HEALTH MAINTENANCE LETTER (OUTPATIENT)
Age: 85
End: 2025-03-01

## 2025-06-15 ENCOUNTER — HEALTH MAINTENANCE LETTER (OUTPATIENT)
Age: 85
End: 2025-06-15